# Patient Record
Sex: MALE | Race: WHITE | NOT HISPANIC OR LATINO | Employment: OTHER | URBAN - METROPOLITAN AREA
[De-identification: names, ages, dates, MRNs, and addresses within clinical notes are randomized per-mention and may not be internally consistent; named-entity substitution may affect disease eponyms.]

---

## 2017-01-04 RX ORDER — INSULIN GLARGINE 100 [IU]/ML
INJECTION, SOLUTION SUBCUTANEOUS
Qty: 45 ML | Refills: 4 | Status: SHIPPED | OUTPATIENT
Start: 2017-01-04 | End: 2018-05-11 | Stop reason: SDUPTHER

## 2017-01-16 ENCOUNTER — LAB VISIT (OUTPATIENT)
Dept: LAB | Facility: HOSPITAL | Age: 77
End: 2017-01-16
Attending: INTERNAL MEDICINE
Payer: MEDICARE

## 2017-01-16 DIAGNOSIS — E13.9 DIABETES MELLITUS DUE TO ABNORMAL INSULIN: ICD-10-CM

## 2017-01-16 LAB
ALBUMIN SERPL BCP-MCNC: 3.9 G/DL
ALP SERPL-CCNC: 80 U/L
ALT SERPL W/O P-5'-P-CCNC: 53 U/L
ANION GAP SERPL CALC-SCNC: 7 MMOL/L
AST SERPL-CCNC: 43 U/L
BASOPHILS # BLD AUTO: 0.01 K/UL
BASOPHILS NFR BLD: 0.2 %
BILIRUB SERPL-MCNC: 0.4 MG/DL
BUN SERPL-MCNC: 16 MG/DL
CALCIUM SERPL-MCNC: 9.8 MG/DL
CHLORIDE SERPL-SCNC: 103 MMOL/L
CO2 SERPL-SCNC: 32 MMOL/L
CREAT SERPL-MCNC: 1 MG/DL
DIFFERENTIAL METHOD: ABNORMAL
EOSINOPHIL # BLD AUTO: 0.1 K/UL
EOSINOPHIL NFR BLD: 1.7 %
ERYTHROCYTE [DISTWIDTH] IN BLOOD BY AUTOMATED COUNT: 14.3 %
EST. GFR  (AFRICAN AMERICAN): >60 ML/MIN/1.73 M^2
EST. GFR  (NON AFRICAN AMERICAN): >60 ML/MIN/1.73 M^2
GLUCOSE SERPL-MCNC: 189 MG/DL
HCT VFR BLD AUTO: 38.9 %
HGB BLD-MCNC: 12.8 G/DL
LYMPHOCYTES # BLD AUTO: 1.4 K/UL
LYMPHOCYTES NFR BLD: 21.9 %
MCH RBC QN AUTO: 32 PG
MCHC RBC AUTO-ENTMCNC: 32.9 %
MCV RBC AUTO: 97 FL
MONOCYTES # BLD AUTO: 0.5 K/UL
MONOCYTES NFR BLD: 7.6 %
NEUTROPHILS # BLD AUTO: 4.3 K/UL
NEUTROPHILS NFR BLD: 68.3 %
PLATELET # BLD AUTO: 187 K/UL
PMV BLD AUTO: 11.9 FL
POTASSIUM SERPL-SCNC: 4.9 MMOL/L
PROT SERPL-MCNC: 6.9 G/DL
RBC # BLD AUTO: 4 M/UL
SODIUM SERPL-SCNC: 142 MMOL/L
WBC # BLD AUTO: 6.34 K/UL

## 2017-01-16 PROCEDURE — 36415 COLL VENOUS BLD VENIPUNCTURE: CPT | Mod: PO

## 2017-01-16 PROCEDURE — 80053 COMPREHEN METABOLIC PANEL: CPT

## 2017-01-16 PROCEDURE — 83036 HEMOGLOBIN GLYCOSYLATED A1C: CPT

## 2017-01-16 PROCEDURE — 85025 COMPLETE CBC W/AUTO DIFF WBC: CPT

## 2017-01-17 ENCOUNTER — TELEPHONE (OUTPATIENT)
Dept: ORTHOPEDICS | Facility: CLINIC | Age: 77
End: 2017-01-17

## 2017-01-17 DIAGNOSIS — M17.0 PRIMARY OSTEOARTHRITIS OF BOTH KNEES: Primary | ICD-10-CM

## 2017-01-17 LAB
ESTIMATED AVG GLUCOSE: 171 MG/DL
HBA1C MFR BLD HPLC: 7.6 %

## 2017-01-17 RX ORDER — HYALURONATE SODIUM 20 MG/2 ML
40 SYRINGE (ML) INTRAARTICULAR WEEKLY
Status: SHIPPED | OUTPATIENT
Start: 2017-01-17 | End: 2017-02-07

## 2017-01-17 NOTE — TELEPHONE ENCOUNTER
----- Message from Carlos Alberto Galo PA-C sent at 1/17/2017  1:20 PM CST -----  Contact: self @home  Done     ----- Message -----     From: Myla Kevin MA     Sent: 1/16/2017   3:16 PM       To: Carlos Alberto Galo PA-C    He needs an order for bilateral viscosupplementation    ----- Message -----     From: Carlos Alberto Galo PA-C     Sent: 1/16/2017   3:07 PM       To: Myla Kevin MA        ----- Message -----     From: Kristina Nova MA     Sent: 1/16/2017   1:41 PM       To: Carlos Alberto Galo PA-C        ----- Message -----     From: Trista Landin     Sent: 1/16/2017   1:17 PM       To: Fady Saba Staff    sepideh knee euflexxa can he come in on 1/23

## 2017-01-18 RX ORDER — GABAPENTIN 300 MG/1
600 CAPSULE ORAL 3 TIMES DAILY
Qty: 640 CAPSULE | Refills: 3 | Status: SHIPPED | OUTPATIENT
Start: 2017-01-18 | End: 2017-07-05 | Stop reason: SDUPTHER

## 2017-01-18 NOTE — TELEPHONE ENCOUNTER
----- Message from Hillary Castaneda sent at 1/18/2017 12:42 PM CST -----  Contact: Self/[phone]   Type: Rx    Name of medication(s): gabapentin (NEURONTIN) 300 MG capsule    Is this a refill? New rx?refill    Who prescribed medication?    Pharmacy Name, Phone, & Location:[facility] on file #[id number]    Comments:Patient is calling to request a refill on medication above. He states he is completely out of the medication. Pt would like to speak with someone in the office. Please call and advise.    Thank you!

## 2017-01-22 RX ORDER — TIZANIDINE 2 MG/1
TABLET ORAL
Qty: 270 TABLET | Refills: 4 | Status: ON HOLD | OUTPATIENT
Start: 2017-01-22 | End: 2019-06-18

## 2017-01-23 ENCOUNTER — LAB VISIT (OUTPATIENT)
Dept: LAB | Facility: HOSPITAL | Age: 77
End: 2017-01-23
Attending: INTERNAL MEDICINE
Payer: MEDICARE

## 2017-01-23 ENCOUNTER — OFFICE VISIT (OUTPATIENT)
Dept: INTERNAL MEDICINE | Facility: CLINIC | Age: 77
End: 2017-01-23
Payer: MEDICARE

## 2017-01-23 VITALS
WEIGHT: 236.75 LBS | BODY MASS INDEX: 33.89 KG/M2 | HEART RATE: 69 BPM | DIASTOLIC BLOOD PRESSURE: 80 MMHG | HEIGHT: 70 IN | SYSTOLIC BLOOD PRESSURE: 118 MMHG | OXYGEN SATURATION: 95 %

## 2017-01-23 DIAGNOSIS — R74.01 TRANSAMINITIS: ICD-10-CM

## 2017-01-23 DIAGNOSIS — Z79.4 DIABETES MELLITUS DUE TO UNDERLYING CONDITION WITH DIABETIC POLYNEUROPATHY, WITH LONG-TERM CURRENT USE OF INSULIN: ICD-10-CM

## 2017-01-23 DIAGNOSIS — I21.4 NON-STEMI (NON-ST ELEVATED MYOCARDIAL INFARCTION): ICD-10-CM

## 2017-01-23 DIAGNOSIS — I48.0 PAROXYSMAL ATRIAL FIBRILLATION: ICD-10-CM

## 2017-01-23 DIAGNOSIS — E08.42 DIABETES MELLITUS DUE TO UNDERLYING CONDITION WITH DIABETIC POLYNEUROPATHY: ICD-10-CM

## 2017-01-23 DIAGNOSIS — I10 ESSENTIAL HYPERTENSION: ICD-10-CM

## 2017-01-23 DIAGNOSIS — I48.92 ATRIAL FLUTTER: ICD-10-CM

## 2017-01-23 DIAGNOSIS — M54.50 CHRONIC LOW BACK PAIN WITHOUT SCIATICA, UNSPECIFIED BACK PAIN LATERALITY: ICD-10-CM

## 2017-01-23 DIAGNOSIS — I50.43 ACUTE ON CHRONIC SYSTOLIC AND DIASTOLIC HEART FAILURE, NYHA CLASS 4: ICD-10-CM

## 2017-01-23 DIAGNOSIS — E08.42 DIABETES MELLITUS DUE TO UNDERLYING CONDITION WITH DIABETIC POLYNEUROPATHY, WITH LONG-TERM CURRENT USE OF INSULIN: ICD-10-CM

## 2017-01-23 DIAGNOSIS — I49.01 VENTRICULAR FIBRILLATION: ICD-10-CM

## 2017-01-23 DIAGNOSIS — I50.22 CHRONIC SYSTOLIC HEART FAILURE: ICD-10-CM

## 2017-01-23 DIAGNOSIS — I25.5 CARDIOMYOPATHY, ISCHEMIC: Primary | ICD-10-CM

## 2017-01-23 DIAGNOSIS — G89.29 CHRONIC LOW BACK PAIN WITHOUT SCIATICA, UNSPECIFIED BACK PAIN LATERALITY: ICD-10-CM

## 2017-01-23 DIAGNOSIS — Z45.02 IMPLANTABLE CARDIOVERTER-DEFIBRILLATOR DISCHARGE: ICD-10-CM

## 2017-01-23 DIAGNOSIS — I48.91 ATRIAL FIBRILLATION: ICD-10-CM

## 2017-01-23 DIAGNOSIS — I70.0 ATHEROSCLEROSIS OF AORTIC ARCH: ICD-10-CM

## 2017-01-23 DIAGNOSIS — I25.10 CORONARY ARTERY DISEASE INVOLVING NATIVE CORONARY ARTERY OF NATIVE HEART WITHOUT ANGINA PECTORIS: ICD-10-CM

## 2017-01-23 DIAGNOSIS — Z95.810 AUTOMATIC IMPLANTABLE CARDIOVERTER-DEFIBRILLATOR IN SITU: ICD-10-CM

## 2017-01-23 DIAGNOSIS — I25.5 CARDIOMYOPATHY, ISCHEMIC: ICD-10-CM

## 2017-01-23 DIAGNOSIS — Z98.61 POST PTCA: ICD-10-CM

## 2017-01-23 DIAGNOSIS — E78.00 PURE HYPERCHOLESTEROLEMIA: ICD-10-CM

## 2017-01-23 DIAGNOSIS — E78.5 HYPERLIPIDEMIA: ICD-10-CM

## 2017-01-23 LAB
ALBUMIN SERPL BCP-MCNC: 3.8 G/DL
ALP SERPL-CCNC: 77 U/L
ALT SERPL W/O P-5'-P-CCNC: 50 U/L
ANION GAP SERPL CALC-SCNC: 8 MMOL/L
AST SERPL-CCNC: 51 U/L
BASOPHILS # BLD AUTO: 0.02 K/UL
BASOPHILS NFR BLD: 0.4 %
BILIRUB SERPL-MCNC: 0.5 MG/DL
BUN SERPL-MCNC: 14 MG/DL
CALCIUM SERPL-MCNC: 9.2 MG/DL
CHLORIDE SERPL-SCNC: 102 MMOL/L
CHOLEST/HDLC SERPL: 4.6 {RATIO}
CO2 SERPL-SCNC: 30 MMOL/L
CREAT SERPL-MCNC: 1.1 MG/DL
DIFFERENTIAL METHOD: ABNORMAL
EOSINOPHIL # BLD AUTO: 0.1 K/UL
EOSINOPHIL NFR BLD: 2 %
ERYTHROCYTE [DISTWIDTH] IN BLOOD BY AUTOMATED COUNT: 14.1 %
EST. GFR  (AFRICAN AMERICAN): >60 ML/MIN/1.73 M^2
EST. GFR  (NON AFRICAN AMERICAN): >60 ML/MIN/1.73 M^2
GLUCOSE SERPL-MCNC: 277 MG/DL
HCT VFR BLD AUTO: 38.4 %
HDL/CHOLESTEROL RATIO: 21.6 %
HDLC SERPL-MCNC: 185 MG/DL
HDLC SERPL-MCNC: 40 MG/DL
HGB BLD-MCNC: 12.9 G/DL
LDLC SERPL CALC-MCNC: 103.6 MG/DL
LYMPHOCYTES # BLD AUTO: 1.5 K/UL
LYMPHOCYTES NFR BLD: 26.7 %
MCH RBC QN AUTO: 32.5 PG
MCHC RBC AUTO-ENTMCNC: 33.6 %
MCV RBC AUTO: 97 FL
MONOCYTES # BLD AUTO: 0.6 K/UL
MONOCYTES NFR BLD: 11.7 %
NEUTROPHILS # BLD AUTO: 3.2 K/UL
NEUTROPHILS NFR BLD: 58.8 %
NONHDLC SERPL-MCNC: 145 MG/DL
PLATELET # BLD AUTO: 178 K/UL
PMV BLD AUTO: 11.9 FL
POTASSIUM SERPL-SCNC: 4.7 MMOL/L
PROT SERPL-MCNC: 6.9 G/DL
RBC # BLD AUTO: 3.97 M/UL
SODIUM SERPL-SCNC: 140 MMOL/L
TRIGL SERPL-MCNC: 207 MG/DL
WBC # BLD AUTO: 5.47 K/UL

## 2017-01-23 PROCEDURE — 99999 PR PBB SHADOW E&M-EST. PATIENT-LVL II: CPT | Mod: PBBFAC,,, | Performed by: INTERNAL MEDICINE

## 2017-01-23 PROCEDURE — 1160F RVW MEDS BY RX/DR IN RCRD: CPT | Mod: S$GLB,,, | Performed by: INTERNAL MEDICINE

## 2017-01-23 PROCEDURE — 99499 UNLISTED E&M SERVICE: CPT | Mod: S$GLB,,, | Performed by: INTERNAL MEDICINE

## 2017-01-23 PROCEDURE — 3074F SYST BP LT 130 MM HG: CPT | Mod: S$GLB,,, | Performed by: INTERNAL MEDICINE

## 2017-01-23 PROCEDURE — 1157F ADVNC CARE PLAN IN RCRD: CPT | Mod: S$GLB,,, | Performed by: INTERNAL MEDICINE

## 2017-01-23 PROCEDURE — 1159F MED LIST DOCD IN RCRD: CPT | Mod: S$GLB,,, | Performed by: INTERNAL MEDICINE

## 2017-01-23 PROCEDURE — 99214 OFFICE O/P EST MOD 30 MIN: CPT | Mod: S$GLB,,, | Performed by: INTERNAL MEDICINE

## 2017-01-23 PROCEDURE — 3079F DIAST BP 80-89 MM HG: CPT | Mod: S$GLB,,, | Performed by: INTERNAL MEDICINE

## 2017-01-23 NOTE — PROGRESS NOTES
CHIEF COMPLAINT: Follow up of chf, diabetes, hypertension, a fib, hyperlipidemia, back pain      HISTORY OF PRESENT ILLNESS: This is a 76-year-old man who presents for follow up of above. His back is doing well.  HE is doing stretching at home which helps. HE has some pain if he stands too long. He takes tizanidine 2 mg three times daily as needed for muscle spasms in back      He is currently taking pradaxa 150 mg twice daily for anticoagulation for a fib/flutter. NO edema. He has mild dyspnea on exertion. No chest pain or palpitations. He continues to take Lasix 40 mg once daily, carvediolol 25 mg bid and Entresto 97/103 twice daily and KCL 10 meq 1 tablet daily for his hypertension and CHF. He is also on amiodarone 200 mg twice daily for his atrial flutter. Aspirin 81 mg daily.urgently.        Mood is stable on paxil 10 mg nightly. He was born with a bad temper. Temper is a little better. He is sleeping well. HE continues to take trazodone 50 mg 2 tablets at bedtime, temazepam 15 mg at bedtime and melatonin 5 mg at bedtime. No anxiety or depression now. HE has  from his wife which has helped his mood.       His blood sugars are better. Saw diabetic education. He currently takes Lantus 40 units at night, and NovoLog 16-20 units with meals. He denies any polydipsia, polyuria, hypoglycemia.       He has hyperlipidemia, currently off Lipitor 20 mg daily      His liver enzymes have been mildly elevated. Dr Palm stopped fenofibrate due to the elevated liver enzymes. He had a liver biopsy and the elevated liver enzymes are NOT due to amiodarone. No further work up is needed at this time.       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 Shooting pain has resolved with increasing dose of gabapentin.     No nausea, vomiting, constipation, diarrhea, dysuria, hematuria, sinus congestion, sore throat, headache.      PAST MEDICAL HISTORY:    1. Hypertension, coronary artery  "disease, status post stenting.    2. Ischemic cardiomyopathy.    3. Atrial flutter.    4. Diabetes mellitus.    5. ICD placed 02/22/2012.    6. Hyperlipidemia.    7. Osteoarthritis of the knees.  8. Atrial fibrillation  9. Transaminitis      PAST SURGICAL HISTORY: Appendectomy in 2003.       SOCIAL HISTORY: He is a former smoker, quit in 1987. Does not drink alcohol.    .       PHYSICAL EXAMINATION:    Visit Vitals    /80    Pulse 69    Ht 5' 10" (1.778 m)    Wt 107.4 kg (236 lb 12.4 oz)    SpO2 95%    BMI 33.97 kg/m2       GENERAL: He is alert, oriented, no apparent distress. Affect within normal limits.    HEENT: Conjunctivae anicteric. Pupils are equal, round and reactive to light.    Tympanic membranes are clear. Oropharynx is clear.    NECK: Supple. No cervical lymphadenopathy, no thyroid enlargement.    RESPIRATORY: Effort normal. Lungs are clear to auscultation.    HEART: Regular rate and rhythm without murmurs, gallops or rubs. No lower extremity edema.    ABDOMEN: soft, non distended, non tender, bowel sounds present, no hepatosplenomgaly  BREAST: no abn seen, no nodules palpated, no axillary lad     Labs 1/16/17 - hemoglobin A1C 7.6 , CBC acceptable, CMP with AST of 43, ALT of 53 (better)              ASSESSMENT AND PLAN:.     1. Back pain - better with stretching  2. CHF - doing well - stable  3. Coronary artery disease - stable - to follow up with Cardiology.    4. Atrial flutter - s/p cardioversion - stable    5. Diabetes mellitus - stable. Watch blood sugars  5. History of anemia on last blood work, stable    6. Hyperlipidemia. At goal off atorvastatin  7. Transaminitis -better, watch closely    8. Screening - PSA 3/16,   9. Diabetic neuropathy -stable. gabapentin 300 mg 2 capsules three times daily    10. Anxiety and depression - stable  11. Aortic arch atherosclerosis - modifying risk factors  I will see him back in 4 months, sooner if problems arise.         PSA 3/16  Colonoscopy " - declines  Influenza 9/16  Prevnar 9/16

## 2017-01-30 ENCOUNTER — HOSPITAL ENCOUNTER (OUTPATIENT)
Dept: CARDIOLOGY | Facility: CLINIC | Age: 77
Discharge: HOME | End: 2017-01-30
Payer: MEDICARE

## 2017-01-30 ENCOUNTER — OFFICE VISIT (OUTPATIENT)
Dept: CARDIOLOGY | Facility: CLINIC | Age: 77
End: 2017-01-30
Payer: MEDICARE

## 2017-01-30 VITALS
HEIGHT: 70 IN | WEIGHT: 238.31 LBS | HEART RATE: 60 BPM | DIASTOLIC BLOOD PRESSURE: 57 MMHG | BODY MASS INDEX: 34.12 KG/M2 | SYSTOLIC BLOOD PRESSURE: 102 MMHG

## 2017-01-30 DIAGNOSIS — R74.01 TRANSAMINITIS: ICD-10-CM

## 2017-01-30 DIAGNOSIS — I25.5 CARDIOMYOPATHY, ISCHEMIC: ICD-10-CM

## 2017-01-30 DIAGNOSIS — I50.22 CHRONIC SYSTOLIC HEART FAILURE: ICD-10-CM

## 2017-01-30 DIAGNOSIS — I25.10 CORONARY ARTERY DISEASE INVOLVING NATIVE CORONARY ARTERY OF NATIVE HEART WITHOUT ANGINA PECTORIS: ICD-10-CM

## 2017-01-30 DIAGNOSIS — I10 ESSENTIAL HYPERTENSION: ICD-10-CM

## 2017-01-30 DIAGNOSIS — I50.43 ACUTE ON CHRONIC SYSTOLIC AND DIASTOLIC HEART FAILURE, NYHA CLASS 4: ICD-10-CM

## 2017-01-30 DIAGNOSIS — E78.5 HYPERLIPIDEMIA: ICD-10-CM

## 2017-01-30 DIAGNOSIS — I48.91 ATRIAL FIBRILLATION: ICD-10-CM

## 2017-01-30 DIAGNOSIS — Z98.61 POST PTCA: ICD-10-CM

## 2017-01-30 DIAGNOSIS — Z95.810 AUTOMATIC IMPLANTABLE CARDIOVERTER-DEFIBRILLATOR IN SITU: ICD-10-CM

## 2017-01-30 DIAGNOSIS — I48.92 ATRIAL FLUTTER: ICD-10-CM

## 2017-01-30 DIAGNOSIS — E08.42 DIABETES MELLITUS DUE TO UNDERLYING CONDITION WITH DIABETIC POLYNEUROPATHY: ICD-10-CM

## 2017-01-30 DIAGNOSIS — I49.01 VENTRICULAR FIBRILLATION: ICD-10-CM

## 2017-01-30 DIAGNOSIS — I48.0 PAROXYSMAL ATRIAL FIBRILLATION: Primary | ICD-10-CM

## 2017-01-30 DIAGNOSIS — Z45.02 IMPLANTABLE CARDIOVERTER-DEFIBRILLATOR DISCHARGE: ICD-10-CM

## 2017-01-30 DIAGNOSIS — I21.4 NON-STEMI (NON-ST ELEVATED MYOCARDIAL INFARCTION): ICD-10-CM

## 2017-01-30 PROCEDURE — 1157F ADVNC CARE PLAN IN RCRD: CPT | Mod: S$GLB,,, | Performed by: INTERNAL MEDICINE

## 2017-01-30 PROCEDURE — 93000 ELECTROCARDIOGRAM COMPLETE: CPT | Mod: S$GLB,,, | Performed by: INTERNAL MEDICINE

## 2017-01-30 PROCEDURE — 1160F RVW MEDS BY RX/DR IN RCRD: CPT | Mod: S$GLB,,, | Performed by: INTERNAL MEDICINE

## 2017-01-30 PROCEDURE — 1159F MED LIST DOCD IN RCRD: CPT | Mod: S$GLB,,, | Performed by: INTERNAL MEDICINE

## 2017-01-30 PROCEDURE — 99499 UNLISTED E&M SERVICE: CPT | Mod: S$GLB,,, | Performed by: INTERNAL MEDICINE

## 2017-01-30 PROCEDURE — 99214 OFFICE O/P EST MOD 30 MIN: CPT | Mod: S$GLB,,, | Performed by: INTERNAL MEDICINE

## 2017-01-30 PROCEDURE — 99999 PR PBB SHADOW E&M-EST. PATIENT-LVL IV: CPT | Mod: PBBFAC,,, | Performed by: INTERNAL MEDICINE

## 2017-01-30 PROCEDURE — 1126F AMNT PAIN NOTED NONE PRSNT: CPT | Mod: S$GLB,,, | Performed by: INTERNAL MEDICINE

## 2017-01-30 PROCEDURE — 3074F SYST BP LT 130 MM HG: CPT | Mod: S$GLB,,, | Performed by: INTERNAL MEDICINE

## 2017-01-30 PROCEDURE — 3078F DIAST BP <80 MM HG: CPT | Mod: S$GLB,,, | Performed by: INTERNAL MEDICINE

## 2017-01-30 NOTE — PROGRESS NOTES
Subjective:   Patient ID:  Madhav Cortez is a 76 y.o. male who presents for follow-up of Essential hypertension (6 months fu)      HPI:  He has been doing well since his last visit. Madhav Cortez denies any chest pain, PND, orthopnea, palpitations, leg edema, or syncope. He has stable class II dyspnea. He has been tolerating Entresto. His last echo was 3/29 and showed a dilated LV with an LVEF of 10% ( LVEDD of 6.8 LVESD 6.6 cm.) His ICD was interrogated in December and showed LV pacing 99% of the time. He states he has been following the same vegan diet as Josue Merritt. On further questioning, he has has been eating at a Chinese buffet multiple days a week at the [employer]. His weight is up 10 lbs from last year and 6 lbs since the summer.     ECG: Bi-V pacing    Past Medical History   Diagnosis Date    *Atrial fibrillation     *Atrial flutter     Acute exacerbation of CHF (congestive heart failure) 8/2/2013    Anticoagulant long-term use     Anxiety     Arthritis     Atrial flutter     Back pain     Cardiomyopathy     Cataract     Coronary artery disease     Depression     Diabetes mellitus     Heart bloc     Hepatitis B     Hyperlipidemia 4/1/2014    Hypertension     Myocardial infarction     Non-STEMI (non-ST elevated myocardial infarction) 5/26/2013    Nuclear sclerosis - Both Eyes 2/18/2013    Post PTCA 8/7/2012    Presence of biventricular AICD 2/23/2016    Tobacco dependence     Transaminitis 4/1/2014    Ventricular tachycardia        Past Surgical History   Procedure Laterality Date    Coronary angioplasty with stent placement      Appendectomy      Radiofrequency ablation      Fracture surgery       L arm    Cardiac defibrillator placement      Cardiac defibrillator placement      Tonsillectomy      Vascular surgery      Insert / replace / remove pacemaker  12/15     bi-V upgrade    Colonoscopy      Steroid injection knee Bilateral      received about every 4 months  "      Social History     Social History    Marital status:      Spouse name: N/A    Number of children: N/A    Years of education: N/A     Social History Main Topics    Smoking status: Former Smoker     Quit date: 7/6/1987    Smokeless tobacco: Never Used      Comment: 25 years ago    Alcohol use No    Drug use: No    Sexual activity: No     Other Topics Concern    Not on file     Social History Narrative    Retired. Spends a lot of time gambling in the [employer]       Family History   Problem Relation Age of Onset    Cancer Father     Diabetes Father     Bladder Cancer Father     Diabetes Mother     Heart attack Mother      "weak heart"    Bipolar disorder Son     Cancer Paternal Grandmother     Heart disease Maternal Grandmother     No Known Problems Maternal Grandfather     Cancer Paternal Grandfather     No Known Problems Daughter     No Known Problems Daughter     No Known Problems Son     No Known Problems Son     Cancer Maternal Uncle     No Known Problems Maternal Aunt     No Known Problems Paternal Aunt     No Known Problems Paternal Uncle     No Known Problems Sister     No Known Problems Brother     Amblyopia Neg Hx     Blindness Neg Hx     Cataracts Neg Hx     Glaucoma Neg Hx     Hypertension Neg Hx     Macular degeneration Neg Hx     Retinal detachment Neg Hx     Strabismus Neg Hx     Stroke Neg Hx     Thyroid disease Neg Hx     Liver disease Neg Hx     Melanoma Neg Hx     Psoriasis Neg Hx     Lupus Neg Hx     Acne Neg Hx     Eczema Neg Hx        Patient's Medications   New Prescriptions    No medications on file   Previous Medications    ACCU-CHEK JIE MISC        ACCU-CHEK SMARTVIEW TEST STRIP STRP    CHECK BLOOD SUGAR THREE TIMES DAILY    AMIODARONE (PACERONE) 200 MG TAB    TAKE 1 TABLET TWICE DAILY    ASCORBIC ACID (VITAMIN C) 500 MG TABLET    Take 1,500 mg by mouth 2 (two) times daily.     ASPIRIN 81 MG CHEW    Take 1 tablet (81 mg total) by mouth " "once daily.    BETA CAROTENE-C-E-LUTEIN-MIN29 5,000-60-30-2 UNIT-MG-UNIT-MG TAB    Take 1 capsule by mouth once daily.     BLOOD-GLUCOSE METER KIT    Accu check monique meter Use as instructed    CARVEDILOL (COREG) 25 MG TABLET    TAKE 1 TABLET TWICE DAILY    DABIGATRAN ETEXILATE (PRADAXA) 150 MG CAP    Take 1 capsule (150 mg total) by mouth 2 (two) times daily. "Do NOT break, chew, or open capsules."    FUROSEMIDE (LASIX) 40 MG TABLET    Take 1 tablet (40 mg total) by mouth once daily.    GABAPENTIN (NEURONTIN) 300 MG CAPSULE    Take 2 capsules (600 mg total) by mouth 3 (three) times daily.    GLUCOSAMINE-CHONDROITIN 500-400 MG TABLET    Take 1 tablet by mouth 2 (two) times daily.    INSULIN ASPART (NOVOLOG FLEXPEN) 100 UNIT/ML INPN PEN    INJECT  16 UNITS SUBCUTANEOUSLY THREE TIMES DAILY WITH MEALS    LANTUS SOLOSTAR 100 UNIT/ML (3 ML) INPN PEN    INJECT 40 UNITS INTO THE SKIN EVERY EVENING.    MAGNESIUM OXIDE (MAG-OX) 400 MG TABLET    Take 400 mg by mouth 2 (two) times daily.     MELATONIN 5 MG TAB    Take 1 tablet by mouth every evening.     NITROGLYCERIN (NITROSTAT) 0.4 MG SL TABLET    Place 1 tablet (0.4 mg total) under the tongue every 5 (five) minutes as needed for Chest pain.    PAROXETINE (PAXIL) 10 MG TABLET    TAKE 1 TABLET EVERY EVENING.    POTASSIUM CHLORIDE (MICRO-K) 10 MEQ CPSR    TAKE 2 CAPSULES ONE TIME DAILY    RIBOSE ORAL    Take 1 tablet by mouth once daily.     SACUBITRIL-VALSARTAN (ENTRESTO)  MG PER TABLET    Take one tablet twice daily    SENNA-DOCUSATE 8.6-50 MG (PERICOLACE) 8.6-50 MG PER TABLET    Take 1 tablet by mouth 2 (two) times daily.    TEMAZEPAM (RESTORIL) 15 MG CAP    TAKE 1 CAPSULE EVERY EVENING    TIZANIDINE (ZANAFLEX) 2 MG TABLET    TAKE 1 TABLET EVERY 8 HOURS AS NEEDED FOR MUSCLE PAIN    TRAZODONE (DESYREL) 50 MG TABLET    TAKE 1 TO 2 TABLETS EVERY EVENING    UBIDECARENONE (COQ-10 ORAL)    Take 700 mg by mouth once daily. Takes 100mg tablet at lunch, and 600mg at dinner " "  Modified Medications    No medications on file   Discontinued Medications    No medications on file       Review of Systems   Constitution: Positive for weight gain. Negative for weakness and malaise/fatigue.   HENT: Negative for headaches and hearing loss.    Eyes: Negative for visual disturbance.   Cardiovascular: Positive for dyspnea on exertion. Negative for chest pain, claudication, leg swelling, near-syncope, orthopnea, palpitations, paroxysmal nocturnal dyspnea and syncope.   Respiratory: Negative for cough, shortness of breath, sleep disturbances due to breathing, snoring and wheezing.    Endocrine: Negative for cold intolerance, heat intolerance, polydipsia, polyphagia and polyuria.   Hematologic/Lymphatic: Negative for bleeding problem. Does not bruise/bleed easily.   Skin: Negative for rash and suspicious lesions.   Musculoskeletal: Negative for arthritis, falls, joint pain, muscle weakness and myalgias.   Gastrointestinal: Negative for abdominal pain, change in bowel habit, constipation, diarrhea, heartburn, hematochezia, melena and nausea.   Genitourinary: Negative for hematuria and nocturia.   Neurological: Negative for excessive daytime sleepiness, dizziness, light-headedness and loss of balance.   Psychiatric/Behavioral: Negative for depression. The patient is not nervous/anxious.    Allergic/Immunologic: Negative for environmental allergies.       Visit Vitals    BP (!) 102/57 (BP Location: Left arm, Patient Position: Sitting, BP Method: Automatic)    Pulse 60    Ht 5' 10" (1.778 m)    Wt 108.1 kg (238 lb 5.1 oz)    BMI 34.2 kg/m2       Objective:   Physical Exam   Constitutional: He is oriented to person, place, and time. He appears well-developed and well-nourished.        HENT:   Head: Normocephalic and atraumatic.   Mouth/Throat: Oropharynx is clear and moist.   Eyes: Conjunctivae and EOM are normal. Pupils are equal, round, and reactive to light. No scleral icterus.   Neck: Normal range " of motion. Neck supple. No hepatojugular reflux and no JVD present. No tracheal deviation present. No thyromegaly present.   Cardiovascular: Normal rate, regular rhythm, normal heart sounds and intact distal pulses.  PMI is not displaced.    Pulses:       Carotid pulses are 2+ on the right side, and 2+ on the left side.       Radial pulses are 2+ on the right side, and 2+ on the left side.        Dorsalis pedis pulses are 2+ on the right side, and 2+ on the left side.        Posterior tibial pulses are 2+ on the right side, and 2+ on the left side.   Pulmonary/Chest: Effort normal and breath sounds normal.   Abdominal: Soft. Bowel sounds are normal. He exhibits no distension and no mass. There is no hepatosplenomegaly. There is no tenderness.   Musculoskeletal: He exhibits no edema or tenderness.   Lymphadenopathy:     He has no cervical adenopathy.   Neurological: He is alert and oriented to person, place, and time.   Skin: Skin is warm and dry. No rash noted. No cyanosis or erythema. Nails show no clubbing.   Psychiatric: He has a normal mood and affect. His speech is normal and behavior is normal.       Lab Results   Component Value Date     01/23/2017    K 4.7 01/23/2017     01/23/2017    CO2 30 (H) 01/23/2017    BUN 14 01/23/2017    CREATININE 1.1 01/23/2017     (H) 01/23/2017    HGBA1C 7.6 (H) 01/16/2017    MG 2.6 10/19/2015    AST 51 (H) 01/23/2017    ALT 50 (H) 01/23/2017    ALBUMIN 3.8 01/23/2017    PROT 6.9 01/23/2017    BILITOT 0.5 01/23/2017    WBC 5.47 01/23/2017    HGB 12.9 (L) 01/23/2017    HCT 38.4 (L) 01/23/2017    MCV 97 01/23/2017     01/23/2017    INR 1.1 11/09/2015    INR 1.7 (H) 10/06/2011    TSH 2.125 09/23/2016    CHOL 185 01/23/2017    HDL 40 01/23/2017    LDLCALC 103.6 01/23/2017    TRIG 207 (H) 01/23/2017     (H) 10/19/2015       Assessment:     1. Paroxysmal atrial fibrillation : He is on amiodarone and anticoagulated with pradaxa. He follows with   [facility].   2. Chronic systolic heart failure :He appears well compensated and euvolemic. Continue Entresto and carvedilol. Consider adding spironolactone at his next visit if his blood pressure allows. It is too low today. FC II, Stage C. We discussed eating a healthy, low sodium diet.   3. Cardiomyopathy, ischemic    4. Coronary artery disease involving native coronary artery of native heart without angina pectoris:  He is asymptomatic. Continue asa. Statin therapy was stopped due to elevated liver enzymes.   5. Essential hypertension : Blood pressure is at goal. I have made no changes.   6. Automatic implantable cardioverter-defibrillator in situ    7. Transaminitis :Improved after stopping his statin therapy, but remains minimally elevated. Seen by hepatology and thought to be secondary to amiodarone. Currently the benefit of VT suppression outweighs the risk of stopping the amiodarone.   8.      Ventricular tachycardia:  Continue amiodarone.    Plan:     Madhav was seen today for essential hypertension.    Diagnoses and all orders for this visit:    Paroxysmal atrial fibrillation  -     Comprehensive metabolic panel; Future  -     CBC auto differential; Future    Chronic systolic heart failure  -     Comprehensive metabolic panel; Future  -     CBC auto differential; Future    Cardiomyopathy, ischemic  -     Comprehensive metabolic panel; Future  -     CBC auto differential; Future    Coronary artery disease involving native coronary artery of native heart without angina pectoris  -     Comprehensive metabolic panel; Future  -     CBC auto differential; Future    Essential hypertension  -     Comprehensive metabolic panel; Future  -     CBC auto differential; Future    Automatic implantable cardioverter-defibrillator in situ  -     Comprehensive metabolic panel; Future  -     CBC auto differential; Future    Transaminitis  -     Comprehensive metabolic panel; Future  -     CBC auto differential;  Future        Thank you for allowing me to participate in this patient's care. Please do not hesitate to contact me with any questions or concerns.

## 2017-01-30 NOTE — MR AVS SNAPSHOT
[facility] - Cardiology  [address]  [place] [address]  Phone: [phone]                  Madhav Cortez   2017 11:00 AM   Office Visit    Description:  Male : 1940   Provider:  Kira Palm MD   Department:  [facility] - Cardiology           Reason for Visit     Essential hypertension           Diagnoses this Visit        Comments    Paroxysmal atrial fibrillation    -  Primary     Chronic systolic heart failure         Cardiomyopathy, ischemic         Coronary artery disease involving native coronary artery of native heart without angina pectoris         Essential hypertension         Automatic implantable cardioverter-defibrillator in situ         Transaminitis                To Do List           Future Appointments        Provider Department Dept Phone    3/20/2017 1:00 PM PACEMAKER, ICD [facility] [address] Arrhythmia [phone]    2017 1:00 PM Mirna Reyes MD [facility] - Internal Medicine [phone]      Goals (5 Years of Data)     None      Follow-Up and Disposition     Return in about 6 months (around 2017).      [facility] On Call     [facility] On Call Nurse [facility] Emergency Department Line -  Assistance  Registered nurses in the [facility] On Call Center provide clinical advisement, health education, appointment booking, and other advisory services.  Call for this free service at [phone].             Medications           Message regarding Medications     Verify the changes and/or additions to your medication regime listed below are the same as discussed with your clinician today.  If any of these changes or additions are incorrect, please notify your healthcare provider.             Verify that the below list of medications is an accurate representation of the medications you are currently taking.  If none reported, the list may be blank. If incorrect, please contact your healthcare provider. Carry this list with you in case of emergency.           Current Medications      "ACCU-CHEK JIE [facility]     ACCU-CHEK SMARTVIEW TEST STRIP Strp CHECK BLOOD SUGAR THREE TIMES DAILY    amiodarone (PACERONE) 200 MG Tab TAKE 1 TABLET TWICE DAILY    ascorbic acid (VITAMIN C) 500 MG tablet Take 1,500 mg by mouth 2 (two) times daily.     aspirin 81 MG Chew Take 1 tablet (81 mg total) by mouth once daily.    beta carotene-C-E-lutein-min29 5,000-60-30-2 unit-mg-unit-mg Tab Take 1 capsule by mouth once daily.     blood-glucose meter kit Accu check jie meter Use as instructed    carvedilol (COREG) 25 MG tablet TAKE 1 TABLET TWICE DAILY    dabigatran etexilate (PRADAXA) 150 mg Cap Take 1 capsule (150 mg total) by mouth 2 (two) times daily. "Do NOT break, chew, or open capsules."    furosemide (LASIX) 40 MG tablet Take 1 tablet (40 mg total) by mouth once daily.    gabapentin (NEURONTIN) 300 MG capsule Take 2 capsules (600 mg total) by mouth 3 (three) times daily.    glucosamine-chondroitin 500-400 mg tablet Take 1 tablet by mouth 2 (two) times daily.    insulin aspart (NOVOLOG FLEXPEN) 100 unit/mL InPn pen INJECT  16 UNITS SUBCUTANEOUSLY THREE TIMES DAILY WITH MEALS    LANTUS SOLOSTAR 100 unit/mL (3 mL) InPn pen INJECT 40 UNITS INTO THE SKIN EVERY EVENING.    magnesium oxide (MAG-OX) 400 mg tablet Take 400 mg by mouth 2 (two) times daily.     melatonin 5 mg Tab Take 1 tablet by mouth every evening.     nitroGLYCERIN (NITROSTAT) 0.4 MG SL tablet Place 1 tablet (0.4 mg total) under the tongue every 5 (five) minutes as needed for Chest pain.    paroxetine (PAXIL) 10 MG tablet TAKE 1 TABLET EVERY EVENING.    potassium chloride (MICRO-K) 10 MEQ CpSR TAKE 2 CAPSULES ONE TIME DAILY    RIBOSE ORAL Take 1 tablet by mouth once daily.     sacubitril-valsartan (ENTRESTO)  mg per tablet Take one tablet twice daily    senna-docusate 8.6-50 mg (PERICOLACE) 8.6-50 mg per tablet Take 1 tablet by mouth 2 (two) times daily.    temazepam (RESTORIL) 15 mg Cap TAKE 1 CAPSULE EVERY EVENING    tizanidine (ZANAFLEX) 2 MG tablet " "TAKE 1 TABLET EVERY 8 HOURS AS NEEDED FOR MUSCLE PAIN    trazodone (DESYREL) 50 MG tablet TAKE 1 TO 2 TABLETS EVERY EVENING    UBIDECARENONE (COQ-10 ORAL) Take 700 mg by mouth once daily. Takes 100mg tablet at lunch, and 600mg at dinner           Clinical Reference Information           Vital Signs - Last Recorded  Most recent update: 1/30/2017 10:27 AM by Jie Bar MA    BP Pulse Ht Wt BMI    (!) 102/57 (BP Location: Left arm, Patient Position: Sitting, BP Method: Automatic) 60 5' 10" (1.778 m) 108.1 kg (238 lb 5.1 oz) 34.2 kg/m2      Blood Pressure          Most Recent Value    Right Arm BP - Sitting  100/59    Left Arm BP - Sitting  102/57    BP  (!)  102/57      Allergies as of 1/30/2017     No Known Allergies      Immunizations Administered on Date of Encounter - 1/30/2017     None      Orders Placed During Today's Visit     Future Labs/Procedures Expected by Expires    CBC auto differential  7/30/2017 3/31/2018    Comprehensive metabolic panel  8/1/2017 (Approximate) 1/30/2018      "

## 2017-01-30 NOTE — LETTER
January 30, 2017      Mirna Reyes MD  [address]  [place] [address]           [facility] - Cardiology  [address]  [place] [address]  Phone: [phone]          Patient: Madhav Cortez   MR Number: [id number]   YOB: 1940   Date of Visit: 1/30/2017       Dear Dr. Mirna Reyes:    Thank you for referring Madhav Cortez to me for evaluation. Attached you will find relevant portions of my assessment and plan of care.    If you have questions, please do not hesitate to call me. I look forward to following Madhav Cortez along with you.    Sincerely,    Kria Palm MD    Enclosure  CC:  No Recipients    If you would like to receive this communication electronically, please contact [email or web address] or [phone] to request more information on [employer] Link access.    For providers and/or their staff who would like to refer a patient to [facility], please contact us through our one-stop-shop provider referral line, [facility], at [phone].    If you feel you have received this communication in error or would no longer like to receive these types of communications, please e-mail [email or web address]

## 2017-03-05 RX ORDER — TRAZODONE HYDROCHLORIDE 50 MG/1
TABLET ORAL
Qty: 180 TABLET | Refills: 3 | Status: SHIPPED | OUTPATIENT
Start: 2017-03-05 | End: 2018-03-12 | Stop reason: SDUPTHER

## 2017-03-17 DIAGNOSIS — I25.5 ISCHEMIC CARDIOMYOPATHY: Primary | ICD-10-CM

## 2017-03-30 ENCOUNTER — OFFICE VISIT (OUTPATIENT)
Dept: ELECTROPHYSIOLOGY | Facility: CLINIC | Age: 77
End: 2017-03-30
Payer: MEDICARE

## 2017-03-30 ENCOUNTER — CLINICAL SUPPORT (OUTPATIENT)
Dept: ELECTROPHYSIOLOGY | Facility: CLINIC | Age: 77
End: 2017-03-30
Payer: MEDICARE

## 2017-03-30 ENCOUNTER — LAB VISIT (OUTPATIENT)
Dept: LAB | Facility: HOSPITAL | Age: 77
End: 2017-03-30
Payer: MEDICARE

## 2017-03-30 VITALS
HEART RATE: 97 BPM | SYSTOLIC BLOOD PRESSURE: 80 MMHG | HEIGHT: 70 IN | WEIGHT: 235 LBS | DIASTOLIC BLOOD PRESSURE: 46 MMHG | BODY MASS INDEX: 33.64 KG/M2

## 2017-03-30 DIAGNOSIS — Z79.899 ON AMIODARONE THERAPY: ICD-10-CM

## 2017-03-30 DIAGNOSIS — I50.22 CHRONIC SYSTOLIC HEART FAILURE: ICD-10-CM

## 2017-03-30 DIAGNOSIS — Z79.01 CURRENT USE OF LONG TERM ANTICOAGULATION: ICD-10-CM

## 2017-03-30 DIAGNOSIS — Z95.810 ICD (IMPLANTABLE CARDIOVERTER-DEFIBRILLATOR) IN PLACE: ICD-10-CM

## 2017-03-30 DIAGNOSIS — I25.5 ISCHEMIC CARDIOMYOPATHY: ICD-10-CM

## 2017-03-30 DIAGNOSIS — I25.5 CARDIOMYOPATHY, ISCHEMIC: ICD-10-CM

## 2017-03-30 DIAGNOSIS — E08.42 DIABETES MELLITUS DUE TO UNDERLYING CONDITION WITH DIABETIC POLYNEUROPATHY, WITH LONG-TERM CURRENT USE OF INSULIN: ICD-10-CM

## 2017-03-30 DIAGNOSIS — I48.0 PAROXYSMAL ATRIAL FIBRILLATION: Primary | ICD-10-CM

## 2017-03-30 DIAGNOSIS — I48.92 ATRIAL FLUTTER, UNSPECIFIED TYPE: ICD-10-CM

## 2017-03-30 DIAGNOSIS — Z95.810 ICD (IMPLANTABLE CARDIOVERTER-DEFIBRILLATOR) IN PLACE: Primary | ICD-10-CM

## 2017-03-30 DIAGNOSIS — Z79.4 DIABETES MELLITUS DUE TO UNDERLYING CONDITION WITH DIABETIC POLYNEUROPATHY, WITH LONG-TERM CURRENT USE OF INSULIN: ICD-10-CM

## 2017-03-30 DIAGNOSIS — I25.10 CORONARY ARTERY DISEASE INVOLVING NATIVE CORONARY ARTERY OF NATIVE HEART WITHOUT ANGINA PECTORIS: ICD-10-CM

## 2017-03-30 DIAGNOSIS — Z95.810 ICD (IMPLANTABLE CARDIOVERTER-DEFIBRILLATOR), BIVENTRICULAR, IN SITU: ICD-10-CM

## 2017-03-30 DIAGNOSIS — E78.00 PURE HYPERCHOLESTEROLEMIA: ICD-10-CM

## 2017-03-30 DIAGNOSIS — I10 ESSENTIAL HYPERTENSION: ICD-10-CM

## 2017-03-30 LAB
ALBUMIN SERPL BCP-MCNC: 3.8 G/DL
ALP SERPL-CCNC: 65 U/L
ALT SERPL W/O P-5'-P-CCNC: 85 U/L
AST SERPL-CCNC: 83 U/L
BILIRUB DIRECT SERPL-MCNC: 0.2 MG/DL
BILIRUB SERPL-MCNC: 0.4 MG/DL
PROT SERPL-MCNC: 7 G/DL
TSH SERPL DL<=0.005 MIU/L-ACNC: 2.26 UIU/ML

## 2017-03-30 PROCEDURE — 36415 COLL VENOUS BLD VENIPUNCTURE: CPT

## 2017-03-30 PROCEDURE — 1160F RVW MEDS BY RX/DR IN RCRD: CPT | Mod: S$GLB,,, | Performed by: NURSE PRACTITIONER

## 2017-03-30 PROCEDURE — 1159F MED LIST DOCD IN RCRD: CPT | Mod: S$GLB,,, | Performed by: NURSE PRACTITIONER

## 2017-03-30 PROCEDURE — 1126F AMNT PAIN NOTED NONE PRSNT: CPT | Mod: S$GLB,,, | Performed by: NURSE PRACTITIONER

## 2017-03-30 PROCEDURE — 93284 PRGRMG EVAL IMPLANTABLE DFB: CPT | Mod: S$GLB,,, | Performed by: INTERNAL MEDICINE

## 2017-03-30 PROCEDURE — 3078F DIAST BP <80 MM HG: CPT | Mod: S$GLB,,, | Performed by: NURSE PRACTITIONER

## 2017-03-30 PROCEDURE — 84443 ASSAY THYROID STIM HORMONE: CPT

## 2017-03-30 PROCEDURE — 93005 ELECTROCARDIOGRAM TRACING: CPT | Mod: S$GLB,,, | Performed by: INTERNAL MEDICINE

## 2017-03-30 PROCEDURE — 99999 PR PBB SHADOW E&M-EST. PATIENT-LVL III: CPT | Mod: PBBFAC,,, | Performed by: NURSE PRACTITIONER

## 2017-03-30 PROCEDURE — 93010 ELECTROCARDIOGRAM REPORT: CPT | Mod: S$GLB,,, | Performed by: INTERNAL MEDICINE

## 2017-03-30 PROCEDURE — 3074F SYST BP LT 130 MM HG: CPT | Mod: S$GLB,,, | Performed by: NURSE PRACTITIONER

## 2017-03-30 PROCEDURE — 99214 OFFICE O/P EST MOD 30 MIN: CPT | Mod: S$GLB,,, | Performed by: NURSE PRACTITIONER

## 2017-03-30 PROCEDURE — 80076 HEPATIC FUNCTION PANEL: CPT

## 2017-03-30 PROCEDURE — 1157F ADVNC CARE PLAN IN RCRD: CPT | Mod: S$GLB,,, | Performed by: NURSE PRACTITIONER

## 2017-03-30 NOTE — MR AVS SNAPSHOT
[facility] - Arrhythmia  [address]  [facility] [address]  Phone: [phone]  Fax: [phone]                  Madhav Cortez   3/30/2017 4:30 PM   Office Visit    Description:  Male : 1940   Provider:  CITLALI Velazquez   Department:  [facility]           Reason for Visit     Pacemaker Check           Diagnoses this Visit        Comments    On amiodarone therapy                To Do List           Future Appointments        Provider Department Dept Phone    3/30/2017 3:00 PM LAB, SAME DAY [facility] [phone]    3/30/2017 4:30 PM CITLALI Velazquez [facility] Arrhythmia [phone]    2017 1:00 PM Mirna Reyes MD [facility] - Internal Medicine [phone]    7/10/2017 1:00 PM PACEMAKER, ICD [facility] Arrhythmia [phone]      Goals (5 Years of Data)     None      Follow-Up and Disposition     Return in about 6 months (around 2017).      [facility]     [facility] -  Assistance  Unless otherwise directed by your provider, please contact [facility], our nurse care line that is available for  assistance.     Registered nurses in the [facility] provide: appointment scheduling, clinical advisement, health education, and other advisory services.  Call: [phone] (toll free)               Medications           Message regarding Medications     Verify the changes and/or additions to your medication regime listed below are the same as discussed with your clinician today.  If any of these changes or additions are incorrect, please notify your healthcare provider.             Verify that the below list of medications is an accurate representation of the medications you are currently taking.  If none reported, the list may be blank. If incorrect, please contact your healthcare provider. Carry this list with you in case of emergency.           Current Medications     ACCU-CHEK  "JIE Misc     ACCU-CHEK SMARTVIEW TEST STRIP Strp CHECK BLOOD SUGAR THREE TIMES DAILY    amiodarone (PACERONE) 200 MG Tab TAKE 1 TABLET TWICE DAILY    ascorbic acid (VITAMIN C) 500 MG tablet Take 1,500 mg by mouth 2 (two) times daily.     aspirin 81 MG Chew Take 1 tablet (81 mg total) by mouth once daily.    beta carotene-C-E-lutein-min29 5,000-60-30-2 unit-mg-unit-mg Tab Take 1 capsule by mouth once daily.     blood-glucose meter kit Accu check jie meter Use as instructed    carvedilol (COREG) 25 MG tablet TAKE 1 TABLET TWICE DAILY    dabigatran etexilate (PRADAXA) 150 mg Cap Take 1 capsule (150 mg total) by mouth 2 (two) times daily. "Do NOT break, chew, or open capsules."    furosemide (LASIX) 40 MG tablet Take 1 tablet (40 mg total) by mouth once daily.    gabapentin (NEURONTIN) 300 MG capsule Take 2 capsules (600 mg total) by mouth 3 (three) times daily.    glucosamine-chondroitin 500-400 mg tablet Take 1 tablet by mouth 2 (two) times daily.    insulin aspart (NOVOLOG FLEXPEN) 100 unit/mL InPn pen INJECT  16 UNITS SUBCUTANEOUSLY THREE TIMES DAILY WITH MEALS    LANTUS SOLOSTAR 100 unit/mL (3 mL) InPn pen INJECT 40 UNITS INTO THE SKIN EVERY EVENING.    magnesium oxide (MAG-OX) 400 mg tablet Take 400 mg by mouth 2 (two) times daily.     melatonin 5 mg Tab Take 1 tablet by mouth every evening.     nitroGLYCERIN (NITROSTAT) 0.4 MG SL tablet Place 1 tablet (0.4 mg total) under the tongue every 5 (five) minutes as needed for Chest pain.    paroxetine (PAXIL) 10 MG tablet TAKE 1 TABLET EVERY EVENING.    potassium chloride (MICRO-K) 10 MEQ CpSR TAKE 2 CAPSULES ONE TIME DAILY    RIBOSE ORAL Take 1 tablet by mouth once daily.     sacubitril-valsartan (ENTRESTO)  mg per tablet Take one tablet twice daily    senna-docusate 8.6-50 mg (PERICOLACE) 8.6-50 mg per tablet Take 1 tablet by mouth 2 (two) times daily.    temazepam (RESTORIL) 15 mg Cap TAKE 1 CAPSULE EVERY EVENING    tizanidine (ZANAFLEX) 2 MG tablet TAKE 1 " "TABLET EVERY 8 HOURS AS NEEDED FOR MUSCLE PAIN    trazodone (DESYREL) 50 MG tablet TAKE 1 TO 2 TABLETS EVERY EVENING    UBIDECARENONE (COQ-10 ORAL) Take 700 mg by mouth once daily. Takes 100mg tablet at lunch, and 600mg at dinner           Clinical Reference Information           Your Vitals Were     BP Pulse Height Weight BMI    80/46 97 5' 10" (1.778 m) 106.6 kg (235 lb) 33.72 kg/m2      Blood Pressure          Most Recent Value    Right Arm BP - Sitting  (P) 90/44    Left Arm BP - Sitting  (P) 80/46    BP  (!)  80/46      Allergies as of 3/30/2017     No Known Allergies      Immunizations Administered on Date of Encounter - 3/30/2017     None      Orders Placed During Today's Visit      Normal Orders This Visit    Complete PFT w/ bronchodilator     Future Labs/Procedures Expected by Expires    Hepatic function panel  3/30/2017 (Approximate) 8/30/2017    TSH  3/30/2017 8/30/2017      Instructions    Hold: carvedilol (Coreg), Losartan, and aldactone for 1 day.  Thereafter, restart Coreg and aldactone; contact Dr. Reyes for follow up.        Language Assistance Services     ATTENTION: Language assistance services are available, free of charge. Please call [phone].      ATENCIÓN: Si habla domonique, tiene a hunter disposición servicios gratuitos de asistencia lingüística. Llame al [phone].     YAZ Ý: N?u b?n nói Ti?ng Vi?t, có các d?ch v? h? tr? ngôn ng? mi?n phí dành cho b?n. G?i s? [phone].         [facility] complies with applicable Federal civil rights laws and does not discriminate on the basis of race, color, national origin, age, disability, or sex.        "

## 2017-03-30 NOTE — PATIENT INSTRUCTIONS
Hold: carvedilol (Coreg), Losartan, and aldactone for 1 day.  Thereafter, restart Coreg and aldactone; contact Dr. Reyes for follow up.

## 2017-03-30 NOTE — PROGRESS NOTES
Subjective:    Patient ID:  Madhav Cortez is a 76 y.o. male who presents for follow-up of ICD Check.     Mr. Cortez is a patient of Dr. Koehler.     HPI      Mr. Cortez is a 76 y.o. male with pAF, AFL, VT, ICM, LBBB, HFrEF (EF 10-15%) s/p BiV ICD, EULOGIO thrombus, CAD, DM, HTN, and hypercholesterolemia.   H/o inappropriate shock for atrial tachycardia/flutter 2/12.   H/o RFA of cavo-tricuspid isthmus 5/11.   Developed recurrent atrial flutter and atrial fibrillation.   EPS/RFA 4/8/13 micro-reentry near prior isthmus RFA, underwent successful RFA. Also had atrial fibrillation during procedure.   Had an episode of syncope, device interrogation revealed VF, with appropriate ICD shock. Was admitted, blood work c/w NSTEMI (troponin up to 7). LHC revealed stable CAD. Coreg increased to 25 mg twice daily.   Developed persistent atrial fibrillation, symptomatic. Placed on Pradaxa.   DIOGO revealed EULOGIO thrombus. Continued for extended period on Pradaxa, underwent DIOGO/DCCV 10/11/13.  Has h/o elevated liver enzymes, for which he underwent liver biopsy. Not thought to be related to amiodarone.  Given worsening CHF, upgraded to CRT-D 11/11/15.    Since his last office visit, Mr. Cortez reports feeling well overall with mild dizziness, fatigue, and occasional MCBRIDE, per baseline ; he denies chest pain, overt SOB, palpitations, or syncope. His BP is low today in clinic (R 90/44; L 80/46).     Recent cardiac studies:  Echo (03/29/16) revealed an EF of 10-15%, biatrial enlargement, of severe LVE, and a PASP of 20 mmHg.   Device Interrogation (03/30/17) reveals an intrinsic SB w/first degree AVB with stable lead and device function. AMS noted (<1% AMS burden; egrams c/w noise)no NSVT. He RA paces 99% and BiV paces 100% of the time. Estimated battery longevity 5 years.     I reviewed today's ECG which demonstrated AF w/frequent AV dual-paced complexes at 97 bpm; QRS 88, and QTc 388.    Review of Systems   Constitution: Positive for  malaise/fatigue. Negative for diaphoresis and weakness.   HENT: Negative for headaches and nosebleeds.    Eyes: Negative for double vision.   Cardiovascular: Positive for dyspnea on exertion. Negative for chest pain, irregular heartbeat, leg swelling, near-syncope, palpitations and syncope.   Respiratory: Negative for shortness of breath.    Skin: Negative.    Musculoskeletal: Negative.    Gastrointestinal: Negative for abdominal pain, hematemesis and hematochezia.   Genitourinary: Negative for hematuria.   Neurological: Positive for dizziness and light-headedness.   Psychiatric/Behavioral: Negative for altered mental status.        Objective:    Physical Exam   Constitutional: He is oriented to person, place, and time. He appears well-developed and well-nourished.   HENT:   Head: Normocephalic and atraumatic.   Eyes: Pupils are equal, round, and reactive to light.   Neck: Normal range of motion.   Cardiovascular: Normal rate, regular rhythm, normal heart sounds and intact distal pulses.    Pulmonary/Chest: Effort normal and breath sounds normal.   Abdominal: Soft.   Musculoskeletal: Normal range of motion.   Neurological: He is alert and oriented to person, place, and time.   Vitals reviewed.        Assessment:       1. Paroxysmal atrial fibrillation    2. Atrial flutter, unspecified type    3. On amiodarone therapy    4. Current use of long term anticoagulation    5. Cardiomyopathy, ischemic    6. Chronic systolic heart failure    7. ICD (implantable cardioverter-defibrillator), biventricular, in situ    8. Coronary artery disease involving native coronary artery of native heart without angina pectoris    9. Diabetes mellitus due to underlying condition with diabetic polyneuropathy, with long-term current use of insulin    10. Essential hypertension    11. Pure hypercholesterolemia         Plan:       In summary, Mr. Cortez is a 76 y.o. male with pAF, AFL s/p CTI RFA, ICM, HFrEF (EF 10-15%) s/p BiV ICD, CAD, DM,  HTN, and hypercholesterolemia.   Mr. Cortez is doing well from a rhythm perspective with stable lead and device function with <1% AF burden. Rhythm-controlled on amiodarone and anticoagulated on Pradaxa.     Hold Coreg, Losartan, and Aldactone x 1 day given hypotension today. Then restart Coreg and Aldactone and follow up with Dr. Reyes (PCP). Continue all other medications.   Continue current device settings.   Follow up in device clinic as scheduled.   Follow up in EP clinic in 6 months with repeat PFTs, TSH, and LFTs, sooner as needed.     Anali Brady, [place], APRN, FNP-C      Dr. Koehler personally evaluated Mr. Cortez and agrees with the aforementioned plan.   (A copy of today's note was sent to Dr. Reyes).

## 2017-03-30 NOTE — LETTER
April 3, 2017      Mirna Reyes MD  [address]  [facility] [address]           [facility]  [address]  [facility] [address]  Phone: [phone]  Fax: [phone]          Patient: Madhav Cortez   MR Number: [id number]   YOB: 1940   Date of Visit: 3/30/2017       Dear Dr. Mirna Reyes:    Thank you for referring Madhav Cortez to me for evaluation. Attached you will find relevant portions of my assessment and plan of care.    If you have questions, please do not hesitate to call me. I look forward to following Madhav Cortez along with you.    Sincerely,    Anali Brady, [facility]    Enclosure  CC:  No Recipients    If you would like to receive this communication electronically, please contact [email or web address] or [phone] to request more information on [employer] Link access.    For providers and/or their staff who would like to refer a patient to [facility], please contact us through our one-stop-shop provider referral line, [facility] , at [phone].    If you feel you have received this communication in error or would no longer like to receive these types of communications, please e-mail [email or web address]

## 2017-03-31 DIAGNOSIS — Z79.899 ON AMIODARONE THERAPY: Primary | ICD-10-CM

## 2017-04-03 PROBLEM — Z79.01 CURRENT USE OF LONG TERM ANTICOAGULATION: Status: ACTIVE | Noted: 2017-04-03

## 2017-04-04 ENCOUNTER — OFFICE VISIT (OUTPATIENT)
Dept: INTERNAL MEDICINE | Facility: CLINIC | Age: 77
End: 2017-04-04
Payer: MEDICARE

## 2017-04-04 VITALS
HEIGHT: 70 IN | WEIGHT: 233.94 LBS | DIASTOLIC BLOOD PRESSURE: 68 MMHG | SYSTOLIC BLOOD PRESSURE: 120 MMHG | BODY MASS INDEX: 33.49 KG/M2 | HEART RATE: 65 BPM

## 2017-04-04 DIAGNOSIS — R74.01 TRANSAMINITIS: ICD-10-CM

## 2017-04-04 DIAGNOSIS — E13.9 DIABETES MELLITUS DUE TO ABNORMAL INSULIN: Primary | ICD-10-CM

## 2017-04-04 DIAGNOSIS — I10 ESSENTIAL HYPERTENSION: ICD-10-CM

## 2017-04-04 DIAGNOSIS — E78.00 PURE HYPERCHOLESTEROLEMIA: ICD-10-CM

## 2017-04-04 DIAGNOSIS — I50.22 CHRONIC SYSTOLIC HEART FAILURE: ICD-10-CM

## 2017-04-04 DIAGNOSIS — I48.92 ATRIAL FLUTTER, UNSPECIFIED TYPE: ICD-10-CM

## 2017-04-04 PROCEDURE — 1159F MED LIST DOCD IN RCRD: CPT | Mod: S$GLB,,, | Performed by: INTERNAL MEDICINE

## 2017-04-04 PROCEDURE — 3078F DIAST BP <80 MM HG: CPT | Mod: S$GLB,,, | Performed by: INTERNAL MEDICINE

## 2017-04-04 PROCEDURE — 3074F SYST BP LT 130 MM HG: CPT | Mod: S$GLB,,, | Performed by: INTERNAL MEDICINE

## 2017-04-04 PROCEDURE — 99214 OFFICE O/P EST MOD 30 MIN: CPT | Mod: S$GLB,,, | Performed by: INTERNAL MEDICINE

## 2017-04-04 PROCEDURE — 1126F AMNT PAIN NOTED NONE PRSNT: CPT | Mod: S$GLB,,, | Performed by: INTERNAL MEDICINE

## 2017-04-04 PROCEDURE — 1157F ADVNC CARE PLAN IN RCRD: CPT | Mod: S$GLB,,, | Performed by: INTERNAL MEDICINE

## 2017-04-04 PROCEDURE — 1160F RVW MEDS BY RX/DR IN RCRD: CPT | Mod: S$GLB,,, | Performed by: INTERNAL MEDICINE

## 2017-04-04 PROCEDURE — 99999 PR PBB SHADOW E&M-EST. PATIENT-LVL II: CPT | Mod: PBBFAC,,, | Performed by: INTERNAL MEDICINE

## 2017-04-04 RX ORDER — PEN NEEDLE, DIABETIC 29 G X1/2"
NEEDLE, DISPOSABLE MISCELLANEOUS
Qty: 360 EACH | Refills: 4 | Status: SHIPPED | OUTPATIENT
Start: 2017-04-04 | End: 2019-12-23 | Stop reason: SDUPTHER

## 2017-04-04 RX ORDER — INSULIN PUMP SYRINGE, 3 ML
EACH MISCELLANEOUS
Qty: 1 EACH | Refills: 0 | Status: SHIPPED | OUTPATIENT
Start: 2017-04-04 | End: 2019-11-12 | Stop reason: SDUPTHER

## 2017-04-04 NOTE — PROGRESS NOTES
CHIEF COMPLAINT: Follow up of chf, diabetes, hypertension, a fib, hyperlipidemia, back pain      HISTORY OF PRESENT ILLNESS: This is a 76-year-old man who presents for follow up of above. HE had an episode of lightheadeness in Dr Koehler's office. Blood pressure was low. He has cut back on his fluid intake due to excessive urination at night.  He is now drinking more water and is feeling better.    His back is doing well. HE is doing stretching at home which helps. HE has some pain if he stands too long. He takes tizanidine 2 mg three times daily as needed for muscle spasms in back      He is currently taking pradaxa 150 mg twice daily for anticoagulation for a fib/flutter. NO edema. He has mild dyspnea on exertion. No chest pain or palpitations. He continues to take Lasix 40 mg once daily, carvediolol 25 mg bid and Entresto 97/103 twice daily and KCL 10 meq 1 tablet daily for his hypertension and CHF. He is also on amiodarone 200 mg twice daily for his atrial flutter. Aspirin 81 mg daily.         He is no longer taking paxil 10 mg nightly. HE does not know why he is not taking it. No side effect.  His mood is better since he  from his wife. He was born with a bad temper. Temper is a little better. He is sleeping well. HE continues to take trazodone 50 mg 2 tablets at bedtime, temazepam 15 mg at bedtime and melatonin 5 mg at bedtime. No anxiety or depression now. HE has  from his wife which has helped his mood.       His blood sugars are better. Blood sugar in the morning 160. In the afternoon, 130. He eats late at night. Saw diabetic education. He currently takes Lantus 40 units at night, and NovoLog 16-20 units with meals. He denies any polydipsia, polyuria, hypoglycemia.       He has hyperlipidemia, currently off Lipitor 20 mg daily      His liver enzymes have been mildly elevated. Dr Palm stopped fenofibrate due to the elevated liver enzymes. He had a liver biopsy and the elevated liver enzymes  "are NOT due to amiodarone. No further work up is needed at this time.       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 Shooting pain has resolved with increasing dose of gabapentin.      No nausea, vomiting, constipation, diarrhea, dysuria, hematuria, sinus congestion, sore throat, headache.       PAST MEDICAL HISTORY:    1. Hypertension, coronary artery disease, status post stenting.    2. Ischemic cardiomyopathy.    3. Atrial flutter.    4. Diabetes mellitus.    5. ICD placed 02/22/2012.    6. Hyperlipidemia.    7. Osteoarthritis of the knees.  8. Atrial fibrillation  9. Transaminitis      PAST SURGICAL HISTORY: Appendectomy in 2003.        SOCIAL HISTORY: He is a former smoker, quit in 1987. Does not drink alcohol.    .       PHYSICAL EXAMINATION:     /68  Pulse 65  Ht 5' 10" (1.778 m)  Wt 106.1 kg (233 lb 14.5 oz)  BMI 33.56 kg/m2  GENERAL: He is alert, oriented, no apparent distress. Affect within normal limits.    HEENT: Conjunctivae anicteric. Pupils are equal, round and reactive to light.    Tympanic membranes are clear. Oropharynx is clear.    NECK: Supple. No cervical lymphadenopathy, no thyroid enlargement.    RESPIRATORY: Effort normal. Lungs are clear to auscultation.    HEART: Regular rate and rhythm without murmurs, gallops or rubs. No lower extremity edema.                  ASSESSMENT AND PLAN:.      1. Back pain - stable  2. CHF - doing well - stable  3. Coronary artery disease - stable - to follow up with Cardiology.    4. Atrial flutter - s/p cardioversion - stable    5. Diabetes mellitus - stable. Watch blood sugars  5. History of anemia on last blood work, stable    6. Hyperlipidemia. At goal off atorvastatin  7. Transaminitis -better, watch closely    8. Screening - PSA 3/16,   9. Diabetic neuropathy -stable. gabapentin 300 mg 2 capsules three times daily    10. Anxiety and depression - stable  11. Aortic arch atherosclerosis - modifying " risk factors  I will see him back in 4 months, sooner if problems arise.

## 2017-04-06 RX ORDER — CARVEDILOL 25 MG/1
TABLET ORAL
Qty: 180 TABLET | Refills: 3 | Status: SHIPPED | OUTPATIENT
Start: 2017-04-06 | End: 2018-05-31 | Stop reason: SDUPTHER

## 2017-06-29 DIAGNOSIS — I25.5 CARDIOMYOPATHY, ISCHEMIC: ICD-10-CM

## 2017-06-29 DIAGNOSIS — R74.01 TRANSAMINITIS: ICD-10-CM

## 2017-06-29 DIAGNOSIS — I25.10 CORONARY ARTERY DISEASE INVOLVING NATIVE CORONARY ARTERY OF NATIVE HEART WITHOUT ANGINA PECTORIS: ICD-10-CM

## 2017-06-29 DIAGNOSIS — I10 ESSENTIAL HYPERTENSION: ICD-10-CM

## 2017-06-29 DIAGNOSIS — Z45.02 IMPLANTABLE CARDIOVERTER-DEFIBRILLATOR DISCHARGE: ICD-10-CM

## 2017-06-29 DIAGNOSIS — E78.5 HYPERLIPIDEMIA, UNSPECIFIED HYPERLIPIDEMIA TYPE: ICD-10-CM

## 2017-06-29 DIAGNOSIS — E08.42 DIABETES MELLITUS DUE TO UNDERLYING CONDITION WITH DIABETIC POLYNEUROPATHY, WITH LONG-TERM CURRENT USE OF INSULIN: ICD-10-CM

## 2017-06-29 DIAGNOSIS — Z79.4 DIABETES MELLITUS DUE TO UNDERLYING CONDITION WITH DIABETIC POLYNEUROPATHY, WITH LONG-TERM CURRENT USE OF INSULIN: ICD-10-CM

## 2017-06-29 DIAGNOSIS — Z95.810 AUTOMATIC IMPLANTABLE CARDIOVERTER-DEFIBRILLATOR IN SITU: ICD-10-CM

## 2017-06-29 DIAGNOSIS — I50.22 CHRONIC SYSTOLIC HEART FAILURE: ICD-10-CM

## 2017-06-29 DIAGNOSIS — Z98.61 POST PTCA: ICD-10-CM

## 2017-06-29 DIAGNOSIS — I49.01 VENTRICULAR FIBRILLATION: ICD-10-CM

## 2017-06-29 DIAGNOSIS — I50.43 ACUTE ON CHRONIC SYSTOLIC AND DIASTOLIC HEART FAILURE, NYHA CLASS 4: ICD-10-CM

## 2017-06-29 DIAGNOSIS — I21.4 NON-STEMI (NON-ST ELEVATED MYOCARDIAL INFARCTION): ICD-10-CM

## 2017-06-29 RX ORDER — DABIGATRAN ETEXILATE 150 MG/1
150 CAPSULE ORAL 2 TIMES DAILY
Qty: 180 CAPSULE | Refills: 3 | Status: SHIPPED | OUTPATIENT
Start: 2017-06-29 | End: 2018-06-28 | Stop reason: SDUPTHER

## 2017-06-29 RX ORDER — TEMAZEPAM 15 MG/1
CAPSULE ORAL
Qty: 90 CAPSULE | Refills: 1 | Status: SHIPPED | OUTPATIENT
Start: 2017-06-29 | End: 2017-12-11 | Stop reason: SDUPTHER

## 2017-07-03 ENCOUNTER — OFFICE VISIT (OUTPATIENT)
Dept: OPTOMETRY | Facility: CLINIC | Age: 77
End: 2017-07-03
Payer: MEDICARE

## 2017-07-03 DIAGNOSIS — H25.13 NUCLEAR SCLEROSIS, BILATERAL: Primary | ICD-10-CM

## 2017-07-03 DIAGNOSIS — H52.4 PRESBYOPIA: ICD-10-CM

## 2017-07-03 DIAGNOSIS — H40.003 GLAUCOMA SUSPECT OF BOTH EYES: ICD-10-CM

## 2017-07-03 DIAGNOSIS — H52.203 ASTIGMATISM OF BOTH EYES, UNSPECIFIED TYPE: ICD-10-CM

## 2017-07-03 DIAGNOSIS — H25.013 CORTICAL SENILE CATARACT, BILATERAL: ICD-10-CM

## 2017-07-03 DIAGNOSIS — E11.3299 BACKGROUND DIABETIC RETINOPATHY: ICD-10-CM

## 2017-07-03 PROCEDURE — 92014 COMPRE OPH EXAM EST PT 1/>: CPT | Mod: S$GLB,,, | Performed by: OPTOMETRIST

## 2017-07-03 PROCEDURE — 99999 PR PBB SHADOW E&M-EST. PATIENT-LVL II: CPT | Mod: PBBFAC,,, | Performed by: OPTOMETRIST

## 2017-07-03 PROCEDURE — 92015 DETERMINE REFRACTIVE STATE: CPT | Mod: S$GLB,,, | Performed by: OPTOMETRIST

## 2017-07-03 PROCEDURE — 99499 UNLISTED E&M SERVICE: CPT | Mod: S$GLB,,, | Performed by: OPTOMETRIST

## 2017-07-03 NOTE — PATIENT INSTRUCTIONS
Bilateral nuclear sclerotic cataract , with scattered punctate lens opacities (more apparent in the left eye.  Note decrease in correctable VA in each eye.  Per discussion with  Markdominick, defer referral for cataract surgery.  Recheck in six months..     Bilateral background/non-proliferative diabetic retinopathy. Monitor    Prior diagnsis of glaucoma suspect based on larger-than-average optic nerve cup-to-disc ratio in each eye.    C/D ratio appears stable in each eye.   Intraocular pressure (adjusted for central corneal thickess) remains within normal range.  No evidence of glaucomatous visual field defect in either eye on HVF test done previously.  Continue to monitor annually.    Astigmatic refractive error in each eye.   Presbyopia.  New spectacle lens Rx issued for use as desired.  Recheck in six months- or prior if any apparent and bothersome decrease in vision in the interim.

## 2017-07-03 NOTE — PROGRESS NOTES
HPI     Concerns About Ocular Health    Additional comments: Eye exam and refraction.  C/O bilateral epiphora, in   the morning.            Comments   Patient's age: 77 y.o. WM  Occupation: Retired  Approximate date of last eye examination:  06/22/2016  Name of last eye doctor seen: Dr Padilla  City/State: [facility]  Wears glasses? Yes     If yes, wears  Full-time or part-time?  Full-time  Present glasses are: Bifocal, SV Distance, SV Reading?  Progressive lens  Approximate age of present glasses: 1 yrs old   Got new glasses following last exam, or subsequently?:  Yes   Any problem with VA with glasses?  No  Wears CLs?:  No  Headaches?  No  Eye pain/discomfort?  No                                                                                     Flashes?  No  Floaters?  No  Diplopia/Double vision?  No  Patient's Ocular History:         Any eye surgeries? No         Any eye injury?  No         Any treatment for eye disease?  Glaucoma Suspect  Family history of eye disease?  None  Significant patient medical history:         1. Diabetes?  Yes, diagnosed X 35-40 years ago  LBS - 96 this am  ..Hemoglobin A1C       Date                     Value               Ref Range             Status                01/16/2017               7.6 (H)             4.5 - 6.2 %           Final              Comment:    According to ADA guidelines, hemoglobin A1C <7.0% represents  optimal   control in non-pregnant diabetic patients.  Different  metrics may apply   to specific populations.   Standards of Medical Care in Diabetes -   2016.  For the purpose of screening for the presence of diabetes:  <5.7%       Consistent with the absence of diabetes  5.7-6.4%  Consistent with   increasing risk for diabetes   (prediabetes)  >or=6.5%  Consistent with   diabetes  Currently no consensus exists for use of hemoglobin A1C  for   diagnosis of diabetes for children.         09/23/2016               6.9 (H)             4.5 - 6.2 %           Final          "     Comment:    According to ADA guidelines, hemoglobin A1C <7.0% represents  optimal   control in non-pregnant diabetic patients.  Different  metrics may apply   to specific populations.   Standards of Medical Care in Diabetes -   2016.  For the purpose of screening for the presence of diabetes:  <5.7%       Consistent with the absence of diabetes  5.7-6.4%  Consistent with   increasing risk for diabetes   (prediabetes)  >or=6.5%  Consistent with   diabetes  Currently no consensus exists for use of hemoglobin A1C  for   diagnosis of diabetes for children.         03/29/2016               7.3 (H)             4.5 - 6.2 %           Final              ----------         If yes, IDDM or NIDDM? IDDMx 40 -45 years   2. HBP?  No                ! OTC eyedrops currently using:  None   ! Prescription eye meds currently using:  None   ! Any history of allergy/adverse reaction to any eye meds used   previously?  No    ! Any history of allergy/adverse reaction to eyedrops used during prior   eye exam(s)? No    ! Any history of allergy/adverse reaction to Novacaine or similar meds?   No   ! Any history of allergy/adverse reaction to Epinephrine or similar meds?   No    ! Patient okay with use of anesthetic eyedrops to check eye pressure?    Yes        ! Patient okay with use of eyedrops to dilate pupils today?  Yes    !  Allergies/Medications/Medical History/Family History reviewed today?    Yes      PD =   72/68  Desired reading distance =  15"                                                                      Last edited by Keo Padilla, OD on 7/3/2017  1:43 PM. (History)            Assessment /Plan     For exam results, see Encounter Report.    1. Nuclear sclerosis, bilateral     2. Cortical senile cataract, bilateral     3. Background diabetic retinopathy     4. Astigmatism of both eyes, unspecified type     5. Presbyopia     6. Glaucoma suspect of both eyes                Bilateral nuclear sclerotic cataract , with " scattered punctate lens opacities (more apparent in the left eye.  Note decrease in correctable VA in each eye.  Per discussion with Mr. Cortez, defer referral for cataract surgery.  Recheck in six months..     Bilateral background/non-proliferative diabetic retinopathy. Monitor    Prior diagnsis of glaucoma suspect based on larger-than-average optic nerve cup-to-disc ratio in each eye.    C/D ratio appears stable in each eye.   Intraocular pressure (adjusted for central corneal thickess) remains within normal range.  No evidence of glaucomatous visual field defect in either eye on HVF test done previously.  Continue to monitor annually.    Astigmatic refractive error in each eye.   Presbyopia.  New spectacle lens Rx issued for use as desired.  Recheck in six months- or prior if any apparent and bothersome decrease in vision in the interim.

## 2017-07-06 RX ORDER — GABAPENTIN 300 MG/1
CAPSULE ORAL
Qty: 540 CAPSULE | Refills: 3 | Status: SHIPPED | OUTPATIENT
Start: 2017-07-06 | End: 2018-06-23 | Stop reason: SDUPTHER

## 2017-07-10 ENCOUNTER — CLINICAL SUPPORT (OUTPATIENT)
Dept: ELECTROPHYSIOLOGY | Facility: CLINIC | Age: 77
End: 2017-07-10
Payer: MEDICARE

## 2017-07-10 DIAGNOSIS — I25.5 ISCHEMIC CARDIOMYOPATHY: ICD-10-CM

## 2017-07-10 DIAGNOSIS — Z95.810 ICD (IMPLANTABLE CARDIOVERTER-DEFIBRILLATOR) IN PLACE: Primary | ICD-10-CM

## 2017-07-10 DIAGNOSIS — Z95.810 PRESENCE OF AUTOMATIC CARDIOVERTER/DEFIBRILLATOR (AICD): ICD-10-CM

## 2017-07-10 PROCEDURE — 93284 PRGRMG EVAL IMPLANTABLE DFB: CPT | Mod: S$GLB,,, | Performed by: INTERNAL MEDICINE

## 2017-07-25 ENCOUNTER — TELEPHONE (OUTPATIENT)
Dept: CARDIOLOGY | Facility: CLINIC | Age: 77
End: 2017-07-25

## 2017-07-25 DIAGNOSIS — R74.01 TRANSAMINITIS: ICD-10-CM

## 2017-07-25 DIAGNOSIS — I25.10 CORONARY ARTERY DISEASE INVOLVING NATIVE CORONARY ARTERY OF NATIVE HEART WITHOUT ANGINA PECTORIS: ICD-10-CM

## 2017-07-25 DIAGNOSIS — I10 ESSENTIAL HYPERTENSION: ICD-10-CM

## 2017-07-25 DIAGNOSIS — I25.5 CARDIOMYOPATHY, ISCHEMIC: ICD-10-CM

## 2017-07-25 NOTE — TELEPHONE ENCOUNTER
Called and was on hold for about 7 min,chose opt #5 and left a message.Pt said that he was ok with filling the Rx here at [facility] so Rx sent to the [facility] for a 30 day supply.Once the prior auth is approved he said he would like to get his Rx filled at [facility] mail order.e-Rx sent to .

## 2017-07-25 NOTE — TELEPHONE ENCOUNTER
----- Message from Rahel Roy sent at 7/25/2017  3:33 PM CDT -----  Contact: [facility] called because the prior auth is necessary for the pt to get on Entresto and she wanted to know if help was needed for the prior auth to be completed.  She can be reached @ [phone].  Pt of MELISSA Palm LOV 01/30/2017.  Thanks!!

## 2017-07-31 DIAGNOSIS — I25.5 CARDIOMYOPATHY, ISCHEMIC: ICD-10-CM

## 2017-07-31 DIAGNOSIS — I25.10 CORONARY ARTERY DISEASE INVOLVING NATIVE CORONARY ARTERY OF NATIVE HEART WITHOUT ANGINA PECTORIS: ICD-10-CM

## 2017-07-31 DIAGNOSIS — R74.01 TRANSAMINITIS: ICD-10-CM

## 2017-07-31 DIAGNOSIS — I10 ESSENTIAL HYPERTENSION: ICD-10-CM

## 2017-07-31 NOTE — TELEPHONE ENCOUNTER
----- Message from Ortega Lane sent at 7/31/2017  1:56 PM CDT -----  Contact: patient  Please call pt at [phone]. This is the patient that is waiting for Entresto  mg. You spoke to him earlier and he also wants to discuss [employer] pharmacy process. Last seen Dr UMM Palm 01/30/17    Thank you

## 2017-08-10 ENCOUNTER — OFFICE VISIT (OUTPATIENT)
Dept: CARDIOLOGY | Facility: CLINIC | Age: 77
End: 2017-08-10
Payer: MEDICARE

## 2017-08-10 VITALS
WEIGHT: 239.44 LBS | SYSTOLIC BLOOD PRESSURE: 131 MMHG | DIASTOLIC BLOOD PRESSURE: 65 MMHG | HEART RATE: 60 BPM | HEIGHT: 70 IN | BODY MASS INDEX: 34.28 KG/M2

## 2017-08-10 DIAGNOSIS — I48.0 PAROXYSMAL ATRIAL FIBRILLATION: ICD-10-CM

## 2017-08-10 DIAGNOSIS — E78.00 PURE HYPERCHOLESTEROLEMIA: ICD-10-CM

## 2017-08-10 DIAGNOSIS — I25.5 CARDIOMYOPATHY, ISCHEMIC: ICD-10-CM

## 2017-08-10 DIAGNOSIS — I50.22 CHRONIC SYSTOLIC HEART FAILURE: Primary | ICD-10-CM

## 2017-08-10 DIAGNOSIS — E08.42 DIABETES MELLITUS DUE TO UNDERLYING CONDITION WITH DIABETIC POLYNEUROPATHY, WITH LONG-TERM CURRENT USE OF INSULIN: ICD-10-CM

## 2017-08-10 DIAGNOSIS — Z79.01 CURRENT USE OF LONG TERM ANTICOAGULATION: ICD-10-CM

## 2017-08-10 DIAGNOSIS — Z79.899 ON AMIODARONE THERAPY: ICD-10-CM

## 2017-08-10 DIAGNOSIS — I25.10 CORONARY ARTERY DISEASE INVOLVING NATIVE CORONARY ARTERY OF NATIVE HEART WITHOUT ANGINA PECTORIS: ICD-10-CM

## 2017-08-10 DIAGNOSIS — Z79.4 DIABETES MELLITUS DUE TO UNDERLYING CONDITION WITH DIABETIC POLYNEUROPATHY, WITH LONG-TERM CURRENT USE OF INSULIN: ICD-10-CM

## 2017-08-10 DIAGNOSIS — Z95.810 ICD (IMPLANTABLE CARDIOVERTER-DEFIBRILLATOR), BIVENTRICULAR, IN SITU: ICD-10-CM

## 2017-08-10 DIAGNOSIS — I10 ESSENTIAL HYPERTENSION: ICD-10-CM

## 2017-08-10 PROCEDURE — 1159F MED LIST DOCD IN RCRD: CPT | Mod: S$GLB,,, | Performed by: INTERNAL MEDICINE

## 2017-08-10 PROCEDURE — 3008F BODY MASS INDEX DOCD: CPT | Mod: S$GLB,,, | Performed by: INTERNAL MEDICINE

## 2017-08-10 PROCEDURE — 3078F DIAST BP <80 MM HG: CPT | Mod: S$GLB,,, | Performed by: INTERNAL MEDICINE

## 2017-08-10 PROCEDURE — 99499 UNLISTED E&M SERVICE: CPT | Mod: S$GLB,,, | Performed by: INTERNAL MEDICINE

## 2017-08-10 PROCEDURE — 99214 OFFICE O/P EST MOD 30 MIN: CPT | Mod: S$GLB,,, | Performed by: INTERNAL MEDICINE

## 2017-08-10 PROCEDURE — 3075F SYST BP GE 130 - 139MM HG: CPT | Mod: S$GLB,,, | Performed by: INTERNAL MEDICINE

## 2017-08-10 PROCEDURE — 99999 PR PBB SHADOW E&M-EST. PATIENT-LVL III: CPT | Mod: PBBFAC,,, | Performed by: INTERNAL MEDICINE

## 2017-08-10 RX ORDER — SPIRONOLACTONE 25 MG/1
25 TABLET ORAL DAILY
Qty: 90 TABLET | Refills: 3 | Status: SHIPPED | OUTPATIENT
Start: 2017-08-10 | End: 2018-08-17 | Stop reason: SDUPTHER

## 2017-08-10 RX ORDER — SPIRONOLACTONE 25 MG/1
25 TABLET ORAL DAILY
Qty: 30 TABLET | Refills: 11 | Status: SHIPPED | OUTPATIENT
Start: 2017-08-10 | End: 2017-08-10 | Stop reason: SDUPTHER

## 2017-08-10 NOTE — PROGRESS NOTES
Subjective:   Patient ID:  Madhav Cortez is a 77 y.o. male who presents for follow-up of Cardiomyopathy      HPI: He has been feeling well with the exception of his knees. He and his have . He continues to go to the [employer] frequently to play poker. He has chronic class III dyspnea. Madhav Cortez denies any chest pain, PND, orthopnea, palpitations, leg edema, or syncope. He does not have a scale at home. He eats salads at the [employer]. He was hypotensive back in March when he saw Anali Brady in EP. His antihypertensive medications were held for one day and then resumed. He has not had any further episodes. He thinks he did not drink enough that day.    His last echo was 3/29 and showed a dilated LV with an LVEF of 10% ( LVEDD of 6.8 LVESD 6.6 cm.). His ICD was interrogated was 7/17 and showed LV pacing 99% of the time and no VT. He did have multiple mode switches.    Past Medical History:   Diagnosis Date    *Atrial fibrillation     *Atrial flutter     Acute exacerbation of CHF (congestive heart failure) 8/2/2013    Anticoagulant long-term use     Anxiety     Arthritis     Atrial flutter     Back pain     Cardiomyopathy     Cataract     Coronary artery disease     Depression     Diabetes mellitus     Heart bloc     Hepatitis B     Hyperlipidemia 4/1/2014    Hypertension     Myocardial infarction     Non-STEMI (non-ST elevated myocardial infarction) 5/26/2013    Nuclear sclerosis - Both Eyes 2/18/2013    Post PTCA 8/7/2012    Presence of biventricular AICD 2/23/2016    Tobacco dependence     Transaminitis 4/1/2014    Ventricular tachycardia        Past Surgical History:   Procedure Laterality Date    APPENDECTOMY      CARDIAC DEFIBRILLATOR PLACEMENT      CARDIAC DEFIBRILLATOR PLACEMENT      COLONOSCOPY      CORONARY ANGIOPLASTY WITH STENT PLACEMENT      FRACTURE SURGERY      L arm    INSERT / REPLACE / REMOVE PACEMAKER  12/15    bi-V upgrade    RADIOFREQUENCY  "ABLATION      STEROID INJECTION KNEE Bilateral     received about every 4 months    TONSILLECTOMY      VASCULAR SURGERY         Social History     Social History    Marital status:      Spouse name: N/A    Number of children: N/A    Years of education: N/A     Social History Main Topics    Smoking status: Former Smoker     Quit date: 7/6/1987    Smokeless tobacco: Never Used      Comment: 25 years ago    Alcohol use No    Drug use: No    Sexual activity: No     Other Topics Concern    None     Social History Narrative    Retired. Spends a lot of time gambling in the [employer]       Family History   Problem Relation Age of Onset    Cancer Father     Diabetes Father     Bladder Cancer Father     Diabetes Mother     Heart attack Mother      "weak heart"    Bipolar disorder Son     Cancer Paternal Grandmother     Heart disease Maternal Grandmother     No Known Problems Maternal Grandfather     Cancer Paternal Grandfather     No Known Problems Daughter     No Known Problems Daughter     No Known Problems Son     No Known Problems Son     Cancer Maternal Uncle     No Known Problems Maternal Aunt     No Known Problems Paternal Aunt     No Known Problems Paternal Uncle     No Known Problems Sister     No Known Problems Brother     Amblyopia Neg Hx     Blindness Neg Hx     Cataracts Neg Hx     Glaucoma Neg Hx     Hypertension Neg Hx     Macular degeneration Neg Hx     Retinal detachment Neg Hx     Strabismus Neg Hx     Stroke Neg Hx     Thyroid disease Neg Hx     Liver disease Neg Hx     Melanoma Neg Hx     Psoriasis Neg Hx     Lupus Neg Hx     Acne Neg Hx     Eczema Neg Hx        Patient's Medications   New Prescriptions    SPIRONOLACTONE (ALDACTONE) 25 MG TABLET    Take 1 tablet (25 mg total) by mouth once daily.   Previous Medications    ACCU-CHEK JIE MISC        AMIODARONE (PACERONE) 200 MG TAB    TAKE 1 TABLET TWICE DAILY    ASCORBIC ACID (VITAMIN C) 500 MG TABLET    " "Take 1,500 mg by mouth 2 (two) times daily.     ASPIRIN 81 MG CHEW    Take 1 tablet (81 mg total) by mouth once daily.    BETA CAROTENE-C-E-LUTEIN-MIN29 5,000-60-30-2 UNIT-MG-UNIT-MG TAB    Take 1 capsule by mouth once daily.     BLOOD SUGAR DIAGNOSTIC STRP    Accu check Monique test strips. Check blood sugar three times daily    BLOOD-GLUCOSE METER KIT    Accu check monique meter Use as instructed    CARVEDILOL (COREG) 25 MG TABLET    TAKE 1 TABLET TWICE DAILY    DABIGATRAN ETEXILATE (PRADAXA) 150 MG CAP    Take 1 capsule (150 mg total) by mouth 2 (two) times daily. "Do NOT break, chew, or open capsules."    FUROSEMIDE (LASIX) 40 MG TABLET    Take 1 tablet (40 mg total) by mouth once daily.    GABAPENTIN (NEURONTIN) 300 MG CAPSULE    TAKE 2 CAPSULES (600 MG TOTAL) BY MOUTH THREE TIMES DAILY    GLUCOSAMINE-CHONDROITIN 500-400 MG TABLET    Take 1 tablet by mouth 2 (two) times daily.    INSULIN ASPART (NOVOLOG FLEXPEN) 100 UNIT/ML INPN PEN    INJECT  16 UNITS SUBCUTANEOUSLY THREE TIMES DAILY WITH MEALS    LANTUS SOLOSTAR 100 UNIT/ML (3 ML) INPN PEN    INJECT 40 UNITS INTO THE SKIN EVERY EVENING.    MAGNESIUM OXIDE (MAG-OX) 400 MG TABLET    Take 400 mg by mouth 2 (two) times daily.     MELATONIN 5 MG TAB    Take 1 tablet by mouth every evening.     NITROGLYCERIN (NITROSTAT) 0.4 MG SL TABLET    Place 1 tablet (0.4 mg total) under the tongue every 5 (five) minutes as needed for Chest pain.    PAROXETINE (PAXIL) 10 MG TABLET    TAKE 1 TABLET EVERY EVENING.    PEN NEEDLE, DIABETIC 31 GAUGE X 1/4" NDLE    Use 4 times daily    RIBOSE ORAL    Take 1 tablet by mouth once daily.     SACUBITRIL-VALSARTAN (ENTRESTO)  MG PER TABLET    Take one tablet twice daily    SENNA-DOCUSATE 8.6-50 MG (PERICOLACE) 8.6-50 MG PER TABLET    Take 1 tablet by mouth 2 (two) times daily.    TEMAZEPAM (RESTORIL) 15 MG CAP    TAKE 1 CAPSULE EVERY EVENING    TIZANIDINE (ZANAFLEX) 2 MG TABLET    TAKE 1 TABLET EVERY 8 HOURS AS NEEDED FOR MUSCLE PAIN    " "TRAZODONE (DESYREL) 50 MG TABLET    TAKE 1 TO 2 TABLETS EVERY EVENING    UBIDECARENONE (COQ-10 ORAL)    Take 800 mg by mouth once daily. Takes 100mg tablet at lunch, and 600mg at dinner   Modified Medications    No medications on file   Discontinued Medications    POTASSIUM CHLORIDE (MICRO-K) 10 MEQ CPSR    TAKE 2 CAPSULES ONE TIME DAILY       Review of Systems   Constitution: Negative for weakness, malaise/fatigue and weight gain.   HENT: Negative for headaches and hearing loss.    Eyes: Negative for visual disturbance.   Cardiovascular: Positive for dyspnea on exertion. Negative for chest pain, claudication, leg swelling, near-syncope, orthopnea, palpitations, paroxysmal nocturnal dyspnea and syncope.   Respiratory: Negative for cough, shortness of breath, sleep disturbances due to breathing, snoring and wheezing.    Endocrine: Negative for cold intolerance, heat intolerance, polydipsia, polyphagia and polyuria.   Hematologic/Lymphatic: Negative for bleeding problem. Does not bruise/bleed easily.   Skin: Negative for rash and suspicious lesions.   Musculoskeletal: Positive for joint pain. Negative for arthritis, falls, muscle weakness and myalgias.   Gastrointestinal: Negative for abdominal pain, change in bowel habit, constipation, diarrhea, heartburn, hematochezia, melena and nausea.   Genitourinary: Negative for hematuria and nocturia.   Neurological: Positive for loss of balance. Negative for excessive daytime sleepiness, dizziness and light-headedness.   Psychiatric/Behavioral: Negative for depression. The patient is not nervous/anxious.    Allergic/Immunologic: Negative for environmental allergies.       /65 (BP Location: Left arm, Patient Position: Sitting, BP Method: Large (Automatic))   Pulse 60   Ht 5' 10" (1.778 m)   Wt 108.6 kg (239 lb 6.7 oz)   BMI 34.35 kg/m²     Objective:   Physical Exam   Constitutional: He is oriented to person, place, and time. He appears well-developed and " well-nourished.        HENT:   Head: Normocephalic and atraumatic.   Mouth/Throat: Oropharynx is clear and moist.   Eyes: Conjunctivae and EOM are normal. Pupils are equal, round, and reactive to light. No scleral icterus.   Neck: Normal range of motion. Neck supple. No hepatojugular reflux and no JVD present. No tracheal deviation present. No thyromegaly present.   Cardiovascular: Normal rate, regular rhythm, normal heart sounds and intact distal pulses.  PMI is not displaced.    Pulses:       Carotid pulses are 2+ on the right side, and 2+ on the left side.       Radial pulses are 2+ on the right side, and 2+ on the left side.        Dorsalis pedis pulses are 1+ on the right side, and 1+ on the left side.        Posterior tibial pulses are 1+ on the right side, and 1+ on the left side.   Pulmonary/Chest: Effort normal and breath sounds normal.   Abdominal: Soft. Bowel sounds are normal. He exhibits no distension and no mass. There is no hepatosplenomegaly. There is no tenderness.   Musculoskeletal: He exhibits edema. He exhibits no tenderness.   Trace edema   Lymphadenopathy:     He has no cervical adenopathy.   Neurological: He is alert and oriented to person, place, and time.   Skin: Skin is warm and dry. No rash noted. No cyanosis or erythema. Nails show no clubbing.   Hyperpigmentation of both shins   Psychiatric: He has a normal mood and affect. His speech is normal and behavior is normal.       Lab Results   Component Value Date     01/23/2017    K 4.7 01/23/2017     01/23/2017    CO2 30 (H) 01/23/2017    BUN 14 01/23/2017    CREATININE 1.1 01/23/2017     (H) 01/23/2017    HGBA1C 7.6 (H) 01/16/2017    MG 2.6 10/19/2015    AST 83 (H) 03/30/2017    ALT 85 (H) 03/30/2017    ALBUMIN 3.8 03/30/2017    PROT 7.0 03/30/2017    BILITOT 0.4 03/30/2017    WBC 5.47 01/23/2017    HGB 12.9 (L) 01/23/2017    HCT 38.4 (L) 01/23/2017    MCV 97 01/23/2017     01/23/2017    INR 1.1 11/09/2015    INR  1.7 (H) 10/06/2011    TSH 2.258 03/30/2017    CHOL 185 01/23/2017    HDL 40 01/23/2017    LDLCALC 103.6 01/23/2017    TRIG 207 (H) 01/23/2017     (H) 10/19/2015       Assessment:     1. Chronic systolic heart failure : He appears well compensated today. FC III. He is tolerating carvedilol and Entresto. Start spironolactone 25 mg daily. Stop potassium supplement and check BMP in 2 weeks.   2. Cardiomyopathy, ischemic    3. Paroxysmal atrial fibrillation :He is on amiodarone and anticoagulated with pradaxa. He follows with Dr Koehler. He has chronic mild transaminitis.   4. Coronary artery disease involving native coronary artery of native heart without angina pectoris : Continue asa. His statin was stopped due to chronically elevated liver enzymes.   5. Essential hypertension : Blood pressure is at goal. I have made no changes. Continue current regimen.   6. ICD (implantable cardioverter-defibrillator), biventricular, in situ    7. On amiodarone therapy : Recent TSH was normal. Needs yearly eye exams. He has known chronic mild transaminitis, however, after previous discussion with Dr Koehler, the risk of stopping the amiodarone outweighed the benefit.   8. Pure hypercholesterolemia    9. Current use of long term anticoagulation    10. Diabetes mellitus due to underlying condition with diabetic polyneuropathy, with long-term current use of insulin        Plan:     Madhav was seen today for cardiomyopathy.    Diagnoses and all orders for this visit:    Chronic systolic heart failure  -     Discontinue: spironolactone (ALDACTONE) 25 MG tablet; Take 1 tablet (25 mg total) by mouth once daily.  -     Basic metabolic panel; Future  -     spironolactone (ALDACTONE) 25 MG tablet; Take 1 tablet (25 mg total) by mouth once daily.    Cardiomyopathy, ischemic    Paroxysmal atrial fibrillation    Coronary artery disease involving native coronary artery of native heart without angina pectoris    Essential hypertension    ICD  (implantable cardioverter-defibrillator), biventricular, in situ    On amiodarone therapy    Pure hypercholesterolemia    Current use of long term anticoagulation    Diabetes mellitus due to underlying condition with diabetic polyneuropathy, with long-term current use of insulin        Thank you for allowing me to participate in this patient's care. Please do not hesitate to contact me with any questions or concerns.

## 2017-08-14 ENCOUNTER — OFFICE VISIT (OUTPATIENT)
Dept: INTERNAL MEDICINE | Facility: CLINIC | Age: 77
End: 2017-08-14
Payer: MEDICARE

## 2017-08-14 VITALS
WEIGHT: 235.5 LBS | DIASTOLIC BLOOD PRESSURE: 58 MMHG | BODY MASS INDEX: 33.71 KG/M2 | OXYGEN SATURATION: 96 % | HEART RATE: 70 BPM | HEIGHT: 70 IN | SYSTOLIC BLOOD PRESSURE: 108 MMHG

## 2017-08-14 DIAGNOSIS — E08.65: ICD-10-CM

## 2017-08-14 DIAGNOSIS — I48.0 PAROXYSMAL ATRIAL FIBRILLATION: ICD-10-CM

## 2017-08-14 DIAGNOSIS — E08.42: ICD-10-CM

## 2017-08-14 DIAGNOSIS — G47.00 INSOMNIA, UNSPECIFIED TYPE: ICD-10-CM

## 2017-08-14 DIAGNOSIS — Z23 NEED FOR VACCINATION AGAINST STREPTOCOCCUS PNEUMONIAE: Primary | ICD-10-CM

## 2017-08-14 DIAGNOSIS — E11.43 DIABETIC AUTONOMIC NEUROPATHY ASSOCIATED WITH TYPE 2 DIABETES MELLITUS: ICD-10-CM

## 2017-08-14 DIAGNOSIS — I50.9 CONGESTIVE HEART FAILURE, UNSPECIFIED CONGESTIVE HEART FAILURE CHRONICITY, UNSPECIFIED CONGESTIVE HEART FAILURE TYPE: ICD-10-CM

## 2017-08-14 DIAGNOSIS — E78.00 PURE HYPERCHOLESTEROLEMIA: ICD-10-CM

## 2017-08-14 DIAGNOSIS — I10 ESSENTIAL HYPERTENSION: ICD-10-CM

## 2017-08-14 PROCEDURE — 1125F AMNT PAIN NOTED PAIN PRSNT: CPT | Mod: S$GLB,,, | Performed by: INTERNAL MEDICINE

## 2017-08-14 PROCEDURE — 3074F SYST BP LT 130 MM HG: CPT | Mod: S$GLB,,, | Performed by: INTERNAL MEDICINE

## 2017-08-14 PROCEDURE — 3078F DIAST BP <80 MM HG: CPT | Mod: S$GLB,,, | Performed by: INTERNAL MEDICINE

## 2017-08-14 PROCEDURE — 3008F BODY MASS INDEX DOCD: CPT | Mod: S$GLB,,, | Performed by: INTERNAL MEDICINE

## 2017-08-14 PROCEDURE — 99214 OFFICE O/P EST MOD 30 MIN: CPT | Mod: S$GLB,,, | Performed by: INTERNAL MEDICINE

## 2017-08-14 PROCEDURE — 1159F MED LIST DOCD IN RCRD: CPT | Mod: S$GLB,,, | Performed by: INTERNAL MEDICINE

## 2017-08-14 PROCEDURE — 99499 UNLISTED E&M SERVICE: CPT | Mod: S$GLB,,, | Performed by: INTERNAL MEDICINE

## 2017-08-14 PROCEDURE — 99999 PR PBB SHADOW E&M-EST. PATIENT-LVL III: CPT | Mod: PBBFAC,,, | Performed by: INTERNAL MEDICINE

## 2017-08-14 NOTE — PROGRESS NOTES
CHIEF COMPLAINT: Follow up of chf, diabetes, hypertension, a fib, hyperlipidemia, back pain      HISTORY OF PRESENT ILLNESS: This is a 77-year-old man who presents for follow up of above.      His back is doing well. He is doing stretching at home which helps. HE has some pain if he stands too long. He takes tizanidine 2 mg three times daily as needed for muscle spasms in back      He is currently taking pradaxa 150 mg twice daily for anticoagulation for a fib/flutter. NO edema. He has mild dyspnea on exertion. No chest pain or palpitations. He continues to take spironolactone 25 mg daily, Lasix 40 mg once daily, carvediolol 25 mg bid and Entresto 97/103 twice daily and KCL 10 meq 1 tablet daily for his hypertension and CHF. He is also on amiodarone 200 mg twice daily for his atrial flutter. Aspirin 81 mg daily.         He is no longer taking paxil 10 mg nightly. HE does not know why he is not taking it. No side effect.  His mood is better since he  from his wife. He was born with a bad temper. Temper is a little better. He is sleeping well. HE continues to take trazodone 50 mg 2 tablets at bedtime, temazepam 15 mg at bedtime and melatonin 5 mg at bedtime. No anxiety or depression now. HE has  from his wife which has helped his mood.       His blood sugars are slightly higher. Blood sugar in the morning 160's. In the afternoon, 200. He eats late at night. Saw diabetic education. He currently takes Lantus 40 units at night, and NovoLog 16-20 units with meals. He denies any polydipsia, polyuria, hypoglycemia.       He has hyperlipidemia, currently off Lipitor 20 mg daily      His liver enzymes have been mildly elevated. Dr Palm stopped fenofibrate due to the elevated liver enzymes. He had a liver biopsy and the elevated liver enzymes are NOT due to amiodarone. No further work up is needed at this time.       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 Shooting  "pain has resolved with increasing dose of gabapentin.      No nausea, vomiting, constipation, diarrhea, dysuria, hematuria, sinus congestion, sore throat, headache.       PAST MEDICAL HISTORY:    1. Hypertension, coronary artery disease, status post stenting.    2. Ischemic cardiomyopathy.    3. Atrial flutter.    4. Diabetes mellitus.    5. ICD placed 02/22/2012.    6. Hyperlipidemia.    7. Osteoarthritis of the knees.  8. Atrial fibrillation  9. Transaminitis      PAST SURGICAL HISTORY: Appendectomy in 2003.        SOCIAL HISTORY: He is a former smoker, quit in 1987. Does not drink alcohol.    .       PHYSICAL EXAMINATION:    BP (!) 108/58 (BP Location: Left arm, Patient Position: Sitting, BP Method: Medium (Manual))   Pulse 70   Ht 5' 10" (1.778 m)   Wt 106.8 kg (235 lb 8 oz)   SpO2 96%   BMI 33.79 kg/m²      GENERAL: He is alert, oriented, no apparent distress. Affect within normal limits.    HEENT: Conjunctivae anicteric. Pupils are equal, round and reactive to light.    Tympanic membranes are clear. Oropharynx is clear.    NECK: Supple. No cervical lymphadenopathy, no thyroid enlargement.    RESPIRATORY: Effort normal. Lungs are clear to auscultation.    HEART: Regular rate and rhythm without murmurs, gallops or rubs. No lower extremity edema.      Protective Sensation (w/ 10 gram monofilament):  Right: Decreased  Left: Decreased    Visual Inspection:  Dry Skin -  Bilateral    Pedal Pulses:   Right: Diminshed  Left: Diminshed    Posterior tibialis:   Right:Diminshed  Left: Diminshed                     ASSESSMENT AND PLAN:.      1. Back pain - stable  2. CHF - doing well - stable  3. Coronary artery disease - stable - to follow up with Cardiology.    4. Atrial flutter - s/p cardioversion - stable    5. Diabetes mellitus - stable. Watch blood sugars. A1c  5. History of anemia on last blood work, stable    6. Hyperlipidemia. At goal off atorvastatin  7. Transaminitis -better, watch closely    8. " Screening - PSA 3/16,   9. Diabetic neuropathy -stable. gabapentin 300 mg 2 capsules three times daily    10. Anxiety and depression - stable  11. Aortic arch atherosclerosis - modifying risk factors  12. Insomnia - try increaseing melatonin to 10 mg at bedtime  I will see him back in 4 months, sooner if problems arise.

## 2017-08-25 ENCOUNTER — LAB VISIT (OUTPATIENT)
Dept: LAB | Facility: HOSPITAL | Age: 77
End: 2017-08-25
Attending: INTERNAL MEDICINE
Payer: MEDICARE

## 2017-08-25 DIAGNOSIS — I50.22 CHRONIC SYSTOLIC HEART FAILURE: ICD-10-CM

## 2017-08-25 DIAGNOSIS — I10 ESSENTIAL HYPERTENSION: ICD-10-CM

## 2017-08-25 DIAGNOSIS — R74.01 TRANSAMINITIS: ICD-10-CM

## 2017-08-25 DIAGNOSIS — Z95.810 AUTOMATIC IMPLANTABLE CARDIOVERTER-DEFIBRILLATOR IN SITU: ICD-10-CM

## 2017-08-25 DIAGNOSIS — I48.0 PAROXYSMAL ATRIAL FIBRILLATION: ICD-10-CM

## 2017-08-25 DIAGNOSIS — I25.10 CORONARY ARTERY DISEASE INVOLVING NATIVE CORONARY ARTERY OF NATIVE HEART WITHOUT ANGINA PECTORIS: ICD-10-CM

## 2017-08-25 DIAGNOSIS — E08.65: ICD-10-CM

## 2017-08-25 DIAGNOSIS — E08.42: ICD-10-CM

## 2017-08-25 DIAGNOSIS — I25.5 CARDIOMYOPATHY, ISCHEMIC: ICD-10-CM

## 2017-08-25 LAB
ALBUMIN SERPL BCP-MCNC: 3.9 G/DL
ALP SERPL-CCNC: 58 U/L
ALT SERPL W/O P-5'-P-CCNC: 99 U/L
ANION GAP SERPL CALC-SCNC: 5 MMOL/L
AST SERPL-CCNC: 85 U/L
BASOPHILS # BLD AUTO: 0.03 K/UL
BASOPHILS NFR BLD: 0.4 %
BILIRUB SERPL-MCNC: 0.6 MG/DL
BUN SERPL-MCNC: 17 MG/DL
CALCIUM SERPL-MCNC: 9.8 MG/DL
CHLORIDE SERPL-SCNC: 102 MMOL/L
CO2 SERPL-SCNC: 32 MMOL/L
CREAT SERPL-MCNC: 1.1 MG/DL
DIFFERENTIAL METHOD: ABNORMAL
EOSINOPHIL # BLD AUTO: 0.1 K/UL
EOSINOPHIL NFR BLD: 0.9 %
ERYTHROCYTE [DISTWIDTH] IN BLOOD BY AUTOMATED COUNT: 13.5 %
EST. GFR  (AFRICAN AMERICAN): >60 ML/MIN/1.73 M^2
EST. GFR  (NON AFRICAN AMERICAN): >60 ML/MIN/1.73 M^2
ESTIMATED AVG GLUCOSE: 146 MG/DL
GLUCOSE SERPL-MCNC: 188 MG/DL
HBA1C MFR BLD HPLC: 6.7 %
HCT VFR BLD AUTO: 39.5 %
HGB BLD-MCNC: 13.4 G/DL
LYMPHOCYTES # BLD AUTO: 1.5 K/UL
LYMPHOCYTES NFR BLD: 19.7 %
MCH RBC QN AUTO: 32.4 PG
MCHC RBC AUTO-ENTMCNC: 33.9 G/DL
MCV RBC AUTO: 95 FL
MONOCYTES # BLD AUTO: 0.6 K/UL
MONOCYTES NFR BLD: 7.7 %
NEUTROPHILS # BLD AUTO: 5.5 K/UL
NEUTROPHILS NFR BLD: 71 %
PLATELET # BLD AUTO: 217 K/UL
PMV BLD AUTO: 11.2 FL
POTASSIUM SERPL-SCNC: 4.4 MMOL/L
PROT SERPL-MCNC: 7.1 G/DL
RBC # BLD AUTO: 4.14 M/UL
SODIUM SERPL-SCNC: 139 MMOL/L
WBC # BLD AUTO: 7.68 K/UL

## 2017-08-25 PROCEDURE — 80053 COMPREHEN METABOLIC PANEL: CPT

## 2017-08-25 PROCEDURE — 85025 COMPLETE CBC W/AUTO DIFF WBC: CPT

## 2017-08-25 PROCEDURE — 83036 HEMOGLOBIN GLYCOSYLATED A1C: CPT

## 2017-08-25 PROCEDURE — 36415 COLL VENOUS BLD VENIPUNCTURE: CPT | Mod: PO

## 2017-08-28 ENCOUNTER — TELEPHONE (OUTPATIENT)
Dept: CARDIOLOGY | Facility: CLINIC | Age: 77
End: 2017-08-28

## 2017-08-28 NOTE — TELEPHONE ENCOUNTER
----- Message from Kira Palm MD sent at 8/28/2017  8:44 AM CDT -----  Potassium and kidney function look ok on spironolactone.

## 2017-09-22 RX ORDER — INSULIN ASPART 100 [IU]/ML
INJECTION, SOLUTION INTRAVENOUS; SUBCUTANEOUS
Qty: 45 ML | Refills: 3 | Status: SHIPPED | OUTPATIENT
Start: 2017-09-22 | End: 2019-05-22

## 2017-10-11 ENCOUNTER — CLINICAL SUPPORT (OUTPATIENT)
Dept: ELECTROPHYSIOLOGY | Facility: CLINIC | Age: 77
End: 2017-10-11
Payer: MEDICARE

## 2017-10-11 DIAGNOSIS — I42.9 CARDIOMYOPATHY: ICD-10-CM

## 2017-10-11 DIAGNOSIS — Z95.810 ICD (IMPLANTABLE CARDIOVERTER-DEFIBRILLATOR) IN PLACE: ICD-10-CM

## 2017-10-11 PROCEDURE — 93295 DEV INTERROG REMOTE 1/2/MLT: CPT | Mod: S$GLB,,, | Performed by: INTERNAL MEDICINE

## 2017-10-11 PROCEDURE — 93296 REM INTERROG EVL PM/IDS: CPT | Mod: S$GLB,,, | Performed by: INTERNAL MEDICINE

## 2017-10-17 ENCOUNTER — TELEPHONE (OUTPATIENT)
Dept: ELECTROPHYSIOLOGY | Facility: CLINIC | Age: 77
End: 2017-10-17

## 2017-10-17 NOTE — TELEPHONE ENCOUNTER
Calling to see if pt can get his PFT done in [place] before he comes in to see Anali elias. LM on .

## 2017-10-18 ENCOUNTER — HOSPITAL ENCOUNTER (OUTPATIENT)
Dept: CARDIOLOGY | Facility: CLINIC | Age: 77
Discharge: HOME | End: 2017-10-18
Payer: MEDICARE

## 2017-10-18 ENCOUNTER — OFFICE VISIT (OUTPATIENT)
Dept: ELECTROPHYSIOLOGY | Facility: CLINIC | Age: 77
End: 2017-10-18
Payer: MEDICARE

## 2017-10-18 ENCOUNTER — LAB VISIT (OUTPATIENT)
Dept: LAB | Facility: HOSPITAL | Age: 77
End: 2017-10-18
Payer: MEDICARE

## 2017-10-18 VITALS
BODY MASS INDEX: 33.39 KG/M2 | HEART RATE: 60 BPM | DIASTOLIC BLOOD PRESSURE: 62 MMHG | SYSTOLIC BLOOD PRESSURE: 102 MMHG | WEIGHT: 233.25 LBS | HEIGHT: 70 IN

## 2017-10-18 DIAGNOSIS — Z79.899 ON AMIODARONE THERAPY: ICD-10-CM

## 2017-10-18 DIAGNOSIS — Z79.4 DIABETES MELLITUS DUE TO UNDERLYING CONDITION WITH DIABETIC POLYNEUROPATHY, WITH LONG-TERM CURRENT USE OF INSULIN: ICD-10-CM

## 2017-10-18 DIAGNOSIS — I25.10 CORONARY ARTERY DISEASE INVOLVING NATIVE CORONARY ARTERY OF NATIVE HEART WITHOUT ANGINA PECTORIS: ICD-10-CM

## 2017-10-18 DIAGNOSIS — I25.5 CARDIOMYOPATHY, ISCHEMIC: Primary | ICD-10-CM

## 2017-10-18 DIAGNOSIS — Z95.810 ICD (IMPLANTABLE CARDIOVERTER-DEFIBRILLATOR), BIVENTRICULAR, IN SITU: ICD-10-CM

## 2017-10-18 DIAGNOSIS — E08.42 DIABETES MELLITUS DUE TO UNDERLYING CONDITION WITH DIABETIC POLYNEUROPATHY, WITH LONG-TERM CURRENT USE OF INSULIN: ICD-10-CM

## 2017-10-18 DIAGNOSIS — Z79.01 CURRENT USE OF LONG TERM ANTICOAGULATION: ICD-10-CM

## 2017-10-18 DIAGNOSIS — I48.0 PAROXYSMAL ATRIAL FIBRILLATION: ICD-10-CM

## 2017-10-18 DIAGNOSIS — Z79.899 ON AMIODARONE THERAPY: Primary | ICD-10-CM

## 2017-10-18 DIAGNOSIS — I50.22 CHRONIC SYSTOLIC HEART FAILURE: ICD-10-CM

## 2017-10-18 DIAGNOSIS — I10 ESSENTIAL HYPERTENSION: ICD-10-CM

## 2017-10-18 DIAGNOSIS — I25.5 ISCHEMIC CARDIOMYOPATHY: ICD-10-CM

## 2017-10-18 DIAGNOSIS — I48.92 ATRIAL FLUTTER, UNSPECIFIED TYPE: ICD-10-CM

## 2017-10-18 LAB
ALBUMIN SERPL BCP-MCNC: 3.5 G/DL
ALP SERPL-CCNC: 68 U/L
ALT SERPL W/O P-5'-P-CCNC: 105 U/L
AST SERPL-CCNC: 79 U/L
BILIRUB DIRECT SERPL-MCNC: 0.2 MG/DL
BILIRUB SERPL-MCNC: 0.4 MG/DL
PROT SERPL-MCNC: 6.9 G/DL

## 2017-10-18 PROCEDURE — 99214 OFFICE O/P EST MOD 30 MIN: CPT | Mod: S$GLB,,, | Performed by: NURSE PRACTITIONER

## 2017-10-18 PROCEDURE — 99999 PR PBB SHADOW E&M-EST. PATIENT-LVL III: CPT | Mod: PBBFAC,,, | Performed by: NURSE PRACTITIONER

## 2017-10-18 PROCEDURE — 36415 COLL VENOUS BLD VENIPUNCTURE: CPT

## 2017-10-18 PROCEDURE — 80076 HEPATIC FUNCTION PANEL: CPT

## 2017-10-18 PROCEDURE — 93000 ELECTROCARDIOGRAM COMPLETE: CPT | Mod: S$GLB,,, | Performed by: INTERNAL MEDICINE

## 2017-10-18 PROCEDURE — 99499 UNLISTED E&M SERVICE: CPT | Mod: S$GLB,,, | Performed by: NURSE PRACTITIONER

## 2017-10-18 NOTE — PROGRESS NOTES
Subjective:    Patient ID:  Madhav Cortez is a 77 y.o. male who presents for an ICD Check.     Madhav Cortez is a patient of Dr. Koehler.    HPI     Mr. Cortez is a 77 y.o. male with pAF, AFL, VT, ICM, LBBB, HFrEF (EF 10-15%) s/p BiV ICD, EULOGIO thrombus, CAD, DM, HTN, and hypercholesterolemia.   H/o inappropriate shock for atrial tachycardia/flutter 2/12.   H/o RFA of cavo-tricuspid isthmus 5/11.   Developed recurrent atrial flutter and atrial fibrillation.   EPS/RFA 4/8/13 micro-reentry near prior isthmus RFA, underwent successful RFA. Also had atrial fibrillation during procedure.   Had an episode of syncope, device interrogation revealed VF, with appropriate ICD shock. Was admitted, blood work c/w NSTEMI (troponin up to 7). LHC revealed stable CAD. Coreg increased to 25 mg twice daily.   Developed persistent atrial fibrillation, symptomatic. Placed on Pradaxa.   DIOGO revealed EULOGIO thrombus. Continued for extended period on Pradaxa, underwent DIOGO/DCCV 10/11/13.  Has h/o elevated liver enzymes, for which he underwent liver biopsy. Not thought to be related to amiodarone.  Given worsening CHF, upgraded to CRT-D 11/11/15.  At his last office visit, Mr. Cortez reported experiencing mild dizziness, fatigue, and occasional MCBRIDE, per baseline. He was found to be hypotensive (90/44; L 80/46); BP medications were held for a day and he was instructed to follow up with Dr. Reyes (PCP).    Since his last office visit, Mr. Cortez reports feeling well overall, with continued SOB/MCBRIDE, and rare positional dizziness/LH. He denies chest pain, palpitations, or syncope. He remains active, but is somewhat limited by chronic knee pain.     Recent cardiac studies:  Echo (03/29/16) revealed an EF of 10-15%, biatrial enlargement, of severe LVE, and a PASP of 20 mmHg.   Device Interrogation (10/11/17) reveals stable lead and device function. AMS noted (<1% AMS burden). He RA paces >99% and BiV paces >99% of the time. Estimated  battery longevity >4 years.       I reviewed today's ECG which demonstrated a Dual A/BiV-paced rhythm at 60 bpm; , , and QTc 468.    Review of Systems   Constitution: Negative for diaphoresis and malaise/fatigue.   HENT: Negative for nosebleeds.    Eyes: Negative for double vision.   Cardiovascular: Positive for dyspnea on exertion. Negative for chest pain, irregular heartbeat, near-syncope, palpitations and syncope.   Respiratory: Positive for shortness of breath.    Skin: Negative.    Musculoskeletal: Positive for arthritis, joint pain and stiffness.   Gastrointestinal: Negative for hematemesis and hematochezia.   Genitourinary: Negative for hematuria.   Neurological: Positive for light-headedness (positional ).   Psychiatric/Behavioral: Negative for altered mental status.        Objective:    Physical Exam   Constitutional: He is oriented to person, place, and time. He appears well-developed and well-nourished.   HENT:   Head: Normocephalic and atraumatic.   Eyes: Pupils are equal, round, and reactive to light.   Cardiovascular: Normal rate and regular rhythm.    Pulmonary/Chest: Effort normal.   Musculoskeletal:   Antalgic gait noted    Neurological: He is alert and oriented to person, place, and time.   Vitals reviewed.        Assessment:       1. Cardiomyopathy, ischemic    2. Chronic systolic heart failure    3. ICD (implantable cardioverter-defibrillator), biventricular, in situ    4. Paroxysmal atrial fibrillation    5. Atrial flutter, unspecified type    6. Current use of long term anticoagulation    7. On amiodarone therapy    8. Coronary artery disease involving native coronary artery of native heart without angina pectoris    9. Essential hypertension    10. Diabetes mellitus due to underlying condition with diabetic polyneuropathy, with long-term current use of insulin         Plan:       In summary, Mr. Cortez is a 77 y.o. male with pAF, AFL s/p CTI RFA, ICM, HFrEF (EF 10-15%) s/p BiV  ICD, CAD, DM, HTN, and hypercholesterolemia.   Mr. Cortez is doing well from a rhythm perspective with stable lead and device function with <1% AF burden; >99% BiV pacing. Rhythm-controlled on amiodarone and anticoagulated on Pradaxa.     LFTs and PFTs now given long term amiodarone use; recent TSH WNL.   Continue current medication regimen and device settings.   Follow up in device clinic as scheduled.   Follow up in EP clinic in 6 months with repeat TSH, sooner as needed.     JANI Velazquez, APRN, FNP-C        (A copy of today's note was sent to Dr. Koehler.)

## 2017-11-06 RX ORDER — FUROSEMIDE 40 MG/1
TABLET ORAL
Qty: 90 TABLET | Refills: 3 | Status: ON HOLD | OUTPATIENT
Start: 2017-11-06 | End: 2019-05-28 | Stop reason: HOSPADM

## 2017-11-22 RX ORDER — AMIODARONE HYDROCHLORIDE 200 MG/1
TABLET ORAL
Qty: 180 TABLET | Refills: 3 | Status: SHIPPED | OUTPATIENT
Start: 2017-11-22 | End: 2018-12-26 | Stop reason: SDUPTHER

## 2017-11-28 ENCOUNTER — HOSPITAL ENCOUNTER (OUTPATIENT)
Dept: CARDIOLOGY | Facility: CLINIC | Age: 77
Discharge: HOME | End: 2017-11-28
Attending: INTERNAL MEDICINE
Payer: MEDICARE

## 2017-11-28 ENCOUNTER — OFFICE VISIT (OUTPATIENT)
Dept: CARDIOLOGY | Facility: CLINIC | Age: 77
End: 2017-11-28
Payer: MEDICARE

## 2017-11-28 VITALS
HEIGHT: 70 IN | BODY MASS INDEX: 32.48 KG/M2 | WEIGHT: 226.88 LBS | SYSTOLIC BLOOD PRESSURE: 103 MMHG | HEART RATE: 59 BPM | DIASTOLIC BLOOD PRESSURE: 54 MMHG

## 2017-11-28 DIAGNOSIS — I50.22 CHRONIC SYSTOLIC HEART FAILURE: ICD-10-CM

## 2017-11-28 DIAGNOSIS — I10 ESSENTIAL HYPERTENSION: ICD-10-CM

## 2017-11-28 DIAGNOSIS — I25.10 CORONARY ARTERY DISEASE INVOLVING NATIVE CORONARY ARTERY OF NATIVE HEART WITHOUT ANGINA PECTORIS: ICD-10-CM

## 2017-11-28 DIAGNOSIS — E78.00 PURE HYPERCHOLESTEROLEMIA: ICD-10-CM

## 2017-11-28 DIAGNOSIS — I48.0 PAROXYSMAL ATRIAL FIBRILLATION: Primary | ICD-10-CM

## 2017-11-28 DIAGNOSIS — Z95.810 ICD (IMPLANTABLE CARDIOVERTER-DEFIBRILLATOR), BIVENTRICULAR, IN SITU: ICD-10-CM

## 2017-11-28 DIAGNOSIS — E08.42 DIABETES MELLITUS DUE TO UNDERLYING CONDITION WITH DIABETIC POLYNEUROPATHY, WITH LONG-TERM CURRENT USE OF INSULIN: ICD-10-CM

## 2017-11-28 DIAGNOSIS — Z79.4 DIABETES MELLITUS DUE TO UNDERLYING CONDITION WITH DIABETIC POLYNEUROPATHY, WITH LONG-TERM CURRENT USE OF INSULIN: ICD-10-CM

## 2017-11-28 DIAGNOSIS — Z79.899 ON AMIODARONE THERAPY: ICD-10-CM

## 2017-11-28 DIAGNOSIS — I48.0 PAROXYSMAL ATRIAL FIBRILLATION: ICD-10-CM

## 2017-11-28 DIAGNOSIS — Z79.01 CURRENT USE OF LONG TERM ANTICOAGULATION: ICD-10-CM

## 2017-11-28 LAB
DIASTOLIC DYSFUNCTION: YES
ESTIMATED PA SYSTOLIC PRESSURE: 30.25
RETIRED EF AND QEF - SEE NOTES: 10 (ref 55–65)
TRICUSPID VALVE REGURGITATION: ABNORMAL

## 2017-11-28 PROCEDURE — 99499 UNLISTED E&M SERVICE: CPT | Mod: S$GLB,,, | Performed by: INTERNAL MEDICINE

## 2017-11-28 PROCEDURE — 93306 TTE W/DOPPLER COMPLETE: CPT | Mod: S$GLB,,, | Performed by: INTERNAL MEDICINE

## 2017-11-28 PROCEDURE — 99999 PR PBB SHADOW E&M-EST. PATIENT-LVL III: CPT | Mod: PBBFAC,,, | Performed by: INTERNAL MEDICINE

## 2017-11-28 PROCEDURE — 99214 OFFICE O/P EST MOD 30 MIN: CPT | Mod: S$GLB,,, | Performed by: INTERNAL MEDICINE

## 2017-11-28 NOTE — PROGRESS NOTES
Consent obtained. 22 g sl started in right forearm for optison use. optison given ivp via sl for imaging. Denies transfusion rxn. Tolerated well. Sl d/ramila after. Pressure applied.

## 2017-11-28 NOTE — PROGRESS NOTES
Subjective:   Patient ID:  Madhav Cortez is a 77 y.o. male who presents for follow-up of Congestive Heart Failure (3 month f/u ) and Dizziness      HPI: He has been in his usual state of health. We added spironolactone at his last visit which he has tolerated well. He reports some postural lightheadedness. He has not had any falls or syncopal events. He has stable FC III dyspnea and no chest discomfort. His weight is down 7 lbs. His last echo was 3/16 and showed an LVEDD of 6.8 cm and an LVEF of 10%. His ICD interrogation 10/17 showed no NSVT, afib < 1% and he was LV paced 100% of the time.    Past Medical History:   Diagnosis Date    *Atrial fibrillation     *Atrial flutter     Acute exacerbation of CHF (congestive heart failure) 8/2/2013    Anticoagulant long-term use     Anxiety     Arthritis     Atrial flutter     Back pain     Cardiomyopathy     Cataract     Coronary artery disease     Depression     Diabetes mellitus     Heart bloc     Hepatitis B     Hyperlipidemia 4/1/2014    Hypertension     Myocardial infarction     Non-STEMI (non-ST elevated myocardial infarction) 5/26/2013    Nuclear sclerosis - Both Eyes 2/18/2013    Post PTCA 8/7/2012    Presence of biventricular AICD 2/23/2016    Tobacco dependence     Transaminitis 4/1/2014    Ventricular tachycardia        Past Surgical History:   Procedure Laterality Date    APPENDECTOMY      CARDIAC DEFIBRILLATOR PLACEMENT      CARDIAC DEFIBRILLATOR PLACEMENT      COLONOSCOPY      CORONARY ANGIOPLASTY WITH STENT PLACEMENT      FRACTURE SURGERY      L arm    INSERT / REPLACE / REMOVE PACEMAKER  12/15    bi-V upgrade    RADIOFREQUENCY ABLATION      STEROID INJECTION KNEE Bilateral     received about every 4 months    TONSILLECTOMY      VASCULAR SURGERY         Social History     Social History    Marital status:      Spouse name: N/A    Number of children: N/A    Years of education: N/A     Social History Main Topics  "   Smoking status: Former Smoker     Quit date: 7/6/1987    Smokeless tobacco: Never Used      Comment: 25 years ago    Alcohol use No    Drug use: No    Sexual activity: No     Other Topics Concern    None     Social History Narrative    Retired. Spends a lot of time gambling in the [place]       Family History   Problem Relation Age of Onset    Cancer Father     Diabetes Father     Bladder Cancer Father     Diabetes Mother     Heart attack Mother      "weak heart"    Bipolar disorder Son     Cancer Paternal Grandmother     Heart disease Maternal Grandmother     No Known Problems Maternal Grandfather     Cancer Paternal Grandfather     No Known Problems Daughter     No Known Problems Daughter     No Known Problems Son     No Known Problems Son     Cancer Maternal Uncle     No Known Problems Maternal Aunt     No Known Problems Paternal Aunt     No Known Problems Paternal Uncle     No Known Problems Sister     No Known Problems Brother     Amblyopia Neg Hx     Blindness Neg Hx     Cataracts Neg Hx     Glaucoma Neg Hx     Hypertension Neg Hx     Macular degeneration Neg Hx     Retinal detachment Neg Hx     Strabismus Neg Hx     Stroke Neg Hx     Thyroid disease Neg Hx     Liver disease Neg Hx     Melanoma Neg Hx     Psoriasis Neg Hx     Lupus Neg Hx     Acne Neg Hx     Eczema Neg Hx        Patient's Medications   New Prescriptions    No medications on file   Previous Medications    ACCU-CHEK JIE MISC        AMIODARONE (PACERONE) 200 MG TAB    TAKE 1 TABLET TWICE DAILY    ASCORBIC ACID (VITAMIN C) 500 MG TABLET    Take 1,500 mg by mouth 2 (two) times daily.     ASPIRIN 81 MG CHEW    Take 1 tablet (81 mg total) by mouth once daily.    BETA CAROTENE-C-E-LUTEIN-MIN29 5,000-60-30-2 UNIT-MG-UNIT-MG TAB    Take 1 capsule by mouth once daily.     BLOOD SUGAR DIAGNOSTIC STRP    Accu check Jie test strips. Check blood sugar three times daily    BLOOD-GLUCOSE METER KIT    Accu check " "monique meter Use as instructed    CARVEDILOL (COREG) 25 MG TABLET    TAKE 1 TABLET TWICE DAILY    DABIGATRAN ETEXILATE (PRADAXA) 150 MG CAP    Take 1 capsule (150 mg total) by mouth 2 (two) times daily. "Do NOT break, chew, or open capsules."    FUROSEMIDE (LASIX) 40 MG TABLET    TAKE 1 TABLET ONE TIME DAILY    GABAPENTIN (NEURONTIN) 300 MG CAPSULE    TAKE 2 CAPSULES (600 MG TOTAL) BY MOUTH THREE TIMES DAILY    GLUCOSAMINE-CHONDROITIN 500-400 MG TABLET    Take 1 tablet by mouth 2 (two) times daily.    LANTUS SOLOSTAR 100 UNIT/ML (3 ML) INPN PEN    INJECT 40 UNITS INTO THE SKIN EVERY EVENING.    MAGNESIUM OXIDE (MAG-OX) 400 MG TABLET    Take 400 mg by mouth 2 (two) times daily.     MELATONIN 5 MG TAB    Take 1 tablet by mouth every evening.     NITROGLYCERIN (NITROSTAT) 0.4 MG SL TABLET    Place 1 tablet (0.4 mg total) under the tongue every 5 (five) minutes as needed for Chest pain.    NOVOLOG FLEXPEN 100 UNIT/ML INPN PEN    INJECT  16 UNITS SUBCUTANEOUSLY THREE TIMES DAILY WITH MEALS    PAROXETINE (PAXIL) 10 MG TABLET    TAKE 1 TABLET EVERY EVENING.    PEN NEEDLE, DIABETIC 31 GAUGE X 1/4" NDLE    Use 4 times daily    RIBOSE ORAL    Take 1 tablet by mouth once daily.     SACUBITRIL-VALSARTAN (ENTRESTO)  MG PER TABLET    Take one tablet twice daily    SENNA-DOCUSATE 8.6-50 MG (PERICOLACE) 8.6-50 MG PER TABLET    Take 1 tablet by mouth 2 (two) times daily.    SPIRONOLACTONE (ALDACTONE) 25 MG TABLET    Take 1 tablet (25 mg total) by mouth once daily.    TEMAZEPAM (RESTORIL) 15 MG CAP    TAKE 1 CAPSULE EVERY EVENING    TIZANIDINE (ZANAFLEX) 2 MG TABLET    TAKE 1 TABLET EVERY 8 HOURS AS NEEDED FOR MUSCLE PAIN    TRAZODONE (DESYREL) 50 MG TABLET    TAKE 1 TO 2 TABLETS EVERY EVENING    UBIDECARENONE (COQ-10 ORAL)    Take 800 mg by mouth once daily. Takes 100mg tablet at lunch, and 600mg at dinner   Modified Medications    No medications on file   Discontinued Medications    No medications on file       Review of " "Systems   Constitution: Negative for weakness, malaise/fatigue and weight gain.   HENT: Negative for hearing loss.    Eyes: Negative for visual disturbance.   Cardiovascular: Positive for dyspnea on exertion. Negative for chest pain, claudication, leg swelling, near-syncope, orthopnea, palpitations, paroxysmal nocturnal dyspnea and syncope.   Respiratory: Negative for cough, shortness of breath, sleep disturbances due to breathing, snoring and wheezing.    Endocrine: Negative for cold intolerance, heat intolerance, polydipsia, polyphagia and polyuria.   Hematologic/Lymphatic: Negative for bleeding problem. Does not bruise/bleed easily.   Skin: Negative for rash and suspicious lesions.   Musculoskeletal: Negative for arthritis, falls, joint pain, muscle weakness and myalgias.   Gastrointestinal: Negative for abdominal pain, change in bowel habit, constipation, diarrhea, heartburn, hematochezia, melena and nausea.   Genitourinary: Negative for hematuria and nocturia.   Neurological: Positive for light-headedness. Negative for excessive daytime sleepiness, dizziness, headaches and loss of balance.   Psychiatric/Behavioral: Negative for depression. The patient is not nervous/anxious.    Allergic/Immunologic: Negative for environmental allergies.       BP (!) 103/54 (BP Location: Left arm, Patient Position: Sitting, BP Method: Medium (Automatic))   Pulse (!) 59   Ht 5' 10" (1.778 m)   Wt 102.9 kg (226 lb 13.7 oz)   BMI 32.55 kg/m²     Objective:   Physical Exam   Constitutional: He is oriented to person, place, and time. He appears well-developed and well-nourished.        HENT:   Head: Normocephalic and atraumatic.   Mouth/Throat: Oropharynx is clear and moist.   Eyes: Conjunctivae and EOM are normal. Pupils are equal, round, and reactive to light. No scleral icterus.   Neck: Normal range of motion. Neck supple. No hepatojugular reflux and no JVD present. No tracheal deviation present. No thyromegaly present. "   Cardiovascular: Normal rate, regular rhythm, normal heart sounds and intact distal pulses.  PMI is not displaced.    Pulses:       Carotid pulses are 2+ on the right side, and 2+ on the left side.       Radial pulses are 0 on the right side, and 1+ on the left side.        Dorsalis pedis pulses are 1+ on the right side, and 1+ on the left side.        Posterior tibial pulses are 1+ on the right side, and 1+ on the left side.   Pulmonary/Chest: Effort normal and breath sounds normal.   Abdominal: Soft. Bowel sounds are normal. He exhibits no distension and no mass. There is no hepatosplenomegaly. There is no tenderness.   Musculoskeletal: He exhibits edema. He exhibits no tenderness.   Trace edema   Lymphadenopathy:     He has no cervical adenopathy.   Neurological: He is alert and oriented to person, place, and time.   Skin: Skin is warm and dry. No rash noted. No cyanosis or erythema. Nails show no clubbing.   Hyperpigmentation of both shins   Psychiatric: He has a normal mood and affect. His speech is normal and behavior is normal.       Lab Results   Component Value Date     08/25/2017    K 4.4 08/25/2017     08/25/2017    CO2 32 (H) 08/25/2017    BUN 17 08/25/2017    CREATININE 1.1 08/25/2017     (H) 08/25/2017    HGBA1C 6.7 (H) 08/25/2017    MG 2.6 10/19/2015    AST 79 (H) 10/18/2017     (H) 10/18/2017    ALBUMIN 3.5 10/18/2017    PROT 6.9 10/18/2017    BILITOT 0.4 10/18/2017    WBC 7.68 08/25/2017    HGB 13.4 (L) 08/25/2017    HCT 39.5 (L) 08/25/2017    MCV 95 08/25/2017     08/25/2017    INR 1.1 11/09/2015    INR 1.7 (H) 10/06/2011    TSH 2.258 03/30/2017    CHOL 185 01/23/2017    HDL 40 01/23/2017    LDLCALC 103.6 01/23/2017    TRIG 207 (H) 01/23/2017     (H) 10/19/2015       Assessment:     1. Paroxysmal atrial fibrillation : He is on amiodarone and anticoagulated with pradaxa. He follows with Dr Koehler. He has chronic mild transaminitis   2. Chronic systolic heart  failure: He is well compensated and euvolemic. FC III, Stage C. Continue current regimen. BMP today. Echo prior to next visit.    3. Coronary artery disease involving native coronary artery of native heart without angina pectoris : He is asymptomatic. Continue asa. Statin stopped due to transaminitis. FLP prior to next visit. Will consider PCSK9 inhibitor pending his lipid results.   4. Essential hypertension : Blood pressure is at goal. I have made no changes. Continue current regimen.   5. Pure hypercholesterolemia    6. ICD (implantable cardioverter-defibrillator), biventricular, in situ    7. On amiodarone therapy    8. Current use of long term anticoagulation    9. Diabetes mellitus due to underlying condition with diabetic polyneuropathy, with long-term current use of insulin        Plan:     Madhav was seen today for congestive heart failure and dizziness.    Diagnoses and all orders for this visit:    Paroxysmal atrial fibrillation  -     Basic metabolic panel; Future  -     2D echo with color flow doppler; Future  -     Comprehensive metabolic panel; Future  -     Lipid panel; Future    Chronic systolic heart failure  -     Basic metabolic panel; Future  -     2D echo with color flow doppler; Future  -     Comprehensive metabolic panel; Future  -     Lipid panel; Future    Coronary artery disease involving native coronary artery of native heart without angina pectoris    Essential hypertension  -     Basic metabolic panel; Future  -     2D echo with color flow doppler; Future  -     Comprehensive metabolic panel; Future  -     Lipid panel; Future    Pure hypercholesterolemia  -     Basic metabolic panel; Future  -     2D echo with color flow doppler; Future  -     Comprehensive metabolic panel; Future  -     Lipid panel; Future    ICD (implantable cardioverter-defibrillator), biventricular, in situ  -     Basic metabolic panel; Future  -     2D echo with color flow doppler; Future  -     Comprehensive  metabolic panel; Future  -     Lipid panel; Future    On amiodarone therapy    Current use of long term anticoagulation    Diabetes mellitus due to underlying condition with diabetic polyneuropathy, with long-term current use of insulin  -     Basic metabolic panel; Future  -     2D echo with color flow doppler; Future  -     Comprehensive metabolic panel; Future  -     Lipid panel; Future        Thank you for allowing me to participate in this patient's care. Please do not hesitate to contact me with any questions or concerns.

## 2017-11-29 ENCOUNTER — TELEPHONE (OUTPATIENT)
Dept: CARDIOLOGY | Facility: CLINIC | Age: 77
End: 2017-11-29

## 2017-11-29 DIAGNOSIS — I48.0 PAROXYSMAL ATRIAL FIBRILLATION: Primary | ICD-10-CM

## 2017-11-29 NOTE — TELEPHONE ENCOUNTER
----- Message from Kira Palm MD sent at 11/29/2017  2:50 PM CST -----  The heart function remains severely depressed with an LVEF of 10-15%

## 2017-11-29 NOTE — TELEPHONE ENCOUNTER
----- Message from Kira Palm MD sent at 11/29/2017  2:51 PM CST -----  Potassium level is high. Please have him decrease the spironolactone to 12.5 mg daily and repeat a potassium level on Monday.

## 2017-11-29 NOTE — TELEPHONE ENCOUNTER
Potassium level elevated 5.5. Decrease spironolactone to 12.5 mg daily and repeat potassium level on Monday.

## 2017-12-04 ENCOUNTER — LAB VISIT (OUTPATIENT)
Dept: LAB | Facility: HOSPITAL | Age: 77
End: 2017-12-04
Attending: INTERNAL MEDICINE
Payer: MEDICARE

## 2017-12-04 DIAGNOSIS — I48.0 PAROXYSMAL ATRIAL FIBRILLATION: ICD-10-CM

## 2017-12-04 LAB — POTASSIUM SERPL-SCNC: 4.5 MMOL/L

## 2017-12-04 PROCEDURE — 36415 COLL VENOUS BLD VENIPUNCTURE: CPT | Mod: PO

## 2017-12-04 PROCEDURE — 84132 ASSAY OF SERUM POTASSIUM: CPT

## 2017-12-05 ENCOUNTER — PATIENT MESSAGE (OUTPATIENT)
Dept: CARDIOLOGY | Facility: CLINIC | Age: 77
End: 2017-12-05

## 2017-12-11 ENCOUNTER — OFFICE VISIT (OUTPATIENT)
Dept: INTERNAL MEDICINE | Facility: CLINIC | Age: 77
End: 2017-12-11
Payer: MEDICARE

## 2017-12-11 ENCOUNTER — IMMUNIZATION (OUTPATIENT)
Dept: INTERNAL MEDICINE | Facility: CLINIC | Age: 77
End: 2017-12-11
Payer: MEDICARE

## 2017-12-11 VITALS
BODY MASS INDEX: 32.5 KG/M2 | DIASTOLIC BLOOD PRESSURE: 42 MMHG | WEIGHT: 227 LBS | HEART RATE: 60 BPM | HEIGHT: 70 IN | SYSTOLIC BLOOD PRESSURE: 82 MMHG

## 2017-12-11 VITALS — DIASTOLIC BLOOD PRESSURE: 58 MMHG | OXYGEN SATURATION: 96 % | SYSTOLIC BLOOD PRESSURE: 92 MMHG | HEART RATE: 61 BPM

## 2017-12-11 DIAGNOSIS — I25.5 CARDIOMYOPATHY, ISCHEMIC: Primary | ICD-10-CM

## 2017-12-11 DIAGNOSIS — I70.0 ATHEROSCLEROSIS OF AORTIC ARCH: ICD-10-CM

## 2017-12-11 DIAGNOSIS — I50.22 CHRONIC SYSTOLIC HEART FAILURE: ICD-10-CM

## 2017-12-11 DIAGNOSIS — I10 ESSENTIAL HYPERTENSION: ICD-10-CM

## 2017-12-11 DIAGNOSIS — E08.42 DIABETES MELLITUS DUE TO UNDERLYING CONDITION WITH DIABETIC POLYNEUROPATHY, WITH LONG-TERM CURRENT USE OF INSULIN: ICD-10-CM

## 2017-12-11 DIAGNOSIS — I25.10 CORONARY ARTERY DISEASE INVOLVING NATIVE CORONARY ARTERY OF NATIVE HEART WITHOUT ANGINA PECTORIS: ICD-10-CM

## 2017-12-11 DIAGNOSIS — E78.00 PURE HYPERCHOLESTEROLEMIA: ICD-10-CM

## 2017-12-11 DIAGNOSIS — Z95.810 ICD (IMPLANTABLE CARDIOVERTER-DEFIBRILLATOR), BIVENTRICULAR, IN SITU: ICD-10-CM

## 2017-12-11 DIAGNOSIS — E66.9 OBESITY, UNSPECIFIED CLASSIFICATION, UNSPECIFIED OBESITY TYPE, UNSPECIFIED WHETHER SERIOUS COMORBIDITY PRESENT: ICD-10-CM

## 2017-12-11 DIAGNOSIS — N18.30 STAGE 3 CHRONIC KIDNEY DISEASE: ICD-10-CM

## 2017-12-11 DIAGNOSIS — E11.43 DIABETIC AUTONOMIC NEUROPATHY ASSOCIATED WITH TYPE 2 DIABETES MELLITUS: ICD-10-CM

## 2017-12-11 DIAGNOSIS — I25.5 CARDIOMYOPATHY, ISCHEMIC: ICD-10-CM

## 2017-12-11 DIAGNOSIS — Z79.4 DIABETES MELLITUS DUE TO UNDERLYING CONDITION WITH DIABETIC POLYNEUROPATHY, WITH LONG-TERM CURRENT USE OF INSULIN: ICD-10-CM

## 2017-12-11 DIAGNOSIS — Z00.00 ENCOUNTER FOR PREVENTIVE HEALTH EXAMINATION: Primary | ICD-10-CM

## 2017-12-11 DIAGNOSIS — Z79.01 CURRENT USE OF LONG TERM ANTICOAGULATION: ICD-10-CM

## 2017-12-11 DIAGNOSIS — Z23 NEED FOR PNEUMOCOCCAL VACCINATION: ICD-10-CM

## 2017-12-11 DIAGNOSIS — I48.0 PAROXYSMAL ATRIAL FIBRILLATION: ICD-10-CM

## 2017-12-11 DIAGNOSIS — F41.9 ANXIETY: ICD-10-CM

## 2017-12-11 DIAGNOSIS — I48.92 ATRIAL FLUTTER, UNSPECIFIED TYPE: ICD-10-CM

## 2017-12-11 DIAGNOSIS — Z79.899 ON AMIODARONE THERAPY: ICD-10-CM

## 2017-12-11 LAB
CREAT UR-MCNC: 195 MG/DL
MICROALBUMIN UR DL<=1MG/L-MCNC: 122 UG/ML
MICROALBUMIN/CREATININE RATIO: 62.6 UG/MG

## 2017-12-11 PROCEDURE — 90662 IIV NO PRSV INCREASED AG IM: CPT | Mod: S$GLB,,, | Performed by: INTERNAL MEDICINE

## 2017-12-11 PROCEDURE — 99999 PR PBB SHADOW E&M-EST. PATIENT-LVL V: CPT | Mod: PBBFAC,,, | Performed by: NURSE PRACTITIONER

## 2017-12-11 PROCEDURE — G0008 ADMIN INFLUENZA VIRUS VAC: HCPCS | Mod: S$GLB,,, | Performed by: INTERNAL MEDICINE

## 2017-12-11 PROCEDURE — 99214 OFFICE O/P EST MOD 30 MIN: CPT | Mod: S$GLB,,, | Performed by: INTERNAL MEDICINE

## 2017-12-11 PROCEDURE — 99499 UNLISTED E&M SERVICE: CPT | Mod: S$GLB,,, | Performed by: INTERNAL MEDICINE

## 2017-12-11 PROCEDURE — 99999 PR PBB SHADOW E&M-EST. PATIENT-LVL II: CPT | Mod: PBBFAC,,, | Performed by: INTERNAL MEDICINE

## 2017-12-11 PROCEDURE — 90670 PCV13 VACCINE IM: CPT | Mod: S$GLB,,, | Performed by: INTERNAL MEDICINE

## 2017-12-11 PROCEDURE — 82570 ASSAY OF URINE CREATININE: CPT

## 2017-12-11 PROCEDURE — 99499 UNLISTED E&M SERVICE: CPT | Mod: S$GLB,,, | Performed by: NURSE PRACTITIONER

## 2017-12-11 PROCEDURE — G0439 PPPS, SUBSEQ VISIT: HCPCS | Mod: S$GLB,,, | Performed by: NURSE PRACTITIONER

## 2017-12-11 PROCEDURE — G0009 ADMIN PNEUMOCOCCAL VACCINE: HCPCS | Mod: S$GLB,,, | Performed by: INTERNAL MEDICINE

## 2017-12-11 RX ORDER — TEMAZEPAM 15 MG/1
CAPSULE ORAL
Qty: 90 CAPSULE | Refills: 1 | Status: SHIPPED | OUTPATIENT
Start: 2017-12-11 | End: 2018-07-02 | Stop reason: SDUPTHER

## 2017-12-11 NOTE — PROGRESS NOTES
CHIEF COMPLAINT: Follow up of chf, diabetes, hypertension, a fib, hyperlipidemia, back pain      HISTORY OF PRESENT ILLNESS: This is a 77-year-old man who presents for follow up of above.      His back is doing well. He is doing stretching at home which helps.  He takes tizanidine 2 mg three times daily as needed for muscle spasms in back.  HE takes one tizanidine in the evening.       He is currently taking pradaxa 150 mg twice daily for anticoagulation for a fib/flutter. NO edema. He has mild dyspnea on exertion. No chest pain or palpitations. He continues to take spironolactone 25 mg 1/2 daily, Lasix 40 mg once daily, carvediolol 25 mg bid and Entresto 97/103 twice daily and KCL 10 meq 1 tablet daily for his hypertension and CHF. He is also on amiodarone 200 mg twice daily for his atrial flutter. Aspirin 81 mg daily.         He is no longer taking paxil 10 mg nightly. HE does not know why he is not taking it. No side effect.  His mood is better since he  from his wife. He was born with a bad temper. Temper is a little better. He is sleeping well. HE continues to take trazodone 50 mg 2 tablets at bedtime, temazepam 15 mg at bedtime and melatonin 5 mg at bedtime. No anxiety or depression now. HE has  from his wife which has helped his mood.       His blood sugars are slightly higher. Blood sugar in the morning 160's. In the afternoon, 200. He eats late at night. Saw diabetic education. He currently takes Lantus 40 units at night, and NovoLog 16-20 units with meals. He denies any polydipsia, polyuria, hypoglycemia.       He has hyperlipidemia, currently off Lipitor 20 mg daily      His liver enzymes have been mildly elevated. Dr Palm stopped fenofibrate due to the elevated liver enzymes. He had a liver biopsy and the elevated liver enzymes are NOT due to amiodarone. No further work up is needed at this time.       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  Shooting pain has resolved with increasing dose of gabapentin.      No nausea, vomiting, constipation, diarrhea, dysuria, hematuria, sinus congestion, sore throat, headache.       PAST MEDICAL HISTORY:    1. Hypertension, coronary artery disease, status post stenting.    2. Ischemic cardiomyopathy.    3. Atrial flutter.    4. Diabetes mellitus.    5. ICD placed 02/22/2012.    6. Hyperlipidemia.    7. Osteoarthritis of the knees.  8. Atrial fibrillation  9. Transaminitis      PAST SURGICAL HISTORY: Appendectomy in 2003.        SOCIAL HISTORY: He is a former smoker, quit in 1987. Does not drink alcohol.    .       PHYSICAL EXAMINATION:     BP (!) 92/58   Pulse 61   SpO2 96%    GENERAL: He is alert, oriented, no apparent distress. Affect within normal limits.    HEENT: Conjunctivae anicteric. Pupils are equal, round and reactive to light.    Tympanic membranes are clear. Oropharynx is clear.    NECK: Supple. No cervical lymphadenopathy, no thyroid enlargement.    RESPIRATORY: Effort normal. Lungs are clear to auscultation.    HEART: Regular rate and rhythm without murmurs, gallops or rubs. No lower extremity edema.        ASSESSMENT AND PLAN:.      1. Back pain - stable  2. CHF - BP slightly low. Decrease lasix to 40 mg 1/2 tablet daily and watch for edema  3. Coronary artery disease - stable - to follow up with Cardiology.    4. Atrial flutter - s/p cardioversion - stable    5. Diabetes mellitus - stable. Watch blood sugars. A1c at next lab draw  5. History of anemia on last blood work, stable    6. Hyperlipidemia. At goal off atorvasly    8. Screening - PSA 3/16, tatin  7. Transaminitis -better, watch close  9. Diabetic neuropathy -stable. gabapentin 300 mg 2 capsules three times daily    10. Anxiety and depression - stable  11. Aortic arch atherosclerosis - modifying risk factors  12. Insomnia - stable  I will see him back in 4 months, sooner if problems arise.

## 2017-12-11 NOTE — PATIENT INSTRUCTIONS
Counseling and Referral of Other Preventative  (Italic type indicates deductible and co-insurance are waived)    Patient Name: Madhav Cortez  Today's Date: 12/11/2017      SERVICE LIMITATIONS RECOMMENDATION    Vaccines    · Pneumococcal (once after 65)    · Influenza (annually)    · Hepatitis B (if medium/high risk)    · Prevnar 13      Hepatitis B medium/high risk factors:       - End-stage renal disease       - Hemophiliacs who received Factor VII or         IX concentrates       - Clients of institutions for the mentally             retarded       - Persons who live in the same house as          a HepB carrier       - Homosexual men       - Illicit injectable drug abusers     Pneumococcal: Due 12/2018     Influenza: Scheduled - see appointments     Hepatitis B: N/A     Prevnar 13: Scheduled - see appointments    Prostate cancer screening (annually to age 75)     Prostate specific antigen (PSA) Shared decision making with Provider. Sometimes a co-pay may be required if the patient decides to have this test. The USPSTF no longer recommends prostate cancer screening routinely in medicine: not to follow    Colorectal cancer screening (to age 75)    · Fecal occult blood test (annual)  · Flexible sigmoidoscopy (5y)  · Screening colonoscopy (10y)  · Barium enema   N/A    Diabetes self-management training (no USPSTF recommendations)  Requires referral by treating physician for patient with diabetes or renal disease. 10 hours of initial DSMT sessions of no less than 30 minutes each in a continuous 12-month period. 2 hours of follow-up DSMT in subsequent years.  Done, repeat as necessary    Glaucoma screening (no USPSTF recommendation)  Diabetes mellitus, family history   , age 50 or over    American, age 65 or over  Recommend follow up with eye care professional regularly    Medical nutrition therapy for diabetes or renal disease (no recommended schedule)  Requires referral by treating  physician for patient with diabetes or renal disease or kidney transplant within the past 3 years.  Can be provided in same year as diabetes self-management training (DSMT), and CMS recommends medical nutrition therapy take place after DSMT. Up to 3 hours for initial year and 2 hours in subsequent years.  N/A    Cardiovascular screening blood tests (every 5 years)  · Fasting lipid panel  Order as a panel if possible  Last done 1/2017, recommend to repeat every  year    Diabetes screening tests (at least every 3 years, Medicare covers annually or at 6-month intervals for prediabetic patients)  · Fasting blood sugar (FBS) or glucose tolerance test (GTT)  Patient must be diagnosed with one of the following:       - Hypertension       - Dyslipidemia       - Obesity (BMI 30kg/m2)       - Previous elevated impaired FBS or GTT       ... or any two of the following:       - Overweight (BMI 25 but <30)       - Family history of diabetes       - Age 65 or older       - History of gestational diabetes or birth of baby weighing more than 9 pounds  Last done 8/2017, recommend to repeat every 6  months    Abdominal aortic aneurysm screening (once)  · Sonogram   Limited to patients who meet one of the following criteria:       - Men who are 65-75 years old and have smoked more than 100 cigarette in their lifetime       - Anyone with a family history of abdominal aortic aneurysm       - Anyone recommended for screening by the USPSTF  N/A    HIV screening (annually for increased risk patients)  · HIV-1 and HIV-2 by EIA, or JUAN J, rapid antibody test or oral mucosa transudate  Patients must be at increased risk for HIV infection per USPSTF guidelines or pregnant. Tests covered annually for patient at increased risk or as requested by the patient. Pregnant patients may receive up to 3 tests during pregnancy.  Risks discussed, screening is not recommended    Smoking cessation counseling (up to 8 sessions per year)  Patients must be  asymptomatic of tobacco-related conditions to receive as a preventative service.  Non-smoker    Subsequent annual wellness visit  At least 12 months since last AWV  Return in one year     The following information is provided to all patients.  This information is to help you find resources for any of the problems found today that may be affecting your health:                Living healthy guide: www.[facility].[email or web address].[facility]      Understanding Diabetes: www.diabetes.org      Eating healthy: www.cdc.gov/healthyweight      CDC home safety checklist: www.cdc.gov/steadi/patient.html      Agency on Aging: www.goea.[place].[facility]      Alcoholics anonymous (AA): www.aa.org      Physical Activity: www.mojgan.nih.gov/hn3najh      Tobacco use: www.quitwithusla.org

## 2017-12-11 NOTE — PROGRESS NOTES
"Madhav Cortez presented for a  Medicare AWV and comprehensive Health Risk Assessment today. The following components were reviewed and updated:    · Medical history  · Family History  · Social history  · Allergies and Current Medications  · Health Risk Assessment  · Health Maintenance  · Care Team     ** See Completed Assessments for Annual Wellness Visit within the encounter summary.**       The following assessments were completed:  · Living Situation  · CAGE  · Depression Screening  · Timed Get Up and Go  · Whisper Test  · Cognitive Function Screening  · Nutrition Screening  · ADL Screening  · PAQ Screening            Vitals:    12/11/17 1412   BP: (!) 82/42   BP Location: Left arm   Patient Position: Sitting   Pulse: 60   Weight: 103 kg (227 lb)   Height: 5' 10" (1.778 m)     Body mass index is 32.57 kg/m².     Physical Exam   Constitutional: He is oriented to person, place, and time. He appears well-developed. No distress.   HENT:   Head: Normocephalic and atraumatic.   Cardiovascular: Normal rate and regular rhythm.    Pulses:       Radial pulses are 1+ on the right side, and 1+ on the left side.   Pulmonary/Chest: Effort normal and breath sounds normal. No respiratory distress. He has no wheezes. He has no rales.   Musculoskeletal: He exhibits no edema, tenderness or deformity.   Antalgic gait   Neurological: He is alert and oriented to person, place, and time.   Skin: Skin is warm and dry. He is not diaphoretic.   Psychiatric: He has a normal mood and affect. His behavior is normal. He expresses no homicidal and no suicidal ideation. He expresses no suicidal plans and no homicidal plans.   Vitals reviewed.        Diagnoses and health risks identified today and associated recommendations/orders:    1. Encounter for preventive health examination  Flu vaccine today.  Abnormal Whisper Test-declined further evaluation    2. Diabetes mellitus due to underlying condition with diabetic polyneuropathy, with " long-term current use of insulin  Stable.   Continue current medications.  Followed by PCP.   - MICROALBUMIN / CREATININE RATIO URINE    3. Stage 3 chronic kidney disease  Stable.   Followed by PCP.     4. 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   Continue current medications.  Followed by PCP.     5. Atrial flutter, unspecified type  Stable.   Continue current medication.  Followed by Cardiology.     6. Paroxysmal atrial fibrillation  Stable.   Continue current medications.  Followed by Cardiology.     7. Chronic systolic heart failure  Stable.   Continue current medication.  Followed by Cardiology.     8. Diabetic autonomic neuropathy associated with type 2 diabetes mellitus  Stable.   Continue current medication.  Followed by PCP.     9. Atherosclerosis of aortic arch  Stable.   Seen on imaging from 8/2/2013.  Followed by Cardiology.     10. Need for pneumococcal vaccination  - (In Office Administered) Pneumococcal Conjugate Vaccine (13 Valent) (IM)    11. On amiodarone therapy  Stable.   Continue current medication.  Followed by Cardiology.     12. Current use of long term anticoagulation  Stable.   Continue current medication.  Followed by Cardiology.     13. Pure hypercholesterolemia  Stable.   Followed by Cardiology.     14. Anxiety  Stable.   Followed by PCP.     15. Coronary artery disease involving native coronary artery of native heart without angina pectoris  Stable.   Continue current medications.  Followed by Cardiology.     16. Essential hypertension  Low BP. Will follow up with PCP next.   Followed by PCP.     17. Cardiomyopathy, ischemic  Stable.   Followed by Cardiology.     18. ICD (implantable cardioverter-defibrillator), biventricular, in situ  Stable.   Followed by Cardiology.     19. Obesity, unspecified classification, unspecified obesity type, unspecified whether serious comorbidity present  Stable.   Work on  weight loss.  Followed by PCP.       Provided Madhav with a 5-10 year written screening schedule and personal prevention plan. Recommendations were developed using the USPSTF age appropriate recommendations. Education, counseling, and referrals were provided as needed. After Visit Summary printed and given to patient which includes a list of additional screenings\tests needed.    Follow up with PCP today. Return in 1 year for HRA.    Akosua Manuel NP

## 2017-12-14 ENCOUNTER — TELEPHONE (OUTPATIENT)
Dept: INTERNAL MEDICINE | Facility: CLINIC | Age: 77
End: 2017-12-14

## 2017-12-14 ENCOUNTER — OFFICE VISIT (OUTPATIENT)
Dept: INTERNAL MEDICINE | Facility: CLINIC | Age: 77
End: 2017-12-14
Attending: FAMILY MEDICINE
Payer: MEDICARE

## 2017-12-14 VITALS
SYSTOLIC BLOOD PRESSURE: 100 MMHG | HEIGHT: 70 IN | BODY MASS INDEX: 32.57 KG/M2 | DIASTOLIC BLOOD PRESSURE: 60 MMHG | WEIGHT: 227.5 LBS

## 2017-12-14 DIAGNOSIS — M17.0 OSTEOARTHRITIS OF BOTH KNEES, UNSPECIFIED OSTEOARTHRITIS TYPE: Primary | ICD-10-CM

## 2017-12-14 PROCEDURE — 99499 UNLISTED E&M SERVICE: CPT | Mod: S$GLB,,, | Performed by: FAMILY MEDICINE

## 2017-12-14 PROCEDURE — 20610 DRAIN/INJ JOINT/BURSA W/O US: CPT | Mod: 50,S$GLB,, | Performed by: FAMILY MEDICINE

## 2017-12-14 PROCEDURE — 99999 PR PBB SHADOW E&M-EST. PATIENT-LVL III: CPT | Mod: PBBFAC,,, | Performed by: FAMILY MEDICINE

## 2017-12-14 RX ORDER — HYALURONATE SODIUM 20 MG/2 ML
20 SYRINGE (ML) INTRAARTICULAR WEEKLY
Status: SHIPPED | OUTPATIENT
Start: 2017-12-14 | End: 2018-01-04

## 2017-12-14 RX ADMIN — Medication 20 MG: at 06:12

## 2017-12-14 NOTE — PROGRESS NOTES
Subjective:       Patient ID: Madhav Cortez is a 77 y.o. male.    Chief Complaint: Knee Pain (knee shot)    HPI  Review of Systems   Constitutional: Negative for chills, fatigue and fever.   HENT: Negative for congestion and trouble swallowing.    Eyes: Negative for redness.   Respiratory: Negative for cough, chest tightness and shortness of breath.    Cardiovascular: Negative for chest pain, palpitations and leg swelling.   Gastrointestinal: Negative for abdominal pain and blood in stool.   Genitourinary: Negative for hematuria.   Musculoskeletal: Positive for arthralgias and gait problem. Negative for back pain, joint swelling, myalgias and neck pain.   Skin: Negative for color change and rash.   Neurological: Negative for tremors, speech difficulty, weakness, numbness and headaches.   Hematological: Negative for adenopathy. Does not bruise/bleed easily.   Psychiatric/Behavioral: Negative for behavioral problems, confusion and sleep disturbance. The patient is not nervous/anxious.        Objective:      Physical Exam   Constitutional: He is oriented to person, place, and time. He appears well-developed and well-nourished. No distress.   Neck: Neck supple.   Pulmonary/Chest: Effort normal.   Musculoskeletal: He exhibits no edema.        Right lower leg: He exhibits no edema.        Left lower leg: He exhibits no edema.   Neurological: He is alert and oriented to person, place, and time.   Skin: Skin is warm and dry. No rash noted.   Psychiatric: He has a normal mood and affect. His behavior is normal. Judgment and thought content normal.   Nursing note and vitals reviewed.      Assessment:       1. Osteoarthritis of both knees, unspecified osteoarthritis type        Plan:   Madhav was seen today for knee pain.    Diagnoses and all orders for this visit:    Osteoarthritis of both knees, unspecified osteoarthritis type  -     Prior Authorization Order      See meds, orders, follow up, routing and instructions  sections of encounter.  A 77-year-old male patient of Dr. Reyes.  He has DJD of the knees and   requests viscosupplementation.  He was counseled about risks and benefits   including, but not limited to joint infection, failure to improve.  The patient   consented, timeout was performed per protocol and patient received injections   bilaterally, as  indicated in his medical record.    Follow up in one week.      BIA/SUZIE  dd: 12/14/2017 18:01:42 (CST)  td: 12/14/2017 22:49:37 (CST)  Doc ID   #[id number]  Job ID #[id number]    CC:         BRIEF HISTORY:    Patient presents for therapeutic injections in the bilateral Knee(s) for OA. No complaints expressed at this time. The patient was counseled about risks and benefits of knee Visco-supplementation and other injections in general, including but not limited to pain, infection even that requiring surgery to clean out, failure to provide relief, transient flare, tissue damage. Patient expressed understanding, was given the opportunity to ask questions and any questions answered and consented verbally. Time out performed for localization, medication and patient identification using multiple identifiers.    Procedure Note:    Following a standard, sterile 2-antiseptic prep and negative arthrocentesis, Euflexxa was instilled in the bilateral knee(s) with no immediate discomfort. The procedure was tolerated well with no immediate adverse effects.    Follow up for next in series as scheduled.

## 2017-12-14 NOTE — TELEPHONE ENCOUNTER
Called pt's insurance and spoke with the PA department. The representative stated that since this was an in  no PA was needed for the euflexxa injection of both knees. Call reference # [id number].

## 2017-12-18 ENCOUNTER — PROCEDURE VISIT (OUTPATIENT)
Dept: INTERNAL MEDICINE | Facility: CLINIC | Age: 77
End: 2017-12-18
Attending: FAMILY MEDICINE
Payer: MEDICARE

## 2017-12-18 DIAGNOSIS — M17.0 OSTEOARTHRITIS OF BOTH KNEES, UNSPECIFIED OSTEOARTHRITIS TYPE: Primary | ICD-10-CM

## 2017-12-18 PROCEDURE — 20610 DRAIN/INJ JOINT/BURSA W/O US: CPT | Mod: 50,S$GLB,, | Performed by: FAMILY MEDICINE

## 2017-12-18 RX ORDER — HYALURONATE SODIUM 20 MG/2 ML
20 SYRINGE (ML) INTRAARTICULAR
Status: COMPLETED | OUTPATIENT
Start: 2017-12-18 | End: 2017-12-18

## 2017-12-18 RX ORDER — HYALURONATE SODIUM 20 MG/2 ML
20 SYRINGE (ML) INTRAARTICULAR WEEKLY
Status: SHIPPED | OUTPATIENT
Start: 2017-12-18 | End: 2018-01-08

## 2017-12-18 RX ADMIN — Medication 20 MG: at 05:12

## 2017-12-18 NOTE — PROCEDURES
Large Joint Aspiration/Injection  Date/Time: 12/18/2017 5:50 PM  Performed by: ADRIAN NAYLOR.  Authorized by: ADRIAN NAYLOR     Indications:  Pain  Timeout: Prior to procedure the correct patient, procedure, and site was verified           BRIEF HISTORY:    Patient presents for therapeutic injections in the bilateral Knee(s) for OA. No complaints expressed at this time. The patient was counseled about risks and benefits of knee Visco-supplementation and other injections in general, including but not limited to pain, infection even that requiring surgery to clean out, failure to provide relief, transient flare, tissue damage. Patient expressed understanding, was given the opportunity to ask questions and any questions answered and consented verbally. Time out performed for localization, medication and patient identification using multiple identifiers.    Procedure Note:    Following a standard, sterile 2-antiseptic prep and negative arthrocentesis, Euflexxa was instilled in the bilateral knee(s) with no immediate discomfort. The procedure was tolerated well with no immediate adverse effects.    Follow up for final in series as scheduled.

## 2018-01-08 ENCOUNTER — TELEPHONE (OUTPATIENT)
Dept: INTERNAL MEDICINE | Facility: CLINIC | Age: 78
End: 2018-01-08

## 2018-01-08 NOTE — TELEPHONE ENCOUNTER
----- Message from Ana Mendoza sent at 1/8/2018 11:46 AM CST -----  Contact: Patient [phone]  Patient is requesting an appt for tomorrow. Really needs to be seen.    Please call and advise.    Thank You

## 2018-01-09 ENCOUNTER — PROCEDURE VISIT (OUTPATIENT)
Dept: INTERNAL MEDICINE | Facility: CLINIC | Age: 78
End: 2018-01-09
Attending: FAMILY MEDICINE
Payer: MEDICARE

## 2018-01-09 ENCOUNTER — OFFICE VISIT (OUTPATIENT)
Dept: INTERNAL MEDICINE | Facility: CLINIC | Age: 78
End: 2018-01-09
Payer: MEDICARE

## 2018-01-09 VITALS
WEIGHT: 228.69 LBS | DIASTOLIC BLOOD PRESSURE: 60 MMHG | HEIGHT: 70 IN | BODY MASS INDEX: 32.74 KG/M2 | SYSTOLIC BLOOD PRESSURE: 110 MMHG

## 2018-01-09 VITALS
BODY MASS INDEX: 32.64 KG/M2 | OXYGEN SATURATION: 95 % | WEIGHT: 228 LBS | HEART RATE: 60 BPM | SYSTOLIC BLOOD PRESSURE: 90 MMHG | DIASTOLIC BLOOD PRESSURE: 56 MMHG | HEIGHT: 70 IN

## 2018-01-09 DIAGNOSIS — I10 ESSENTIAL HYPERTENSION: ICD-10-CM

## 2018-01-09 DIAGNOSIS — I70.0 ATHEROSCLEROSIS OF AORTIC ARCH: ICD-10-CM

## 2018-01-09 DIAGNOSIS — I25.5 CARDIOMYOPATHY, ISCHEMIC: ICD-10-CM

## 2018-01-09 DIAGNOSIS — G47.00 INSOMNIA, UNSPECIFIED TYPE: ICD-10-CM

## 2018-01-09 DIAGNOSIS — Z79.4 DIABETES MELLITUS DUE TO UNDERLYING CONDITION WITH DIABETIC POLYNEUROPATHY, WITH LONG-TERM CURRENT USE OF INSULIN: ICD-10-CM

## 2018-01-09 DIAGNOSIS — I25.10 CORONARY ARTERY DISEASE INVOLVING NATIVE CORONARY ARTERY OF NATIVE HEART WITHOUT ANGINA PECTORIS: ICD-10-CM

## 2018-01-09 DIAGNOSIS — M17.0 PRIMARY OSTEOARTHRITIS OF BOTH KNEES: ICD-10-CM

## 2018-01-09 DIAGNOSIS — E78.00 PURE HYPERCHOLESTEROLEMIA: ICD-10-CM

## 2018-01-09 DIAGNOSIS — M76.60 ACHILLES TENDINITIS, UNSPECIFIED LATERALITY: Primary | ICD-10-CM
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
## 2018-01-09 DIAGNOSIS — E11.43 DIABETIC AUTONOMIC NEUROPATHY ASSOCIATED WITH TYPE 2 DIABETES MELLITUS: ICD-10-CM

## 2018-01-09 DIAGNOSIS — E66.9 OBESITY, UNSPECIFIED CLASSIFICATION, UNSPECIFIED OBESITY TYPE, UNSPECIFIED WHETHER SERIOUS COMORBIDITY PRESENT: ICD-10-CM

## 2018-01-09 DIAGNOSIS — I50.22 CHRONIC SYSTOLIC HEART FAILURE: ICD-10-CM

## 2018-01-09 DIAGNOSIS — I48.92 ATRIAL FLUTTER, UNSPECIFIED TYPE: ICD-10-CM

## 2018-01-09 DIAGNOSIS — E08.42 DIABETES MELLITUS DUE TO UNDERLYING CONDITION WITH DIABETIC POLYNEUROPATHY, WITH LONG-TERM CURRENT USE OF INSULIN: ICD-10-CM

## 2018-01-09 DIAGNOSIS — M17.0 OSTEOARTHRITIS OF BOTH KNEES, UNSPECIFIED OSTEOARTHRITIS TYPE: Primary | ICD-10-CM

## 2018-01-09 PROCEDURE — 99499 UNLISTED E&M SERVICE: CPT | Mod: S$GLB,,, | Performed by: INTERNAL MEDICINE

## 2018-01-09 PROCEDURE — 99999 PR PBB SHADOW E&M-EST. PATIENT-LVL III: CPT | Mod: PBBFAC,,, | Performed by: INTERNAL MEDICINE

## 2018-01-09 PROCEDURE — 99214 OFFICE O/P EST MOD 30 MIN: CPT | Mod: S$GLB,,, | Performed by: INTERNAL MEDICINE

## 2018-01-09 PROCEDURE — 20610 DRAIN/INJ JOINT/BURSA W/O US: CPT | Mod: 50,S$GLB,, | Performed by: FAMILY MEDICINE

## 2018-01-09 RX ORDER — HYALURONATE SODIUM 20 MG/2 ML
20 SYRINGE (ML) INTRAARTICULAR WEEKLY
Status: DISCONTINUED | OUTPATIENT
Start: 2018-01-09 | End: 2018-01-09

## 2018-01-09 RX ORDER — HYALURONATE SODIUM 20 MG/2 ML
20 SYRINGE (ML) INTRAARTICULAR WEEKLY
Status: COMPLETED | OUTPATIENT
Start: 2018-01-09 | End: 2018-01-09

## 2018-01-09 RX ADMIN — Medication 20 MG: at 03:01

## 2018-01-09 NOTE — PROCEDURES
Procedures     BRIEF HISTORY:    Patient presents for therapeutic injections in the bilateral Knee(s) for OA. No complaints expressed at this time. The patient was counseled about risks and benefits of knee Visco-supplementation and other injections in general, including but not limited to pain, infection even that requiring surgery to clean out, failure to provide relief, transient flare, tissue damage. Patient expressed understanding, was given the opportunity to ask questions and any questions answered and consented verbally. Time out performed for localization, medication and patient identification using multiple identifiers.    Procedure Note:    Following a standard, sterile 2-antiseptic prep and negative arthrocentesis, Euflexxa was instilled in the bilateral knee(s) with no immediate discomfort. The procedure was tolerated well with no immediate adverse effects.    Follow up prn.       A  7-year-old patient of Dr. Reyes presenting for his third bilateral   Euflexxa injections.  He had fallen last week on to his hands, elbows and knees.    He states he could not to get up.  He states this is not a single occurrence.    He attributes it to his blood pressure dropping from his blood pressure   medications.  He denied palpitations, chest pain or dyspnea and EMS did check   him out, but did not take him in for further evaluation.    Today, he did have some abrasions, which were uninfected appearing, overlying   the patellar tendon and distal kneecap area bilaterally.    The area in the suprapatellar region was not involved.  There was no joint   effusion.  His range of motion was full.    His injections were provided today as documented, however, we did use a superior   lateral approach to avoid any abraded skin, in keeping with full sterile   technique. He tolerated this well.  I asked him to wait 6 to 8 weeks to assess   response and we will have him follow up with us p.r.n.  He does see Dr. Reyes  today and we did communicate his fall issue to her.      BIA/HN  dd: 01/09/2018 15:14:56 (CST)  td: 01/10/2018 00:38:18 (CST)  Doc ID   #[id number]  Job ID #[id number]    CC:
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
## 2018-01-15 ENCOUNTER — CLINICAL SUPPORT (OUTPATIENT)
Dept: ELECTROPHYSIOLOGY | Facility: CLINIC | Age: 78
End: 2018-01-15
Payer: MEDICARE

## 2018-01-15 DIAGNOSIS — Z95.810 ICD (IMPLANTABLE CARDIOVERTER-DEFIBRILLATOR) IN PLACE: ICD-10-CM

## 2018-01-15 DIAGNOSIS — I42.9 CARDIOMYOPATHY: ICD-10-CM

## 2018-01-15 PROCEDURE — 93295 DEV INTERROG REMOTE 1/2/MLT: CPT | Mod: S$GLB,,, | Performed by: INTERNAL MEDICINE

## 2018-01-15 PROCEDURE — 93296 REM INTERROG EVL PM/IDS: CPT | Mod: S$GLB,,, | Performed by: INTERNAL MEDICINE

## 2018-02-21 ENCOUNTER — TELEPHONE (OUTPATIENT)
Dept: INTERNAL MEDICINE | Facility: CLINIC | Age: 78
End: 2018-02-21

## 2018-02-21 NOTE — TELEPHONE ENCOUNTER
----- Message from Anabela Kirk sent at 2/21/2018 10:47 AM CST -----  Contact: Self/[phone]  Patient is requesting a call from nurse.Could not get more information from patient because patient is hard of hearing. Please advise.

## 2018-02-21 NOTE — LETTER
February 21, 2018    Madhav Cortez  [address] [place] [address]             [facility]  [address]  [facility] [address]  Phone: [phone]  Fax: [phone] To Whom It May Concern:    Due to osteoarthritis of the lumbar spine, osteoarthritis of the knees and congestive heart failure, Mr Cortez can no longer climb stairs. He cannot live in an apartment with stairs.       Mirna Quezada MD

## 2018-02-21 NOTE — TELEPHONE ENCOUNTER
----- Message from Don Ortiz sent at 2/21/2018  9:34 AM CST -----  Contact: self   Patient would like doctor to write a letter stating his medical conditions and unable to continue living in apartment with stairs.    Please advise

## 2018-03-12 RX ORDER — TRAZODONE HYDROCHLORIDE 50 MG/1
TABLET ORAL
Qty: 180 TABLET | Refills: 3 | Status: SHIPPED | OUTPATIENT
Start: 2018-03-12 | End: 2018-12-13 | Stop reason: SDUPTHER

## 2018-04-12 ENCOUNTER — TELEPHONE (OUTPATIENT)
Dept: CARDIOLOGY | Facility: CLINIC | Age: 78
End: 2018-04-12

## 2018-04-12 ENCOUNTER — OFFICE VISIT (OUTPATIENT)
Dept: INTERNAL MEDICINE | Facility: CLINIC | Age: 78
End: 2018-04-12
Attending: FAMILY MEDICINE
Payer: MEDICARE

## 2018-04-12 ENCOUNTER — PATIENT MESSAGE (OUTPATIENT)
Dept: CARDIOLOGY | Facility: CLINIC | Age: 78
End: 2018-04-12

## 2018-04-12 ENCOUNTER — LAB VISIT (OUTPATIENT)
Dept: LAB | Facility: HOSPITAL | Age: 78
End: 2018-04-12
Attending: INTERNAL MEDICINE
Payer: MEDICARE

## 2018-04-12 VITALS
SYSTOLIC BLOOD PRESSURE: 105 MMHG | HEART RATE: 60 BPM | BODY MASS INDEX: 31.62 KG/M2 | DIASTOLIC BLOOD PRESSURE: 60 MMHG | WEIGHT: 220.88 LBS | HEIGHT: 70 IN

## 2018-04-12 DIAGNOSIS — E78.00 PURE HYPERCHOLESTEROLEMIA: ICD-10-CM

## 2018-04-12 DIAGNOSIS — M17.0 OSTEOARTHRITIS OF BOTH KNEES, UNSPECIFIED OSTEOARTHRITIS TYPE: Primary | ICD-10-CM

## 2018-04-12 DIAGNOSIS — I48.0 PAROXYSMAL ATRIAL FIBRILLATION: ICD-10-CM

## 2018-04-12 DIAGNOSIS — I25.5 CARDIOMYOPATHY, ISCHEMIC: ICD-10-CM

## 2018-04-12 DIAGNOSIS — I50.22 CHRONIC SYSTOLIC HEART FAILURE: ICD-10-CM

## 2018-04-12 DIAGNOSIS — Z95.810 ICD (IMPLANTABLE CARDIOVERTER-DEFIBRILLATOR), BIVENTRICULAR, IN SITU: ICD-10-CM

## 2018-04-12 DIAGNOSIS — Z79.899 ON AMIODARONE THERAPY: ICD-10-CM

## 2018-04-12 DIAGNOSIS — I10 ESSENTIAL HYPERTENSION: ICD-10-CM

## 2018-04-12 DIAGNOSIS — E08.42 DIABETES MELLITUS DUE TO UNDERLYING CONDITION WITH DIABETIC POLYNEUROPATHY, WITH LONG-TERM CURRENT USE OF INSULIN: ICD-10-CM

## 2018-04-12 DIAGNOSIS — Z79.4 DIABETES MELLITUS DUE TO UNDERLYING CONDITION WITH DIABETIC POLYNEUROPATHY, WITH LONG-TERM CURRENT USE OF INSULIN: ICD-10-CM

## 2018-04-12 LAB
ALBUMIN SERPL BCP-MCNC: 3.7 G/DL
ALBUMIN SERPL BCP-MCNC: 3.7 G/DL
ALP SERPL-CCNC: 65 U/L
ALP SERPL-CCNC: 65 U/L
ALT SERPL W/O P-5'-P-CCNC: 88 U/L
ALT SERPL W/O P-5'-P-CCNC: 88 U/L
ANION GAP SERPL CALC-SCNC: 6 MMOL/L
ANION GAP SERPL CALC-SCNC: 6 MMOL/L
AST SERPL-CCNC: 58 U/L
AST SERPL-CCNC: 58 U/L
BILIRUB DIRECT SERPL-MCNC: 0.1 MG/DL
BILIRUB SERPL-MCNC: 0.4 MG/DL
BILIRUB SERPL-MCNC: 0.4 MG/DL
BUN SERPL-MCNC: 17 MG/DL
BUN SERPL-MCNC: 17 MG/DL
CALCIUM SERPL-MCNC: 9.3 MG/DL
CALCIUM SERPL-MCNC: 9.3 MG/DL
CHLORIDE SERPL-SCNC: 106 MMOL/L
CHLORIDE SERPL-SCNC: 106 MMOL/L
CHOLEST SERPL-MCNC: 149 MG/DL
CHOLEST/HDLC SERPL: 4.1 {RATIO}
CO2 SERPL-SCNC: 31 MMOL/L
CO2 SERPL-SCNC: 31 MMOL/L
CREAT SERPL-MCNC: 0.8 MG/DL
CREAT SERPL-MCNC: 0.8 MG/DL
EST. GFR  (AFRICAN AMERICAN): >60 ML/MIN/1.73 M^2
EST. GFR  (AFRICAN AMERICAN): >60 ML/MIN/1.73 M^2
EST. GFR  (NON AFRICAN AMERICAN): >60 ML/MIN/1.73 M^2
EST. GFR  (NON AFRICAN AMERICAN): >60 ML/MIN/1.73 M^2
GLUCOSE SERPL-MCNC: 158 MG/DL
GLUCOSE SERPL-MCNC: 158 MG/DL
HDLC SERPL-MCNC: 36 MG/DL
HDLC SERPL: 24.2 %
LDLC SERPL CALC-MCNC: 86.2 MG/DL
NONHDLC SERPL-MCNC: 113 MG/DL
POTASSIUM SERPL-SCNC: 4.6 MMOL/L
POTASSIUM SERPL-SCNC: 4.6 MMOL/L
PROT SERPL-MCNC: 6.5 G/DL
PROT SERPL-MCNC: 6.5 G/DL
SODIUM SERPL-SCNC: 143 MMOL/L
SODIUM SERPL-SCNC: 143 MMOL/L
T4 FREE SERPL-MCNC: 0.75 NG/DL
TRIGL SERPL-MCNC: 134 MG/DL
TSH SERPL DL<=0.005 MIU/L-ACNC: 7.47 UIU/ML

## 2018-04-12 PROCEDURE — 80061 LIPID PANEL: CPT

## 2018-04-12 PROCEDURE — 99999 PR PBB SHADOW E&M-EST. PATIENT-LVL III: CPT | Mod: PBBFAC,,, | Performed by: FAMILY MEDICINE

## 2018-04-12 PROCEDURE — 36415 COLL VENOUS BLD VENIPUNCTURE: CPT

## 2018-04-12 PROCEDURE — 84443 ASSAY THYROID STIM HORMONE: CPT

## 2018-04-12 PROCEDURE — 3074F SYST BP LT 130 MM HG: CPT | Mod: CPTII,S$GLB,, | Performed by: FAMILY MEDICINE

## 2018-04-12 PROCEDURE — 99213 OFFICE O/P EST LOW 20 MIN: CPT | Mod: 25,S$GLB,, | Performed by: FAMILY MEDICINE

## 2018-04-12 PROCEDURE — 80076 HEPATIC FUNCTION PANEL: CPT

## 2018-04-12 PROCEDURE — 3078F DIAST BP <80 MM HG: CPT | Mod: CPTII,S$GLB,, | Performed by: FAMILY MEDICINE

## 2018-04-12 PROCEDURE — 99499 UNLISTED E&M SERVICE: CPT | Mod: HCNC,S$GLB,, | Performed by: FAMILY MEDICINE

## 2018-04-12 PROCEDURE — 84439 ASSAY OF FREE THYROXINE: CPT

## 2018-04-12 PROCEDURE — 20610 DRAIN/INJ JOINT/BURSA W/O US: CPT | Mod: RT,S$GLB,, | Performed by: FAMILY MEDICINE

## 2018-04-12 PROCEDURE — 80048 BASIC METABOLIC PNL TOTAL CA: CPT

## 2018-04-12 RX ORDER — BETAMETHASONE SODIUM PHOSPHATE AND BETAMETHASONE ACETATE 3; 3 MG/ML; MG/ML
6 INJECTION, SUSPENSION INTRA-ARTICULAR; INTRALESIONAL; INTRAMUSCULAR; SOFT TISSUE ONCE
Status: COMPLETED | OUTPATIENT
Start: 2018-04-12 | End: 2018-04-12

## 2018-04-12 RX ADMIN — BETAMETHASONE SODIUM PHOSPHATE AND BETAMETHASONE ACETATE 6 MG: 3; 3 INJECTION, SUSPENSION INTRA-ARTICULAR; INTRALESIONAL; INTRAMUSCULAR; SOFT TISSUE at 09:04

## 2018-04-12 NOTE — TELEPHONE ENCOUNTER
----- Message from Kira Palm MD sent at 4/12/2018  4:47 PM CDT -----  Liver enzymes are chronically elevated, otherwise labs look good.

## 2018-04-12 NOTE — PROGRESS NOTES
Subjective:       Patient ID: Madhav Cortez is a 77 y.o. male.    Chief Complaint: Knee Pain    HPI  Review of Systems   Constitutional: Positive for fatigue. Negative for chills and fever.   HENT: Negative for congestion and trouble swallowing.    Eyes: Negative for redness.   Respiratory: Positive for shortness of breath. Negative for cough and chest tightness.    Cardiovascular: Negative for chest pain, palpitations and leg swelling.   Gastrointestinal: Negative for abdominal pain and blood in stool.   Genitourinary: Negative for hematuria.   Musculoskeletal: Positive for arthralgias and gait problem. Negative for back pain, joint swelling, myalgias and neck pain.   Skin: Negative for color change and rash.   Neurological: Negative for tremors, speech difficulty, weakness, numbness and headaches.   Hematological: Negative for adenopathy. Does not bruise/bleed easily.   Psychiatric/Behavioral: Negative for behavioral problems, confusion and sleep disturbance. The patient is not nervous/anxious.        Objective:      Physical Exam   Constitutional: He is oriented to person, place, and time. He appears well-developed and well-nourished. No distress.   Neck: Neck supple.   Pulmonary/Chest: Effort normal.   Musculoskeletal: He exhibits no edema.        Right lower leg: He exhibits no edema.        Left lower leg: He exhibits no edema.   Neurological: He is alert and oriented to person, place, and time.   Skin: Skin is warm and dry. No rash noted.   Psychiatric: He has a normal mood and affect. His behavior is normal. Judgment and thought content normal.   Nursing note and vitals reviewed.      Assessment:       1. Osteoarthritis of both knees, unspecified osteoarthritis type    2. Cardiomyopathy, ischemic    3. Chronic systolic heart failure    4. On amiodarone therapy        Plan:   Madhav was seen today for knee pain.    Diagnoses and all orders for this visit:    Osteoarthritis of both knees, unspecified  osteoarthritis type  -     Prior Authorization Order    Cardiomyopathy, ischemic    Chronic systolic heart failure    On amiodarone therapy    Other orders  -     betamethasone acetate-betamethasone sodium phosphate injection 6 mg; Inject 1 mL (6 mg total) into the articular space once.      See meds, orders, follow up, routing and instructions sections of encounter.    A 77-year-old established male patient.  He is in for knee pain.  He states he   fell recently when his blood sugar got a little bit low.  He has no chest pain,   dyspnea, diaphoresis, syncope, near syncope or palpitations.  We did do Euflexxa   shots approximately three and a half months ago.  He did receive some   improvement with that.  He has seen Dr. Wright and has been deemed to be a   poor surgical candidate for knee replacement, although his knees have a very   little cartilage left.  I did review his x-rays today.  He is distally   neurovascular intact.  He has distal venous stasis changes.  There are no acute   findings to either knee.  I had a lengthy discussion concerning potential for   side effects and we will proceed with a corticosteroid injection at this time   since he is a bit early for Euflexxa.  A Euflexxa prior authorization was   placed.  We will see him back in two months.    The patient and his wife did understand our conversation concerning risk and   benefit and they consented verbally for doing knee arthrocentesis.      BIA/SUZIE  dd: 04/12/2018 09:50:42 (CDT)  td: 04/13/2018 07:14:06 (CDT)  Doc ID   #[id number]  Job ID #[id number]    CC:       BRIEF HISTORY:    Patient presents for therapeutic injections in the right Knee(s) for OA. No complaints expressed at this time. The patient was counseled about risks and benefits of knee Visco-supplementation and other injections in general, including but not limited to pain, infection even that requiring surgery to clean out, failure to provide relief, transient flare, tissue damage.  Patient expressed understanding, was given the opportunity to ask questions and any questions answered and consented verbally. Time out performed for localization, medication and patient identification using multiple identifiers.    Procedure Note:    Following a standard, sterile 2-antiseptic prep and negative arthrocentesis, Betamethasone/Marcaine was instilled in the right knee(s) with no immediate discomfort. The procedure was tolerated well with no immediate adverse effects.    Follow up in 2 months for euflexxa at a 6-month interval from last shot series.

## 2018-04-17 ENCOUNTER — CLINICAL SUPPORT (OUTPATIENT)
Dept: ELECTROPHYSIOLOGY | Facility: CLINIC | Age: 78
End: 2018-04-17
Payer: MEDICARE

## 2018-04-17 DIAGNOSIS — I25.5 ISCHEMIC CARDIOMYOPATHY: ICD-10-CM

## 2018-04-17 DIAGNOSIS — Z95.810 ICD (IMPLANTABLE CARDIOVERTER-DEFIBRILLATOR) IN PLACE: ICD-10-CM

## 2018-04-17 PROCEDURE — 93296 REM INTERROG EVL PM/IDS: CPT | Mod: S$GLB,,, | Performed by: INTERNAL MEDICINE

## 2018-04-17 PROCEDURE — 93295 DEV INTERROG REMOTE 1/2/MLT: CPT | Mod: S$GLB,,, | Performed by: INTERNAL MEDICINE

## 2018-04-24 DIAGNOSIS — E13.9 DIABETES MELLITUS DUE TO ABNORMAL INSULIN: ICD-10-CM

## 2018-04-25 RX ORDER — BLOOD SUGAR DIAGNOSTIC
STRIP MISCELLANEOUS
Qty: 300 STRIP | Refills: 4 | Status: SHIPPED | OUTPATIENT
Start: 2018-04-25 | End: 2019-07-16 | Stop reason: SDUPTHER

## 2018-05-04 ENCOUNTER — TELEPHONE (OUTPATIENT)
Dept: INTERNAL MEDICINE | Facility: CLINIC | Age: 78
End: 2018-05-04

## 2018-05-04 ENCOUNTER — OFFICE VISIT (OUTPATIENT)
Dept: INTERNAL MEDICINE | Facility: CLINIC | Age: 78
End: 2018-05-04
Payer: MEDICARE

## 2018-05-04 VITALS
SYSTOLIC BLOOD PRESSURE: 118 MMHG | HEIGHT: 70 IN | HEART RATE: 60 BPM | OXYGEN SATURATION: 98 % | WEIGHT: 224.88 LBS | BODY MASS INDEX: 32.19 KG/M2 | DIASTOLIC BLOOD PRESSURE: 64 MMHG

## 2018-05-04 DIAGNOSIS — I25.5 CARDIOMYOPATHY, ISCHEMIC: ICD-10-CM

## 2018-05-04 DIAGNOSIS — E13.9 DIABETES MELLITUS DUE TO ABNORMAL INSULIN: Primary | ICD-10-CM

## 2018-05-04 DIAGNOSIS — I10 ESSENTIAL HYPERTENSION: ICD-10-CM

## 2018-05-04 DIAGNOSIS — Z79.4 TYPE 2 DIABETES MELLITUS WITH OTHER CIRCULATORY COMPLICATION, WITH LONG-TERM CURRENT USE OF INSULIN: ICD-10-CM

## 2018-05-04 DIAGNOSIS — E11.43 DIABETIC AUTONOMIC NEUROPATHY ASSOCIATED WITH TYPE 2 DIABETES MELLITUS: ICD-10-CM

## 2018-05-04 DIAGNOSIS — E11.59 TYPE 2 DIABETES MELLITUS WITH OTHER CIRCULATORY COMPLICATION, WITH LONG-TERM CURRENT USE OF INSULIN: ICD-10-CM

## 2018-05-04 DIAGNOSIS — I48.0 PAROXYSMAL ATRIAL FIBRILLATION: ICD-10-CM

## 2018-05-04 DIAGNOSIS — I25.10 CORONARY ARTERY DISEASE INVOLVING NATIVE CORONARY ARTERY OF NATIVE HEART WITHOUT ANGINA PECTORIS: ICD-10-CM

## 2018-05-04 DIAGNOSIS — E78.00 PURE HYPERCHOLESTEROLEMIA: ICD-10-CM

## 2018-05-04 PROCEDURE — 99999 PR PBB SHADOW E&M-EST. PATIENT-LVL III: CPT | Mod: PBBFAC,,, | Performed by: INTERNAL MEDICINE

## 2018-05-04 PROCEDURE — 3074F SYST BP LT 130 MM HG: CPT | Mod: CPTII,S$GLB,, | Performed by: INTERNAL MEDICINE

## 2018-05-04 PROCEDURE — 3078F DIAST BP <80 MM HG: CPT | Mod: CPTII,S$GLB,, | Performed by: INTERNAL MEDICINE

## 2018-05-04 PROCEDURE — 99214 OFFICE O/P EST MOD 30 MIN: CPT | Mod: S$GLB,,, | Performed by: INTERNAL MEDICINE

## 2018-05-04 RX ORDER — UBIDECARENONE 30 MG
CAPSULE ORAL
Status: ON HOLD | COMMUNITY
Start: 2018-05-01 | End: 2019-06-18 | Stop reason: HOSPADM

## 2018-05-04 NOTE — TELEPHONE ENCOUNTER
Hemoglobin a1c reordered. Please let pt know that his lab had to be cancelled and needs to be redrawn

## 2018-05-04 NOTE — PROGRESS NOTES
CHIEF COMPLAINT: Follow up of chf, diabetes, hypertension, a fib, hyperlipidemia, back pain      HISTORY OF PRESENT ILLNESS: This is a 77-year-old man who presents for follow up of above.     Lightheadedness resolved with changing lasix to as needed. He has not has had swelling in his legs and has not needed lasix. He has been cutting back on portions and has lost 4 pounds.     He is currently taking pradaxa 150 mg twice daily for anticoagulation for a fib/flutter. NO edema. He has mild dyspnea on exertion. No chest pain or palpitations. He continues to take spironolactone 25 mg 1/2 daily, carvediolol 25 mg bid and Entresto 97/103 twice daily and KCL 10 meq 1 tablet daily for his hypertension and CHF. He is also on amiodarone 200 mg twice daily for his atrial flutter. Aspirin 81 mg daily.       His back is doing well. He is doing stretching at home which helps.  HE takes one tizanidine in the evening.       He is no longer taking paxil 10 mg nightly.  His mood is better since he  from his wife. He was born with a bad temper. Temper is a little better. He is sleeping well. HE continues to take trazodone 50 mg 2 tablets at bedtime, temazepam 15 mg at bedtime and melatonin 5 mg at bedtime. No anxiety or depression now. HE has  from his wife which has helped his mood.       His blood sugars have been very low so he stopped the Novolog. . He currently takes Lantus 40 units at night.  He denies any polydipsia, polyuria, hypoglycemia.  Blood sugars ranging 120-125.       He has hyperlipidemia, currently off Lipitor 20 mg daily      His liver enzymes have been mildly elevated. Dr Palm stopped fenofibrate due to the elevated liver enzymes. He had a liver biopsy and the elevated liver enzymes are NOT due to amiodarone. No further work up is needed at this time.       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 Shooting pain has resolved with increasing dose of  "gabapentin.      No nausea, vomiting, constipation, diarrhea, dysuria, hematuria, sinus congestion, sore throat, headache.       PAST MEDICAL HISTORY:    1. Hypertension, coronary artery disease, status post stenting.    2. Ischemic cardiomyopathy.    3. Atrial flutter.    4. Diabetes mellitus.    5. ICD placed 02/22/2012.    6. Hyperlipidemia.    7. Osteoarthritis of the knees.  8. Atrial fibrillation  9. Transaminitis      PAST SURGICAL HISTORY: Appendectomy in 2003.        SOCIAL HISTORY: He is a former smoker, quit in 1987. Does not drink alcohol.    .       PHYSICAL EXAMINATION:     /64 (BP Location: Left arm, Patient Position: Sitting, BP Method: Large (Manual))   Pulse 60   Ht 5' 10" (1.778 m)   Wt 102 kg (224 lb 13.9 oz)   SpO2 98%   BMI 32.27 kg/m²      GENERAL: He is alert, oriented, no apparent distress. Affect within normal limits.    HEENT: Conjunctivae anicteric. Pupils are equal, round and reactive to light.    Tympanic membranes are clear. Oropharynx is clear.    NECK: Supple. No cervical lymphadenopathy, no thyroid enlargement.    RESPIRATORY: Effort normal. Lungs are clear to auscultation.    HEART: Regular rate and rhythm without murmurs, gallops or rubs. No lower extremity edema.          ASSESSMENT AND PLAN:.  1.  CHF - stable  4. Coronary artery disease - stable - to follow up with Cardiology.    5. Atrial flutter - s/p cardioversion - stable    6. 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 Watch blood sugars. A1C today  7. History of anemia on last blood work, stable    8. Hyperlipidemia. At goal    9Transaminitis -better, watch close  10. Diabetic neuropathy -stable. gabapentin 300 mg 2 capsules three times daily    11. Anxiety and depression - stable  12. Aortic arch atherosclerosis - modifying risk factors  13. Insomnia - stable  14. OA knees - stable  I will see him back in 4 months, sooner if problems arise.    "

## 2018-05-07 ENCOUNTER — TELEPHONE (OUTPATIENT)
Dept: INTERNAL MEDICINE | Facility: CLINIC | Age: 78
End: 2018-05-07

## 2018-05-07 NOTE — TELEPHONE ENCOUNTER
----- Message from Ana eMndoza sent at 5/7/2018 12:29 PM CDT -----  Contact: Patient [phone]  Patient is requesting a call about his appt.    Please call and advise.    Thank You

## 2018-05-08 ENCOUNTER — LAB VISIT (OUTPATIENT)
Dept: LAB | Facility: HOSPITAL | Age: 78
End: 2018-05-08
Attending: INTERNAL MEDICINE
Payer: MEDICARE

## 2018-05-08 DIAGNOSIS — E13.9 DIABETES MELLITUS DUE TO ABNORMAL INSULIN: ICD-10-CM

## 2018-05-08 LAB
ESTIMATED AVG GLUCOSE: 154 MG/DL
HBA1C MFR BLD HPLC: 7 %

## 2018-05-08 PROCEDURE — 36415 COLL VENOUS BLD VENIPUNCTURE: CPT

## 2018-05-08 PROCEDURE — 83036 HEMOGLOBIN GLYCOSYLATED A1C: CPT

## 2018-05-11 RX ORDER — INSULIN GLARGINE 100 [IU]/ML
INJECTION, SOLUTION SUBCUTANEOUS
Qty: 45 ML | Refills: 4 | Status: ON HOLD | OUTPATIENT
Start: 2018-05-11 | End: 2019-05-28 | Stop reason: SDUPTHER

## 2018-05-21 DIAGNOSIS — R74.01 TRANSAMINITIS: ICD-10-CM

## 2018-05-21 DIAGNOSIS — I10 ESSENTIAL HYPERTENSION: ICD-10-CM

## 2018-05-21 DIAGNOSIS — I25.5 CARDIOMYOPATHY, ISCHEMIC: ICD-10-CM

## 2018-05-21 DIAGNOSIS — I25.10 CORONARY ARTERY DISEASE INVOLVING NATIVE CORONARY ARTERY OF NATIVE HEART WITHOUT ANGINA PECTORIS: ICD-10-CM

## 2018-05-22 ENCOUNTER — TELEPHONE (OUTPATIENT)
Dept: INTERNAL MEDICINE | Facility: CLINIC | Age: 78
End: 2018-05-22

## 2018-05-22 RX ORDER — SACUBITRIL AND VALSARTAN 97; 103 MG/1; MG/1
TABLET, FILM COATED ORAL
Qty: 180 TABLET | Refills: 1 | Status: SHIPPED | OUTPATIENT
Start: 2018-05-22 | End: 2018-12-03 | Stop reason: SDUPTHER

## 2018-05-22 NOTE — TELEPHONE ENCOUNTER
----- Message from Yonas Steve MD sent at 5/22/2018  2:46 PM CDT -----  Contact: Pt [phone]      ----- Message -----  From: Emeli Abbott  Sent: 5/22/2018  12:34 PM  To: Power Branham A Staff    Patient states he was told to call a week before he has to come in for his appointment. Pt states he called yesterday and did not get a call back.

## 2018-05-22 NOTE — TELEPHONE ENCOUNTER
----- Message from Yonas Steve MD sent at 5/22/2018  7:41 AM CDT -----  Contact: Self [phone]      ----- Message -----  From: Tori Tovar  Sent: 5/21/2018   1:11 PM  To: Power Branham A Staff    Patient states he was told to call a week before he has to come in for his appointment.

## 2018-05-31 RX ORDER — CARVEDILOL 25 MG/1
25 TABLET ORAL 2 TIMES DAILY
Qty: 180 TABLET | Refills: 0 | Status: SHIPPED | OUTPATIENT
Start: 2018-05-31 | End: 2018-06-08 | Stop reason: SDUPTHER

## 2018-06-08 RX ORDER — CARVEDILOL 25 MG/1
25 TABLET ORAL 2 TIMES DAILY
Qty: 180 TABLET | Refills: 1 | Status: SHIPPED | OUTPATIENT
Start: 2018-06-08 | End: 2019-03-26 | Stop reason: SDUPTHER

## 2018-06-12 ENCOUNTER — OFFICE VISIT (OUTPATIENT)
Dept: INTERNAL MEDICINE | Facility: CLINIC | Age: 78
End: 2018-06-12
Attending: FAMILY MEDICINE
Payer: MEDICARE

## 2018-06-12 VITALS
HEART RATE: 60 BPM | HEIGHT: 70 IN | SYSTOLIC BLOOD PRESSURE: 102 MMHG | BODY MASS INDEX: 29.15 KG/M2 | DIASTOLIC BLOOD PRESSURE: 52 MMHG | WEIGHT: 203.63 LBS

## 2018-06-12 DIAGNOSIS — M17.0 PRIMARY OSTEOARTHRITIS OF BOTH KNEES: Primary | ICD-10-CM

## 2018-06-12 PROCEDURE — 99999 PR PBB SHADOW E&M-EST. PATIENT-LVL III: CPT | Mod: PBBFAC,,, | Performed by: FAMILY MEDICINE

## 2018-06-12 PROCEDURE — 20610 DRAIN/INJ JOINT/BURSA W/O US: CPT | Mod: 50,S$GLB,, | Performed by: FAMILY MEDICINE

## 2018-06-12 PROCEDURE — 99499 UNLISTED E&M SERVICE: CPT | Mod: S$GLB,,, | Performed by: FAMILY MEDICINE

## 2018-06-13 NOTE — PROGRESS NOTES
This 78-year-old gentleman presents for injection of his knees.  I reviewed   Euflexxa, indications, risks and benefits and potential complication.  The   patient consented verbally to injections bilaterally.  Please see procedure   note.  Follow up in 1 week.      BIA/IN  dd: 06/13/2018 08:49:49 (CDT)  td: 06/14/2018 01:09:33 (CDT)  Doc ID   #[id number]  Job ID #[id number]    CC:           BRIEF HISTORY:    Patient presents for therapeutic injections in the bilateral Knee(s) for OA. No complaints expressed at this time. The patient was counseled about risks and benefits of knee Visco-supplementation and other injections in general, including but not limited to pain, infection even that requiring surgery to clean out, failure to provide relief, transient flare, tissue damage. Patient expressed understanding, was given the opportunity to ask questions and any questions answered and consented verbally. Time out performed for localization, medication and patient identification using multiple identifiers.    Procedure Note:    Following a standard, sterile 2-antiseptic prep and negative arthrocentesis, Euflexxa was instilled in the bilateral knee(s) with no immediate discomfort. The procedure was tolerated well with no immediate adverse effects.    Follow up for next in series as scheduled.

## 2018-06-19 ENCOUNTER — OFFICE VISIT (OUTPATIENT)
Dept: INTERNAL MEDICINE | Facility: CLINIC | Age: 78
End: 2018-06-19
Attending: FAMILY MEDICINE
Payer: MEDICARE

## 2018-06-19 VITALS
HEART RATE: 60 BPM | WEIGHT: 223.13 LBS | BODY MASS INDEX: 31.94 KG/M2 | SYSTOLIC BLOOD PRESSURE: 110 MMHG | HEIGHT: 70 IN | DIASTOLIC BLOOD PRESSURE: 52 MMHG

## 2018-06-19 DIAGNOSIS — M17.0 PRIMARY OSTEOARTHRITIS OF BOTH KNEES: Primary | ICD-10-CM

## 2018-06-19 PROCEDURE — 99999 PR PBB SHADOW E&M-EST. PATIENT-LVL III: CPT | Mod: PBBFAC,,, | Performed by: FAMILY MEDICINE

## 2018-06-19 PROCEDURE — 99499 UNLISTED E&M SERVICE: CPT | Mod: S$GLB,,, | Performed by: FAMILY MEDICINE

## 2018-06-19 PROCEDURE — 20610 DRAIN/INJ JOINT/BURSA W/O US: CPT | Mod: 50,S$GLB,, | Performed by: FAMILY MEDICINE

## 2018-06-19 NOTE — PROGRESS NOTES
BRIEF HISTORY:    Patient presents for therapeutic injections in the bilateral Knee(s) for OA. No complaints expressed at this time. The patient was counseled about risks and benefits of knee Visco-supplementation and other injections in general, including but not limited to pain, infection even that requiring surgery to clean out, failure to provide relief, transient flare, tissue damage. Patient expressed understanding, was given the opportunity to ask questions and any questions answered and consented verbally. Time out performed for localization, medication and patient identification using multiple identifiers.    Procedure Note:    Following a standard, sterile 2-antiseptic prep and negative arthrocentesis, Euflexxa was instilled in the bilateral knee(s) with no immediate discomfort. The procedure was tolerated well with no immediate adverse effects.    Follow up for final in series as scheduled.       An 78-year-old patient.  He is here for his knee injection.  He has no   complaints.  No benefit from his first.  No complication from his first.      USHA  dd: 06/19/2018 16:56:07 (CDT)  td: 06/20/2018 01:48:10 (CDT)  Doc ID   #[id number]  Job ID #[id number]    CC:

## 2018-06-24 RX ORDER — GABAPENTIN 300 MG/1
CAPSULE ORAL
Qty: 540 CAPSULE | Refills: 3 | Status: SHIPPED | OUTPATIENT
Start: 2018-06-24 | End: 2019-09-24 | Stop reason: SDUPTHER

## 2018-06-28 ENCOUNTER — OFFICE VISIT (OUTPATIENT)
Dept: INTERNAL MEDICINE | Facility: CLINIC | Age: 78
End: 2018-06-28
Attending: FAMILY MEDICINE
Payer: MEDICARE

## 2018-06-28 VITALS
WEIGHT: 211.13 LBS | HEART RATE: 62 BPM | SYSTOLIC BLOOD PRESSURE: 94 MMHG | HEIGHT: 70 IN | BODY MASS INDEX: 30.23 KG/M2 | DIASTOLIC BLOOD PRESSURE: 54 MMHG

## 2018-06-28 DIAGNOSIS — I25.5 CARDIOMYOPATHY, ISCHEMIC: ICD-10-CM

## 2018-06-28 DIAGNOSIS — Z45.02 IMPLANTABLE CARDIOVERTER-DEFIBRILLATOR DISCHARGE: ICD-10-CM

## 2018-06-28 DIAGNOSIS — I50.22 CHRONIC SYSTOLIC HEART FAILURE: ICD-10-CM

## 2018-06-28 DIAGNOSIS — E78.5 HYPERLIPIDEMIA, UNSPECIFIED HYPERLIPIDEMIA TYPE: ICD-10-CM

## 2018-06-28 DIAGNOSIS — I21.4 NON-STEMI (NON-ST ELEVATED MYOCARDIAL INFARCTION): ICD-10-CM

## 2018-06-28 DIAGNOSIS — E08.42 DIABETES MELLITUS DUE TO UNDERLYING CONDITION WITH DIABETIC POLYNEUROPATHY, WITH LONG-TERM CURRENT USE OF INSULIN: ICD-10-CM

## 2018-06-28 DIAGNOSIS — Z79.4 DIABETES MELLITUS DUE TO UNDERLYING CONDITION WITH DIABETIC POLYNEUROPATHY, WITH LONG-TERM CURRENT USE OF INSULIN: ICD-10-CM

## 2018-06-28 DIAGNOSIS — I49.01 VENTRICULAR FIBRILLATION: ICD-10-CM

## 2018-06-28 DIAGNOSIS — M17.0 PRIMARY OSTEOARTHRITIS OF BOTH KNEES: Primary | ICD-10-CM

## 2018-06-28 DIAGNOSIS — Z98.61 POST PTCA: ICD-10-CM

## 2018-06-28 DIAGNOSIS — R74.01 TRANSAMINITIS: ICD-10-CM

## 2018-06-28 DIAGNOSIS — I10 ESSENTIAL HYPERTENSION: ICD-10-CM

## 2018-06-28 DIAGNOSIS — I50.43 ACUTE ON CHRONIC SYSTOLIC AND DIASTOLIC HEART FAILURE, NYHA CLASS 4: ICD-10-CM

## 2018-06-28 DIAGNOSIS — I25.10 CORONARY ARTERY DISEASE INVOLVING NATIVE CORONARY ARTERY OF NATIVE HEART WITHOUT ANGINA PECTORIS: ICD-10-CM

## 2018-06-28 DIAGNOSIS — Z95.810 AUTOMATIC IMPLANTABLE CARDIOVERTER-DEFIBRILLATOR IN SITU: ICD-10-CM

## 2018-06-28 PROCEDURE — 20610 DRAIN/INJ JOINT/BURSA W/O US: CPT | Mod: 50,S$GLB,, | Performed by: FAMILY MEDICINE

## 2018-06-28 PROCEDURE — 99499 UNLISTED E&M SERVICE: CPT | Mod: S$GLB,,, | Performed by: FAMILY MEDICINE

## 2018-06-28 PROCEDURE — 99999 PR PBB SHADOW E&M-EST. PATIENT-LVL III: CPT | Mod: PBBFAC,,, | Performed by: FAMILY MEDICINE

## 2018-06-28 RX ORDER — DABIGATRAN ETEXILATE 150 MG/1
150 CAPSULE ORAL 2 TIMES DAILY
Qty: 180 CAPSULE | Refills: 3 | Status: SHIPPED | OUTPATIENT
Start: 2018-06-28 | End: 2019-08-05 | Stop reason: SDUPTHER

## 2018-06-28 NOTE — PROGRESS NOTES
BRIEF HISTORY:    Patient presents for therapeutic injections in the bilateral Knee(s) for OA. No complaints expressed at this time. The patient was counseled about risks and benefits of knee Visco-supplementation and other injections in general, including but not limited to pain, infection even that requiring surgery to clean out, failure to provide relief, transient flare, tissue damage. Patient expressed understanding, was given the opportunity to ask questions and any questions answered and consented verbally. Time out performed for localization, medication and patient identification using multiple identifiers.    Procedure Note:    Following a standard, sterile 2-antiseptic prep and negative arthrocentesis, Euflexxa was instilled in the bilateral knee(s) with no immediate discomfort. The procedure was tolerated well with no immediate adverse effects.    Follow up in about 6 months prn.

## 2018-07-02 RX ORDER — TEMAZEPAM 15 MG/1
CAPSULE ORAL
Qty: 90 CAPSULE | Refills: 1 | Status: SHIPPED | OUTPATIENT
Start: 2018-07-02 | End: 2018-12-26 | Stop reason: SDUPTHER

## 2018-07-05 ENCOUNTER — PES CALL (OUTPATIENT)
Dept: ADMINISTRATIVE | Facility: CLINIC | Age: 78
End: 2018-07-05

## 2018-07-17 ENCOUNTER — TELEPHONE (OUTPATIENT)
Dept: ELECTROPHYSIOLOGY | Facility: CLINIC | Age: 78
End: 2018-07-17

## 2018-07-17 NOTE — TELEPHONE ENCOUNTER
Left voicemail requesting patient come into the clinic earlier for his appt 7/18/18 OR provided the option of transmitting remotely and returning to clinic in 3 mos.  If patient accepts an earlier time, he would need to arrive prior to 2pm.

## 2018-08-17 DIAGNOSIS — I50.22 CHRONIC SYSTOLIC HEART FAILURE: ICD-10-CM

## 2018-08-20 RX ORDER — SPIRONOLACTONE 25 MG/1
TABLET ORAL
Qty: 90 TABLET | Refills: 3 | Status: SHIPPED | OUTPATIENT
Start: 2018-08-20 | End: 2019-09-10 | Stop reason: SDUPTHER

## 2018-09-05 ENCOUNTER — TELEPHONE (OUTPATIENT)
Dept: INTERNAL MEDICINE | Facility: CLINIC | Age: 78
End: 2018-09-05

## 2018-09-05 NOTE — TELEPHONE ENCOUNTER
----- Message from Esther Olmos sent at 9/5/2018  9:40 AM CDT -----  Contact: [facility] [phone]      ----- Message -----  From: Esther Olmos  Sent: 9/5/2018   9:11 AM  To: Eric PETERSON Staff    Prior Authorization Needed    Medication: paroxetine (PAXIL) 10 MG tablet    Pharmacy Info: [facility] - [address]    Plan does not cover this medication. Please call plan at [phone] to initiate prior authorization or call/fax pharmacy to change medication. Patient ID#[id number]    Note chart when prior authorization has been submitted.    Please notify pharmacy when prior authorization has been approved.    Thank You

## 2018-09-07 NOTE — TELEPHONE ENCOUNTER
If he has not been taking paxil since 2015, I will not start him on the medication at this point in time.     Please have him bring all of his medication bottles to his next visit so I can make sure we have a correct list of his medications.

## 2018-09-07 NOTE — TELEPHONE ENCOUNTER
Spoke with [facility] mail delivery, pharmacy is needing a new rx for pt's paroxetine (PAXIL) 10 MG tablet last rx was in 2015. Pt requested [facility] reach out to the office for a new rx to be sent in to mail delivery.      Please advise.

## 2018-09-18 ENCOUNTER — IMMUNIZATION (OUTPATIENT)
Dept: INTERNAL MEDICINE | Facility: CLINIC | Age: 78
End: 2018-09-18
Payer: MEDICARE

## 2018-09-18 ENCOUNTER — OFFICE VISIT (OUTPATIENT)
Dept: INTERNAL MEDICINE | Facility: CLINIC | Age: 78
End: 2018-09-18
Payer: MEDICARE

## 2018-09-18 ENCOUNTER — LAB VISIT (OUTPATIENT)
Dept: LAB | Facility: HOSPITAL | Age: 78
End: 2018-09-18
Attending: INTERNAL MEDICINE
Payer: MEDICARE

## 2018-09-18 VITALS
HEIGHT: 70 IN | OXYGEN SATURATION: 97 % | SYSTOLIC BLOOD PRESSURE: 120 MMHG | HEART RATE: 61 BPM | BODY MASS INDEX: 30.29 KG/M2 | DIASTOLIC BLOOD PRESSURE: 80 MMHG

## 2018-09-18 DIAGNOSIS — M17.9 OSTEOARTHRITIS OF KNEE, UNSPECIFIED LATERALITY, UNSPECIFIED OSTEOARTHRITIS TYPE: ICD-10-CM

## 2018-09-18 DIAGNOSIS — I25.5 CARDIOMYOPATHY, ISCHEMIC: ICD-10-CM

## 2018-09-18 DIAGNOSIS — M17.0 PRIMARY OSTEOARTHRITIS OF BOTH KNEES: ICD-10-CM

## 2018-09-18 DIAGNOSIS — I70.0 ATHEROSCLEROSIS OF AORTIC ARCH: ICD-10-CM

## 2018-09-18 DIAGNOSIS — Z79.4 TYPE 2 DIABETES MELLITUS WITH OTHER CIRCULATORY COMPLICATION, WITH LONG-TERM CURRENT USE OF INSULIN: ICD-10-CM

## 2018-09-18 DIAGNOSIS — Z79.4 DIABETES MELLITUS DUE TO UNDERLYING CONDITION WITH DIABETIC POLYNEUROPATHY, WITH LONG-TERM CURRENT USE OF INSULIN: ICD-10-CM

## 2018-09-18 DIAGNOSIS — E08.42 DIABETES MELLITUS DUE TO UNDERLYING CONDITION WITH DIABETIC POLYNEUROPATHY, WITH LONG-TERM CURRENT USE OF INSULIN: ICD-10-CM

## 2018-09-18 DIAGNOSIS — N18.30 STAGE 3 CHRONIC KIDNEY DISEASE: ICD-10-CM

## 2018-09-18 DIAGNOSIS — E11.59 TYPE 2 DIABETES MELLITUS WITH OTHER CIRCULATORY COMPLICATION, WITH LONG-TERM CURRENT USE OF INSULIN: ICD-10-CM

## 2018-09-18 DIAGNOSIS — I50.22 CHRONIC SYSTOLIC HEART FAILURE: ICD-10-CM

## 2018-09-18 DIAGNOSIS — I48.0 PAROXYSMAL ATRIAL FIBRILLATION: ICD-10-CM

## 2018-09-18 DIAGNOSIS — E78.00 PURE HYPERCHOLESTEROLEMIA: ICD-10-CM

## 2018-09-18 DIAGNOSIS — Z12.5 SCREENING PSA (PROSTATE SPECIFIC ANTIGEN): ICD-10-CM

## 2018-09-18 DIAGNOSIS — Z00.00 ROUTINE GENERAL MEDICAL EXAMINATION AT A HEALTH CARE FACILITY: Primary | ICD-10-CM

## 2018-09-18 DIAGNOSIS — I10 ESSENTIAL HYPERTENSION: ICD-10-CM

## 2018-09-18 LAB
ALBUMIN SERPL BCP-MCNC: 3.9 G/DL
ALP SERPL-CCNC: 69 U/L
ALT SERPL W/O P-5'-P-CCNC: 47 U/L
ANION GAP SERPL CALC-SCNC: 6 MMOL/L
AST SERPL-CCNC: 40 U/L
BASOPHILS # BLD AUTO: 0.01 K/UL
BASOPHILS NFR BLD: 0.2 %
BILIRUB SERPL-MCNC: 0.4 MG/DL
BUN SERPL-MCNC: 15 MG/DL
CALCIUM SERPL-MCNC: 9.3 MG/DL
CHLORIDE SERPL-SCNC: 102 MMOL/L
CO2 SERPL-SCNC: 29 MMOL/L
COMPLEXED PSA SERPL-MCNC: 0.89 NG/ML
CREAT SERPL-MCNC: 0.9 MG/DL
DIFFERENTIAL METHOD: ABNORMAL
EOSINOPHIL # BLD AUTO: 0.1 K/UL
EOSINOPHIL NFR BLD: 1.1 %
ERYTHROCYTE [DISTWIDTH] IN BLOOD BY AUTOMATED COUNT: 13.5 %
EST. GFR  (AFRICAN AMERICAN): >60 ML/MIN/1.73 M^2
EST. GFR  (NON AFRICAN AMERICAN): >60 ML/MIN/1.73 M^2
GLUCOSE SERPL-MCNC: 289 MG/DL
HCT VFR BLD AUTO: 38.4 %
HGB BLD-MCNC: 12.6 G/DL
LYMPHOCYTES # BLD AUTO: 1.3 K/UL
LYMPHOCYTES NFR BLD: 20.6 %
MCH RBC QN AUTO: 30.8 PG
MCHC RBC AUTO-ENTMCNC: 32.8 G/DL
MCV RBC AUTO: 94 FL
MONOCYTES # BLD AUTO: 0.5 K/UL
MONOCYTES NFR BLD: 8.3 %
NEUTROPHILS # BLD AUTO: 4.4 K/UL
NEUTROPHILS NFR BLD: 69.6 %
PLATELET # BLD AUTO: 164 K/UL
PMV BLD AUTO: 11.5 FL
POTASSIUM SERPL-SCNC: 4.7 MMOL/L
PROT SERPL-MCNC: 6.8 G/DL
RBC # BLD AUTO: 4.09 M/UL
SODIUM SERPL-SCNC: 137 MMOL/L
WBC # BLD AUTO: 6.37 K/UL

## 2018-09-18 PROCEDURE — 90662 IIV NO PRSV INCREASED AG IM: CPT | Mod: PBBFAC

## 2018-09-18 PROCEDURE — 99213 OFFICE O/P EST LOW 20 MIN: CPT | Mod: PBBFAC,25 | Performed by: INTERNAL MEDICINE

## 2018-09-18 PROCEDURE — 99213 OFFICE O/P EST LOW 20 MIN: CPT | Mod: 25,S$PBB,, | Performed by: INTERNAL MEDICINE

## 2018-09-18 PROCEDURE — 3079F DIAST BP 80-89 MM HG: CPT | Mod: CPTII,,, | Performed by: INTERNAL MEDICINE

## 2018-09-18 PROCEDURE — 84443 ASSAY THYROID STIM HORMONE: CPT

## 2018-09-18 PROCEDURE — 3074F SYST BP LT 130 MM HG: CPT | Mod: CPTII,,, | Performed by: INTERNAL MEDICINE

## 2018-09-18 PROCEDURE — 99397 PER PM REEVAL EST PAT 65+ YR: CPT | Mod: 25,S$PBB,, | Performed by: INTERNAL MEDICINE

## 2018-09-18 PROCEDURE — 80053 COMPREHEN METABOLIC PANEL: CPT

## 2018-09-18 PROCEDURE — 99499 UNLISTED E&M SERVICE: CPT | Mod: HCNC,S$GLB,, | Performed by: INTERNAL MEDICINE

## 2018-09-18 PROCEDURE — 85025 COMPLETE CBC W/AUTO DIFF WBC: CPT

## 2018-09-18 PROCEDURE — 36415 COLL VENOUS BLD VENIPUNCTURE: CPT

## 2018-09-18 PROCEDURE — 83036 HEMOGLOBIN GLYCOSYLATED A1C: CPT

## 2018-09-18 PROCEDURE — 99999 PR PBB SHADOW E&M-EST. PATIENT-LVL III: CPT | Mod: PBBFAC,,, | Performed by: INTERNAL MEDICINE

## 2018-09-18 PROCEDURE — 84439 ASSAY OF FREE THYROXINE: CPT

## 2018-09-18 PROCEDURE — 84153 ASSAY OF PSA TOTAL: CPT

## 2018-09-18 RX ORDER — NYSTATIN 100000 U/G
CREAM TOPICAL 2 TIMES DAILY
Qty: 60 G | Refills: 3 | Status: ON HOLD | OUTPATIENT
Start: 2018-09-18 | End: 2019-06-18

## 2018-09-18 NOTE — PROGRESS NOTES
CHIEF COMPLAINT: Annual exam and  Follow up of chf, diabetes, hypertension, a fib, hyperlipidemia, back pain      HISTORY OF PRESENT ILLNESS: This is a 78-year-old man who presents for follow up of above.       He is currently taking pradaxa 150 mg twice daily for anticoagulation for a fib/flutter. NO edema. He has mild dyspnea on exertion. No chest pain or palpitations. He continues to take spironolactone 25 mg 1/2 daily, carvediolol 25 mg bid and Entresto 97/103 twice daily and KCL 10 meq 1 tablet daily for his hypertension and CHF. He is also on amiodarone 200 mg twice daily for his atrial flutter. Aspirin 81 mg daily.       His back is doing well. He is doing stretching at home which helps.  HE takes one tizanidine in the evening.       He is no longer taking paxil 10 mg nightly.  His mood is better since he  from his wife. He was born with a bad temper. Temper is a little better. He is sleeping well. HE continues to take trazodone 50 mg 2 tablets at bedtime, temazepam 15 mg at bedtime and melatonin 5 mg at bedtime. No anxiety or depression now.       His blood sugars have been very low so he stopped the Novolog. . He currently takes Lantus 40-45 units at night.  He denies any polydipsia, polyuria, hypoglycemia.  Blood sugars ranging 120-125.       He has hyperlipidemia, currently off Lipitor 20 mg daily      His liver enzymes have been mildly elevated. Dr Palm stopped fenofibrate due to the elevated liver enzymes. He had a liver biopsy and the elevated liver enzymes are NOT due to amiodarone. No further work up is needed at this time.       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 Shooting pain has resolved with increasing dose of gabapentin.      No nausea, vomiting, constipation, diarrhea, dysuria, hematuria, sinus congestion, sore throat, headache.     He continues to have knee pain. His knees will occasionally do p hysical therapy.       PAST MEDICAL  "HISTORY:    1. Hypertension, coronary artery disease, status post stenting.    2. Ischemic cardiomyopathy.    3. Atrial flutter.    4. Diabetes mellitus.    5. ICD placed 02/22/2012.    6. Hyperlipidemia.    7. Osteoarthritis of the knees.  8. Atrial fibrillation  9. Transaminitis      PAST SURGICAL HISTORY: Appendectomy in 2003.        SOCIAL HISTORY: He is a former smoker, quit in 1987. Does not drink alcohol.    .       PHYSICAL EXAMINATION:      /80   Pulse 61   Ht 5' 10" (1.778 m)   SpO2 97%   BMI 30.29 kg/m²      GENERAL: He is alert, oriented, no apparent distress. Affect within normal limits.    HEENT: Conjunctivae anicteric. Pupils are equal, round and reactive to light.    Tympanic membranes are clear. Oropharynx is clear.    NECK: Supple. No cervical lymphadenopathy, no thyroid enlargement.    RESPIRATORY: Effort normal. Lungs are clear to auscultation.    HEART: Regular rate and rhythm without murmurs, gallops or rubs. No lower extremity edema.     Protective Sensation (w/ 10 gram monofilament):  Right: Decreased  Left: Decreased    Visual Inspection:  Dry Skin -  Bilateral    Pedal Pulses:   Right: Present  Left: Present    Posterior tibialis:   Right:Present  Left: Present                 ASSESSMENT AND PLAN:.    Annual exam - discussed diet, exercise and safety issues.    PSA today  Declines colonosocpy  Influenza vaccine today  Prevnar 12/2017  Pneumovax 2/2003      Other medical problems discussed at this visit. Billing will be separate and based on time. Spent 15 minutes in counseling regarding these medical problems.    1.  CHF - stable  4. Coronary artery disease - stable - to follow up with Cardiology.    5. Atrial flutter - s/p cardioversion - stable    6. 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 Watch blood sugars. A1C today  7. History of anemia on last blood work, stable    8. Hyperlipidemia. At goal    9Transaminitis -better, watch close  10. " Diabetic neuropathy -stable. gabapentin 300 mg 2 capsules three times daily    11. Anxiety and depression - stable  12. Aortic arch atherosclerosis - modifying risk factors  13. Insomnia - stable  14. OA knees - to pain clinic for eval for geniculate nerve ablation  I will see him back in 4 months, sooner if problems arise.

## 2018-09-19 LAB
ESTIMATED AVG GLUCOSE: 189 MG/DL
HBA1C MFR BLD HPLC: 8.2 %
T4 FREE SERPL-MCNC: 0.72 NG/DL
TSH SERPL DL<=0.005 MIU/L-ACNC: 8.93 UIU/ML

## 2018-09-23 ENCOUNTER — TELEPHONE (OUTPATIENT)
Dept: INTERNAL MEDICINE | Facility: CLINIC | Age: 78
End: 2018-09-23

## 2018-09-23 RX ORDER — LEVOTHYROXINE SODIUM 50 UG/1
50 TABLET ORAL DAILY
Qty: 90 TABLET | Refills: 1 | Status: SHIPPED | OUTPATIENT
Start: 2018-09-23 | End: 2019-02-11

## 2018-10-03 ENCOUNTER — TELEPHONE (OUTPATIENT)
Dept: INTERNAL MEDICINE | Facility: CLINIC | Age: 78
End: 2018-10-03

## 2018-10-03 NOTE — TELEPHONE ENCOUNTER
----- Message from Esther Olmos sent at 10/3/2018  3:34 PM CDT -----  Contact: Patient [phone]      ----- Message -----  From: Isabella Issa  Sent: 10/3/2018   2:45 PM  To: Eric PETERSON Staff    Pt is calling to speak with someone in regards to himself having some fleam coming for the past three days. Pt would like to know if he can get something dent over to his pharmacy. Please call back and advise.        Thanks

## 2018-10-08 ENCOUNTER — TELEPHONE (OUTPATIENT)
Dept: INTERNAL MEDICINE | Facility: CLINIC | Age: 78
End: 2018-10-08

## 2018-10-08 RX ORDER — KETOCONAZOLE 20 MG/G
CREAM TOPICAL DAILY
Qty: 60 G | Refills: 3 | Status: ON HOLD | OUTPATIENT
Start: 2018-10-08 | End: 2019-06-18 | Stop reason: CLARIF

## 2018-10-08 NOTE — TELEPHONE ENCOUNTER
Spoke with pt, states last time he saw PCP rash under left arm medication given is not helping the rash is growing. Pt state he has been putting on everyday. Pt would like something new called in.      Please advise

## 2018-10-08 NOTE — TELEPHONE ENCOUNTER
----- Message from Esther Olmos sent at 10/8/2018  1:48 PM CDT -----  Contact: Self/[phone]      ----- Message -----  From: Anabela Kirk  Sent: 10/8/2018   1:21 PM  To: Eric PETERSON Staff    Patient is requesting a call back from nurse ASAP, the patient called on 10/3 and states no one has returned call back. Please call patient.

## 2018-10-18 ENCOUNTER — TELEPHONE (OUTPATIENT)
Dept: INTERNAL MEDICINE | Facility: CLINIC | Age: 78
End: 2018-10-18

## 2018-10-18 DIAGNOSIS — R21 RASH: Primary | ICD-10-CM

## 2018-10-18 NOTE — TELEPHONE ENCOUNTER
Pt states that PCP mis diagnosed him. He states that he was given 2 different medications on 2 different occassions however he has no relief. Pt would like to see someone else for this issue. Please advise

## 2018-10-18 NOTE — TELEPHONE ENCOUNTER
----- Message from Tori Tovar sent at 10/18/2018  2:16 PM CDT -----  Contact: Self. Call  [phone]  Patient ask that Dr. Reyes or nurse call him and he would not tell me a ref to the call.

## 2018-10-18 NOTE — TELEPHONE ENCOUNTER
He still has a rash despite 2 different creams  Have him see dermatology - have him call derm to see if he can be seen sooner.

## 2018-10-25 ENCOUNTER — OFFICE VISIT (OUTPATIENT)
Dept: DERMATOLOGY | Facility: CLINIC | Age: 78
End: 2018-10-25
Payer: MEDICARE

## 2018-10-25 DIAGNOSIS — L98.9 DISEASE OF SKIN AND SUBCUTANEOUS TISSUE: Primary | ICD-10-CM

## 2018-10-25 PROCEDURE — 99202 OFFICE O/P NEW SF 15 MIN: CPT | Mod: S$PBB,,, | Performed by: DERMATOLOGY

## 2018-10-25 PROCEDURE — 99999 PR PBB SHADOW E&M-EST. PATIENT-LVL II: CPT | Mod: PBBFAC,,, | Performed by: DERMATOLOGY

## 2018-10-25 PROCEDURE — 99212 OFFICE O/P EST SF 10 MIN: CPT | Mod: PBBFAC | Performed by: DERMATOLOGY

## 2018-10-25 PROCEDURE — 1101F PT FALLS ASSESS-DOCD LE1/YR: CPT | Mod: CPTII,,, | Performed by: DERMATOLOGY

## 2018-10-25 RX ORDER — TRIAMCINOLONE ACETONIDE 0.25 MG/G
CREAM TOPICAL
Qty: 30 G | Refills: 3 | Status: ON HOLD | OUTPATIENT
Start: 2018-10-25 | End: 2019-06-18

## 2018-10-25 RX ORDER — CLINDAMYCIN PHOSPHATE 10 UG/ML
LOTION TOPICAL
Qty: 60 ML | Refills: 3 | Status: ON HOLD | OUTPATIENT
Start: 2018-10-25 | End: 2019-05-28 | Stop reason: HOSPADM

## 2018-10-25 NOTE — PROGRESS NOTES
Subjective:       Patient ID:  Madhav Cortez is a 78 y.o. male who presents for   Chief Complaint   Patient presents with    Rash     under L arm     Skin Check     UBSE     Rash  - Initial  Affected locations: left axilla and right axilla  Duration: 2 months  Signs / symptoms: itching (occ)  Timing: last used deodarant under left arm 3 weeks ago.  Aggravated by: nothing  Treatments tried: keto cream bid x 10 days.  Improvement on treatment: no relief        Review of Systems   Genitourinary:        Has diabetes  Lab Results       Component                Value               Date                       HGBA1C                   8.2 (H)             09/18/2018               Skin: Positive for itching (occ) and rash.        Objective:    Physical Exam   Constitutional: He appears well-developed and well-nourished. No distress.   Neurological: He is alert and oriented to person, place, and time. He is not disoriented.   Psychiatric: He has a normal mood and affect.   Skin:   Areas Examined (abnormalities noted in diagram):   Chest / Axilla Inspection Performed             Diagram Legend     Erythematous scaling macule/papule c/w actinic keratosis       Vascular papule c/w angioma      Pigmented verrucoid papule/plaque c/w seborrheic keratosis      Yellow umbilicated papule c/w sebaceous hyperplasia      Irregularly shaped tan macule c/w lentigo     1-2 mm smooth white papules consistent with Milia      Movable subcutaneous cyst with punctum c/w epidermal inclusion cyst      Subcutaneous movable cyst c/w pilar cyst      Firm pink to brown papule c/w dermatofibroma      Pedunculated fleshy papule(s) c/w skin tag(s)      Evenly pigmented macule c/w junctional nevus     Mildly variegated pigmented, slightly irregular-bordered macule c/w mildly atypical nevus      Flesh colored to evenly pigmented papule c/w intradermal nevus       Pink pearly papule/plaque c/w basal cell carcinoma      Erythematous hyperkeratotic  cursted plaque c/w SCC      Surgical scar with no sign of skin cancer recurrence      Open and closed comedones      Inflammatory papules and pustules      Verrucoid papule consistent consistent with wart     Erythematous eczematous patches and plaques     Dystrophic onycholytic nail with subungual debris c/w onychomycosis     Umbilicated papule    Erythematous-base heme-crusted tan verrucoid plaque consistent with inflamed seborrheic keratosis     Erythematous Silvery Scaling Plaque c/w Psoriasis     See annotation      Assessment / Plan:        Disease of skin and subcutaneous tissue - r/o erythrasma vs ICD/ACD  Failed antiyeast treatments    -     clindamycin (CLEOCIN T) 1 % lotion; AAA axilla bid - apply first  Dispense: 60 mL; Refill: 3  -     triamcinolone acetonide 0.025% (KENALOG) 0.025 % cream; AAA bid  Dispense: 30 g; Refill: 3    D/c deodorant until clear then trial of almay             No Follow-up on file.

## 2018-10-30 DIAGNOSIS — Z95.810 CARDIAC DEFIBRILLATOR IN SITU: ICD-10-CM

## 2018-10-30 DIAGNOSIS — Z95.810 CARDIAC DEFIBRILLATOR IN SITU: Primary | ICD-10-CM

## 2018-10-30 DIAGNOSIS — Z95.810 CARDIAC DEFIBRILLATOR IN PLACE: Primary | ICD-10-CM

## 2018-11-06 ENCOUNTER — TELEPHONE (OUTPATIENT)
Dept: CARDIOLOGY | Facility: CLINIC | Age: 78
End: 2018-11-06

## 2018-11-06 ENCOUNTER — TELEPHONE (OUTPATIENT)
Dept: DERMATOLOGY | Facility: CLINIC | Age: 78
End: 2018-11-06

## 2018-11-06 DIAGNOSIS — I50.22 CHRONIC SYSTOLIC HEART FAILURE: Primary | ICD-10-CM

## 2018-11-06 DIAGNOSIS — R06.02 SOB (SHORTNESS OF BREATH) ON EXERTION: ICD-10-CM

## 2018-11-06 NOTE — TELEPHONE ENCOUNTER
Spoke to pt.pt will continue x2 Rx to give it time to improved pt only used both creams for 1 1/2 week, pt will call back if not improved in the next month or so.

## 2018-11-06 NOTE — TELEPHONE ENCOUNTER
,         LOV:11/28/18.Pt is c/o feeling SOB with exertion and he said he dreads bedtime b/c he said he is unable to sleep b/c he starts to bring up a lot of thick mucus.He denies swelling to his LE's and does not feel he has gained wt.He said that his PCP told him to call his cardiologist.He is very addiment about only seeing you.I felt he should not wait until you are clinic consultant on 11/20/18 and he said absolutely no nurse parctioner.He reports he takes all his meds as Rx'd,taking the Lasix 40 mg 1 tab QD as well as the spironolactone 25 mg 1 tab daily .Did admit that he likes to eat popcorn at night.I can put him in to see a fellow tomorrow but he refuses.Please advise.Thanks,Cherelle

## 2018-11-06 NOTE — TELEPHONE ENCOUNTER
----- Message from Ariana Hill sent at 11/6/2018 12:59 PM CST -----  Contact: pt at [phone]  Patient Returning Call from [facility]    Who Left Message for Patient:Rick pt  Communication Preference:call  Additional Information:Just missed a call.  Thinks it may have been from you or maybe his cardio doc.

## 2018-11-06 NOTE — TELEPHONE ENCOUNTER
----- Message from Maritza Black MA sent at 11/6/2018  1:09 PM CST -----  Contact: patient called  The patient said he can't sleep at night and he have  mucus in his throat his PCP told him to call his cardiology Dr. Last visit was on 11- Dr. Kira Palm.  Please call the patient at [phone]. Thank you.

## 2018-11-06 NOTE — TELEPHONE ENCOUNTER
----- Message from Dorothea Lloyd sent at 11/6/2018 11:50 AM CST -----  Contact: patient  Please call above patient at [phone] said he was told call if cream was not working for his under arm rash waiting on a call from the nurse

## 2018-11-06 NOTE — TELEPHONE ENCOUNTER
He's not been seen here since last November.  He needs to come in to be evalutated. If he doesn't want to wait to see me or see Una, he can see a fellow or another available attending for an urgent visit and then follow up with me.

## 2018-11-07 ENCOUNTER — OFFICE VISIT (OUTPATIENT)
Dept: CARDIOLOGY | Facility: CLINIC | Age: 78
End: 2018-11-07
Payer: MEDICARE

## 2018-11-07 ENCOUNTER — HOSPITAL ENCOUNTER (OUTPATIENT)
Dept: CARDIOLOGY | Facility: CLINIC | Age: 78
Discharge: HOME | End: 2018-11-07
Payer: MEDICARE

## 2018-11-07 VITALS
HEIGHT: 72 IN | WEIGHT: 217.63 LBS | DIASTOLIC BLOOD PRESSURE: 62 MMHG | OXYGEN SATURATION: 98 % | SYSTOLIC BLOOD PRESSURE: 124 MMHG | HEART RATE: 60 BPM | BODY MASS INDEX: 29.48 KG/M2

## 2018-11-07 DIAGNOSIS — I10 ESSENTIAL HYPERTENSION: ICD-10-CM

## 2018-11-07 DIAGNOSIS — I25.5 CARDIOMYOPATHY, ISCHEMIC: ICD-10-CM

## 2018-11-07 DIAGNOSIS — E78.00 PURE HYPERCHOLESTEROLEMIA: ICD-10-CM

## 2018-11-07 DIAGNOSIS — Z95.810 ICD (IMPLANTABLE CARDIOVERTER-DEFIBRILLATOR), BIVENTRICULAR, IN SITU: ICD-10-CM

## 2018-11-07 DIAGNOSIS — Z79.899 ON AMIODARONE THERAPY: ICD-10-CM

## 2018-11-07 DIAGNOSIS — I48.0 PAROXYSMAL ATRIAL FIBRILLATION: ICD-10-CM

## 2018-11-07 DIAGNOSIS — I50.22 CHRONIC SYSTOLIC HEART FAILURE: ICD-10-CM

## 2018-11-07 DIAGNOSIS — R06.02 SOB (SHORTNESS OF BREATH) ON EXERTION: ICD-10-CM

## 2018-11-07 DIAGNOSIS — R09.3 ABNORMAL SPUTUM AMOUNT: ICD-10-CM

## 2018-11-07 DIAGNOSIS — G47.00 INSOMNIA, UNSPECIFIED TYPE: Primary | ICD-10-CM

## 2018-11-07 PROCEDURE — 99999 PR PBB SHADOW E&M-EST. PATIENT-LVL V: CPT | Mod: PBBFAC,GC,, | Performed by: INTERNAL MEDICINE

## 2018-11-07 PROCEDURE — 99214 OFFICE O/P EST MOD 30 MIN: CPT | Mod: 25,GC,S$GLB, | Performed by: INTERNAL MEDICINE

## 2018-11-07 PROCEDURE — 3078F DIAST BP <80 MM HG: CPT | Mod: CPTII,GC,S$GLB, | Performed by: INTERNAL MEDICINE

## 2018-11-07 PROCEDURE — 93000 ELECTROCARDIOGRAM COMPLETE: CPT | Mod: S$GLB,,, | Performed by: INTERNAL MEDICINE

## 2018-11-07 PROCEDURE — 3074F SYST BP LT 130 MM HG: CPT | Mod: CPTII,GC,S$GLB, | Performed by: INTERNAL MEDICINE

## 2018-11-07 PROCEDURE — 1101F PT FALLS ASSESS-DOCD LE1/YR: CPT | Mod: CPTII,GC,S$GLB, | Performed by: INTERNAL MEDICINE

## 2018-11-07 RX ORDER — FLUTICASONE PROPIONATE 50 MCG
1 SPRAY, SUSPENSION (ML) NASAL DAILY
Qty: 2 BOTTLE | Refills: 0 | Status: SHIPPED | OUTPATIENT
Start: 2018-11-07 | End: 2019-07-22

## 2018-11-07 NOTE — PROGRESS NOTES
I have personally taken the history and examined this patient and agree with the fellow's note as stated above.Concur that the patient has no clinical evidence for volume overload and may have sxs from GERD. He was instructed to weigh daily and take furosemide for short term weight gain then stop. Repeat as necessary.

## 2018-11-07 NOTE — PROGRESS NOTES
Cardiology Clinic Note  Reason for Visit: White mucus production     HPI:   78 y.o M with PMhx of ICM (EF10-15%, CRT-D in place, NYHA FC III sx), CAD (s/p PCI), VT (On Amiodarone), AFL (s/p CTI RFA), HTN, T2DM, CKD that presents today for evaluation of white mucus production.    Patient reports when he lays down sometimes or when he is recumbent while watching TV, he has productive white mucus production which he has to spit up. He also reports difficulty sleeping. He has been compliant with his medications and reports that this problem of spitting up productive white mucus has been occurring over the past few months. He reported this to his PCP he recommended he be evaluated by cardiology due to his history of ICM / HFrEF. He denies any recent changes in his diet or medications (was taken off his Lasix other than on PRN basis by his PCP earlier this year). He reports feeling well other than insomnia and this productive white mucus. He also reports that he often wakes up in the AM with a runny nose that improves once he blows his nose. He uses affrin in the PM before bed to clear his sinuses. Denies a history of GERD. Reports that he snores.     Reports MCBRIDE (chronic, at baseline)  He denies any CP, SOB at rest, orthopnea, PND, lightheadedness, syncope, LE edema, palpitations    ROS:    Constitution: Negative for fever or chills. Negative for weight loss or gain.   HENT: Negative for sore throat or headaches. Negative for rhinorrhea.  Eyes: Negative for blurred or double vision.   Cardiovascular: See above  Pulmonary: Negative for SOB. Negative for cough.   Gastrointestinal: Negative for abdominal pain. Negative for nausea/ vomiting. Negative for diarrhea.   : Negative for dysuria.   Neurological: Negative for focal weakness or sensory changes.  PMH:     Past Medical History:   Diagnosis Date    *Atrial fibrillation     *Atrial flutter     Acute exacerbation of CHF (congestive heart failure) 8/2/2013     Anticoagulant long-term use     Anxiety     Arthritis     Atrial flutter     Back pain     Cardiomyopathy     Cataract     Coronary artery disease     Depression     Diabetes mellitus     Heart bloc     Hepatitis B     Hyperlipidemia 4/1/2014    Hypertension     Myocardial infarction     Non-STEMI (non-ST elevated myocardial infarction) 5/26/2013    Nuclear sclerosis - Both Eyes 2/18/2013    Post PTCA 8/7/2012    Presence of biventricular AICD 2/23/2016    Stage 3 chronic kidney disease 12/11/2017    Tobacco dependence     Transaminitis 4/1/2014    Ventricular tachycardia      Past Surgical History:   Procedure Laterality Date    ABLATION N/A 4/8/2013    [facility] CATH LAB    APPENDECTOMY      BIOPSY LIVER AND ULTRASOUND N/A 5/26/2014    [facility]    CARDIAC DEFIBRILLATOR PLACEMENT      CARDIAC DEFIBRILLATOR PLACEMENT      CARDIOVERSION N/A 10/10/2013    Performed by [facility] CATH LAB    CARDIOVERSION N/A 8/19/2013    [facility] CATH LAB    COLONOSCOPY      CORONARY ANGIOPLASTY WITH STENT PLACEMENT      FRACTURE SURGERY      L arm    INSERT / REPLACE / REMOVE PACEMAKER  12/15    bi-V upgrade    INSERTION-ICD-BIVENTRICULAR N/A 11/11/2015    Per[facility] CATH LAB    RADIOFREQUENCY ABLATION      STEROID INJECTION KNEE Bilateral     received about every 4 months    TONSILLECTOMY      TRANSESOPHAGEAL ECHOCARDIOGRAM (DIOGO) N/A 10/10/2013    [facility] CATH LAB    TRANSESOPHAGEAL ECHOCARDIOGRAM (DIOGO) N/A 8/19/2013    Per[facility] by [facility] CATH LAB    VASCULAR SURGERY       Allergies:   Review of patient's allergies indicates:  No Known Allergies  Medications:     Current Outpatient Medications on File Prior to Visit   Medication Sig Dispense Refill    ACCU-CHEK JIE Misc USE AS INSTRUCTED 1 each 0     "ACCU-CHEK SMARTVIEW TEST STRIP Strp CHECK BLOOD SUGAR THREE TIMES DAILY 300 strip 4    amiodarone (PACERONE) 200 MG Tab TAKE 1 TABLET TWICE DAILY 180 tablet 3    ascorbic acid (VITAMIN C) 500 MG tablet Take 1,500 mg by mouth 2 (two) times daily.       aspirin 81 MG Chew Take 1 tablet (81 mg total) by mouth once daily. 30 tablet 11    beta carotene-C-E-lutein-min29 5,000-60-30-2 unit-mg-unit-mg Tab Take 1 capsule by mouth once daily.       blood-glucose meter kit Accu check monique meter Use as instructed 1 each 0    carvedilol (COREG) 25 MG tablet Take 1 tablet (25 mg total) by mouth 2 (two) times daily. 180 tablet 1    clindamycin (CLEOCIN T) 1 % lotion  apply to affected area twice daily as directed ( apply first) 60 mL 3    co-enzyme Q-10 30 mg capsule 800 units daily      dabigatran etexilate (PRADAXA) 150 mg Cap Take 1 capsule (150 mg total) by mouth 2 (two) times daily. "Do NOT break, chew, or open capsules." 180 capsule 3    ENTRESTO  mg per tablet TAKE 1 TABLET TWICE DAILY 180 tablet 1    furosemide (LASIX) 40 MG tablet TAKE 1 TABLET ONE TIME DAILY (Patient taking differently: TAKE 1/2 TABLET ONE TIME DAILY) 90 tablet 3    gabapentin (NEURONTIN) 300 MG capsule TAKE 2 CAPSULES BY MOUTH THREE TIMES DAILY 540 capsule 3    glucosamine-chondroitin 500-400 mg tablet Take 1 tablet by mouth 2 (two) times daily.      ketoconazole (NIZORAL) 2 % cream Apply topically once daily. 60 g 3    LANTUS SOLOSTAR U-100 INSULIN 100 unit/mL (3 mL) InPn pen INJECT 40 UNITS INTO THE SKIN EVERY EVENING. 45 mL 4    levothyroxine (SYNTHROID) 50 MCG tablet Take 1 tablet (50 mcg total) by mouth once daily. 90 tablet 1    magnesium oxide (MAG-OX) 400 mg tablet Take 400 mg by mouth 2 (two) times daily.       melatonin 5 mg Tab Take 1 tablet by mouth every evening.       NOVOLOG FLEXPEN 100 unit/mL InPn pen INJECT  16 UNITS SUBCUTANEOUSLY THREE TIMES DAILY WITH MEALS 45 mL 3    nystatin (MYCOSTATIN) cream Apply " "topically 2 (two) times daily. 60 g 3    pen needle, diabetic 31 gauge x 1/4" Ndle Use 4 times daily 360 each 4    RIBOSE ORAL Take 1 tablet by mouth once daily.       senna-docusate 8.6-50 mg (PERICOLACE) 8.6-50 mg per tablet Take 1 tablet by mouth 2 (two) times daily. 60 tablet 0    spironolactone (ALDACTONE) 25 MG tablet TAKE 1 TABLET ONE TIME DAILY 90 tablet 3    temazepam (RESTORIL) 15 mg Cap TAKE 1 CAPSULE EVERY EVENING 90 capsule 1    tizanidine (ZANAFLEX) 2 MG tablet TAKE 1 TABLET EVERY 8 HOURS AS NEEDED FOR MUSCLE PAIN 270 tablet 4    traZODone (DESYREL) 50 MG tablet TAKE 1 TO 2 TABLETS EVERY EVENING 180 tablet 3    triamcinolone acetonide 0.025% (KENALOG) 0.025 % cream apply to affected area twice daily as directed 30 g 3    UBIDECARENONE (COQ-10 ORAL) Take 800 mg by mouth once daily. Takes 100mg tablet at lunch, and 600mg at dinner      nitroGLYCERIN (NITROSTAT) 0.4 MG SL tablet Place 1 tablet (0.4 mg total) under the tongue every 5 (five) minutes as needed for Chest pain. 30 tablet 3     No current facility-administered medications on file prior to visit.      Social History:     Social History     Tobacco Use    Smoking status: Former Smoker     Last attempt to quit: 1987     Years since quittin.3    Smokeless tobacco: Never Used    Tobacco comment: 25 years ago   Substance Use Topics    Alcohol use: No     Family History:     Family History   Problem Relation Age of Onset    Cancer Father     Diabetes Father     Bladder Cancer Father     Diabetes Mother     Heart attack Mother         "weak heart"    Bipolar disorder Son     Cancer Paternal Grandmother     Heart disease Maternal Grandmother     No Known Problems Maternal Grandfather     Cancer Paternal Grandfather     No Known Problems Daughter     No Known Problems Daughter     No Known Problems Son     No Known Problems Son     Cancer Maternal Uncle     No Known Problems Maternal Aunt     No Known Problems " Paternal Aunt     No Known Problems Paternal Uncle     No Known Problems Sister     No Known Problems Brother     Amblyopia Neg Hx     Blindness Neg Hx     Cataracts Neg Hx     Glaucoma Neg Hx     Hypertension Neg Hx     Macular degeneration Neg Hx     Retinal detachment Neg Hx     Strabismus Neg Hx     Stroke Neg Hx     Thyroid disease Neg Hx     Liver disease Neg Hx     Melanoma Neg Hx     Psoriasis Neg Hx     Lupus Neg Hx     Acne Neg Hx     Eczema Neg Hx      Physical Exam:   /62 (BP Location: Left arm, Patient Position: Sitting, BP Method: Medium (Automatic))   Pulse 60   Ht 6' (1.829 m)   Wt 98.7 kg (217 lb 9.5 oz)   SpO2 98%   BMI 29.51 kg/m²      Constitutional: NAD, conversant  HEENT: Sclera anicteric, EOMI, OP clear  Neck: No JVD, no carotid bruits  CV: RRR, no m/r/g, normal S1/S2  Pulm: CTAB, no wheezes, rales, or ronchi  GI: Abdomen soft, NTND, +BS  Extremities: No LE edema, warm with palpable pulses  Skin: No ecchymosis, erythema, or ulcers. Hyperpigmented BL LE.  Psych: AOx3, appropriate affect  Neuro: No focal deficits    Labs:     Lab Results   Component Value Date     09/18/2018    K 4.7 09/18/2018     09/18/2018    CO2 29 09/18/2018    BUN 15 09/18/2018    CREATININE 0.9 09/18/2018    ANIONGAP 6 (L) 09/18/2018     Lab Results   Component Value Date    AST 40 09/18/2018    ALT 47 (H) 09/18/2018    ALKPHOS 69 09/18/2018    BILITOT 0.4 09/18/2018    BILIDIR 0.1 04/12/2018    ALBUMIN 3.9 09/18/2018     Lab Results   Component Value Date    CALCIUM 9.3 09/18/2018    MG 2.6 10/19/2015    PHOS 4.2 08/02/2013     Lab Results   Component Value Date     (H) 10/19/2015     (H) 09/21/2015     (H) 09/14/2015    Lab Results   Component Value Date    WBC 6.37 09/18/2018    HGB 12.6 (L) 09/18/2018    HCT 38.4 (L) 09/18/2018     09/18/2018    GRAN 4.4 09/18/2018    GRAN 69.6 09/18/2018     Lab Results   Component Value Date    INR 1.1 11/09/2015     INR 1.7 (H) 10/06/2011     Lab Results   Component Value Date    CHOL 149 04/12/2018    HDL 36 (L) 04/12/2018    LDLCALC 86.2 04/12/2018    TRIG 134 04/12/2018     Lab Results   Component Value Date    HGBA1C 8.2 (H) 09/18/2018     Lab Results   Component Value Date    TSH 8.928 (H) 09/18/2018          Imaging:     No results found for: EF    EKG: BI-V paced rhythm.   Assessment and Plan:    Madhav was seen today for shortness of breath, cough and insomnia. His SOB is at his baseline and he shows no clinical signs of ADHF. He does not take daily weights (instructed to do so) but review of his weight from today shows weight less than that when he was seen in clinic by Dr. Palm 1 year ago. His sx sound more consistent with either GERD or post nasal drip. No recent viral illness.     Diagnoses and all orders for this visit:    Paroxysmal atrial fibrillation - BI-V paced rhythm today. Continue Amiodarone and Pradaxa.    Cardiomyopathy, ischemic - Appears compensated. Continue Entresto, Coreg and Aldacrone. Lasix PRN. Instructed to take for weight gain, worsening SOB or LE edema.    Essential hypertension - Well controlled. Continue current regimen.    Pure hypercholesterolemia    On amiodarone therapy    ICD (implantable cardioverter-defibrillator), biventricular, in situ  Insomnia, unspecified type - Refer to sleep disorders, may need evaluation for HO  -     Ambulatory consult to Sleep Disorders    Abnormal sputum amount - Patient denies any symptoms consistent with ADHF. Clinical exam not suggestive of volume overload. Instructed to take daily weights and medication compliance.   -     fluticasone (FLONASE) 50 mcg/actuation nasal spray; 1 spray (50 mcg total) by Each Nare route once daily.  -     ranitidine (ZANTAC) 150 MG tablet; Take 1 tablet (150 mg total) by mouth 2 (two) times daily.        RTC in 1 year or PRN    Patient seen with Dr. Dewitt    Signed:  Ronal Gutiérrez MD  Cardiovascular Disease Fellow,  PGY-V  Pager: [phone]  11/7/2018 4:40 PM    Follow-up:     Future Appointments   Date Time Provider Department Center   12/21/2018  3:00 PM Terrie Camarillo NP [facility] SLEEP [place]   1/16/2019  1:45 PM EKG, APPT [facility] EKG [facility]   1/16/2019  2:20 PM COORDINATED DEVICE CHECK [facility] DEVON [facility]   1/16/2019  3:00 PM Yana Adan NP [facility]   1/22/2019  2:30 PM Mirna Reyes MD [facility]

## 2018-11-07 NOTE — PATIENT INSTRUCTIONS
Weigh daily and double your diuretic dose for 3-4 lb weight gain over a 2-3 day period or 5 pounds in a week until the weight has resolved then return to original dose. Repeat as necessary..

## 2018-11-12 ENCOUNTER — TELEPHONE (OUTPATIENT)
Dept: DERMATOLOGY | Facility: CLINIC | Age: 78
End: 2018-11-12

## 2018-11-12 DIAGNOSIS — L98.9 DISEASE OF SKIN AND SUBCUTANEOUS TISSUE: ICD-10-CM

## 2018-11-12 NOTE — TELEPHONE ENCOUNTER
----- Message from Abhinav Hanna sent at 11/12/2018 12:20 PM CST -----  Contact: Patient   Rx Refill/Request     Is this a Refill or New Rx:  Refill   Rx Name and Strength:   clindamycin (CLEOCIN T) 1 % lotion  Preferred Pharmacy with phone number:  [facility] [phone]  Communication Preference: [phone]  Additional Information:

## 2018-11-12 NOTE — TELEPHONE ENCOUNTER
Spoke with patient and explained that he has refills on requested medication.  He will let us know if he has problems refilling it.

## 2018-12-03 DIAGNOSIS — I25.10 CORONARY ARTERY DISEASE INVOLVING NATIVE CORONARY ARTERY OF NATIVE HEART WITHOUT ANGINA PECTORIS: ICD-10-CM

## 2018-12-03 DIAGNOSIS — I25.5 CARDIOMYOPATHY, ISCHEMIC: ICD-10-CM

## 2018-12-03 DIAGNOSIS — I10 ESSENTIAL HYPERTENSION: ICD-10-CM

## 2018-12-03 DIAGNOSIS — R74.01 TRANSAMINITIS: ICD-10-CM

## 2018-12-04 RX ORDER — SACUBITRIL AND VALSARTAN 97; 103 MG/1; MG/1
TABLET, FILM COATED ORAL
Qty: 180 TABLET | Refills: 1 | Status: SHIPPED | OUTPATIENT
Start: 2018-12-04 | End: 2019-07-16 | Stop reason: SDUPTHER

## 2018-12-06 ENCOUNTER — TELEPHONE (OUTPATIENT)
Dept: INTERNAL MEDICINE | Facility: CLINIC | Age: 78
End: 2018-12-06

## 2018-12-06 NOTE — TELEPHONE ENCOUNTER
Please advise     ----- Message from Jalyn Kitchen sent at 12/6/2018  8:58 AM CST -----  Contact: self [phone]  Patient requesting a call from the office to discuss sleeping medication , stated its not working he don 't remember name of medication . Please call and advise, Thanks

## 2018-12-06 NOTE — TELEPHONE ENCOUNTER
Spoke with pt. Pt stated he is still having trouble sleeping and a friend told him about Ambien. Pt was advised of his upcoming appt with the sleep clinic and his follow up appt with provider. pt was offered and earlier appt and opted to wait til his regular schedule appt to discuss options with provider.

## 2018-12-07 ENCOUNTER — TELEPHONE (OUTPATIENT)
Dept: INTERNAL MEDICINE | Facility: CLINIC | Age: 78
End: 2018-12-07

## 2018-12-07 DIAGNOSIS — M17.0 PRIMARY OSTEOARTHRITIS OF BOTH KNEES: Primary | ICD-10-CM

## 2018-12-07 NOTE — TELEPHONE ENCOUNTER
Please see below and advise. Thank you NL  ----- Message from Amy Dang sent at 12/7/2018  2:15 PM CST -----  Contact: self/[phone]  Patient called in regards needing to get shots on his knee, patient did not specify what type of shot, but stated that he get 3 shots, in 3 weeks period. Please call and advise. Thank you

## 2018-12-13 RX ORDER — TRAZODONE HYDROCHLORIDE 50 MG/1
TABLET ORAL
Qty: 180 TABLET | Refills: 3 | Status: SHIPPED | OUTPATIENT
Start: 2018-12-13 | End: 2019-05-22

## 2018-12-19 ENCOUNTER — PATIENT MESSAGE (OUTPATIENT)
Dept: SLEEP MEDICINE | Facility: CLINIC | Age: 78
End: 2018-12-19

## 2018-12-19 ENCOUNTER — TELEPHONE (OUTPATIENT)
Dept: INTERNAL MEDICINE | Facility: CLINIC | Age: 78
End: 2018-12-19

## 2018-12-19 NOTE — TELEPHONE ENCOUNTER
----- Message from Amy Dang sent at 12/19/2018  3:43 PM CST -----  Contact: self/[phone]  2nd message.  Patient called in regards needing to get shots on his knee, patient did not specify what type of shot, but stated that he get 3 shots, in 3 weeks period. Patient stated that he called a week ago and has not receive an answer. Please call and advise. Thank you

## 2018-12-20 ENCOUNTER — TELEPHONE (OUTPATIENT)
Dept: DERMATOLOGY | Facility: CLINIC | Age: 78
End: 2018-12-20

## 2018-12-20 NOTE — TELEPHONE ENCOUNTER
----- Message from Yasmine Barber MA sent at 12/19/2018  3:56 PM CST -----  Contact: pt at [phone]      ----- Message -----  From: Ariana Hill  Sent: 12/19/2018   3:51 PM  To: Regine Rios Staff    Needs Advice    Reason for call: Cream he is using is  Not working.  Left a messag a week ago and hasn't heard from anyone.  Pt not too nice.        Communication Preference:call    Additional Information:  Pt uses pharmacy across the [facility] fr om [facility].  [facility]

## 2018-12-20 NOTE — TELEPHONE ENCOUNTER
Spoke to patient.pt states his rash has improved on R axilla, there is only redness present on L armpit but not itchy, pt was using topicals differently then how he was instructed todo so, advise pt:     1. clindamycin (CLEOCIN T) 1 % lotion; AAA axilla bid - apply first  Dispense: 60 mL; Refill: 3  2. triamcinolone acetonide 0.025% (KENALOG) 0.025 % cream; AAA bid  Dispense: 30 g; Refill: 3     Also Dr. Weiner recommended: D/c deodorant until clear then trial of almay.    Informed pt to call back if Rash persist.pt verbalized understanding.

## 2018-12-20 NOTE — TELEPHONE ENCOUNTER
Nam, Please call patient and schedule patient for a proceedure appointment with me. Euflexxa auth was placed. Please ensure this is processed. Thank you.

## 2018-12-26 RX ORDER — TEMAZEPAM 15 MG/1
CAPSULE ORAL
Qty: 90 CAPSULE | Refills: 1 | Status: SHIPPED | OUTPATIENT
Start: 2018-12-26 | End: 2019-04-10 | Stop reason: SDUPTHER

## 2018-12-26 RX ORDER — AMIODARONE HYDROCHLORIDE 200 MG/1
TABLET ORAL
Qty: 180 TABLET | Refills: 3 | Status: SHIPPED | OUTPATIENT
Start: 2018-12-26 | End: 2020-01-29

## 2018-12-27 ENCOUNTER — TELEPHONE (OUTPATIENT)
Dept: DERMATOLOGY | Facility: CLINIC | Age: 78
End: 2018-12-27

## 2018-12-27 NOTE — TELEPHONE ENCOUNTER
----- Message from Dorothea Lloyd sent at 12/27/2018 10:25 AM CST -----  Contact: patient  Please call above patient at [phone] needs help with directions on medication waiting on a call from the nurse thanks

## 2019-01-01 ENCOUNTER — HOSPITAL ENCOUNTER (EMERGENCY)
Facility: HOSPITAL | Age: 79
Discharge: HOME | End: 2019-01-01
Attending: EMERGENCY MEDICINE
Payer: MEDICARE

## 2019-01-01 VITALS
DIASTOLIC BLOOD PRESSURE: 67 MMHG | OXYGEN SATURATION: 95 % | TEMPERATURE: 98 F | HEIGHT: 70 IN | RESPIRATION RATE: 18 BRPM | SYSTOLIC BLOOD PRESSURE: 142 MMHG | WEIGHT: 230 LBS | HEART RATE: 60 BPM | BODY MASS INDEX: 32.93 KG/M2

## 2019-01-01 DIAGNOSIS — L03.116 LEFT LEG CELLULITIS: Primary | ICD-10-CM

## 2019-01-01 PROCEDURE — 99283 EMERGENCY DEPT VISIT LOW MDM: CPT | Mod: HCNC

## 2019-01-01 PROCEDURE — 25000003 PHARM REV CODE 250: Mod: HCNC | Performed by: EMERGENCY MEDICINE

## 2019-01-01 PROCEDURE — 99284 EMERGENCY DEPT VISIT MOD MDM: CPT | Mod: ,,, | Performed by: EMERGENCY MEDICINE

## 2019-01-01 PROCEDURE — 99284 PR EMERGENCY DEPT VISIT,LEVEL IV: ICD-10-PCS | Mod: ,,, | Performed by: EMERGENCY MEDICINE

## 2019-01-01 RX ORDER — BACITRACIN ZINC 500 UNIT/G
OINTMENT IN PACKET (EA) TOPICAL
Status: COMPLETED | OUTPATIENT
Start: 2019-01-01 | End: 2019-01-01

## 2019-01-01 RX ORDER — DOXYCYCLINE HYCLATE 100 MG
100 TABLET ORAL EVERY 12 HOURS
Qty: 20 TABLET | Refills: 0 | Status: SHIPPED | OUTPATIENT
Start: 2019-01-01 | End: 2019-01-07 | Stop reason: ALTCHOICE

## 2019-01-01 RX ORDER — DOXYCYCLINE HYCLATE 100 MG
100 TABLET ORAL
Status: COMPLETED | OUTPATIENT
Start: 2019-01-01 | End: 2019-01-01

## 2019-01-01 RX ADMIN — DOXYCYCLINE HYCLATE 100 MG: 100 TABLET, COATED ORAL at 05:01

## 2019-01-01 RX ADMIN — BACITRACIN 1 EACH: 500 OINTMENT TOPICAL at 05:01

## 2019-01-01 NOTE — ED NOTES
Pt reports that about 2 weeks ago he was getting on a bus and when he stepped up he scraped his shin on the step. Pt says he put neosporin on his leg and thought that the abrasion would eventually heal, but it hasn't. Pt is nervous that wound may be infected. Wound is actively leaking fluid. Pt does have slight neuropathy in RLE, but he has equal sensation in both lower extremities.

## 2019-01-01 NOTE — ED PROVIDER NOTES
Encounter Date: 1/1/2019    SCRIBE #1 NOTE: I, Madhuri Chavez, am scribing for, and in the presence of,  Dr. Acevedo. I have scribed the entire note.       History     Chief Complaint   Patient presents with    Leg Injury     Patient states that he hit his left shin 2 weeks ago. Reports pain to site. Is concerned about infection to site.      Time patient was seen by the provider: 5:36 AM      The patient is a 78 y.o. male with co-morbidities including: Afib, acute exacerbation of CHF, anti-coagulant long-term use, CAD, DM, HLD, HTN, who presents to the ED with a complaint of left leg pain and abrasion after scraping it across a step on the bus 2 weeks ago. The patient reports he began treating the wound with neosporin, but this has given no relief. Patient is concerned wound may be infected. He notes of discharge from the site. Denies head injury or LOC. Denies fever, nausea, vomiting, diarrhea, SOB, chest pain, dysuria. The patient also complains of a productive cough. Patient was a former smoker.  A ten point review of systems was completed and is negative except as documented above.  Patient denies any other acute medical complaint.  The patients available PMH, PSH, medications, allergies, and triage vital signs were reviewed just prior to their medical evaluation.      The history is provided by the patient, medical records and the spouse.     Review of patient's allergies indicates:  No Known Allergies  Past Medical History:   Diagnosis Date    *Atrial fibrillation     *Atrial flutter     Acute exacerbation of CHF (congestive heart failure) 8/2/2013    Anticoagulant long-term use     Anxiety     Arthritis     Atrial flutter     Back pain     Cardiomyopathy     Cataract     Coronary artery disease     Depression     Diabetes mellitus     Heart bloc     Hepatitis B     Hyperlipidemia 4/1/2014    Hypertension     Myocardial infarction     Non-STEMI (non-ST elevated myocardial infarction)  "5/26/2013    Nuclear sclerosis - Both Eyes 2/18/2013    Post PTCA 8/7/2012    Presence of biventricular AICD 2/23/2016    Stage 3 chronic kidney disease 12/11/2017    Tobacco dependence     Transaminitis 4/1/2014    Ventricular tachycardia      Past Surgical History:   Procedure Laterality Date    ABLATION N/A 4/8/2013    [facility]    APPENDECTOMY      BIOPSY LIVER AND ULTRASOUND N/A 5/26/2014    Performed by [facility] OR [facility]    CARDIAC DEFIBRILLATOR PLACEMENT      CARDIAC DEFIBRILLATOR PLACEMENT      CARDIOVERSION N/A 10/10/2013    [facility] CATH LAB    CARDIOVERSION N/A 8/19/2013    [facility] CATH LAB    COLONOSCOPY      CORONARY ANGIOPLASTY WITH STENT PLACEMENT      FRACTURE SURGERY      L arm    INSERT / REPLACE / REMOVE PACEMAKER  12/15    bi-V upgrade    INSERTION-ICD-BIVENTRICULAR N/A 11/11/2015    [facility] CATH LAB    RADIOFREQUENCY ABLATION      STEROID INJECTION KNEE Bilateral     received about every 4 months    TONSILLECTOMY      TRANSESOPHAGEAL ECHOCARDIOGRAM (DIOGO) N/A 10/10/2013    [facility] CATH LAB    TRANSESOPHAGEAL ECHOCARDIOGRAM (DIOGO) N/A 8/19/2013    [facility] CATH LAB    VASCULAR SURGERY       Family History   Problem Relation Age of Onset    Cancer Father     Diabetes Father     Bladder Cancer Father     Diabetes Mother     Heart attack Mother         "weak heart"    Bipolar disorder Son     Cancer Paternal Grandmother     Heart disease Maternal Grandmother     No Known Problems Maternal Grandfather     Cancer Paternal Grandfather     No Known Problems Daughter     No Known Problems Daughter     No Known Problems Son     No Known Problems Son     Cancer Maternal Uncle     No Known Problems Maternal Aunt     No Known Problems Paternal Aunt     No Known " Problems Paternal Uncle     No Known Problems Sister     No Known Problems Brother     Amblyopia Neg Hx     Blindness Neg Hx     Cataracts Neg Hx     Glaucoma Neg Hx     Hypertension Neg Hx     Macular degeneration Neg Hx     Retinal detachment Neg Hx     Strabismus Neg Hx     Stroke Neg Hx     Thyroid disease Neg Hx     Liver disease Neg Hx     Melanoma Neg Hx     Psoriasis Neg Hx     Lupus Neg Hx     Acne Neg Hx     Eczema Neg Hx      Social History     Tobacco Use    Smoking status: Former Smoker     Last attempt to quit: 1987     Years since quittin.5    Smokeless tobacco: Never Used    Tobacco comment: 25 years ago   Substance Use Topics    Alcohol use: No    Drug use: No     Review of Systems   Constitutional: Negative for fever.   HENT: Negative for sore throat.    Eyes: Negative for visual disturbance.   Respiratory: Positive for cough. Negative for shortness of breath.    Cardiovascular: Negative for chest pain.   Gastrointestinal: Negative for nausea.   Genitourinary: Negative for dysuria.   Musculoskeletal: Negative for back pain.        +Left leg pain   Skin: Positive for wound (left leg). Negative for rash.   Neurological: Negative for weakness.   Hematological: Does not bruise/bleed easily.       Physical Exam     Initial Vitals [19 0510]   BP Pulse Resp Temp SpO2   (!) 142/67 60 18 97.9 °F (36.6 °C) 95 %      MAP       --         Physical Exam    Nursing note and vitals reviewed.  Constitutional: He appears well-developed and well-nourished. He is not diaphoretic. No distress.   HENT:   Head: Normocephalic and atraumatic.   Nose: Nose normal.   Eyes: Conjunctivae are normal. Right eye exhibits no discharge. Left eye exhibits no discharge.   Neck: Normal range of motion. Neck supple.   Cardiovascular: Normal rate, regular rhythm and normal heart sounds. Exam reveals no gallop and no friction rub.    No murmur heard.  Pulmonary/Chest: Breath sounds normal. No  respiratory distress. He has no wheezes. He has no rhonchi. He has no rales.   Musculoskeletal: Normal range of motion. He exhibits edema and tenderness.   Trace edema to bilateral LE   Neurological: He is alert and oriented to person, place, and time. He has normal strength. GCS score is 15. GCS eye subscore is 4. GCS verbal subscore is 5. GCS motor subscore is 6.   Skin: Skin is warm and dry. No rash and no abscess noted.   Developing cellulitis surrounding left lower leg abrasion, no purulent discharge.   Psychiatric: He has a normal mood and affect. His behavior is normal. Judgment and thought content normal.         ED Course   Procedures  Labs Reviewed - No data to display       Imaging Results    None          Medical Decision Making:   ED Management:  78-year-old male presents with developing cellulitis around abrasion to the left lower leg.  No evidence of abscess or lymphangitis.  Patient is afebrile and nontoxic.  Vitals unremarkable. The rest of his physical exam is benign.  No evidence of significant CHF exacerbation.  Will treat as outpatient with doxycycline.  He will call his regular physician in 2 days to arrange close follow-up for wound check.  Patient will return to ED for worsening symptoms, inability to eat/drink, fever greater than 100.4, or any other concerns.  Did bedside teaching with return precautions.  All questions answered.  The patient acknowledges understanding.  Gave verbal discharge instructions.            Scribe Attestation:   Scribe #1: I performed the above scribed service and the documentation accurately describes the services I performed. I attest to the accuracy of the note.               Clinical Impression:   The encounter diagnosis was Left leg cellulitis.      Disposition:   Disposition: Discharged  Condition: Stable                        Christoph Acevedo MD  01/01/19 2167

## 2019-01-02 DIAGNOSIS — E13.9 DIABETES MELLITUS DUE TO ABNORMAL INSULIN: ICD-10-CM

## 2019-01-05 ENCOUNTER — OFFICE VISIT (OUTPATIENT)
Dept: INTERNAL MEDICINE | Facility: CLINIC | Age: 79
End: 2019-01-05
Payer: MEDICARE

## 2019-01-05 VITALS
WEIGHT: 224.19 LBS | DIASTOLIC BLOOD PRESSURE: 67 MMHG | HEIGHT: 70 IN | BODY MASS INDEX: 32.1 KG/M2 | SYSTOLIC BLOOD PRESSURE: 137 MMHG | HEART RATE: 57 BPM | TEMPERATURE: 98 F

## 2019-01-05 DIAGNOSIS — L03.116 CELLULITIS OF LEFT LEG: Primary | ICD-10-CM

## 2019-01-05 PROCEDURE — 3075F SYST BP GE 130 - 139MM HG: CPT | Mod: CPTII,HCNC,S$GLB, | Performed by: INTERNAL MEDICINE

## 2019-01-05 PROCEDURE — 3288F FALL RISK ASSESSMENT DOCD: CPT | Mod: CPTII,HCNC,S$GLB, | Performed by: INTERNAL MEDICINE

## 2019-01-05 PROCEDURE — 1100F PTFALLS ASSESS-DOCD GE2>/YR: CPT | Mod: CPTII,HCNC,S$GLB, | Performed by: INTERNAL MEDICINE

## 2019-01-05 PROCEDURE — 3288F PR FALLS RISK ASSESSMENT DOCUMENTED: ICD-10-PCS | Mod: CPTII,HCNC,S$GLB, | Performed by: INTERNAL MEDICINE

## 2019-01-05 PROCEDURE — 1100F PR PT FALLS ASSESS DOC 2+ FALLS/FALL W/INJURY/YR: ICD-10-PCS | Mod: CPTII,HCNC,S$GLB, | Performed by: INTERNAL MEDICINE

## 2019-01-05 PROCEDURE — 3078F DIAST BP <80 MM HG: CPT | Mod: CPTII,HCNC,S$GLB, | Performed by: INTERNAL MEDICINE

## 2019-01-05 PROCEDURE — 99999 PR PBB SHADOW E&M-EST. PATIENT-LVL III: ICD-10-PCS | Mod: PBBFAC,HCNC,, | Performed by: INTERNAL MEDICINE

## 2019-01-05 PROCEDURE — 99213 OFFICE O/P EST LOW 20 MIN: CPT | Mod: HCNC,S$GLB,, | Performed by: INTERNAL MEDICINE

## 2019-01-05 PROCEDURE — 99999 PR PBB SHADOW E&M-EST. PATIENT-LVL III: CPT | Mod: PBBFAC,HCNC,, | Performed by: INTERNAL MEDICINE

## 2019-01-05 PROCEDURE — 3078F PR MOST RECENT DIASTOLIC BLOOD PRESSURE < 80 MM HG: ICD-10-PCS | Mod: CPTII,HCNC,S$GLB, | Performed by: INTERNAL MEDICINE

## 2019-01-05 PROCEDURE — 3075F PR MOST RECENT SYSTOLIC BLOOD PRESS GE 130-139MM HG: ICD-10-PCS | Mod: CPTII,HCNC,S$GLB, | Performed by: INTERNAL MEDICINE

## 2019-01-05 PROCEDURE — 99213 PR OFFICE/OUTPT VISIT, EST, LEVL III, 20-29 MIN: ICD-10-PCS | Mod: HCNC,S$GLB,, | Performed by: INTERNAL MEDICINE

## 2019-01-05 NOTE — PROGRESS NOTES
Subjective:       Patient ID: Madhav Cortez is a 78 y.o. male.    Chief Complaint: Hospital Follow Up    Here for ED follow up after developing cellulitis in left lower leg which he scraped on bus step about mid December.  Taking Bactrim and using neosporin  Thinks it is getting better but wants check.  Reports that surrounding red area is duller and smaller.  Central abrasion is smaller and drains straw colored fluid at lower edge of abrasion.  No fever or chills.  Admits that he sits in chair all day without elevating legs      Review of Systems   Constitutional: Negative for activity change, appetite change and fever.   HENT: Negative for congestion, postnasal drip and sore throat.    Respiratory: Negative for cough, shortness of breath and wheezing.    Cardiovascular: Negative for chest pain and palpitations.   Gastrointestinal: Negative for abdominal pain, blood in stool, constipation, diarrhea, nausea and vomiting.   Genitourinary: Negative for decreased urine volume, difficulty urinating, flank pain and frequency.   Musculoskeletal: Negative for arthralgias.   Neurological: Negative for dizziness, weakness and headaches.       Objective:      Physical Exam   Musculoskeletal:        Legs:      Assessment:       1. Cellulitis of left leg        Plan:   Madhav was seen today for hospital follow up.    Diagnoses and all orders for this visit:    Cellulitis of left leg

## 2019-01-07 ENCOUNTER — OFFICE VISIT (OUTPATIENT)
Dept: INTERNAL MEDICINE | Facility: CLINIC | Age: 79
End: 2019-01-07
Attending: FAMILY MEDICINE
Payer: MEDICARE

## 2019-01-07 DIAGNOSIS — S80.812D INFECTED ABRASION OF LEFT LOWER EXTREMITY, SUBSEQUENT ENCOUNTER: ICD-10-CM

## 2019-01-07 DIAGNOSIS — L03.116 CELLULITIS OF LEFT LEG: Primary | ICD-10-CM

## 2019-01-07 DIAGNOSIS — L08.9 INFECTED ABRASION OF LEFT LOWER EXTREMITY, SUBSEQUENT ENCOUNTER: ICD-10-CM

## 2019-01-07 DIAGNOSIS — M17.0 PRIMARY OSTEOARTHRITIS OF BOTH KNEES: ICD-10-CM

## 2019-01-07 PROCEDURE — 99213 PR OFFICE/OUTPT VISIT, EST, LEVL III, 20-29 MIN: ICD-10-PCS | Mod: HCNC,S$GLB,, | Performed by: FAMILY MEDICINE

## 2019-01-07 PROCEDURE — 99999 PR PBB SHADOW E&M-EST. PATIENT-LVL II: CPT | Mod: PBBFAC,HCNC,, | Performed by: FAMILY MEDICINE

## 2019-01-07 PROCEDURE — 3074F SYST BP LT 130 MM HG: CPT | Mod: CPTII,HCNC,S$GLB, | Performed by: FAMILY MEDICINE

## 2019-01-07 PROCEDURE — 3078F DIAST BP <80 MM HG: CPT | Mod: CPTII,HCNC,S$GLB, | Performed by: FAMILY MEDICINE

## 2019-01-07 PROCEDURE — 99999 PR PBB SHADOW E&M-EST. PATIENT-LVL II: ICD-10-PCS | Mod: PBBFAC,HCNC,, | Performed by: FAMILY MEDICINE

## 2019-01-07 PROCEDURE — 1101F PT FALLS ASSESS-DOCD LE1/YR: CPT | Mod: CPTII,HCNC,S$GLB, | Performed by: FAMILY MEDICINE

## 2019-01-07 PROCEDURE — 3078F PR MOST RECENT DIASTOLIC BLOOD PRESSURE < 80 MM HG: ICD-10-PCS | Mod: CPTII,HCNC,S$GLB, | Performed by: FAMILY MEDICINE

## 2019-01-07 PROCEDURE — 3074F PR MOST RECENT SYSTOLIC BLOOD PRESSURE < 130 MM HG: ICD-10-PCS | Mod: CPTII,HCNC,S$GLB, | Performed by: FAMILY MEDICINE

## 2019-01-07 PROCEDURE — 1101F PR PT FALLS ASSESS DOC 0-1 FALLS W/OUT INJ PAST YR: ICD-10-PCS | Mod: CPTII,HCNC,S$GLB, | Performed by: FAMILY MEDICINE

## 2019-01-07 PROCEDURE — 99213 OFFICE O/P EST LOW 20 MIN: CPT | Mod: HCNC,S$GLB,, | Performed by: FAMILY MEDICINE

## 2019-01-07 RX ORDER — DICLOXACILLIN SODIUM 500 MG/1
500 CAPSULE ORAL 4 TIMES DAILY
Qty: 20 CAPSULE | Refills: 0 | Status: SHIPPED | OUTPATIENT
Start: 2019-01-07 | End: 2019-01-12

## 2019-01-07 NOTE — PROGRESS NOTES
Subjective:       Patient ID: Madhav Cortez is a 78 y.o. male.    Chief Complaint: No chief complaint on file.    HPI  Review of Systems   Constitutional: Positive for fatigue. Negative for chills, fever and unexpected weight change.   HENT: Negative for congestion and trouble swallowing.    Eyes: Negative for redness and visual disturbance.   Respiratory: Negative for cough, chest tightness and shortness of breath.    Cardiovascular: Negative for chest pain, palpitations and leg swelling.   Gastrointestinal: Negative for abdominal pain and blood in stool.   Genitourinary: Negative for difficulty urinating and hematuria.   Musculoskeletal: Positive for arthralgias and gait problem. Negative for back pain, joint swelling, myalgias and neck pain.   Skin: Positive for color change, rash and wound.   Neurological: Negative for tremors, speech difficulty, weakness, numbness and headaches.   Hematological: Negative for adenopathy. Does not bruise/bleed easily.   Psychiatric/Behavioral: Negative for behavioral problems, confusion and sleep disturbance. The patient is not nervous/anxious.        Objective:      Physical Exam   Constitutional: He is oriented to person, place, and time. He appears well-developed and well-nourished. No distress.   Neck: Neck supple.   Pulmonary/Chest: Effort normal.   Musculoskeletal: He exhibits no edema.        Right lower leg: He exhibits no edema.        Left lower leg: He exhibits no edema.   Neurological: He is alert and oriented to person, place, and time.   Skin: Skin is warm and dry. No rash noted. There is erythema.   Psychiatric: He has a normal mood and affect. His behavior is normal. Judgment and thought content normal.   Nursing note and vitals reviewed.      Assessment:       1. Cellulitis of left leg    2. Primary osteoarthritis of both knees    3. Infected abrasion of left lower extremity, subsequent encounter        Plan:   Diagnoses and all orders for this  visit:    Cellulitis of left leg    Primary osteoarthritis of both knees    Infected abrasion of left lower extremity, subsequent encounter    Other orders  -     sodium hyaluronate (EUFLEXXA) 10 mg/mL(mw 2.4 -3.6 million) Syrg 20 mg  -     dicloxacillin (DYNAPEN) 500 MG capsule; Take 1 capsule (500 mg total) by mouth 4 (four) times daily. for 5 days  -     sodium hyaluronate (EUFLEXXA) 10 mg/mL(mw 2.4 -3.6 million) Syrg 20 mg      See meds, orders, follow up, routing and instructions sections of encounter.  The patient is in for shots in his knees; however, he reports having fallen a   couple of days ago.  He was seen in the Emergency Room on the first, placed on   antibiotic, told to use Neosporin.  He was concerned about infection, saw Dr. Maxwell on the fifth, antibiotic was not changed.  She told him to leave wound   open.    On examination today, he has an 8-10 cm abrasion to the left pretibial area with   what appears to be appropriate wound healing and healing ridge, does not appear   to be acutely infected to me.  There is no calf tenderness or swelling.  He   does have a couple of small abraded areas overlying the left knee with no   effusion.  His right knee is unremarkable at this time.    RECOMMENDATIONS:  Since he is here for an injection, I am going to hold off   until he is off of antibiotics altogether, which would be next week.  He wants   to see his primary care physician.  I did reassure him.  I think he may see her   later in the week.  I am going to switch his antibiotics to dicloxacillin.  He   has no other known allergies and I recommend keeping it open and dry for now.      BIA/SUZIE  dd: 01/07/2019 12:27:17 (CST)  td: 01/08/2019 05:20:38 (CST)  Doc ID   #[id number]  Job ID #[id number]    CC:

## 2019-01-09 ENCOUNTER — OFFICE VISIT (OUTPATIENT)
Dept: INTERNAL MEDICINE | Facility: CLINIC | Age: 79
End: 2019-01-09
Payer: MEDICARE

## 2019-01-09 ENCOUNTER — PES CALL (OUTPATIENT)
Dept: ADMINISTRATIVE | Facility: CLINIC | Age: 79
End: 2019-01-09

## 2019-01-09 VITALS
WEIGHT: 224 LBS | HEIGHT: 69 IN | BODY MASS INDEX: 33.18 KG/M2 | HEART RATE: 60 BPM | DIASTOLIC BLOOD PRESSURE: 56 MMHG | SYSTOLIC BLOOD PRESSURE: 120 MMHG

## 2019-01-09 DIAGNOSIS — I10 ESSENTIAL HYPERTENSION: Primary | ICD-10-CM

## 2019-01-09 DIAGNOSIS — E11.59 TYPE 2 DIABETES MELLITUS WITH OTHER CIRCULATORY COMPLICATION, WITH LONG-TERM CURRENT USE OF INSULIN: ICD-10-CM

## 2019-01-09 DIAGNOSIS — M17.0 PRIMARY OSTEOARTHRITIS OF BOTH KNEES: ICD-10-CM

## 2019-01-09 DIAGNOSIS — S81.802D OPEN WOUND OF LEFT LOWER LEG, SUBSEQUENT ENCOUNTER: ICD-10-CM

## 2019-01-09 DIAGNOSIS — I50.22 CHRONIC SYSTOLIC HEART FAILURE: ICD-10-CM

## 2019-01-09 DIAGNOSIS — Z79.4 TYPE 2 DIABETES MELLITUS WITH OTHER CIRCULATORY COMPLICATION, WITH LONG-TERM CURRENT USE OF INSULIN: ICD-10-CM

## 2019-01-09 PROCEDURE — 1101F PR PT FALLS ASSESS DOC 0-1 FALLS W/OUT INJ PAST YR: ICD-10-PCS | Mod: CPTII,HCNC,S$GLB, | Performed by: INTERNAL MEDICINE

## 2019-01-09 PROCEDURE — 3078F DIAST BP <80 MM HG: CPT | Mod: CPTII,HCNC,S$GLB, | Performed by: INTERNAL MEDICINE

## 2019-01-09 PROCEDURE — 99499 RISK ADDL DX/OHS AUDIT: ICD-10-PCS | Mod: HCNC,S$GLB,, | Performed by: INTERNAL MEDICINE

## 2019-01-09 PROCEDURE — 99214 PR OFFICE/OUTPT VISIT, EST, LEVL IV, 30-39 MIN: ICD-10-PCS | Mod: HCNC,S$GLB,, | Performed by: INTERNAL MEDICINE

## 2019-01-09 PROCEDURE — 3074F PR MOST RECENT SYSTOLIC BLOOD PRESSURE < 130 MM HG: ICD-10-PCS | Mod: CPTII,HCNC,S$GLB, | Performed by: INTERNAL MEDICINE

## 2019-01-09 PROCEDURE — 1101F PT FALLS ASSESS-DOCD LE1/YR: CPT | Mod: CPTII,HCNC,S$GLB, | Performed by: INTERNAL MEDICINE

## 2019-01-09 PROCEDURE — 3074F SYST BP LT 130 MM HG: CPT | Mod: CPTII,HCNC,S$GLB, | Performed by: INTERNAL MEDICINE

## 2019-01-09 PROCEDURE — 99999 PR PBB SHADOW E&M-EST. PATIENT-LVL III: ICD-10-PCS | Mod: PBBFAC,HCNC,, | Performed by: INTERNAL MEDICINE

## 2019-01-09 PROCEDURE — 3078F PR MOST RECENT DIASTOLIC BLOOD PRESSURE < 80 MM HG: ICD-10-PCS | Mod: CPTII,HCNC,S$GLB, | Performed by: INTERNAL MEDICINE

## 2019-01-09 PROCEDURE — 99999 PR PBB SHADOW E&M-EST. PATIENT-LVL III: CPT | Mod: PBBFAC,HCNC,, | Performed by: INTERNAL MEDICINE

## 2019-01-09 PROCEDURE — 99214 OFFICE O/P EST MOD 30 MIN: CPT | Mod: HCNC,S$GLB,, | Performed by: INTERNAL MEDICINE

## 2019-01-09 PROCEDURE — 99499 UNLISTED E&M SERVICE: CPT | Mod: HCNC,S$GLB,, | Performed by: INTERNAL MEDICINE

## 2019-01-11 DIAGNOSIS — Z95.810 ICD (IMPLANTABLE CARDIOVERTER-DEFIBRILLATOR) IN PLACE: Primary | ICD-10-CM

## 2019-01-11 DIAGNOSIS — I25.5 ISCHEMIC CARDIOMYOPATHY: ICD-10-CM

## 2019-01-13 NOTE — PROGRESS NOTES
CHIEF COMPLAINT: Wound left lower leg      HISTORY OF PRESENT ILLNESS: This is a 78-year-old man who presents due to wound on the left lower leg.     He scraped his left anterior tibia mid December trying to get on a bus. He went to the ED on 1/1/19 and was started on doxycycline 100 mg twice daily.  The area was slowly improving and he saw DR Maxwell on 1/5/19.  He then saw Dr Steve on 11/7/19 for possible knee injection. The knee was not injected but antibiotics were changed to dicloxacillin 500 mg four times daily.  Wound is much improved. It is now dry.  NO drainage, fever, chills, nausea, vomiting, constipation, diarrhea.       He is currently taking pradaxa 150 mg twice daily for anticoagulation for a fib/flutter. NO edema. He has mild dyspnea on exertion. No chest pain or palpitations. He continues to take spironolactone 25 mg 1/2 daily, carvediolol 25 mg bid and Entresto 97/103 twice daily and KCL 10 meq 1 tablet daily for his hypertension and CHF. He is also on amiodarone 200 mg twice daily for his atrial flutter. Aspirin 81 mg daily.       His back is doing well. He is doing stretching at home which helps.  HE takes one tizanidine in the evening.       He is no longer taking paxil 10 mg nightly.  His mood is better since he  from his wife. He was born with a bad temper. Temper is a little better. He is sleeping well. HE continues to take trazodone 50 mg 2 tablets at bedtime, temazepam 15 mg at bedtime and melatonin 5 mg at bedtime. No anxiety or depression now.       His blood sugars have been very low so he stopped the Novolog. . He currently takes Lantus 40-45 units at night.  He denies any polydipsia, polyuria, hypoglycemia.  Blood sugars ranging 120-125.       He has hyperlipidemia, currently off Lipitor 20 mg daily      His liver enzymes have been mildly elevated. Dr Palm stopped fenofibrate due to the elevated liver enzymes. He had a liver biopsy and the elevated liver enzymes are NOT due to  "amiodarone. No further work up is needed at this time.       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 Shooting pain has resolved with increasing dose of gabapentin.      No nausea, vomiting, constipation, diarrhea, dysuria, hematuria, sinus congestion, sore throat, headache.       PAST MEDICAL HISTORY:    1. Hypertension, coronary artery disease, status post stenting.    2. Ischemic cardiomyopathy.    3. Atrial flutter.    4. Diabetes mellitus.    5. ICD placed 02/22/2012.    6. Hyperlipidemia.    7. Osteoarthritis of the knees.  8. Atrial fibrillation  9. Transaminitis      PAST SURGICAL HISTORY: Appendectomy in 2003.        SOCIAL HISTORY: He is a former smoker, quit in 1987. Does not drink alcohol.    .       PHYSICAL EXAMINATION:    BP (!) 120/56 (BP Location: Right arm, Patient Position: Sitting, BP Method: Medium (Manual))   Pulse 60   Ht 5' 9" (1.753 m)   Wt 101.6 kg (224 lb)   BMI 33.08 kg/m²      GENERAL: He is alert, oriented, no apparent distress. Affect within normal limits.    HEENT: Conjunctivae anicteric. Pupils are equal, round and reactive to light.    Tympanic membranes are clear. Oropharynx is clear.    NECK: Supple. No cervical lymphadenopathy, no thyroid enlargement.    RESPIRATORY: Effort normal. Lungs are clear to auscultation.    HEART: Regular rate and rhythm without murmurs, gallops or rubs. No lower extremity edema.   Dry wound on the left antierior tibia.  NO erythema or warmth.             ASSESSMENT AND PLAN:.   1. Wound anterior tibia of the left lower leg - continue antibiotics. Wash with soap and water  2.  CHF - stable  4. Coronary artery disease - stable - to follow up with Cardiology.    5. Atrial flutter - s/p cardioversion - stable    6. 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 Watch blood sugars.   7. History of anemia on last blood work, stable    8. Hyperlipidemia. At goal    9Transaminitis " -better, watch close  10. Diabetic neuropathy -stable. gabapentin 300 mg 2 capsules three times daily    11. Anxiety and depression - stable  12. Aortic arch atherosclerosis - modifying risk factors  13. Insomnia - stable  14.  will see him back in 2-3 weeks as scheduled, sooner if problems arise.

## 2019-01-14 ENCOUNTER — PROCEDURE VISIT (OUTPATIENT)
Dept: INTERNAL MEDICINE | Facility: CLINIC | Age: 79
End: 2019-01-14
Attending: FAMILY MEDICINE
Payer: MEDICARE

## 2019-01-14 VITALS
WEIGHT: 226.88 LBS | DIASTOLIC BLOOD PRESSURE: 78 MMHG | SYSTOLIC BLOOD PRESSURE: 120 MMHG | OXYGEN SATURATION: 99 % | HEART RATE: 60 BPM | TEMPERATURE: 98 F | BODY MASS INDEX: 33.6 KG/M2 | HEIGHT: 69 IN

## 2019-01-14 DIAGNOSIS — M17.0 PRIMARY OSTEOARTHRITIS OF BOTH KNEES: Primary | ICD-10-CM

## 2019-01-14 PROCEDURE — 20610 DRAIN/INJ JOINT/BURSA W/O US: CPT | Mod: 50,HCNC,S$GLB, | Performed by: FAMILY MEDICINE

## 2019-01-14 PROCEDURE — 20610 PR DRAIN/INJECT LARGE JOINT/BURSA: ICD-10-PCS | Mod: 50,HCNC,S$GLB, | Performed by: FAMILY MEDICINE

## 2019-01-14 NOTE — PROCEDURES
BRIEF HISTORY:    Patient presents for therapeutic injections in the bilateral Knee(s) for OA. No complaints expressed at this time. The patient was counseled about risks and benefits of knee Visco-supplementation and other injections in general, including but not limited to pain, infection even that requiring surgery to clean out, failure to provide relief, transient flare, tissue damage. Patient expressed understanding, was given the opportunity to ask questions and any questions answered and consented verbally. Time out performed for localization, medication and patient identification using multiple identifiers.    Procedure Note:    Following a standard, sterile 2-antiseptic prep and negative arthrocentesis, Euflexxa was instilled in the bilateral knee(s) with no immediate discomfort. The procedure was tolerated well with no immediate adverse effects.    Follow up for next in series as scheduled.     An 78-year-old established male patient.  He is in for an injection in the   knees.  Last week he showed up for his injections and it was noted that he had   abrasion to the left shin and he had concerns for infection.  There was some   erythema surrounding the abraded area consistent with healing and at that time,   I felt that perhaps we should hold off on injecting his lower extremities.  As   of today all symptoms related to his abrasion are resolved.  He has no   appreciable edema, erythema.  He has an appropriate scabbed area.  This is well   apart from the knee.  His knee shows no redness, warmth, swelling or other signs   of inflammation or infection at this time.  I feel it is acceptable to move   forward with his knee injections at this time and we have explained the risks   and benefits, he and his wife consented verbally.      USHA  dd: 01/14/2019 12:15:31 (CST)  td: 01/15/2019 09:52:24 (CST)  Doc ID   #[id number]  Job ID #[id number]    CC:

## 2019-01-16 ENCOUNTER — OFFICE VISIT (OUTPATIENT)
Dept: ELECTROPHYSIOLOGY | Facility: CLINIC | Age: 79
End: 2019-01-16
Payer: MEDICARE

## 2019-01-16 ENCOUNTER — CLINICAL SUPPORT (OUTPATIENT)
Dept: CARDIOLOGY | Facility: HOSPITAL | Age: 79
End: 2019-01-16
Attending: INTERNAL MEDICINE
Payer: MEDICARE

## 2019-01-16 ENCOUNTER — HOSPITAL ENCOUNTER (OUTPATIENT)
Dept: CARDIOLOGY | Facility: CLINIC | Age: 79
Discharge: HOME | End: 2019-01-16
Attending: INTERNAL MEDICINE
Payer: MEDICARE

## 2019-01-16 VITALS
HEIGHT: 70 IN | DIASTOLIC BLOOD PRESSURE: 72 MMHG | HEART RATE: 60 BPM | BODY MASS INDEX: 32.07 KG/M2 | SYSTOLIC BLOOD PRESSURE: 110 MMHG | WEIGHT: 224 LBS

## 2019-01-16 DIAGNOSIS — Z79.899 LONG TERM CURRENT USE OF AMIODARONE: ICD-10-CM

## 2019-01-16 DIAGNOSIS — I10 ESSENTIAL HYPERTENSION: ICD-10-CM

## 2019-01-16 DIAGNOSIS — Z95.810 ICD (IMPLANTABLE CARDIOVERTER-DEFIBRILLATOR) IN PLACE: ICD-10-CM

## 2019-01-16 DIAGNOSIS — I48.0 PAROXYSMAL ATRIAL FIBRILLATION: ICD-10-CM

## 2019-01-16 DIAGNOSIS — Z95.810 CARDIAC DEFIBRILLATOR IN PLACE: ICD-10-CM

## 2019-01-16 DIAGNOSIS — Z95.810 ICD (IMPLANTABLE CARDIOVERTER-DEFIBRILLATOR), BIVENTRICULAR, IN SITU: ICD-10-CM

## 2019-01-16 DIAGNOSIS — I48.92 ATRIAL FLUTTER, UNSPECIFIED TYPE: ICD-10-CM

## 2019-01-16 DIAGNOSIS — I25.5 CARDIOMYOPATHY, ISCHEMIC: Primary | ICD-10-CM

## 2019-01-16 DIAGNOSIS — Z95.810 CARDIAC DEFIBRILLATOR IN SITU: ICD-10-CM

## 2019-01-16 DIAGNOSIS — I25.5 ISCHEMIC CARDIOMYOPATHY: ICD-10-CM

## 2019-01-16 PROCEDURE — 93005 ELECTROCARDIOGRAM TRACING: CPT | Mod: HCNC,S$GLB,, | Performed by: INTERNAL MEDICINE

## 2019-01-16 PROCEDURE — 99214 OFFICE O/P EST MOD 30 MIN: CPT | Mod: HCNC,S$GLB,, | Performed by: NURSE PRACTITIONER

## 2019-01-16 PROCEDURE — 93284 PRGRMG EVAL IMPLANTABLE DFB: CPT | Mod: HCNC

## 2019-01-16 PROCEDURE — 3078F PR MOST RECENT DIASTOLIC BLOOD PRESSURE < 80 MM HG: ICD-10-PCS | Mod: CPTII,HCNC,S$GLB, | Performed by: NURSE PRACTITIONER

## 2019-01-16 PROCEDURE — 93000 ELECTROCARDIOGRAM COMPLETE: CPT | Mod: HCNC,S$GLB,, | Performed by: INTERNAL MEDICINE

## 2019-01-16 PROCEDURE — 3078F DIAST BP <80 MM HG: CPT | Mod: CPTII,HCNC,S$GLB, | Performed by: NURSE PRACTITIONER

## 2019-01-16 PROCEDURE — 99499 RISK ADDL DX/OHS AUDIT: ICD-10-PCS | Mod: HCNC,S$GLB,, | Performed by: NURSE PRACTITIONER

## 2019-01-16 PROCEDURE — 1101F PT FALLS ASSESS-DOCD LE1/YR: CPT | Mod: CPTII,HCNC,S$GLB, | Performed by: NURSE PRACTITIONER

## 2019-01-16 PROCEDURE — 99499 UNLISTED E&M SERVICE: CPT | Mod: HCNC,S$GLB,, | Performed by: NURSE PRACTITIONER

## 2019-01-16 PROCEDURE — 1101F PR PT FALLS ASSESS DOC 0-1 FALLS W/OUT INJ PAST YR: ICD-10-PCS | Mod: CPTII,HCNC,S$GLB, | Performed by: NURSE PRACTITIONER

## 2019-01-16 PROCEDURE — 99999 PR PBB SHADOW E&M-EST. PATIENT-LVL III: CPT | Mod: PBBFAC,HCNC,, | Performed by: NURSE PRACTITIONER

## 2019-01-16 PROCEDURE — 99214 PR OFFICE/OUTPT VISIT, EST, LEVL IV, 30-39 MIN: ICD-10-PCS | Mod: HCNC,S$GLB,, | Performed by: NURSE PRACTITIONER

## 2019-01-16 PROCEDURE — 3074F SYST BP LT 130 MM HG: CPT | Mod: CPTII,HCNC,S$GLB, | Performed by: NURSE PRACTITIONER

## 2019-01-16 PROCEDURE — 93005 RHYTHM STRIP: ICD-10-PCS | Mod: HCNC,S$GLB,, | Performed by: INTERNAL MEDICINE

## 2019-01-16 PROCEDURE — 93284 CARDIAC DEVICE CHECK - IN CLINIC: ICD-10-PCS | Mod: 26,HCNC,, | Performed by: INTERNAL MEDICINE

## 2019-01-16 PROCEDURE — 93000 RHYTHM STRIP: ICD-10-PCS | Mod: HCNC,S$GLB,, | Performed by: INTERNAL MEDICINE

## 2019-01-16 PROCEDURE — 3074F PR MOST RECENT SYSTOLIC BLOOD PRESSURE < 130 MM HG: ICD-10-PCS | Mod: CPTII,HCNC,S$GLB, | Performed by: NURSE PRACTITIONER

## 2019-01-16 PROCEDURE — 99999 PR PBB SHADOW E&M-EST. PATIENT-LVL III: ICD-10-PCS | Mod: PBBFAC,HCNC,, | Performed by: NURSE PRACTITIONER

## 2019-01-16 PROCEDURE — 93284 PRGRMG EVAL IMPLANTABLE DFB: CPT | Mod: 26,HCNC,, | Performed by: INTERNAL MEDICINE

## 2019-01-16 NOTE — PROGRESS NOTES
Mr. Cortez is a patient of Dr. Koehler and was last seen in clinic 10/18/2017.      Subjective:   Patient ID:  Madhav Cortez is a 78 y.o. male who presents for follow-up of Atrial Fibrillation  .     HPI:    Mr. Cortez is a 78 y.o. male with pAF, AFL, VT, ICM, LBBB, HFrEF (EF 10-15%) s/p BiV ICD, EULOGIO thrombus, CAD, DM, HTN, and HLD here for follow up.     Background:    H/o inappropriate shock for atrial tachycardia/flutter 2/12.   H/o RFA of cavo-tricuspid isthmus 5/11.   Developed recurrent atrial flutter and atrial fibrillation.   EPS/RFA 4/8/13 micro-reentry near prior isthmus RFA, underwent successful RFA. Also had atrial fibrillation during procedure.   Had an episode of syncope, device interrogation revealed VF, with appropriate ICD shock. Was admitted, blood work c/w NSTEMI (troponin up to 7). LHC revealed stable CAD. Coreg increased to 25 mg twice daily.   Developed persistent atrial fibrillation, symptomatic. Placed on Pradaxa.   DIOGO revealed EULOGIO thrombus. Continued for extended period on Pradaxa, underwent DIOGO/DCCV 10/11/13.  Has h/o elevated liver enzymes, for which he underwent liver biopsy. Not thought to be related to amiodarone.  Given worsening CHF, upgraded to CRT-D 11/11/2015.    Update (01/16/2019):    Today he says he feels well. No new cardiac symptoms. No significant MCBRIDE, CP, palps, light-headedness, or syncope.    He is currently taking pradaxa 150mg BID for stroke prophylaxis and denies significant bleeding episodes. He is currently being treated with amiodarone 200mg BID for rhythm control and carvedilol 25mg BID for HR control.  Kidney function is stable, with a creatinine of 0.9 on 9/18/2018. LFTs are OK 9/2018. TSH is elevated 9/2018 (normal free T4). Last PFT on 3/29/2016 showed a DLCO of 64 which was stable. He has had PFTs ordered but not completed since then.    Device Interrogation (1/16/2019) reveals stable lead and device function. [id number], <1% burden, max 42 seconds. No  "ventricular arrhythmia. He paces 99% in the RA and 99% in the BiV. Estimated battery longevity 2-3 years. LV cap confirm added in device clinic.    I have personally reviewed the patient's EKG today, which shows APVP at 60bpm. QRS is 170ms. QT is 496. No significant changes vs prior year.    Recent Cardiac Tests:    2D Echo (11/28/2017):  CONCLUSIONS     1 - Severely depressed left ventricular systolic function (EF 10-15%).     2 - Mild left atrial enlargement.     3 - Eccentric hypertrophy.     4 - No wall motion abnormalities.     5 - Impaired LV relaxation, normal LAP (grade 1 diastolic dysfunction).     6 - The estimated PA systolic pressure is 30 mmHg.     7 - Mild tricuspid regurgitation.     Current Outpatient Medications   Medication Sig    ACCU-CHEK JIE Misc USE AS INSTRUCTED    ACCU-CHEK SMARTVIEW TEST STRIP Strp CHECK BLOOD SUGAR THREE TIMES DAILY    amiodarone (PACERONE) 200 MG Tab TAKE 1 TABLET TWICE DAILY    ascorbic acid (VITAMIN C) 500 MG tablet Take 1,500 mg by mouth 2 (two) times daily.     aspirin 81 MG Chew Take 1 tablet (81 mg total) by mouth once daily.    beta carotene-C-E-lutein-min29 5,000-60-30-2 unit-mg-unit-mg Tab Take 1 capsule by mouth once daily.     carvedilol (COREG) 25 MG tablet Take 1 tablet (25 mg total) by mouth 2 (two) times daily.    clindamycin (CLEOCIN T) 1 % lotion  apply to affected area twice daily as directed ( apply first)    co-enzyme Q-10 30 mg capsule 800 units daily    dabigatran etexilate (PRADAXA) 150 mg Cap Take 1 capsule (150 mg total) by mouth 2 (two) times daily. "Do NOT break, chew, or open capsules."    ENTRESTO  mg per tablet TAKE 1 TABLET TWICE DAILY    fluticasone (FLONASE) 50 mcg/actuation nasal spray 1 spray (50 mcg total) by Each Nare route once daily.    furosemide (LASIX) 40 MG tablet TAKE 1 TABLET ONE TIME DAILY (Patient taking differently: TAKE 1/2 TABLET ONE TIME DAILY)    gabapentin (NEURONTIN) 300 MG capsule TAKE 2 CAPSULES " "BY MOUTH THREE TIMES DAILY    glucosamine-chondroitin 500-400 mg tablet Take 1 tablet by mouth 2 (two) times daily.    ketoconazole (NIZORAL) 2 % cream Apply topically once daily.    LANTUS SOLOSTAR U-100 INSULIN 100 unit/mL (3 mL) InPn pen INJECT 40 UNITS INTO THE SKIN EVERY EVENING.    levothyroxine (SYNTHROID) 50 MCG tablet Take 1 tablet (50 mcg total) by mouth once daily.    magnesium oxide (MAG-OX) 400 mg tablet Take 400 mg by mouth 2 (two) times daily.     melatonin 5 mg Tab Take 1 tablet by mouth every evening.     nitroGLYCERIN (NITROSTAT) 0.4 MG SL tablet Place 1 tablet (0.4 mg total) under the tongue every 5 (five) minutes as needed for Chest pain.    NOVOLOG FLEXPEN 100 unit/mL InPn pen INJECT  16 UNITS SUBCUTANEOUSLY THREE TIMES DAILY WITH MEALS    nystatin (MYCOSTATIN) cream Apply topically 2 (two) times daily.    ranitidine (ZANTAC) 150 MG tablet Take 1 tablet (150 mg total) by mouth 2 (two) times daily.    RIBOSE ORAL Take 1 tablet by mouth once daily.     senna-docusate 8.6-50 mg (PERICOLACE) 8.6-50 mg per tablet Take 1 tablet by mouth 2 (two) times daily.    spironolactone (ALDACTONE) 25 MG tablet TAKE 1 TABLET ONE TIME DAILY    temazepam (RESTORIL) 15 mg Cap TAKE 1 CAPSULE EVERY EVENING    tizanidine (ZANAFLEX) 2 MG tablet TAKE 1 TABLET EVERY 8 HOURS AS NEEDED FOR MUSCLE PAIN    traZODone (DESYREL) 50 MG tablet TAKE 1 TO 2 TABLETS EVERY EVENING    triamcinolone acetonide 0.025% (KENALOG) 0.025 % cream apply to affected area twice daily as directed    UBIDECARENONE (COQ-10 ORAL) Take 800 mg by mouth once daily. Takes 100mg tablet at lunch, and 600mg at dinner    blood-glucose meter kit Accu check monique meter Use as instructed    pen needle, diabetic 31 gauge x 1/4" Ndle Use 4 times daily     Current Facility-Administered Medications   Medication    sodium hyaluronate (EUFLEXXA) 10 mg/mL(mw 2.4 -3.6 million) Syrg 20 mg    sodium hyaluronate (EUFLEXXA) 10 mg/mL(mw 2.4 -3.6 " "million) Syrg 20 mg       Review of Systems   Constitution: Negative for malaise/fatigue.   Cardiovascular: Negative for chest pain, dyspnea on exertion, irregular heartbeat, leg swelling and palpitations.   Respiratory: Negative for shortness of breath.    Hematologic/Lymphatic: Negative for bleeding problem.   Skin: Negative for rash.   Musculoskeletal: Negative for myalgias.   Gastrointestinal: Negative for hematemesis, hematochezia and nausea.   Genitourinary: Negative for hematuria.   Neurological: Negative for light-headedness.   Psychiatric/Behavioral: Negative for altered mental status.   Allergic/Immunologic: Negative for persistent infections.     Objective:          /72   Pulse 60   Ht 5' 9.5" (1.765 m)   Wt 101.6 kg (223 lb 15.8 oz)   BMI 32.60 kg/m²     Physical Exam   Constitutional: He is oriented to person, place, and time. He appears well-developed and well-nourished.   HENT:   Head: Normocephalic.   Nose: Nose normal.   Eyes: Pupils are equal, round, and reactive to light.   Cardiovascular: Normal rate, regular rhythm, S1 normal and S2 normal.   No murmur heard.  Pulses:       Radial pulses are 2+ on the right side, and 2+ on the left side.   Pulmonary/Chest: Breath sounds normal. No respiratory distress.   Device to LUCW.   Abdominal: Normal appearance.   Musculoskeletal: Normal range of motion. He exhibits no edema.   Neurological: He is alert and oriented to person, place, and time.   Skin: Skin is warm and dry. No erythema.   Psychiatric: He has a normal mood and affect. His speech is normal and behavior is normal.   Nursing note and vitals reviewed.    Lab Results   Component Value Date     09/18/2018    K 4.7 09/18/2018    MG 2.6 10/19/2015    BUN 15 09/18/2018    CREATININE 0.9 09/18/2018    ALT 47 (H) 09/18/2018    AST 40 09/18/2018    HGB 12.6 (L) 09/18/2018    HCT 38.4 (L) 09/18/2018    TSH 8.928 (H) 09/18/2018    LDLCALC 86.2 04/12/2018           Assessment:     1. " Cardiomyopathy, ischemic    2. Essential hypertension    3. Atrial flutter, unspecified type    4. Paroxysmal atrial fibrillation    5. ICD (implantable cardioverter-defibrillator), biventricular, in situ    6. Long term current use of amiodarone      Plan:     In summary, Mr. Cortez is a 78 y.o. male with pAF, AFL, VT, ICM, LBBB, HFrEF (EF 10-15%) s/p BiV ICD, EULOGIO thrombus, CAD, DM, HTN, and HLD here for follow up.   Mr. Cortez is doing well from a device perspective with stable lead and device function. 100% biventricular pacing.  Very low AF burden. No ventricular arrhythmia. He is on amiodarone for rhythm control (AF/AFL and VT). LFTs, thyroid ok. Needs updated PFTs. On pradaxa for CVA prophylaxis.    Schedule PFTs.  Continue current medication regimen and device settings.   Follow up in device clinic as scheduled.   Follow up in EP clinic in 6 months with TSH, LFTs, sooner as needed.     *A copy of this note has been sent to Dr. Koehler*    Follow-up in about 6 months (around 7/16/2019).    ------------------------------------------------------------------    ANGELIQUE Mckee, NP-C  [facility]

## 2019-01-21 ENCOUNTER — OFFICE VISIT (OUTPATIENT)
Dept: INTERNAL MEDICINE | Facility: CLINIC | Age: 79
End: 2019-01-21
Attending: FAMILY MEDICINE
Payer: MEDICARE

## 2019-01-21 VITALS
TEMPERATURE: 98 F | OXYGEN SATURATION: 98 % | SYSTOLIC BLOOD PRESSURE: 108 MMHG | WEIGHT: 226.88 LBS | BODY MASS INDEX: 33.6 KG/M2 | DIASTOLIC BLOOD PRESSURE: 54 MMHG | HEIGHT: 69 IN | HEART RATE: 60 BPM

## 2019-01-21 DIAGNOSIS — M17.0 PRIMARY OSTEOARTHRITIS OF BOTH KNEES: Primary | ICD-10-CM

## 2019-01-21 PROCEDURE — 99999 PR PBB SHADOW E&M-EST. PATIENT-LVL III: CPT | Mod: PBBFAC,HCNC,, | Performed by: FAMILY MEDICINE

## 2019-01-21 PROCEDURE — 20610 PR DRAIN/INJECT LARGE JOINT/BURSA: ICD-10-PCS | Mod: 50,HCNC,S$GLB, | Performed by: FAMILY MEDICINE

## 2019-01-21 PROCEDURE — 99999 PR PBB SHADOW E&M-EST. PATIENT-LVL III: ICD-10-PCS | Mod: PBBFAC,HCNC,, | Performed by: FAMILY MEDICINE

## 2019-01-21 PROCEDURE — 99499 NO LOS: ICD-10-PCS | Mod: HCNC,S$GLB,, | Performed by: FAMILY MEDICINE

## 2019-01-21 PROCEDURE — 20610 DRAIN/INJ JOINT/BURSA W/O US: CPT | Mod: 50,HCNC,S$GLB, | Performed by: FAMILY MEDICINE

## 2019-01-21 PROCEDURE — 99499 UNLISTED E&M SERVICE: CPT | Mod: HCNC,S$GLB,, | Performed by: FAMILY MEDICINE

## 2019-01-21 NOTE — PROGRESS NOTES
BRIEF HISTORY:    Patient presents for therapeutic injections in the bilateral Knee(s) for OA. No complaints expressed at this time. The patient was counseled about risks and benefits of knee Visco-supplementation and other injections in general, including but not limited to pain, infection even that requiring surgery to clean out, failure to provide relief, transient flare, tissue damage. Patient expressed understanding, was given the opportunity to ask questions and any questions answered and consented verbally. Time out performed for localization, medication and patient identification using multiple identifiers.    Procedure Note:    Following a standard, sterile 2-antiseptic prep and negative arthrocentesis, Euflexxa was instilled in the bilateral knee(s) with no immediate discomfort. The procedure was tolerated well with no immediate adverse effects.    Follow up for next in series as scheduled.

## 2019-01-22 ENCOUNTER — OFFICE VISIT (OUTPATIENT)
Dept: INTERNAL MEDICINE | Facility: CLINIC | Age: 79
End: 2019-01-22
Payer: MEDICARE

## 2019-01-22 ENCOUNTER — HOSPITAL ENCOUNTER (OUTPATIENT)
Dept: PULMONOLOGY | Facility: CLINIC | Age: 79
Discharge: HOME | End: 2019-01-22
Payer: MEDICARE

## 2019-01-22 VITALS
DIASTOLIC BLOOD PRESSURE: 60 MMHG | SYSTOLIC BLOOD PRESSURE: 120 MMHG | BODY MASS INDEX: 32.35 KG/M2 | OXYGEN SATURATION: 99 % | WEIGHT: 226 LBS | HEART RATE: 61 BPM | HEIGHT: 70 IN

## 2019-01-22 DIAGNOSIS — I50.22 CHRONIC SYSTOLIC HEART FAILURE: ICD-10-CM

## 2019-01-22 DIAGNOSIS — I70.0 ATHEROSCLEROSIS OF AORTIC ARCH: ICD-10-CM

## 2019-01-22 DIAGNOSIS — I10 ESSENTIAL HYPERTENSION: ICD-10-CM

## 2019-01-22 DIAGNOSIS — E78.00 PURE HYPERCHOLESTEROLEMIA: ICD-10-CM

## 2019-01-22 DIAGNOSIS — E55.9 VITAMIN D DEFICIENCY DISEASE: ICD-10-CM

## 2019-01-22 DIAGNOSIS — I48.0 PAROXYSMAL ATRIAL FIBRILLATION: ICD-10-CM

## 2019-01-22 DIAGNOSIS — Z79.4 TYPE 2 DIABETES MELLITUS WITH OTHER CIRCULATORY COMPLICATION, WITH LONG-TERM CURRENT USE OF INSULIN: ICD-10-CM

## 2019-01-22 DIAGNOSIS — Z79.899 LONG TERM CURRENT USE OF AMIODARONE: ICD-10-CM

## 2019-01-22 DIAGNOSIS — E11.59 TYPE 2 DIABETES MELLITUS WITH OTHER CIRCULATORY COMPLICATION, WITH LONG-TERM CURRENT USE OF INSULIN: ICD-10-CM

## 2019-01-22 DIAGNOSIS — E13.9 DIABETES MELLITUS DUE TO ABNORMAL INSULIN: Primary | ICD-10-CM

## 2019-01-22 LAB
PRE FEV1 FVC: 67
PRE FEV1: 1.82
PRE FVC: 2.72
PREDICTED FEV1 FVC: 77
PREDICTED FEV1: 2.99
PREDICTED FVC: 3.82

## 2019-01-22 PROCEDURE — 94729 DIFFUSING CAPACITY: CPT | Mod: HCNC,S$GLB,, | Performed by: INTERNAL MEDICINE

## 2019-01-22 PROCEDURE — 99214 OFFICE O/P EST MOD 30 MIN: CPT | Mod: HCNC,S$GLB,, | Performed by: INTERNAL MEDICINE

## 2019-01-22 PROCEDURE — 1101F PT FALLS ASSESS-DOCD LE1/YR: CPT | Mod: CPTII,HCNC,S$GLB, | Performed by: INTERNAL MEDICINE

## 2019-01-22 PROCEDURE — 99999 PR PBB SHADOW E&M-EST. PATIENT-LVL II: ICD-10-PCS | Mod: PBBFAC,HCNC,, | Performed by: INTERNAL MEDICINE

## 2019-01-22 PROCEDURE — 99999 PR PBB SHADOW E&M-EST. PATIENT-LVL II: CPT | Mod: PBBFAC,HCNC,, | Performed by: INTERNAL MEDICINE

## 2019-01-22 PROCEDURE — 3078F DIAST BP <80 MM HG: CPT | Mod: CPTII,HCNC,S$GLB, | Performed by: INTERNAL MEDICINE

## 2019-01-22 PROCEDURE — 1101F PR PT FALLS ASSESS DOC 0-1 FALLS W/OUT INJ PAST YR: ICD-10-PCS | Mod: CPTII,HCNC,S$GLB, | Performed by: INTERNAL MEDICINE

## 2019-01-22 PROCEDURE — 3078F PR MOST RECENT DIASTOLIC BLOOD PRESSURE < 80 MM HG: ICD-10-PCS | Mod: CPTII,HCNC,S$GLB, | Performed by: INTERNAL MEDICINE

## 2019-01-22 PROCEDURE — 94729 PR C02/MEMBANE DIFFUSE CAPACITY: ICD-10-PCS | Mod: HCNC,S$GLB,, | Performed by: INTERNAL MEDICINE

## 2019-01-22 PROCEDURE — 3074F SYST BP LT 130 MM HG: CPT | Mod: CPTII,HCNC,S$GLB, | Performed by: INTERNAL MEDICINE

## 2019-01-22 PROCEDURE — 94010 BREATHING CAPACITY TEST: CPT | Mod: HCNC,S$GLB,, | Performed by: INTERNAL MEDICINE

## 2019-01-22 PROCEDURE — 99214 PR OFFICE/OUTPT VISIT, EST, LEVL IV, 30-39 MIN: ICD-10-PCS | Mod: HCNC,S$GLB,, | Performed by: INTERNAL MEDICINE

## 2019-01-22 PROCEDURE — 94010 BREATHING CAPACITY TEST: ICD-10-PCS | Mod: HCNC,S$GLB,, | Performed by: INTERNAL MEDICINE

## 2019-01-22 PROCEDURE — 3074F PR MOST RECENT SYSTOLIC BLOOD PRESSURE < 130 MM HG: ICD-10-PCS | Mod: CPTII,HCNC,S$GLB, | Performed by: INTERNAL MEDICINE

## 2019-01-22 NOTE — PROGRESS NOTES
CHIEF COMPLAINT: Follow up of Wound left lower leg      HISTORY OF PRESENT ILLNESS: This is a 78-year-old man who presents for follow up of above.  Wound on the left lower leg is healed. HE has a scab on the area. No drainage. HE has finished antibiotics.  No fever, chills, nausea, vomiting, constipation, diarrhea.       He is currently taking pradaxa 150 mg twice daily for anticoagulation for a fib/flutter. NO edema. He has mild dyspnea on exertion. No chest pain or palpitations. He continues to take spironolactone 25 mg 1/2 daily, carvediolol 25 mg bid and Entresto 97/103 twice daily and KCL 10 meq 1 tablet daily for his hypertension and CHF. He is also on amiodarone 200 mg twice daily for his atrial flutter. Aspirin 81 mg daily.       His back is doing well. He is doing stretching at home which helps.  HE takes one tizanidine in the evening.       He is no longer taking paxil 10 mg nightly. Mood has been good. Temper has not been a problem.  He is sleeping well. HE continues to take trazodone 50 mg 2 tablets at bedtime, temazepam 15 mg at bedtime and melatonin 5 mg at bedtime. No anxiety or depression now.       His blood sugars have been very low so he stopped the Novolog. . He currently takes Lantus 40-45 units at night.  He denies any polydipsia, polyuria, hypoglycemia.  Blood sugars ranging 120-125.       He has hyperlipidemia, currently off Lipitor 20 mg daily      His liver enzymes have been mildly elevated. Dr Palm stopped fenofibrate due to the elevated liver enzymes. He had a liver biopsy and the elevated liver enzymes are NOT due to amiodarone. No further work up is needed at this time.       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 Shooting pain has resolved with increasing dose of gabapentin.          PAST MEDICAL HISTORY:    1. Hypertension, coronary artery disease, status post stenting.    2. Ischemic cardiomyopathy.    3. Atrial flutter.    4. Diabetes  "mellitus.    5. ICD placed 02/22/2012.    6. Hyperlipidemia.    7. Osteoarthritis of the knees.  8. Atrial fibrillation  9. Transaminitis      PAST SURGICAL HISTORY: Appendectomy in 2003.        SOCIAL HISTORY: He is a former smoker, quit in 1987. Does not drink alcohol.    .       PHYSICAL EXAMINATION:    /60   Pulse 61   Ht 5' 10" (1.778 m)   Wt 102.5 kg (226 lb)   SpO2 99%   BMI 32.43 kg/m²      GENERAL: He is alert, oriented, no apparent distress. Affect within normal limits.    HEENT: Conjunctivae anicteric. Pupils are equal, round and reactive to light.    Tympanic membranes are clear. Oropharynx is clear.    NECK: Supple. No cervical lymphadenopathy, no thyroid enlargement.    RESPIRATORY: Effort normal. Lungs are clear to auscultation.    HEART: Regular rate and rhythm without murmurs, gallops or rubs. No lower extremity edema.   Scab on the left antierior tibia.  NO erythema or warmth.             ASSESSMENT AND PLAN:.   1. Wound anterior tibia of the left lower leg - healing  2.  CHF - stable  4. Coronary artery disease - stable - to follow up with Cardiology.    5. Atrial flutter - s/p cardioversion - stable    6. 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 Watch blood sugars.   7. History of anemia on last blood work, stable    8. Hyperlipidemia. At goal    9Transaminitis -better, watch close  10. Diabetic neuropathy -stable. gabapentin 300 mg 2 capsules three times daily    11. Anxiety and depression - stable  12. Aortic arch atherosclerosis - modifying risk factors  13. Insomnia - stable  14. Hypothyroidism - on supplement  15. Renal insufficiency - check labs    will see him back in 3 months, sooner if problems arise  "

## 2019-01-28 ENCOUNTER — OFFICE VISIT (OUTPATIENT)
Dept: INTERNAL MEDICINE | Facility: CLINIC | Age: 79
End: 2019-01-28
Attending: FAMILY MEDICINE
Payer: MEDICARE

## 2019-01-28 ENCOUNTER — LAB VISIT (OUTPATIENT)
Dept: LAB | Facility: HOSPITAL | Age: 79
End: 2019-01-28
Attending: INTERNAL MEDICINE
Payer: MEDICARE

## 2019-01-28 VITALS
HEIGHT: 70 IN | HEART RATE: 60 BPM | TEMPERATURE: 98 F | BODY MASS INDEX: 32.35 KG/M2 | WEIGHT: 226 LBS | DIASTOLIC BLOOD PRESSURE: 60 MMHG | OXYGEN SATURATION: 96 % | SYSTOLIC BLOOD PRESSURE: 117 MMHG

## 2019-01-28 DIAGNOSIS — M17.0 PRIMARY OSTEOARTHRITIS OF BOTH KNEES: Primary | ICD-10-CM

## 2019-01-28 DIAGNOSIS — E55.9 VITAMIN D DEFICIENCY DISEASE: ICD-10-CM

## 2019-01-28 DIAGNOSIS — E13.9 DIABETES MELLITUS DUE TO ABNORMAL INSULIN: ICD-10-CM

## 2019-01-28 LAB
25(OH)D3+25(OH)D2 SERPL-MCNC: 35 NG/ML
ALBUMIN SERPL BCP-MCNC: 3.8 G/DL
ALP SERPL-CCNC: 85 U/L
ALT SERPL W/O P-5'-P-CCNC: 72 U/L
ANION GAP SERPL CALC-SCNC: 7 MMOL/L
AST SERPL-CCNC: 46 U/L
BASOPHILS # BLD AUTO: 0.01 K/UL
BASOPHILS NFR BLD: 0.2 %
BILIRUB SERPL-MCNC: 0.4 MG/DL
BUN SERPL-MCNC: 16 MG/DL
CALCIUM SERPL-MCNC: 9.5 MG/DL
CHLORIDE SERPL-SCNC: 102 MMOL/L
CO2 SERPL-SCNC: 29 MMOL/L
CREAT SERPL-MCNC: 1 MG/DL
DIFFERENTIAL METHOD: ABNORMAL
EOSINOPHIL # BLD AUTO: 0.1 K/UL
EOSINOPHIL NFR BLD: 1.9 %
ERYTHROCYTE [DISTWIDTH] IN BLOOD BY AUTOMATED COUNT: 14.1 %
EST. GFR  (AFRICAN AMERICAN): >60 ML/MIN/1.73 M^2
EST. GFR  (NON AFRICAN AMERICAN): >60 ML/MIN/1.73 M^2
ESTIMATED AVG GLUCOSE: 192 MG/DL
GLUCOSE SERPL-MCNC: 247 MG/DL
HBA1C MFR BLD HPLC: 8.3 %
HCT VFR BLD AUTO: 39 %
HGB BLD-MCNC: 12.5 G/DL
LYMPHOCYTES # BLD AUTO: 1.3 K/UL
LYMPHOCYTES NFR BLD: 22.4 %
MCH RBC QN AUTO: 31.1 PG
MCHC RBC AUTO-ENTMCNC: 32.1 G/DL
MCV RBC AUTO: 97 FL
MONOCYTES # BLD AUTO: 0.7 K/UL
MONOCYTES NFR BLD: 10.9 %
NEUTROPHILS # BLD AUTO: 3.8 K/UL
NEUTROPHILS NFR BLD: 64.4 %
PLATELET # BLD AUTO: 158 K/UL
PMV BLD AUTO: 11.5 FL
POTASSIUM SERPL-SCNC: 4.4 MMOL/L
PROT SERPL-MCNC: 6.6 G/DL
RBC # BLD AUTO: 4.02 M/UL
SODIUM SERPL-SCNC: 138 MMOL/L
T4 FREE SERPL-MCNC: 0.83 NG/DL
TSH SERPL DL<=0.005 MIU/L-ACNC: 10.91 UIU/ML
WBC # BLD AUTO: 5.94 K/UL

## 2019-01-28 PROCEDURE — 84443 ASSAY THYROID STIM HORMONE: CPT | Mod: HCNC

## 2019-01-28 PROCEDURE — 83036 HEMOGLOBIN GLYCOSYLATED A1C: CPT | Mod: HCNC

## 2019-01-28 PROCEDURE — 36415 COLL VENOUS BLD VENIPUNCTURE: CPT | Mod: HCNC

## 2019-01-28 PROCEDURE — 85025 COMPLETE CBC W/AUTO DIFF WBC: CPT | Mod: HCNC

## 2019-01-28 PROCEDURE — 20610 DRAIN/INJ JOINT/BURSA W/O US: CPT | Mod: 50,HCNC,S$GLB, | Performed by: FAMILY MEDICINE

## 2019-01-28 PROCEDURE — 99999 PR PBB SHADOW E&M-EST. PATIENT-LVL III: CPT | Mod: PBBFAC,HCNC,, | Performed by: FAMILY MEDICINE

## 2019-01-28 PROCEDURE — 80053 COMPREHEN METABOLIC PANEL: CPT | Mod: HCNC

## 2019-01-28 PROCEDURE — 99999 PR PBB SHADOW E&M-EST. PATIENT-LVL III: ICD-10-PCS | Mod: PBBFAC,HCNC,, | Performed by: FAMILY MEDICINE

## 2019-01-28 PROCEDURE — 84439 ASSAY OF FREE THYROXINE: CPT | Mod: HCNC

## 2019-01-28 PROCEDURE — 99499 NO LOS: ICD-10-PCS | Mod: HCNC,S$GLB,, | Performed by: FAMILY MEDICINE

## 2019-01-28 PROCEDURE — 99499 UNLISTED E&M SERVICE: CPT | Mod: HCNC,S$GLB,, | Performed by: FAMILY MEDICINE

## 2019-01-28 PROCEDURE — 20610 PR DRAIN/INJECT LARGE JOINT/BURSA: ICD-10-PCS | Mod: 50,HCNC,S$GLB, | Performed by: FAMILY MEDICINE

## 2019-01-28 PROCEDURE — 82306 VITAMIN D 25 HYDROXY: CPT | Mod: HCNC

## 2019-01-28 NOTE — PROGRESS NOTES
BRIEF HISTORY:    Patient presents for therapeutic injections in the bilateral Knee(s) for OA. No complaints expressed at this time. The patient was counseled about risks and benefits of knee Visco-supplementation and other injections in general, including but not limited to pain, infection even that requiring surgery to clean out, failure to provide relief, transient flare, tissue damage. Patient expressed understanding, was given the opportunity to ask questions and any questions answered and consented verbally. Time out performed for localization, medication and patient identification using multiple identifiers.    Procedure Note:    Following a standard, sterile 2-antiseptic prep and negative arthrocentesis, Euflexxa was instilled in the bilateral knee(s) with no immediate discomfort. The procedure was tolerated well with no immediate adverse effects.    Follow up in about 6 months.

## 2019-02-11 ENCOUNTER — TELEPHONE (OUTPATIENT)
Dept: INTERNAL MEDICINE | Facility: CLINIC | Age: 79
End: 2019-02-11

## 2019-02-11 ENCOUNTER — TELEPHONE (OUTPATIENT)
Dept: DERMATOLOGY | Facility: CLINIC | Age: 79
End: 2019-02-11

## 2019-02-11 RX ORDER — LEVOTHYROXINE SODIUM 75 UG/1
75 TABLET ORAL DAILY
Qty: 90 TABLET | Refills: 4 | Status: SHIPPED | OUTPATIENT
Start: 2019-02-11 | End: 2020-04-03

## 2019-02-11 NOTE — TELEPHONE ENCOUNTER
----- Message from Ariana Liz sent at 2/11/2019  3:08 PM CST -----  Contact: pt ast [phone]  Needs Advice    Reason for call:Pt states that he needs an emergency appt for tomorrow afternoon because he has a rash under his arm       Communication Preference:call today. l Pt disconnected himself!    Additional Information:

## 2019-02-11 NOTE — TELEPHONE ENCOUNTER
Appointment scheduled. Pt would like to know about his thyroid medication. He is taking current dosage regularly. Wants to know if he needs to double the dosage or if new rx will be called in.

## 2019-02-11 NOTE — TELEPHONE ENCOUNTER
----- Message from Mirna Reyes MD sent at 2/5/2019  5:08 PM CST -----  Please notify pt  YOur blood work on 1/28/19 revealed  Your TSH is elevated, meaning you  Need more thyroid medicaiton. Have you been taking the levothryoxine 50 mcg regularly. If you have not been taking regularly, then you need to take it regularly. If you have been taking regularly, then Dr ESTES needs to increase the medciation(let me know)  Your blood sugars are averaging 192 (hemoglobin A1C is 8.3) work on diet.  Your blood count, kidney function, liver function, are stable  SF

## 2019-02-11 NOTE — TELEPHONE ENCOUNTER
He is to increase his thyroid medicaiton to 75 mcg daily (which would be 1.5 tablets of his 50 mcg dose.   I have sent a new rx for the levothryoxine 75 mcg daily to [facility].

## 2019-02-11 NOTE — TELEPHONE ENCOUNTER
Spoke to pt.pt insisted to be seen next day appt.Yohana appt and confirmed date and time over the phone w/pt.

## 2019-02-11 NOTE — TELEPHONE ENCOUNTER
Spoke with pt, advised of results pt has been taking his rx levothyroxine (SYNTHROID) 50 MCG tablet as prescribed. Pt complaining of 1/2 dollar size fluid filled blister on the top of his right foot next to his big toe. Denies pain. The blister showed up 2 days ago. Pt would like recommendations on if he needs to pop it or how to proceeded since he is a diabetic.       Please advise

## 2019-02-12 ENCOUNTER — OFFICE VISIT (OUTPATIENT)
Dept: INTERNAL MEDICINE | Facility: CLINIC | Age: 79
End: 2019-02-12
Payer: MEDICARE

## 2019-02-12 ENCOUNTER — OFFICE VISIT (OUTPATIENT)
Dept: DERMATOLOGY | Facility: CLINIC | Age: 79
End: 2019-02-12
Payer: MEDICARE

## 2019-02-12 VITALS
SYSTOLIC BLOOD PRESSURE: 122 MMHG | DIASTOLIC BLOOD PRESSURE: 66 MMHG | HEIGHT: 70 IN | BODY MASS INDEX: 32.5 KG/M2 | OXYGEN SATURATION: 95 % | HEART RATE: 60 BPM | TEMPERATURE: 98 F | WEIGHT: 227 LBS

## 2019-02-12 DIAGNOSIS — Z13.83 SCREENING FOR RESPIRATORY CONDITION: Primary | ICD-10-CM

## 2019-02-12 DIAGNOSIS — E11.42 DM TYPE 2 WITH DIABETIC PERIPHERAL NEUROPATHY: ICD-10-CM

## 2019-02-12 DIAGNOSIS — L98.9 DISEASE OF SKIN AND SUBCUTANEOUS TISSUE: Primary | ICD-10-CM

## 2019-02-12 DIAGNOSIS — S90.821A BLISTER OF RIGHT FOOT, INITIAL ENCOUNTER: ICD-10-CM

## 2019-02-12 LAB
POST FEV1 FVC: 70.27
POST FEV1: 1.84
POST FVC: 2.62
PRE FEV1 FVC: 69.23
PRE FEV1: 1.7
PRE FVC: 2.45
PREDICTED FEV1: 59
PREDICTED FVC: 61

## 2019-02-12 PROCEDURE — 3078F PR MOST RECENT DIASTOLIC BLOOD PRESSURE < 80 MM HG: ICD-10-PCS | Mod: HCNC,CPTII,S$GLB, | Performed by: INTERNAL MEDICINE

## 2019-02-12 PROCEDURE — 3078F DIAST BP <80 MM HG: CPT | Mod: HCNC,CPTII,S$GLB, | Performed by: DERMATOLOGY

## 2019-02-12 PROCEDURE — 87106 FUNGI IDENTIFICATION YEAST: CPT | Mod: HCNC

## 2019-02-12 PROCEDURE — 3288F PR FALLS RISK ASSESSMENT DOCUMENTED: ICD-10-PCS | Mod: HCNC,CPTII,S$GLB, | Performed by: INTERNAL MEDICINE

## 2019-02-12 PROCEDURE — 99213 PR OFFICE/OUTPT VISIT, EST, LEVL III, 20-29 MIN: ICD-10-PCS | Mod: 25,HCNC,S$GLB, | Performed by: INTERNAL MEDICINE

## 2019-02-12 PROCEDURE — 87101 SKIN FUNGI CULTURE: CPT | Mod: HCNC

## 2019-02-12 PROCEDURE — 1100F PTFALLS ASSESS-DOCD GE2>/YR: CPT | Mod: HCNC,CPTII,S$GLB, | Performed by: INTERNAL MEDICINE

## 2019-02-12 PROCEDURE — 99999 PR PBB SHADOW E&M-EST. PATIENT-LVL III: ICD-10-PCS | Mod: PBBFAC,HCNC,, | Performed by: DERMATOLOGY

## 2019-02-12 PROCEDURE — 99999 PR PBB SHADOW E&M-EST. PATIENT-LVL III: CPT | Mod: PBBFAC,HCNC,, | Performed by: INTERNAL MEDICINE

## 2019-02-12 PROCEDURE — 99213 OFFICE O/P EST LOW 20 MIN: CPT | Mod: 25,HCNC,S$GLB, | Performed by: INTERNAL MEDICINE

## 2019-02-12 PROCEDURE — 3074F PR MOST RECENT SYSTOLIC BLOOD PRESSURE < 130 MM HG: ICD-10-PCS | Mod: HCNC,CPTII,S$GLB, | Performed by: INTERNAL MEDICINE

## 2019-02-12 PROCEDURE — 3074F PR MOST RECENT SYSTOLIC BLOOD PRESSURE < 130 MM HG: ICD-10-PCS | Mod: HCNC,CPTII,S$GLB, | Performed by: DERMATOLOGY

## 2019-02-12 PROCEDURE — 3074F SYST BP LT 130 MM HG: CPT | Mod: HCNC,CPTII,S$GLB, | Performed by: INTERNAL MEDICINE

## 2019-02-12 PROCEDURE — 99999 PR PBB SHADOW E&M-EST. PATIENT-LVL III: CPT | Mod: PBBFAC,HCNC,, | Performed by: DERMATOLOGY

## 2019-02-12 PROCEDURE — 1101F PT FALLS ASSESS-DOCD LE1/YR: CPT | Mod: HCNC,CPTII,S$GLB, | Performed by: DERMATOLOGY

## 2019-02-12 PROCEDURE — 99499 RISK ADDL DX/OHS AUDIT: ICD-10-PCS | Mod: HCNC,S$GLB,, | Performed by: DERMATOLOGY

## 2019-02-12 PROCEDURE — 94640 PR INHAL RX, AIRWAY OBST/DX SPUTUM INDUCT: ICD-10-PCS | Mod: HCNC,S$GLB,, | Performed by: INTERNAL MEDICINE

## 2019-02-12 PROCEDURE — 3078F PR MOST RECENT DIASTOLIC BLOOD PRESSURE < 80 MM HG: ICD-10-PCS | Mod: HCNC,CPTII,S$GLB, | Performed by: DERMATOLOGY

## 2019-02-12 PROCEDURE — 99499 UNLISTED E&M SERVICE: CPT | Mod: HCNC,S$GLB,, | Performed by: DERMATOLOGY

## 2019-02-12 PROCEDURE — 1101F PR PT FALLS ASSESS DOC 0-1 FALLS W/OUT INJ PAST YR: ICD-10-PCS | Mod: HCNC,CPTII,S$GLB, | Performed by: DERMATOLOGY

## 2019-02-12 PROCEDURE — 94640 AIRWAY INHALATION TREATMENT: CPT | Mod: HCNC,S$GLB,, | Performed by: INTERNAL MEDICINE

## 2019-02-12 PROCEDURE — 99999 PR PBB SHADOW E&M-EST. PATIENT-LVL III: ICD-10-PCS | Mod: PBBFAC,HCNC,, | Performed by: INTERNAL MEDICINE

## 2019-02-12 PROCEDURE — 3288F FALL RISK ASSESSMENT DOCD: CPT | Mod: HCNC,CPTII,S$GLB, | Performed by: INTERNAL MEDICINE

## 2019-02-12 PROCEDURE — 3074F SYST BP LT 130 MM HG: CPT | Mod: HCNC,CPTII,S$GLB, | Performed by: DERMATOLOGY

## 2019-02-12 PROCEDURE — 3078F DIAST BP <80 MM HG: CPT | Mod: HCNC,CPTII,S$GLB, | Performed by: INTERNAL MEDICINE

## 2019-02-12 PROCEDURE — 99213 OFFICE O/P EST LOW 20 MIN: CPT | Mod: HCNC,S$GLB,, | Performed by: DERMATOLOGY

## 2019-02-12 PROCEDURE — 99213 PR OFFICE/OUTPT VISIT, EST, LEVL III, 20-29 MIN: ICD-10-PCS | Mod: HCNC,S$GLB,, | Performed by: DERMATOLOGY

## 2019-02-12 PROCEDURE — 1100F PR PT FALLS ASSESS DOC 2+ FALLS/FALL W/INJURY/YR: ICD-10-PCS | Mod: HCNC,CPTII,S$GLB, | Performed by: INTERNAL MEDICINE

## 2019-02-12 RX ORDER — ALBUTEROL SULFATE 2.5 MG/.5ML
2.5 SOLUTION RESPIRATORY (INHALATION)
Status: COMPLETED | OUTPATIENT
Start: 2019-02-12 | End: 2019-02-12

## 2019-02-12 RX ADMIN — ALBUTEROL SULFATE 2.5 MG: 2.5 SOLUTION RESPIRATORY (INHALATION) at 02:02

## 2019-02-12 NOTE — PROGRESS NOTES
Subjective:       Patient ID: Madhav Cortez is a 78 y.o. male.    Chief Complaint: Blister    Diabetic man has developed large blister on right foot.  No known injury but does have peripheral neuropathy      Review of Systems   Constitutional: Negative for activity change, appetite change and fever.   HENT: Negative for congestion, postnasal drip and sore throat.    Respiratory: Negative for cough, shortness of breath and wheezing.    Cardiovascular: Negative for chest pain and palpitations.   Gastrointestinal: Negative for abdominal pain, blood in stool, constipation, diarrhea, nausea and vomiting.   Genitourinary: Negative for decreased urine volume, difficulty urinating, flank pain and frequency.   Musculoskeletal: Negative for arthralgias.   Neurological: Negative for dizziness, weakness and headaches.       Objective:      Physical Exam   Feet:   Left Foot:   Skin Integrity: Positive for blister.   Skin:            Assessment:       1. Screening for respiratory condition    2. Blister of right foot, initial encounter    3. DM type 2 with diabetic peripheral neuropathy        Plan:   Madhav was seen today for blister.    Diagnoses and all orders for this visit:    Screening for respiratory condition  -     Mobile Spirometry With/Without Bronchodilator - Justus Cardoso    Blister of right foot, initial encounter  -     Ambulatory consult to Podiatry    DM type 2 with diabetic peripheral neuropathy    Other orders  -     albuterol sulfate nebulizer solution 2.5 mg

## 2019-02-12 NOTE — PROGRESS NOTES
Subjective:       Patient ID:  Madhav Cortez is a 78 y.o. male who presents for   Chief Complaint   Patient presents with    Rash     f/u L axilla still itchy needs more refills      Pt with diabetes    Pt with asymptomatic axillary rash since 9/18      Rash  - Follow-up  Symptom course: unchanged  Currently using: last seen 10/25/18 - cleocin lotion bid and tac 0.025% cream bid - no help. failed keto cream bid x 10 days (by PCP) prior   Affected locations: right axilla and left axilla  Signs / symptoms: asymptomatic (not itchy)        Review of Systems   Genitourinary:        Has diabetes  Lab Results       Component                Value               Date                       HGBA1C                   8.2 (H)             09/18/2018               Skin: Positive for rash. Negative for itching.        Objective:    Physical Exam   Constitutional: He appears well-developed and well-nourished. No distress.   Neurological: He is alert and oriented to person, place, and time. He is not disoriented.   Psychiatric: He has a normal mood and affect.   Skin:   Areas Examined (abnormalities noted in diagram):   Chest / Axilla Inspection Performed             Diagram Legend     Erythematous scaling macule/papule c/w actinic keratosis       Vascular papule c/w angioma      Pigmented verrucoid papule/plaque c/w seborrheic keratosis      Yellow umbilicated papule c/w sebaceous hyperplasia      Irregularly shaped tan macule c/w lentigo     1-2 mm smooth white papules consistent with Milia      Movable subcutaneous cyst with punctum c/w epidermal inclusion cyst      Subcutaneous movable cyst c/w pilar cyst      Firm pink to brown papule c/w dermatofibroma      Pedunculated fleshy papule(s) c/w skin tag(s)      Evenly pigmented macule c/w junctional nevus     Mildly variegated pigmented, slightly irregular-bordered macule c/w mildly atypical nevus      Flesh colored to evenly pigmented papule c/w intradermal nevus       Pink  pearly papule/plaque c/w basal cell carcinoma      Erythematous hyperkeratotic cursted plaque c/w SCC      Surgical scar with no sign of skin cancer recurrence      Open and closed comedones      Inflammatory papules and pustules      Verrucoid papule consistent consistent with wart     Erythematous eczematous patches and plaques     Dystrophic onycholytic nail with subungual debris c/w onychomycosis     Umbilicated papule    Erythematous-base heme-crusted tan verrucoid plaque consistent with inflamed seborrheic keratosis     Erythematous Silvery Scaling Plaque c/w Psoriasis     See annotation      Assessment / Plan:        Disease of skin and subcutaneous tissue - r/o candida  D/c tac 0.025% cream and d/c cleocin lotion  -     Fungal culture , skin, hair, or nails -- will call pt with results    Cool blow dry after showering. Once clear, use Zeasorb AF powder for maintenance.               Follow-up if symptoms worsen or fail to improve.

## 2019-02-13 ENCOUNTER — TELEPHONE (OUTPATIENT)
Dept: INTERNAL MEDICINE | Facility: CLINIC | Age: 79
End: 2019-02-13

## 2019-02-13 NOTE — TELEPHONE ENCOUNTER
Pt stated that his wound has now flattened and that it is not bothering him and he would like to know what to do next.   She he keep putting the prescribed cream on it or stop.

## 2019-02-13 NOTE — TELEPHONE ENCOUNTER
----- Message from Brenda Taylor sent at 2/13/2019  1:25 PM CST -----  Contact: Madhav Cortez [phone]  The patient was seen yesterday and he states the blister is bigger and it has gone flat he's very concerned and is requesting a call back immediately. This is concerning to him because he is diabetic. Please call to advise.

## 2019-02-15 ENCOUNTER — OFFICE VISIT (OUTPATIENT)
Dept: PODIATRY | Facility: CLINIC | Age: 79
End: 2019-02-15
Payer: MEDICARE

## 2019-02-15 VITALS
WEIGHT: 227 LBS | HEART RATE: 59 BPM | DIASTOLIC BLOOD PRESSURE: 64 MMHG | SYSTOLIC BLOOD PRESSURE: 101 MMHG | HEIGHT: 70 IN | BODY MASS INDEX: 32.5 KG/M2

## 2019-02-15 DIAGNOSIS — I87.2 STASIS DERMATITIS OF BOTH LEGS: ICD-10-CM

## 2019-02-15 DIAGNOSIS — L60.9 DISEASE OF NAIL: ICD-10-CM

## 2019-02-15 DIAGNOSIS — I73.9 PERIPHERAL VASCULAR DISEASE: Primary | ICD-10-CM

## 2019-02-15 PROCEDURE — 3074F PR MOST RECENT SYSTOLIC BLOOD PRESSURE < 130 MM HG: ICD-10-PCS | Mod: HCNC,CPTII,S$GLB, | Performed by: PODIATRIST

## 2019-02-15 PROCEDURE — 3078F DIAST BP <80 MM HG: CPT | Mod: HCNC,CPTII,S$GLB, | Performed by: PODIATRIST

## 2019-02-15 PROCEDURE — 1101F PR PT FALLS ASSESS DOC 0-1 FALLS W/OUT INJ PAST YR: ICD-10-PCS | Mod: HCNC,CPTII,S$GLB, | Performed by: PODIATRIST

## 2019-02-15 PROCEDURE — 99999 PR PBB SHADOW E&M-EST. PATIENT-LVL III: CPT | Mod: PBBFAC,HCNC,, | Performed by: PODIATRIST

## 2019-02-15 PROCEDURE — 99203 PR OFFICE/OUTPT VISIT, NEW, LEVL III, 30-44 MIN: ICD-10-PCS | Mod: HCNC,S$GLB,, | Performed by: PODIATRIST

## 2019-02-15 PROCEDURE — 1101F PT FALLS ASSESS-DOCD LE1/YR: CPT | Mod: HCNC,CPTII,S$GLB, | Performed by: PODIATRIST

## 2019-02-15 PROCEDURE — 99203 OFFICE O/P NEW LOW 30 MIN: CPT | Mod: HCNC,S$GLB,, | Performed by: PODIATRIST

## 2019-02-15 PROCEDURE — 99999 PR PBB SHADOW E&M-EST. PATIENT-LVL III: ICD-10-PCS | Mod: PBBFAC,HCNC,, | Performed by: PODIATRIST

## 2019-02-15 PROCEDURE — 3078F PR MOST RECENT DIASTOLIC BLOOD PRESSURE < 80 MM HG: ICD-10-PCS | Mod: HCNC,CPTII,S$GLB, | Performed by: PODIATRIST

## 2019-02-15 PROCEDURE — 3074F SYST BP LT 130 MM HG: CPT | Mod: HCNC,CPTII,S$GLB, | Performed by: PODIATRIST

## 2019-02-15 NOTE — LETTER
February 20, 2019      Daniella Maxwell MD  [address]  [place] [address]           [facility] - Podiatry  [address]  [facility] [address]  Phone: [phone]          Patient: Madhav Cortez   MR Number: [id number]   YOB: 1940   Date of Visit: 2/15/2019       Dear Dr. Daniella Maxwell:    Thank you for referring Madhav Cortez to me for evaluation. Attached you will find relevant portions of my assessment and plan of care.    If you have questions, please do not hesitate to call me. I look forward to following Madhav Cortez along with you.    Sincerely,    Alber De La Cruz, DPKIANA    Enclosure  CC:  No Recipients    If you would like to receive this communication electronically, please contact [email or web address] or [phone] to request more information on [employer] access.    For providers and/or their staff who would like to refer a patient to [facility], please contact us through our one-stop-shop provider referral line, [facility], at [phone].    If you feel you have received this communication in error or would no longer like to receive these types of communications, please e-mail [email or web address]

## 2019-02-20 NOTE — PROGRESS NOTES
Subjective:      Patient ID: Madhav Cortez is a 78 y.o. male.    Chief Complaint: Foot Ulcer (rt foot blister) and Diabetes Mellitus    Madhav is a 78 y.o. male who presents to the clinic for evaluation and treatment of high risk feet. Madhav has a past medical history of *Atrial fibrillation, *Atrial flutter, Acute exacerbation of CHF (congestive heart failure) (8/2/2013), Anticoagulant long-term use, Anxiety, Arthritis, Atrial flutter, Back pain, Cardiomyopathy, Cataract, Coronary artery disease, Depression, Diabetes mellitus, Heart bloc, Hepatitis B, Hyperlipidemia (4/1/2014), Hypertension, Myocardial infarction, Non-STEMI (non-ST elevated myocardial infarction) (5/26/2013), Nuclear sclerosis - Both Eyes (2/18/2013), Post PTCA (8/7/2012), Presence of biventricular AICD (2/23/2016), Stage 3 chronic kidney disease (12/11/2017), Tobacco dependence, Transaminitis (4/1/2014), and Ventricular tachycardia. The patient's chief complaint is long, thick toenails. This patient has documented high risk feet requiring routine maintenance secondary to peripheral vascular disease.    PCP: Mirna Reyes MD    Date Last Seen by PCP:   Chief Complaint   Patient presents with    Foot Ulcer     rt foot blister    Diabetes Mellitus         Current shoe gear:  Affected Foot: Casual shoes     Unaffected Foot: Casual shoes    Last encounter in this department: Visit date not found    Hemoglobin A1C   Date Value Ref Range Status   01/28/2019 8.3 (H) 4.0 - 5.6 % Final     Comment:     ADA Screening Guidelines:  5.7-6.4%  Consistent with prediabetes  >or=6.5%  Consistent with diabetes  High levels of fetal hemoglobin interfere with the HbA1C  assay. Heterozygous hemoglobin variants (HbS, HgC, etc)do  not significantly interfere with this assay.   However, presence of multiple variants may affect accuracy.     09/18/2018 8.2 (H) 4.0 - 5.6 % Final     Comment:     ADA Screening Guidelines:  5.7-6.4%  Consistent with  prediabetes  >or=6.5%  Consistent with diabetes  High levels of fetal hemoglobin interfere with the HbA1C  assay. Heterozygous hemoglobin variants (HbS, HgC, etc)do  not significantly interfere with this assay.   However, presence of multiple variants may affect accuracy.     05/08/2018 7.0 (H) 4.0 - 5.6 % Final     Comment:     According to ADA guidelines, hemoglobin A1c <7.0% represents  optimal control in non-pregnant diabetic patients. Different  metrics may apply to specific patient populations.   Standards of Medical Care in Diabetes-2016.  For the purpose of screening for the presence of diabetes:  <5.7%     Consistent with the absence of diabetes  5.7-6.4%  Consistent with increasing risk for diabetes   (prediabetes)  >or=6.5%  Consistent with diabetes  Currently, no consensus exists for use of hemoglobin A1c  for diagnosis of diabetes for children.  This Hemoglobin A1c assay has significant interference with fetal   hemoglobin   (HbF). The results are invalid for patients with abnormal amounts of   HbF,   including those with known Hereditary Persistence   of Fetal Hemoglobin. Heterozygous hemoglobin variants (HbAS, HbAC,   HbAD, HbAE, HbA2) do not significantly interfere with this assay;   however, presence of multiple variants in a sample may impact the %   interference.         Review of Systems   Constitution: Negative for chills and fever.   HENT: Negative for congestion and tinnitus.    Eyes: Negative for double vision and visual disturbance.   Cardiovascular: Positive for claudication. Negative for chest pain.   Respiratory: Negative for hemoptysis and shortness of breath.    Endocrine: Negative for cold intolerance and heat intolerance.   Hematologic/Lymphatic: Negative for adenopathy and bleeding problem.   Skin: Positive for color change, dry skin, nail changes, poor wound healing, skin cancer and suspicious lesions.   Musculoskeletal: Positive for stiffness. Negative for myalgias.    Gastrointestinal: Negative for nausea and vomiting.   Genitourinary: Negative for dysuria and hematuria.   Neurological: Positive for sensory change.   Psychiatric/Behavioral: Negative for altered mental status and suicidal ideas.   Allergic/Immunologic: Negative for environmental allergies and persistent infections.           Objective:      Physical Exam   Constitutional: He is oriented to person, place, and time. He appears well-developed and well-nourished.   Cardiovascular:   Pulses:       Dorsalis pedis pulses are 0 on the right side, and 0 on the left side.        Posterior tibial pulses are 1+ on the right side, and 1+ on the left side.   Pulmonary/Chest: Effort normal.   Musculoskeletal: Normal range of motion.   Anterior, lateral, and posterior muscle groups bilateral lower extremities show strength 4 over 5 symmetrically. Inspection and palpation of the joints and bones reveal no crepitus or joint effusion. No tenderness upon palpation. Mild plantar flexor contractures noted to digits 2 through 5 bilaterally.  Angle and base of gait are normal.   Feet:   Right Foot:   Skin Integrity: Positive for callus and dry skin.   Left Foot:   Skin Integrity: Positive for callus and dry skin.   Neurological: He is alert and oriented to person, place, and time. He displays atrophy and abnormal reflex. A sensory deficit is present.   Reflex Scores:       Patellar reflexes are 1+ on the right side and 1+ on the left side.       Achilles reflexes are 1+ on the right side and 1+ on the left side.  Sharp, dull, light touch sensation are intact bilaterally.  Proprioceptive sensation is intact to both lower extremities.  Vibratory sensation and Kinsman Candice monofilament exam shows intact protective sensation to plantar toes 1 through 5 bilaterally. Deep tendon reflexes to the patellar tendons is 1 over 4 bilaterally symmetrical.  Deep tendon reflexes to the Achilles tendon is 1 over 4 bilaterally symmetrical. No ankle  clonus or Babinski reflex noted bilaterally. Coordination is fair to both lower extremities.     Skin: Skin is warm and dry. Capillary refill takes 2 to 3 seconds. There is pallor.   Skin turgor is decreased bilaterally. Skin texture is thin dry and atrophic. Nail plates 1 through 5 bilaterally show thickening discoloration subungual debris and tenderness upon palpation. No lesions or rashes or open wounds appreciated both lower extremities on palpation and examination.   Psychiatric: He has a normal mood and affect.   Vitals reviewed.            Assessment:       Encounter Diagnoses   Name Primary?    Peripheral vascular disease Yes    Disease of nail     Stasis dermatitis of both legs          Plan:       Madhav was seen today for foot ulcer and diabetes mellitus.    Diagnoses and all orders for this visit:    Peripheral vascular disease    Disease of nail    Stasis dermatitis of both legs      I counseled the patient on his conditions, their implications and medical management.      Discussed peripheral vascular disease, hyperpigmentation of both lower extremities.  May consider compression therapy and/or vascular consult and dermatological consult in the future if condition worsens.  Follow-up as needed.  .

## 2019-02-22 ENCOUNTER — TELEPHONE (OUTPATIENT)
Dept: INTERNAL MEDICINE | Facility: CLINIC | Age: 79
End: 2019-02-22

## 2019-02-22 DIAGNOSIS — B37.9 CANDIDA ALBICANS INFECTION: Primary | ICD-10-CM

## 2019-02-22 RX ORDER — CICLOPIROX OLAMINE 7.7 MG/G
CREAM TOPICAL
Qty: 90 G | Refills: 0 | Status: SHIPPED | OUTPATIENT
Start: 2019-02-22 | End: 2019-05-20 | Stop reason: SDUPTHER

## 2019-02-22 NOTE — PROGRESS NOTES
Fungus Cult, skin, hair or nails [facility]          Sent rx for loprox cream bid to axilla until clear

## 2019-02-22 NOTE — TELEPHONE ENCOUNTER
Pt has a large blister on her   foot. He states that he saw Dr. Maxwell and now another one has come out on his left leg and R ankle. Pt wants to know what he should do. Pt states that he is also getting a sharp pain in his leg. X 3 nights. Pt states that the pain wakes him up in the night when the pain come. Please advise. Pt states that he has seen 2 other doctors for this however he would like to see PCP for this issue now.

## 2019-02-22 NOTE — TELEPHONE ENCOUNTER
----- Message from Isabella Issa sent at 2/22/2019  8:56 AM CST -----  Contact: Patient [phone]  Pt states that he is calling to speak with someone in the office in regards to a blister that he had on his right foot. Pt states that he has now had a blister appear on his left leg and he would like to speak with someone in regards to this. Please call back and advise.      Thanks

## 2019-02-25 ENCOUNTER — OFFICE VISIT (OUTPATIENT)
Dept: INTERNAL MEDICINE | Facility: CLINIC | Age: 79
End: 2019-02-25
Payer: MEDICARE

## 2019-02-25 VITALS
SYSTOLIC BLOOD PRESSURE: 120 MMHG | OXYGEN SATURATION: 98 % | HEIGHT: 70 IN | HEART RATE: 60 BPM | DIASTOLIC BLOOD PRESSURE: 60 MMHG | BODY MASS INDEX: 32.57 KG/M2

## 2019-02-25 DIAGNOSIS — I10 ESSENTIAL HYPERTENSION: ICD-10-CM

## 2019-02-25 DIAGNOSIS — I50.9 CONGESTIVE HEART FAILURE, UNSPECIFIED HF CHRONICITY, UNSPECIFIED HEART FAILURE TYPE: ICD-10-CM

## 2019-02-25 DIAGNOSIS — S91.301A WOUND OF RIGHT FOOT: Primary | ICD-10-CM

## 2019-02-25 DIAGNOSIS — E13.9 DIABETES MELLITUS DUE TO ABNORMAL INSULIN: ICD-10-CM

## 2019-02-25 PROCEDURE — 1101F PT FALLS ASSESS-DOCD LE1/YR: CPT | Mod: HCNC,CPTII,S$GLB, | Performed by: INTERNAL MEDICINE

## 2019-02-25 PROCEDURE — 3078F PR MOST RECENT DIASTOLIC BLOOD PRESSURE < 80 MM HG: ICD-10-PCS | Mod: HCNC,CPTII,S$GLB, | Performed by: INTERNAL MEDICINE

## 2019-02-25 PROCEDURE — 3074F SYST BP LT 130 MM HG: CPT | Mod: HCNC,CPTII,S$GLB, | Performed by: INTERNAL MEDICINE

## 2019-02-25 PROCEDURE — 1101F PR PT FALLS ASSESS DOC 0-1 FALLS W/OUT INJ PAST YR: ICD-10-PCS | Mod: HCNC,CPTII,S$GLB, | Performed by: INTERNAL MEDICINE

## 2019-02-25 PROCEDURE — 3074F PR MOST RECENT SYSTOLIC BLOOD PRESSURE < 130 MM HG: ICD-10-PCS | Mod: HCNC,CPTII,S$GLB, | Performed by: INTERNAL MEDICINE

## 2019-02-25 PROCEDURE — 99214 PR OFFICE/OUTPT VISIT, EST, LEVL IV, 30-39 MIN: ICD-10-PCS | Mod: HCNC,S$GLB,, | Performed by: INTERNAL MEDICINE

## 2019-02-25 PROCEDURE — 99999 PR PBB SHADOW E&M-EST. PATIENT-LVL III: ICD-10-PCS | Mod: PBBFAC,HCNC,, | Performed by: INTERNAL MEDICINE

## 2019-02-25 PROCEDURE — 99214 OFFICE O/P EST MOD 30 MIN: CPT | Mod: HCNC,S$GLB,, | Performed by: INTERNAL MEDICINE

## 2019-02-25 PROCEDURE — 3078F DIAST BP <80 MM HG: CPT | Mod: HCNC,CPTII,S$GLB, | Performed by: INTERNAL MEDICINE

## 2019-02-25 PROCEDURE — 99999 PR PBB SHADOW E&M-EST. PATIENT-LVL III: CPT | Mod: PBBFAC,HCNC,, | Performed by: INTERNAL MEDICINE

## 2019-02-25 NOTE — PROGRESS NOTES
CHIEF COMPLAINT: Blister on right foot.       HISTORY OF PRESENT ILLNESS: This is a 78-year-old man who presents due to blister on the right foot for the last 2 weeks.  It was draining clear yellow fluid.  THe skin came off this morning. No fever or chills. Blisters have not been painful. He had a blishter on the left lower leg several days ago that has resolved. He is putting betadine on the blisters. He does not elevate legs.  He sleeps in a recliner with his feet hanging down.      He is currently taking pradaxa 150 mg twice daily for anticoagulation for a fib/flutter. NO edema. He has mild dyspnea on exertion. No chest pain or palpitations. He continues to take spironolactone 25 mg 1/2 daily, carvediolol 25 mg bid and Entresto 97/103 twice daily for his hypertension and CHF. He is also on amiodarone 200 mg twice daily for his atrial flutter. Aspirin 81 mg daily.       His back is doing well. He is doing stretching at home which helps.  HE takes one tizanidine in the evening.       He is no longer taking paxil 10 mg nightly. Mood has been good. Temper has not been a problem.  He is sleeping well. HE continues to take trazodone 50 mg 2 tablets at bedtime, temazepam 15 mg at bedtime and melatonin 5 mg at bedtime. No anxiety or depression now.       His blood sugars have been very low so he stopped the Novolog. . He currently takes Lantus 40-45 units at night.  He denies any polydipsia, polyuria, hypoglycemia.  Blood sugars ranging 120-125.       He has hyperlipidemia, currently off Lipitor 20 mg daily      His liver enzymes have been mildly elevated. Dr Palm stopped fenofibrate due to the elevated liver enzymes. He had a liver biopsy and the elevated liver enzymes are NOT due to amiodarone. No further work up is needed at this time.       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 Shooting pain has resolved with increasing dose of gabapentin.    He is now taking  "levothyroxine 75 mcg daily for his hypothyroidism           PAST MEDICAL HISTORY:    1. Hypertension, coronary artery disease, status post stenting.    2. Ischemic cardiomyopathy.    3. Atrial flutter.    4. Diabetes mellitus.    5. ICD placed 02/22/2012.    6. Hyperlipidemia.    7. Osteoarthritis of the knees.  8. Atrial fibrillation  9. Transaminitis      PAST SURGICAL HISTORY: Appendectomy in 2003.        SOCIAL HISTORY: He is a former smoker, quit in 1987. Does not drink alcohol.    .       PHYSICAL EXAMINATION:    /60   Pulse 60   Ht 5' 10" (1.778 m)   SpO2 98%   BMI 32.57 kg/m²      GENERAL: He is alert, oriented, no apparent distress. Affect within normal limits.    HEENT: Conjunctivae anicteric. Pupils are equal, round and reactive to light.    Tympanic membranes are clear. Oropharynx is clear.    NECK: Supple. No cervical lymphadenopathy, no thyroid enlargement.    RESPIRATORY: Effort normal. Lungs are clear to auscultation.    HEART: Regular rate and rhythm without murmurs, gallops or rubs. No lower extremity edema. .  NO erythema or warmth.     Excoriated skin over the dorsal aspect of the right foot.             ASSESSMENT AND PLAN:.   1. Wound right foot - no signs of infection> Betadine.  Place topical antibiotic ointment.  Wound care. Elevate feet  2.  CHF - stable  4. Coronary artery disease - stable - to follow up with Cardiology.    5. Atrial flutter - s/p cardioversion - stable    6. 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 Watch blood sugars.   7. History of anemia on last blood work, stable    8. Hyperlipidemia. At goal    9Transaminitis -better, watch close  10. Diabetic neuropathy -stable. gabapentin 300 mg 2 capsules three times daily    11. Anxiety and depression - stable  12. Aortic arch atherosclerosis - modifying risk factors  13. Insomnia - stable  14. Hypothyroidism - on supplement  15. Renal insufficiency - check labs    will see him " back as scheduled, sooner if problems arise

## 2019-02-26 ENCOUNTER — TELEPHONE (OUTPATIENT)
Dept: INTERNAL MEDICINE | Facility: CLINIC | Age: 79
End: 2019-02-26

## 2019-03-08 ENCOUNTER — TELEPHONE (OUTPATIENT)
Dept: WOUND CARE | Facility: CLINIC | Age: 79
End: 2019-03-08

## 2019-03-08 NOTE — TELEPHONE ENCOUNTER
----- Message from Clare Daniels sent at 3/8/2019  2:03 PM CST -----  Contact: Self [phone]  Pt is requesting a call back to move his appointment on 3.22.19 to after 2pm or if that is not possible to another day.    Pt may be reached at [phone].    Thank you.  LC

## 2019-03-12 LAB — FUNGUS BLD CULT: NORMAL

## 2019-03-18 RX ORDER — CARVEDILOL 25 MG/1
TABLET ORAL
Qty: 180 TABLET | Refills: 1 | OUTPATIENT
Start: 2019-03-18

## 2019-03-26 RX ORDER — CARVEDILOL 25 MG/1
25 TABLET ORAL 2 TIMES DAILY
Qty: 180 TABLET | Refills: 1 | Status: SHIPPED | OUTPATIENT
Start: 2019-03-26 | End: 2019-09-17

## 2019-03-26 NOTE — TELEPHONE ENCOUNTER
----- Message from Laquita Levine sent at 3/26/2019  2:07 PM CDT -----  Contact: [facility] mail order/[place]/[phone]   Rep called in regards to getting a Rx refill for carvedilol (COREG) 25 MG tablet 180 tablet 1 6/8/2018 No  Take 1 tablet (25 mg total) by mouth 2 (two) times daily.    For a 14 day supply sent to the local pharmacy. Just until he gets his full Rx from [employer].    [facility] [phone]   Please advise

## 2019-03-27 ENCOUNTER — HOSPITAL ENCOUNTER (OUTPATIENT)
Dept: RADIOLOGY | Facility: HOSPITAL | Age: 79
Discharge: HOME | End: 2019-03-27
Attending: NURSE PRACTITIONER
Payer: MEDICARE

## 2019-03-27 ENCOUNTER — OFFICE VISIT (OUTPATIENT)
Dept: WOUND CARE | Facility: CLINIC | Age: 79
End: 2019-03-27
Payer: MEDICARE

## 2019-03-27 VITALS
WEIGHT: 219.38 LBS | SYSTOLIC BLOOD PRESSURE: 123 MMHG | HEART RATE: 60 BPM | TEMPERATURE: 97 F | DIASTOLIC BLOOD PRESSURE: 73 MMHG | BODY MASS INDEX: 31.41 KG/M2 | HEIGHT: 70 IN

## 2019-03-27 DIAGNOSIS — Z79.4 TYPE 2 DIABETES MELLITUS WITH OTHER CIRCULATORY COMPLICATION, WITH LONG-TERM CURRENT USE OF INSULIN: ICD-10-CM

## 2019-03-27 DIAGNOSIS — L97.921 ULCER OF LEFT LOWER EXTREMITY, LIMITED TO BREAKDOWN OF SKIN: ICD-10-CM

## 2019-03-27 DIAGNOSIS — E11.59 TYPE 2 DIABETES MELLITUS WITH OTHER CIRCULATORY COMPLICATION, WITH LONG-TERM CURRENT USE OF INSULIN: ICD-10-CM

## 2019-03-27 DIAGNOSIS — L97.512 SKIN ULCER OF RIGHT FOOT WITH FAT LAYER EXPOSED: ICD-10-CM

## 2019-03-27 DIAGNOSIS — L97.512 SKIN ULCER OF RIGHT FOOT WITH FAT LAYER EXPOSED: Primary | ICD-10-CM

## 2019-03-27 DIAGNOSIS — R60.0 BILATERAL LEG EDEMA: ICD-10-CM

## 2019-03-27 DIAGNOSIS — I73.9 PERIPHERAL VASCULAR DISEASE: ICD-10-CM

## 2019-03-27 PROCEDURE — 3078F DIAST BP <80 MM HG: CPT | Mod: HCNC,CPTII,S$GLB, | Performed by: NURSE PRACTITIONER

## 2019-03-27 PROCEDURE — 1101F PT FALLS ASSESS-DOCD LE1/YR: CPT | Mod: HCNC,CPTII,S$GLB, | Performed by: NURSE PRACTITIONER

## 2019-03-27 PROCEDURE — 99999 PR PBB SHADOW E&M-EST. PATIENT-LVL V: ICD-10-PCS | Mod: PBBFAC,HCNC,, | Performed by: NURSE PRACTITIONER

## 2019-03-27 PROCEDURE — 3074F SYST BP LT 130 MM HG: CPT | Mod: HCNC,CPTII,S$GLB, | Performed by: NURSE PRACTITIONER

## 2019-03-27 PROCEDURE — 99203 OFFICE O/P NEW LOW 30 MIN: CPT | Mod: HCNC,S$GLB,, | Performed by: NURSE PRACTITIONER

## 2019-03-27 PROCEDURE — 73630 X-RAY EXAM OF FOOT: CPT | Mod: TC,HCNC,RT

## 2019-03-27 PROCEDURE — 1101F PR PT FALLS ASSESS DOC 0-1 FALLS W/OUT INJ PAST YR: ICD-10-PCS | Mod: HCNC,CPTII,S$GLB, | Performed by: NURSE PRACTITIONER

## 2019-03-27 PROCEDURE — 3074F PR MOST RECENT SYSTOLIC BLOOD PRESSURE < 130 MM HG: ICD-10-PCS | Mod: HCNC,CPTII,S$GLB, | Performed by: NURSE PRACTITIONER

## 2019-03-27 PROCEDURE — 99203 PR OFFICE/OUTPT VISIT, NEW, LEVL III, 30-44 MIN: ICD-10-PCS | Mod: HCNC,S$GLB,, | Performed by: NURSE PRACTITIONER

## 2019-03-27 PROCEDURE — 99999 PR PBB SHADOW E&M-EST. PATIENT-LVL V: CPT | Mod: PBBFAC,HCNC,, | Performed by: NURSE PRACTITIONER

## 2019-03-27 PROCEDURE — 73630 XR FOOT COMPLETE 3 VIEW RIGHT: ICD-10-PCS | Mod: 26,HCNC,RT, | Performed by: RADIOLOGY

## 2019-03-27 PROCEDURE — 73630 X-RAY EXAM OF FOOT: CPT | Mod: 26,HCNC,RT, | Performed by: RADIOLOGY

## 2019-03-27 PROCEDURE — 3078F PR MOST RECENT DIASTOLIC BLOOD PRESSURE < 80 MM HG: ICD-10-PCS | Mod: HCNC,CPTII,S$GLB, | Performed by: NURSE PRACTITIONER

## 2019-03-27 NOTE — PROGRESS NOTES
Subjective:       Patient ID: Madhav Cortez is a 78 y.o. male.    Chief Complaint: Wound Check    HPI   This is an 78 year old male referred by Dr. Reyes for evaluation and management of a wound to the right foot.  This began as a blister in early February 2019.  The blister erupted leaving a wound in its place.  He has new wounds to the left lower leg which also began as blisters that erupted. He has used betadine and topical antibiotic ointment on the wounds and they are not healing. His medical history is significant for CHF, CAD, atrial flutter, type II diabetes, hyperlipidemia, diabetic neuropathy, transaminitis, anxiety, depression, aortic arch atherosclerosis, insomnia, hypothyroidism and renal insufficiency. He is afebrile. He denies increased redness, swelling or purulent drainage.  His pain level is 8/10.  Review of Systems   Constitutional: Negative for chills, diaphoresis and fever.   HENT: Positive for hearing loss and postnasal drip. Negative for rhinorrhea, sinus pressure, sneezing, sore throat, tinnitus and trouble swallowing.    Eyes: Negative for visual disturbance.   Respiratory: Positive for shortness of breath. Negative for apnea, cough and wheezing.    Cardiovascular: Positive for leg swelling. Negative for chest pain and palpitations.   Gastrointestinal: Negative for constipation, diarrhea, nausea and vomiting.   Genitourinary: Negative for difficulty urinating, dysuria, frequency and hematuria.   Musculoskeletal: Negative for arthralgias, back pain and joint swelling.   Skin: Positive for wound.   Neurological: Negative for dizziness, weakness, light-headedness and headaches.   Hematological: Bruises/bleeds easily.   Psychiatric/Behavioral: Positive for dysphoric mood and sleep disturbance. Negative for confusion and decreased concentration. The patient is not nervous/anxious.        Objective:      Physical Exam   Constitutional: He is oriented to person, place, and time. He appears  well-developed and well-nourished. No distress.   HENT:   Head: Normocephalic and atraumatic.   Mouth/Throat: Oropharynx is clear and moist.   Eyes: Pupils are equal, round, and reactive to light. Conjunctivae and EOM are normal. Right eye exhibits no discharge. Left eye exhibits no discharge. No scleral icterus.   Neck: Normal range of motion. Neck supple. No JVD present. No tracheal deviation present. No thyromegaly present.   Cardiovascular: Normal rate, regular rhythm and normal heart sounds. Exam reveals no gallop and no friction rub.   No murmur heard.  Pulses:       Dorsalis pedis pulses are 0 on the right side, and 0 on the left side.        Posterior tibial pulses are 0 on the right side, and 0 on the left side.   Pulmonary/Chest: Effort normal and breath sounds normal. No respiratory distress. He has no wheezes. He has no rales.   Abdominal: Soft. Bowel sounds are normal. He exhibits no distension. There is no tenderness.   Musculoskeletal: Normal range of motion. He exhibits edema (1-2+ lower leg). He exhibits no tenderness.        Legs:       Feet:    Neurological: He is alert and oriented to person, place, and time.   Skin: Skin is warm and dry. No rash noted. He is not diaphoretic. No erythema.   Psychiatric: He has a normal mood and affect. His behavior is normal. Judgment and thought content normal.   Nursing note and vitals reviewed.      ..  Hemoglobin A1C   Date Value Ref Range Status   01/28/2019 8.3 (H) 4.0 - 5.6 % Final     Comment:     ADA Screening Guidelines:  5.7-6.4%  Consistent with prediabetes  >or=6.5%  Consistent with diabetes  High levels of fetal hemoglobin interfere with the HbA1C  assay. Heterozygous hemoglobin variants (HbS, HgC, etc)do  not significantly interfere with this assay.   However, presence of multiple variants may affect accuracy.     09/18/2018 8.2 (H) 4.0 - 5.6 % Final     Comment:     ADA Screening Guidelines:  5.7-6.4%  Consistent with prediabetes  >or=6.5%   "Consistent with diabetes  High levels of fetal hemoglobin interfere with the HbA1C  assay. Heterozygous hemoglobin variants (HbS, HgC, etc)do  not significantly interfere with this assay.   However, presence of multiple variants may affect accuracy.     05/08/2018 7.0 (H) 4.0 - 5.6 % Final     Comment:     According to ADA guidelines, hemoglobin A1c <7.0% represents  optimal control in non-pregnant diabetic patients. Different  metrics may apply to specific patient populations.   Standards of Medical Care in Diabetes-2016.  For the purpose of screening for the presence of diabetes:  <5.7%     Consistent with the absence of diabetes  5.7-6.4%  Consistent with increasing risk for diabetes   (prediabetes)  >or=6.5%  Consistent with diabetes  Currently, no consensus exists for use of hemoglobin A1c  for diagnosis of diabetes for children.  This Hemoglobin A1c assay has significant interference with fetal   hemoglobin   (HbF). The results are invalid for patients with abnormal amounts of   HbF,   including those with known Hereditary Persistence   of Fetal Hemoglobin. Heterozygous hemoglobin variants (HbAS, HbAC,   HbAD, HbAE, HbA2) do not significantly interfere with this assay;   however, presence of multiple variants in a sample may impact the %   interference.       ..  Lab Results   Component Value Date    ALBUMIN 3.8 01/28/2019     Assessment:       1. Skin ulcer of right foot with fat layer exposed    2. Ulcer of left lower extremity, limited to breakdown of skin    3. Bilateral leg edema    4. Peripheral vascular disease    5. Type 2 diabetes mellitus with other circulatory complication, with long-term current use of insulin               Plan:            JOSSY/PVRs.  CBC, CMP, ESR, CRP, prealbumin, HgA1C.  Xray right foot to rule out osteo.  Apply medihoney gel daily to bilateral leg and foot ulcers, cover with gauze and secure with roll gauze.  Compression with two 4" ace wraps bilateral lower legs.  Return to " clinic in one month.    Right medial foot    Left medial leg    Left shin

## 2019-03-27 NOTE — PATIENT INSTRUCTIONS
Wound Care  Taking proper care of your wound will help it heal. Your healthcare provider may show you how to clean and dress the wound. He or she will also explain how to tell if the wound is healing normally. Here are the basic steps:      A wound that's not healing normally may be dark in color or have white streaks.    Wash Your Hands  · Use liquid soap and lather for 2 minutes. Scrub between your fingers and under your nails.  · Rinse with warm water, keeping your fingers pointing down.  · Use a paper towel to dry your hands and to turn off the faucet.  Remove the Used Dressing  · Set up your supplies.  · Put on disposable gloves if youre dressing a wound for someone else or your wound is infected.  · Gently take off the old dressing. If you have a drain or tube in the wound, be careful not to pull on it.  · Loosen the tape by pulling gently toward the wound.  · Remove the dressing one layer at a time and put it in a plastic bag.  · Remove your gloves.  Inspect and Dress the Wound  · Each time you change the dressing, inspect the wound carefully to be sure its healing normally.  · Wash your hands again. Put on a new pair of gloves if youre dressing a wound for someone else or if your wound is infected.  · Clean and dress the wound as directed by your doctor or nurse. If you have a drain or tube, be careful not to pull on it.  · Put all supplies in a plastic bag; seal the bag and put it in the trash.  · Be sure to wash your hands again.  Call your healthcare provider if you see any of the following signs of a problem:   · Bleeding that soaks the dressing  · Pink fluid weeping from the wound  · Increased drainage or drainage that is yellow, yellow-green, or foul-smelling  · Increased swelling or pain, or redness or swelling in the skin around the wound  · A change in the color of the wound  · An increase in the size of the wound  · A fever over 101.0°F, increased fatigue, or a loss of appetite.       ©  "7929-3233 [facility], [address] [place] [address], [place] [address]. All rights reserved. This information is not intended as a substitute for professional medical care. Always follow your healthcare professional's instructions.      You may shower using a mild soap such as Dove.  Irrigate the wound with lukewarm water for 5 minutes and dry thoroughly.  Apply medihoney gel to the wound, cover with cotton gauze and secure with roll gauze or a men's diabetic knee high sock (may purchase at [facility] and reuse after washing).  Use two 4" ace wraps for compression.  Change dressing daily.  Report any signs of infection.      You may purchase your supplies including the medihoney gel from [employer].  They have 3 locations:  [address] [place], [place] [address] [address] [phone]  [address] [place] [address] [phone] [address] [place], [place] [id number] [phone]      You may purchase your supplies including the medihoney gel from [employer].  They are located at [address] [place] [address] in [place].  Their telephone number is [phone].  "

## 2019-03-27 NOTE — LETTER
March 27, 2019      Mirna Reyes MD  [address]  [facility] [address]           [facility]  [address]  [facility] [address]  Phone: [phone]          Patient: Madhav Cortez   MR Number: [id number]   YOB: 1940   Date of Visit: 3/27/2019       Dear Dr. Mirna Reyes:    Thank you for referring Madhav Cortez to me for evaluation. Attached you will find relevant portions of my assessment and plan of care.    If you have questions, please do not hesitate to call me. I look forward to following Madhav Cortez along with you.    Sincerely,    Radha Szymanski, ALVAREZ    Enclosure  CC:  No Recipients    If you would like to receive this communication electronically, please contact [email or web address] or [phone] to request more information on [employer] access.    For providers and/or their staff who would like to refer a patient to [facility], please contact us through our one-stop-shop provider referral line, [facility], at [phone].    If you feel you have received this communication in error or would no longer like to receive these types of communications, please e-mail [email or web address]

## 2019-03-28 ENCOUNTER — TELEPHONE (OUTPATIENT)
Dept: WOUND CARE | Facility: CLINIC | Age: 79
End: 2019-03-28

## 2019-03-28 NOTE — TELEPHONE ENCOUNTER
----- Message from Dorothea Lloyd sent at 3/28/2019  1:53 PM CDT -----  Contact: patient wife  [phone]-please call above patient wife at number in message need to speak with the nurse about appointment thanks

## 2019-04-09 ENCOUNTER — PATIENT OUTREACH (OUTPATIENT)
Dept: ADMINISTRATIVE | Facility: HOSPITAL | Age: 79
End: 2019-04-09

## 2019-04-09 NOTE — PROGRESS NOTES
Health Maintenance Due   Topic Date Due    TETANUS VACCINE  06/04/1958    Zoster Vaccine  06/04/1998    Pneumococcal Vaccine (65+ Low/Medium Risk) (2 of 2 - PPSV23) 12/11/2018    Eye Exam  04/17/2019     Pre-visit audit complete, immunizations reconciled, old orders deleted and Hemoglobin A1c modifier updated.

## 2019-04-10 RX ORDER — TEMAZEPAM 15 MG/1
15 CAPSULE ORAL NIGHTLY
Qty: 90 CAPSULE | Refills: 1 | Status: SHIPPED | OUTPATIENT
Start: 2019-04-10 | End: 2019-05-30

## 2019-04-23 ENCOUNTER — TELEPHONE (OUTPATIENT)
Dept: INTERNAL MEDICINE | Facility: CLINIC | Age: 79
End: 2019-04-23

## 2019-04-23 ENCOUNTER — OFFICE VISIT (OUTPATIENT)
Dept: INTERNAL MEDICINE | Facility: CLINIC | Age: 79
End: 2019-04-23
Payer: MEDICARE

## 2019-04-23 VITALS
SYSTOLIC BLOOD PRESSURE: 110 MMHG | WEIGHT: 227.75 LBS | OXYGEN SATURATION: 99 % | DIASTOLIC BLOOD PRESSURE: 60 MMHG | BODY MASS INDEX: 32.61 KG/M2 | HEIGHT: 70 IN | HEART RATE: 59 BPM

## 2019-04-23 DIAGNOSIS — I10 ESSENTIAL HYPERTENSION: Primary | ICD-10-CM

## 2019-04-23 DIAGNOSIS — E08.42 DIABETES MELLITUS DUE TO UNDERLYING CONDITION WITH DIABETIC POLYNEUROPATHY, WITH LONG-TERM CURRENT USE OF INSULIN: ICD-10-CM

## 2019-04-23 DIAGNOSIS — Z79.4 DIABETES MELLITUS DUE TO UNDERLYING CONDITION WITH DIABETIC POLYNEUROPATHY, WITH LONG-TERM CURRENT USE OF INSULIN: ICD-10-CM

## 2019-04-23 DIAGNOSIS — S81.802D WOUND OF LEFT LOWER EXTREMITY, SUBSEQUENT ENCOUNTER: ICD-10-CM

## 2019-04-23 DIAGNOSIS — I48.0 PAROXYSMAL ATRIAL FIBRILLATION: ICD-10-CM

## 2019-04-23 DIAGNOSIS — M17.9 OSTEOARTHRITIS OF KNEE, UNSPECIFIED LATERALITY, UNSPECIFIED OSTEOARTHRITIS TYPE: Primary | ICD-10-CM

## 2019-04-23 PROCEDURE — 3074F SYST BP LT 130 MM HG: CPT | Mod: HCNC,CPTII,S$GLB, | Performed by: INTERNAL MEDICINE

## 2019-04-23 PROCEDURE — 99214 OFFICE O/P EST MOD 30 MIN: CPT | Mod: HCNC,S$GLB,, | Performed by: INTERNAL MEDICINE

## 2019-04-23 PROCEDURE — 99999 PR PBB SHADOW E&M-EST. PATIENT-LVL II: CPT | Mod: PBBFAC,HCNC,, | Performed by: INTERNAL MEDICINE

## 2019-04-23 PROCEDURE — 99999 PR PBB SHADOW E&M-EST. PATIENT-LVL II: ICD-10-PCS | Mod: PBBFAC,HCNC,, | Performed by: INTERNAL MEDICINE

## 2019-04-23 PROCEDURE — 3074F PR MOST RECENT SYSTOLIC BLOOD PRESSURE < 130 MM HG: ICD-10-PCS | Mod: HCNC,CPTII,S$GLB, | Performed by: INTERNAL MEDICINE

## 2019-04-23 PROCEDURE — 1101F PT FALLS ASSESS-DOCD LE1/YR: CPT | Mod: HCNC,CPTII,S$GLB, | Performed by: INTERNAL MEDICINE

## 2019-04-23 PROCEDURE — 3078F PR MOST RECENT DIASTOLIC BLOOD PRESSURE < 80 MM HG: ICD-10-PCS | Mod: HCNC,CPTII,S$GLB, | Performed by: INTERNAL MEDICINE

## 2019-04-23 PROCEDURE — 3078F DIAST BP <80 MM HG: CPT | Mod: HCNC,CPTII,S$GLB, | Performed by: INTERNAL MEDICINE

## 2019-04-23 PROCEDURE — 99214 PR OFFICE/OUTPT VISIT, EST, LEVL IV, 30-39 MIN: ICD-10-PCS | Mod: HCNC,S$GLB,, | Performed by: INTERNAL MEDICINE

## 2019-04-23 PROCEDURE — 1101F PR PT FALLS ASSESS DOC 0-1 FALLS W/OUT INJ PAST YR: ICD-10-PCS | Mod: HCNC,CPTII,S$GLB, | Performed by: INTERNAL MEDICINE

## 2019-04-23 NOTE — TELEPHONE ENCOUNTER
His last shot was in January, he should postpone until the end of June for his next injections. See PA for Euflexxa.  This will need to be a procedure visit, if possible, to trigger the prior authorization. Thank you

## 2019-04-23 NOTE — TELEPHONE ENCOUNTER
"Pt was seen with Dr. Reyes and by the chance told me that pt would be able to wait until the end of June for his knee shot    I have scheduled pt for knee shot on 6/28  The schedule type could not be procedure due to the coding issue I think. I changed the visit type as "Establish pt" with $0 copay    Please let me know if this is ok for you  Thank you  "

## 2019-04-23 NOTE — TELEPHONE ENCOUNTER
----- Message from Nikki Campuzano sent at 4/23/2019  8:41 AM CDT -----  Contact: [phone]  Patient is requesting that the office has the medication needed for his appt on 5-8-2019.  Patient will be getting a shot in his knees.    Please advise, thank you

## 2019-04-23 NOTE — PROGRESS NOTES
"CHIEF COMPLAINT: Follow up of Blister on right foot, chf, hypertension, diabetes, htn.       HISTORY OF PRESENT ILLNESS: This is a 78-year-old man who presents for follow up of above.    HE has a wound on the left lower leg and the right foot. HE is followed by wound care. HE will be seeing wound care tomorrow with JOSSY prior. No swelling in his legs. He has nerve pain in his feet that comes and goes.  No pain in his lower legs when he walks. No fever or chills.      He is currently taking pradaxa 150 mg twice daily for anticoagulation for a fib/flutter. NO edema. He has mild dyspnea on exertion. No chest pain or palpitations. He continues to take spironolactone 25 mg 1/2 tablet daily, carvediolol 25 mg bid and Entresto 97/103 twice daily for his hypertension and CHF. He is also on amiodarone 200 mg twice daily for his atrial flutter. Aspirin 81 mg daily.       His back is doing well. He is doing stretching at home which helps.  HE takes one tizanidine in the evening.       He is no longer taking paxil 10 mg nightly. Mood has been good. Temper has not been a problem.  He is sleeping well. HE continues to take trazodone 50 mg 2 tablets at bedtime, temazepam 15 mg at bedtime and melatonin 5 mg at bedtime. No anxiety or depression now.       His blood sugars have been very low so he stopped the Novolog. He currently takes Lantus at night.  HE states he has not been eating much so he has been taking less lantus. Twice a week, he does not take Lantus. "It all depends what the reading is" He denies any polydipsia, polyuria, hypoglycemia. HE checks his blood sugars 2-3 times a day.       He has hyperlipidemia, currently off Lipitor 20 mg daily      His liver enzymes have been mildly elevated. Dr Palm stopped fenofibrate due to the elevated liver enzymes. He had a liver biopsy and the elevated liver enzymes are NOT due to amiodarone. No further work up is needed at this time.       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 Shooting pain has resolved with increasing dose of gabapentin.     He is now taking levothyroxine 75 mcg daily for his hypothyroidism           PAST MEDICAL HISTORY:    1. Hypertension, coronary artery disease, status post stenting.    2. Ischemic cardiomyopathy.    3. Atrial flutter.    4. Diabetes mellitus.    5. ICD placed 02/22/2012.    6. Hyperlipidemia.    7. Osteoarthritis of the knees.  8. Atrial fibrillation  9. Transaminitis      PAST SURGICAL HISTORY: Appendectomy in 2003.        SOCIAL HISTORY: He is a former smoker, quit in 1987. Does not drink alcohol.    .       PHYSICAL EXAMINATION:    /60   Pulse (!) 59   Ht 5' 10" (1.778 m)   Wt 103.3 kg (227 lb 11.8 oz)   SpO2 99%   BMI 32.68 kg/m²      GENERAL: He is alert, oriented, no apparent distress. Affect within normal limits.    HEENT: Conjunctivae anicteric. Pupils are equal, round and reactive to light.    Tympanic membranes are clear. Oropharynx is clear.    NECK: Supple. No cervical lymphadenopathy, no thyroid enlargement.    RESPIRATORY: Effort normal. Lungs are clear to auscultation.    HEART: Regular rate and rhythm without murmurs, gallops or rubs. No lower extremity edema. .  NO erythema or warmth.      Wound right foot - improved - no signs of infection  Wound left lower leg - no signs of infection            ASSESSMENT AND PLAN:.   1. Wound right foot and left leg - see wound care tomorrow with JOSSY prior-  2.  CHF - stable  4. Coronary artery disease - stable - to follow up with Cardiology.    5. Atrial flutter - s/p cardioversion - stable    6. 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 Watch blood sugars.   7. History of anemia on last blood work, stable    8. Hyperlipidemia. At goal    9  Transaminitis -better, watch close  10. Diabetic neuropathy -stable. gabapentin 300 mg 2 capsules three times daily    11. Anxiety and depression - stable  12. " Aortic arch atherosclerosis - modifying risk factors  13. Insomnia - stable  14. Hypothyroidism - on supplement  15. Renal insufficiency - check labs    I will see him back in 3 months, sooner if problems arise.

## 2019-04-23 NOTE — TELEPHONE ENCOUNTER
Spoke to pt and pt said his knee is doing horrible now and it is very painful that he needs to be seen early.  Do you still want to see pt at the end of June or OK to keep 5/8 appt  Please advise. Thank you

## 2019-04-24 ENCOUNTER — HOSPITAL ENCOUNTER (EMERGENCY)
Facility: HOSPITAL | Age: 79
Discharge: HOME | End: 2019-04-24
Attending: EMERGENCY MEDICINE
Payer: MEDICARE

## 2019-04-24 ENCOUNTER — HOSPITAL ENCOUNTER (OUTPATIENT)
Dept: VASCULAR SURGERY | Facility: CLINIC | Age: 79
Discharge: HOME | End: 2019-04-24
Attending: NURSE PRACTITIONER
Payer: MEDICARE

## 2019-04-24 ENCOUNTER — OFFICE VISIT (OUTPATIENT)
Dept: WOUND CARE | Facility: CLINIC | Age: 79
End: 2019-04-24
Payer: MEDICARE

## 2019-04-24 VITALS
DIASTOLIC BLOOD PRESSURE: 74 MMHG | OXYGEN SATURATION: 98 % | SYSTOLIC BLOOD PRESSURE: 130 MMHG | TEMPERATURE: 99 F | RESPIRATION RATE: 16 BRPM | HEART RATE: 68 BPM

## 2019-04-24 VITALS
SYSTOLIC BLOOD PRESSURE: 118 MMHG | BODY MASS INDEX: 32.5 KG/M2 | HEART RATE: 58 BPM | WEIGHT: 227 LBS | HEIGHT: 70 IN | TEMPERATURE: 97 F | DIASTOLIC BLOOD PRESSURE: 78 MMHG

## 2019-04-24 DIAGNOSIS — I73.9 PERIPHERAL VASCULAR DISEASE: ICD-10-CM

## 2019-04-24 DIAGNOSIS — M25.562 CHRONIC PAIN OF BOTH KNEES: ICD-10-CM

## 2019-04-24 DIAGNOSIS — I87.2 VENOUS INSUFFICIENCY OF BOTH LOWER EXTREMITIES: ICD-10-CM

## 2019-04-24 DIAGNOSIS — G89.29 CHRONIC PAIN OF BOTH KNEES: ICD-10-CM

## 2019-04-24 DIAGNOSIS — L97.921 ULCER OF LEFT LOWER EXTREMITY, LIMITED TO BREAKDOWN OF SKIN: Primary | ICD-10-CM
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
## 2019-04-24 DIAGNOSIS — M25.561 CHRONIC PAIN OF BOTH KNEES: ICD-10-CM

## 2019-04-24 LAB
BUN SERPL-MCNC: 18 MG/DL (ref 6–30)
CHLORIDE SERPL-SCNC: 105 MMOL/L (ref 95–110)
CREAT SERPL-MCNC: 0.8 MG/DL (ref 0.5–1.4)
GLUCOSE SERPL-MCNC: 118 MG/DL (ref 70–110)
HCT VFR BLD CALC: 39 %PCV (ref 36–54)
POC IONIZED CALCIUM: 1.15 MMOL/L (ref 1.06–1.42)
POC TCO2 (MEASURED): 26 MMOL/L (ref 23–29)
POTASSIUM BLD-SCNC: 4.3 MMOL/L (ref 3.5–5.1)
SAMPLE: ABNORMAL
SODIUM BLD-SCNC: 140 MMOL/L (ref 136–145)

## 2019-04-24 PROCEDURE — 3074F SYST BP LT 130 MM HG: CPT | Mod: HCNC,CPTII,S$GLB, | Performed by: NURSE PRACTITIONER

## 2019-04-24 PROCEDURE — 1100F PR PT FALLS ASSESS DOC 2+ FALLS/FALL W/INJURY/YR: ICD-10-PCS | Mod: HCNC,CPTII,S$GLB, | Performed by: NURSE PRACTITIONER

## 2019-04-24 PROCEDURE — 1100F PTFALLS ASSESS-DOCD GE2>/YR: CPT | Mod: HCNC,CPTII,S$GLB, | Performed by: NURSE PRACTITIONER

## 2019-04-24 PROCEDURE — 99284 EMERGENCY DEPT VISIT MOD MDM: CPT | Mod: 25,HCNC

## 2019-04-24 PROCEDURE — 99284 EMERGENCY DEPT VISIT MOD MDM: CPT | Mod: ,,, | Performed by: PHYSICIAN ASSISTANT

## 2019-04-24 PROCEDURE — 3078F DIAST BP <80 MM HG: CPT | Mod: HCNC,CPTII,S$GLB, | Performed by: NURSE PRACTITIONER

## 2019-04-24 PROCEDURE — 99999 PR PBB SHADOW E&M-EST. PATIENT-LVL V: ICD-10-PCS | Mod: PBBFAC,HCNC,, | Performed by: NURSE PRACTITIONER

## 2019-04-24 PROCEDURE — 3078F PR MOST RECENT DIASTOLIC BLOOD PRESSURE < 80 MM HG: ICD-10-PCS | Mod: HCNC,CPTII,S$GLB, | Performed by: NURSE PRACTITIONER

## 2019-04-24 PROCEDURE — 99999 PR PBB SHADOW E&M-EST. PATIENT-LVL V: CPT | Mod: PBBFAC,HCNC,, | Performed by: NURSE PRACTITIONER

## 2019-04-24 PROCEDURE — 3288F FALL RISK ASSESSMENT DOCD: CPT | Mod: HCNC,CPTII,S$GLB, | Performed by: NURSE PRACTITIONER

## 2019-04-24 PROCEDURE — 93924 PR NON-INVASIVE LOWER EXTREM ART STRESS/REST, COMPLETE,BILATERAL: ICD-10-PCS | Mod: HCNC,S$GLB,, | Performed by: SURGERY

## 2019-04-24 PROCEDURE — 80047 BASIC METABLC PNL IONIZED CA: CPT | Mod: HCNC

## 2019-04-24 PROCEDURE — 99212 OFFICE O/P EST SF 10 MIN: CPT | Mod: HCNC,S$GLB,, | Performed by: NURSE PRACTITIONER

## 2019-04-24 PROCEDURE — 99284 PR EMERGENCY DEPT VISIT,LEVEL IV: ICD-10-PCS | Mod: ,,, | Performed by: PHYSICIAN ASSISTANT

## 2019-04-24 PROCEDURE — 93924 LWR XTR VASC STDY BILAT: CPT | Mod: HCNC,S$GLB,, | Performed by: SURGERY

## 2019-04-24 PROCEDURE — 3288F PR FALLS RISK ASSESSMENT DOCUMENTED: ICD-10-PCS | Mod: HCNC,CPTII,S$GLB, | Performed by: NURSE PRACTITIONER

## 2019-04-24 PROCEDURE — 99212 PR OFFICE/OUTPT VISIT, EST, LEVL II, 10-19 MIN: ICD-10-PCS | Mod: HCNC,S$GLB,, | Performed by: NURSE PRACTITIONER

## 2019-04-24 PROCEDURE — 3074F PR MOST RECENT SYSTOLIC BLOOD PRESSURE < 130 MM HG: ICD-10-PCS | Mod: HCNC,CPTII,S$GLB, | Performed by: NURSE PRACTITIONER

## 2019-04-24 NOTE — ED PROVIDER NOTES
"Encounter Date: 4/24/2019       History     Chief Complaint   Patient presents with    Fall     Patient brought in by  EMS from home. Patient tripped and fell, no complaints, denies pain     78 year old male with medical history of Cardiomyopathy, CHF, HTN, Parox AFib on Pradaxa, ICD in place presenting to the ED with the chief complaint of fall. History provided by patient and wife at bedside. Patient experienced a mechanical fall this evening around 11pm. Patient reports developing acute on chronic bilateral knee pain after rising up from his computer chair. Patient reports his legs "gave out" causing him to fall forward. Patient reports catching himself on his recliner and falling down to the ground and landed on his right hip. Patient denies LOC or head trauma. Patient reports having pain to bilateral knees currently. He denies taking any medications for pain at home. Patient has chronic bilateral knee pain 2/2 osteoarthritis and receives intra-articular injections for pain management. He is scheduled to receive his next injection in 6/2019. He denies using ambulatory assistive devices. He reports having chronic skin ulcers to his right ankle and left foot that he is followed by wound care for management. He reports the ulcers have been healing well without recent complications.         Review of patient's allergies indicates:  No Known Allergies  Past Medical History:   Diagnosis Date    *Atrial fibrillation     *Atrial flutter     Acute exacerbation of CHF (congestive heart failure) 8/2/2013    Anticoagulant long-term use     Anxiety     Arthritis     Atrial flutter     Back pain     Cardiomyopathy     Cataract     Coronary artery disease     Depression     Diabetes mellitus     Heart bloc     Hepatitis B     Hyperlipidemia 4/1/2014    Hypertension     Myocardial infarction     Non-STEMI (non-ST elevated myocardial infarction) 5/26/2013    Nuclear sclerosis - Both Eyes 2/18/2013    Post " "PTCA 8/7/2012    Presence of biventricular AICD 2/23/2016    Stage 3 chronic kidney disease 12/11/2017    Tobacco dependence     Transaminitis 4/1/2014    Ventricular tachycardia      Past Surgical History:   Procedure Laterality Date    ABLATION N/A 4/8/2013    [facility] C[facility]    APPENDECTOMY      BIOPSY LIVER AND ULTRASOUND N/A 5/26/2014    Performed by [facility] OR [facility]    CARDIAC DEFIBRILLATOR PLACEMENT      CARDIAC DEFIBRILLATOR PLACEMENT      CARDIOVERSION N/A 10/10/2013    Per[facility] CATH LAB    CARDIOVERSION N/A 8/19/2013    [facility] CATH [facility]    COLONOSCOPY      CORONARY ANGIOPLASTY WITH STENT PLACEMENT      FRACTURE SURGERY      L arm    INSERT / REPLACE / REMOVE PACEMAKER  12/15    bi-V upgrade    INSERTION-ICD-BIVENTRICULAR N/A 11/11/2015    [facility] CATH LAB    RADIOFREQUENCY ABLATION      STEROID INJECTION KNEE Bilateral     received about every 4 months    TONSILLECTOMY      TRANSESOPHAGEAL ECHOCARDIOGRAM (DIOGO) N/A 10/10/2013    [facility] CATH LAB    TRANSESOPHAGEAL ECHOCARDIOGRAM (DIOGO) N/A 8/19/2013    [facility] CATH LAB    VASCULAR SURGERY       Family History   Problem Relation Age of Onset    Cancer Father     Diabetes Father     Bladder Cancer Father     Diabetes Mother     Heart attack Mother         "weak heart"    Bipolar disorder Son     Cancer Paternal Grandmother     Heart disease Maternal Grandmother     No Known Problems Maternal Grandfather     Cancer Paternal Grandfather     No Known Problems Daughter     No Known Problems Daughter     No Known Problems Son     No Known Problems Son     Cancer Maternal Uncle     No Known Problems Maternal Aunt     No Known Problems Paternal Aunt     No Known Problems Paternal Uncle     No Known Problems Sister     No " Known Problems Brother     Amblyopia Neg Hx     Blindness Neg Hx     Cataracts Neg Hx     Glaucoma Neg Hx     Hypertension Neg Hx     Macular degeneration Neg Hx     Retinal detachment Neg Hx     Strabismus Neg Hx     Stroke Neg Hx     Thyroid disease Neg Hx     Liver disease Neg Hx     Melanoma Neg Hx     Psoriasis Neg Hx     Lupus Neg Hx     Acne Neg Hx     Eczema Neg Hx      Social History     Tobacco Use    Smoking status: Former Smoker     Last attempt to quit: 1987     Years since quittin.8    Smokeless tobacco: Never Used    Tobacco comment: 25 years ago   Substance Use Topics    Alcohol use: No    Drug use: No     Review of Systems   Constitutional: Negative for chills, diaphoresis, fatigue and fever.   HENT: Negative for sore throat and trouble swallowing.    Eyes: Negative for pain and redness.   Respiratory: Negative for cough and shortness of breath.    Cardiovascular: Negative for chest pain.   Gastrointestinal: Negative for abdominal pain, diarrhea, nausea and vomiting.   Genitourinary: Negative for dysuria.   Musculoskeletal: Positive for arthralgias (bilateral knee). Negative for back pain, neck pain and neck stiffness.   Skin: Positive for wound (Ulcers to left ankle and R foot). Negative for rash.   Neurological: Negative for dizziness, weakness, light-headedness and headaches.   Hematological: Does not bruise/bleed easily.     Physical Exam     Initial Vitals [19 0030]   BP Pulse Resp Temp SpO2   130/74 68 16 98.6 °F (37 °C) 98 %      MAP       --         Physical Exam    Constitutional: He appears well-developed and well-nourished. He is not diaphoretic.   HENT:   Head: Normocephalic and atraumatic.   Mouth/Throat: Oropharynx is clear and moist. No oropharyngeal exudate.   Eyes: EOM are normal. Pupils are equal, round, and reactive to light.   Neck: Normal range of motion. Neck supple.   Cardiovascular: Normal rate, regular rhythm and intact distal pulses.    Pulmonary/Chest: Breath sounds normal. No respiratory distress. He has no wheezes.   Defibrillator in place left upper chest   Abdominal: Soft. He exhibits no distension. There is no tenderness.   Musculoskeletal: Normal range of motion.   No midline spinal tenderness   Neurological: He is alert and oriented to person, place, and time. He has normal strength. No cranial nerve deficit or sensory deficit.   Skin: Skin is warm and dry.   Superficial abrasions overlying bilateral patellas. Full active and passive ROM of lower extremities. Chronic ulcerations to R ankle and L foot       ED Course   Procedures  Labs Reviewed   ISTAT PROCEDURE - Abnormal; Notable for the following components:       Result Value    POC Glucose 118 (*)     All other components within normal limits   ISTAT CHEM8          Imaging Results          X-Ray Knee 3 View Bilateral (Final result)  Result time 04/24/19 02:31:09    Final result by Reyes Estrada MD (04/24/19 02:31:09)                 Impression:      No acute fractures.      Electronically signed by: Reyes Estrada MD  Date:    04/24/2019  Time:    02:31             Narrative:    EXAMINATION:  XR KNEE 3 VIEW BILATERAL    CLINICAL HISTORY:  Unspecified fall, initial encounter    TECHNIQUE:  AP, lateral, and Merchant views of both knees were performed.    COMPARISON:  June 27, 2016.    FINDINGS:  Right: No fracture or dislocation.  No joint effusion.  Moderate to severe degenerative change, similar to prior.  Varus deformity.    Left: No fracture or dislocation.  No joint effusion.  Moderate to severe degenerative change, similar to prior.  Varus deformity.                               X-Ray Hip 2 View Right (Final result)  Result time 04/24/19 02:25:13    Final result by Reyes Estrada MD (04/24/19 02:25:13)                 Impression:      No acute fracture.      Electronically signed by: Reyes Estrada MD  Date:    04/24/2019  Time:    02:25             Narrative:     EXAMINATION:  XR HIP 2 VIEW RIGHT    CLINICAL HISTORY:  right hip pain;    TECHNIQUE:  AP pelvis and frog leg lateral view of the right hip were performed.    COMPARISON:  None    FINDINGS:  No fracture or dislocation.  Moderate degenerative changes at the right hip joint.  No suspicious appearing lytic or blastic lesions.                                 Medical Decision Making:   History:   Old Medical Records: I decided to obtain old medical records.  Old Records Summarized: records from clinic visits.  Clinical Tests:   Lab Tests: Ordered and Reviewed  Radiological Study: Ordered and Reviewed       APC / Resident Notes:   78 year old male with medical history of Cardiomyopathy, CHF, HTN, Parox AFib on Pradaxa, ICD in place presenting to the ED c/o mechanical fall 2/2 bilateral knee pain today at 11pm. DDx includes but not limited to osteoarthritis, contusion, fracture, dislocation, abrasion, dehydration, electrolyte disturbance. Will get x-rays and check chem8.    X-rays negative for fracture  Chem8 WNL    No emergent findings on ED work-up. Patient able to bear weight on both legs and ambulate without assistance in examination room. He remains neurovascularly intact on repeat exam. Patient's knee pain most likely related to osteoarthritis. Patient reports having a walker at home and states he will use it for assistance when he returns home. Patient is stable for discharge. Advised patient to keep his appointment with wound care tomorrow for his leg ulcerations. Advised him to use Tylenol for pain control. Patient expresses understanding and agreeable to the plan. Return to ED precautions given for new, worsening, or concerning symptoms. I have discussed the care of this patient with my supervising physician.                  Clinical Impression:       ICD-10-CM ICD-9-CM   1. Fall W19.XXXA E888.9   2. Chronic pain of both knees M25.561 719.46    M25.562 338.29    G89.29          Disposition:   Disposition:  Discharged  Condition: Stable                        Barney Reinoso PA-C  04/24/19 0421

## 2019-04-24 NOTE — PROGRESS NOTES
Subjective:       Patient ID: Madhav Cortez is a 78 y.o. male.    Chief Complaint: Wound Check    HPI     This patient is seen today for reevaluation of wounds to the right foot and left lower leg.  These wounds began as blisters that erupted. He has used betadine and topical antibiotic ointment on the wounds and they were not healing. He is using medihoney gel daily on the wounds.  He is supposed to be using ace wraps for compression but admits that he has not been allowing his wife to wrap his legs. His medical history is significant for CHF, CAD, atrial flutter, type II diabetes, hyperlipidemia, diabetic neuropathy, transaminitis, anxiety, depression, aortic arch atherosclerosis, insomnia, hypothyroidism and renal insufficiency. He is afebrile. He denies increased redness, swelling or purulent drainage.  His pain level is 6/10.  Review of Systems   Constitutional: Negative for chills, diaphoresis and fever.   HENT: Positive for hearing loss and postnasal drip. Negative for rhinorrhea, sinus pressure, sneezing, sore throat, tinnitus and trouble swallowing.    Eyes: Negative for visual disturbance.   Respiratory: Positive for shortness of breath. Negative for apnea, cough and wheezing.    Cardiovascular: Positive for leg swelling. Negative for chest pain and palpitations.   Gastrointestinal: Negative for constipation, diarrhea, nausea and vomiting.   Genitourinary: Negative for difficulty urinating, dysuria, frequency and hematuria.   Musculoskeletal: Negative for arthralgias, back pain and joint swelling.   Skin: Positive for wound.   Neurological: Negative for dizziness, weakness, light-headedness and headaches.   Hematological: Bruises/bleeds easily.   Psychiatric/Behavioral: Positive for dysphoric mood and sleep disturbance. Negative for confusion and decreased concentration. The patient is not nervous/anxious.        Objective:      Physical Exam   Constitutional: He is oriented to person, place, and time.  He appears well-developed and well-nourished. No distress.   HENT:   Head: Normocephalic and atraumatic.   Musculoskeletal: Normal range of motion. He exhibits edema (1-2+ lower leg). He exhibits no tenderness.        Legs:       Feet:    Neurological: He is alert and oriented to person, place, and time.   Skin: Skin is warm and dry. No rash noted. He is not diaphoretic. No erythema.   Psychiatric: He has a normal mood and affect. His behavior is normal. Judgment and thought content normal.   Nursing note and vitals reviewed.      ..  Hemoglobin A1C   Date Value Ref Range Status   03/27/2019 8.3 (H) 4.0 - 5.6 % Final     Comment:     ADA Screening Guidelines:  5.7-6.4%  Consistent with prediabetes  >or=6.5%  Consistent with diabetes  High levels of fetal hemoglobin interfere with the HbA1C  assay. Heterozygous hemoglobin variants (HbS, HgC, etc)do  not significantly interfere with this assay.   However, presence of multiple variants may affect accuracy.     01/28/2019 8.3 (H) 4.0 - 5.6 % Final     Comment:     ADA Screening Guidelines:  5.7-6.4%  Consistent with prediabetes  >or=6.5%  Consistent with diabetes  High levels of fetal hemoglobin interfere with the HbA1C  assay. Heterozygous hemoglobin variants (HbS, HgC, etc)do  not significantly interfere with this assay.   However, presence of multiple variants may affect accuracy.     09/18/2018 8.2 (H) 4.0 - 5.6 % Final     Comment:     ADA Screening Guidelines:  5.7-6.4%  Consistent with prediabetes  >or=6.5%  Consistent with diabetes  High levels of fetal hemoglobin interfere with the HbA1C  assay. Heterozygous hemoglobin variants (HbS, HgC, etc)do  not significantly interfere with this assay.   However, presence of multiple variants may affect accuracy.       ..  Lab Results   Component Value Date    ALBUMIN 4.2 03/27/2019    A vascular lab study performed today revealed:    The right JOSSY was 1.98 and the left was 1.65. The presence of artifactually elevated  "pressure in the arteries of the lower extremity renders all pressure measurement unreliable due to suspected medial calcinosis. Bilateral PVR waveforms suggest minimal PAOD.  Assessment:       1. Ulcer of left lower extremity, limited to breakdown of skin    2. Peripheral vascular disease    3. Venous insufficiency of both lower extremities               Plan:            Pre-EVLT study.  Apply medihoney gel daily to bilateral leg and foot ulcers, cover with gauze and secure with roll gauze.  Compression with two 4" ace wraps bilateral lower legs.  Return to clinic in one month.    Right medial foot      Left medial leg      Left shin                        "

## 2019-04-24 NOTE — ED TRIAGE NOTES
"Pt reports "bad knees" and "legs just gave out tonight".  Fell from standing and hit recliner "half way."  Denies pain or injury.  Denies LOC.  "

## 2019-04-24 NOTE — DISCHARGE INSTRUCTIONS
Use your walker at home for ambulation assistance    Follow-up with your primary care provider and wound care for ongoing management  You may take Tylenol for pain  Return to the emergency room for new, worsening, or concerning symptoms    Our goal in the emergency department is to always give you outstanding care and exceptional service. You may receive a survey by mail or e-mail in the next week regarding your experience in our ED. We would greatly appreciate your completing and returning the survey. Your feedback provides us with a way to recognize our staff who give very good care and it helps us learn how to improve when your experience was below our aspiration of excellence.

## 2019-04-24 NOTE — PATIENT INSTRUCTIONS
"You may shower using a mild soap such as Dove.  Irrigate the wound with lukewarm water for 5 minutes and dry thoroughly.  Apply medihoney gel to the wounds, cover with cotton gauze and secure with men's diabetic knee high socks (you may purchase these at [facility]) or roll gauze.  Use two 4" ace wraps for compression.  Change dressing daily.  Report any signs of infection.  "

## 2019-05-13 ENCOUNTER — HOSPITAL ENCOUNTER (OUTPATIENT)
Dept: RADIOLOGY | Facility: HOSPITAL | Age: 79
Discharge: HOME | End: 2019-05-13
Attending: ORTHOPAEDIC SURGERY
Payer: MEDICARE

## 2019-05-13 ENCOUNTER — OFFICE VISIT (OUTPATIENT)
Dept: SPORTS MEDICINE | Facility: CLINIC | Age: 79
End: 2019-05-13
Payer: MEDICARE

## 2019-05-13 VITALS
WEIGHT: 227 LBS | SYSTOLIC BLOOD PRESSURE: 109 MMHG | BODY MASS INDEX: 32.5 KG/M2 | HEIGHT: 70 IN | DIASTOLIC BLOOD PRESSURE: 78 MMHG

## 2019-05-13 DIAGNOSIS — M25.561 PAIN IN BOTH KNEES, UNSPECIFIED CHRONICITY: Primary | ICD-10-CM

## 2019-05-13 DIAGNOSIS — M25.561 PAIN IN BOTH KNEES, UNSPECIFIED CHRONICITY: ICD-10-CM

## 2019-05-13 DIAGNOSIS — M25.562 PAIN IN BOTH KNEES, UNSPECIFIED CHRONICITY: ICD-10-CM

## 2019-05-13 DIAGNOSIS — M25.562 PAIN IN BOTH KNEES, UNSPECIFIED CHRONICITY: Primary | ICD-10-CM

## 2019-05-13 PROCEDURE — 1101F PT FALLS ASSESS-DOCD LE1/YR: CPT | Mod: HCNC,CPTII,S$GLB, | Performed by: ORTHOPAEDIC SURGERY

## 2019-05-13 PROCEDURE — 1101F PR PT FALLS ASSESS DOC 0-1 FALLS W/OUT INJ PAST YR: ICD-10-PCS | Mod: HCNC,CPTII,S$GLB, | Performed by: ORTHOPAEDIC SURGERY

## 2019-05-13 PROCEDURE — 73565 XR KNEE AP STANDING BILATERAL: ICD-10-PCS | Mod: 26,HCNC,, | Performed by: RADIOLOGY

## 2019-05-13 PROCEDURE — 73565 X-RAY EXAM OF KNEES: CPT | Mod: 26,HCNC,, | Performed by: RADIOLOGY

## 2019-05-13 PROCEDURE — 99999 PR PBB SHADOW E&M-EST. PATIENT-LVL V: ICD-10-PCS | Mod: PBBFAC,HCNC,, | Performed by: ORTHOPAEDIC SURGERY

## 2019-05-13 PROCEDURE — 99203 OFFICE O/P NEW LOW 30 MIN: CPT | Mod: HCNC,S$GLB,, | Performed by: ORTHOPAEDIC SURGERY

## 2019-05-13 PROCEDURE — 3078F DIAST BP <80 MM HG: CPT | Mod: HCNC,CPTII,S$GLB, | Performed by: ORTHOPAEDIC SURGERY

## 2019-05-13 PROCEDURE — 99999 PR PBB SHADOW E&M-EST. PATIENT-LVL V: CPT | Mod: PBBFAC,HCNC,, | Performed by: ORTHOPAEDIC SURGERY

## 2019-05-13 PROCEDURE — 3074F SYST BP LT 130 MM HG: CPT | Mod: HCNC,CPTII,S$GLB, | Performed by: ORTHOPAEDIC SURGERY

## 2019-05-13 PROCEDURE — 99203 PR OFFICE/OUTPT VISIT, NEW, LEVL III, 30-44 MIN: ICD-10-PCS | Mod: HCNC,S$GLB,, | Performed by: ORTHOPAEDIC SURGERY

## 2019-05-13 PROCEDURE — 3074F PR MOST RECENT SYSTOLIC BLOOD PRESSURE < 130 MM HG: ICD-10-PCS | Mod: HCNC,CPTII,S$GLB, | Performed by: ORTHOPAEDIC SURGERY

## 2019-05-13 PROCEDURE — 73565 X-RAY EXAM OF KNEES: CPT | Mod: TC,HCNC,FY,PO

## 2019-05-13 PROCEDURE — 3078F PR MOST RECENT DIASTOLIC BLOOD PRESSURE < 80 MM HG: ICD-10-PCS | Mod: HCNC,CPTII,S$GLB, | Performed by: ORTHOPAEDIC SURGERY

## 2019-05-13 NOTE — PROGRESS NOTES
Patient ID: Madhav Cortez is a 78 y.o. male.     Chief Complaint: bilat knee pain    Get regular injecitons with Dr. Steve       HPI  Review of Systems   Constitutional: Positive for fatigue. Negative for chills, fever and unexpected weight change.   HENT: Negative for congestion and trouble swallowing.    Eyes: Negative for redness and visual disturbance.   Respiratory: Negative for cough, chest tightness and shortness of breath.    Cardiovascular: Negative for chest pain, palpitations and leg swelling.   Gastrointestinal: Negative for abdominal pain and blood in stool.   Genitourinary: Negative for difficulty urinating and hematuria.   Musculoskeletal: Positive for arthralgias and gait problem. Negative for back pain, joint swelling, myalgias and neck pain.   Skin: Positive for color change, rash and wound.   Neurological: Negative for tremors, speech difficulty, weakness, numbness and headaches.   Hematological: Negative for adenopathy. Does not bruise/bleed easily.   Psychiatric/Behavioral: Negative for behavioral problems, confusion and sleep disturbance. The patient is not nervous/anxious.        Objective:   Physical Exam   Constitutional: He is oriented to person, place, and time. He appears well-developed and well-nourished. No distress.   Neck: Neck supple.   Pulmonary/Chest: Effort normal.   Musculoskeletal: He exhibits no edema.        Right lower leg: He exhibits no edema.        Left lower leg: He exhibits no edema.   Neurological: He is alert and oriented to person, place, and time.   Skin: Skin is warm and dry. No rash noted. There is erythema.   Psychiatric: He has a normal mood and affect. His behavior is normal. Judgment and thought content normal.   Nursing note and vitals reviewed.      Assessment:       1. Cellulitis of left leg    2. Primary osteoarthritis of both knees    3. Infected abrasion of left lower extremity, subsequent encounter        Plan:   Diagnoses and all orders for this  visit:     Cellulitis of left leg     Primary osteoarthritis of both knees     Infected abrasion of left lower extremity, subsequent encounter     Other orders  -     sodium hyaluronate (EUFLEXXA) 10 mg/mL(mw 2.4 -3.6 million) Syrg 20 mg  -     dicloxacillin (DYNAPEN) 500 MG capsule; Take 1 capsule (500 mg total) by mouth 4 (four) times daily. for 5 days  -     sodium hyaluronate (EUFLEXXA) 10 mg/mL(mw 2.4 -3.6 million) Syrg 20 mg        See meds, orders, follow up, routing and instructions sections of encounter.  The patient is in for shots in his knees; however, he reports having fallen a   couple of days ago.  He was seen in the Emergency Room on the first, placed on   antibiotic, told to use Neosporin.  He was concerned about infection, saw Dr. Maxwell on the fifth, antibiotic was not changed.  She told him to leave wound   open.     On examination today, he has an 8-10 cm abrasion to the left pretibial area with   what appears to be appropriate wound healing and healing ridge, does not appear   to be acutely infected to me.  There is no calf tenderness or swelling.  He   does have a couple of small abraded areas overlying the left knee with no   effusion.  His right knee is unremarkable at this time.     RECOMMENDATIONS:    Will order injection for Dr. Steve  And will follow up with Dr. Steve for injections

## 2019-05-20 ENCOUNTER — TELEPHONE (OUTPATIENT)
Dept: INTERNAL MEDICINE | Facility: CLINIC | Age: 79
End: 2019-05-20

## 2019-05-20 ENCOUNTER — NURSE TRIAGE (OUTPATIENT)
Dept: ADMINISTRATIVE | Facility: CLINIC | Age: 79
End: 2019-05-20

## 2019-05-20 DIAGNOSIS — B37.9 CANDIDA ALBICANS INFECTION: ICD-10-CM

## 2019-05-20 RX ORDER — CICLOPIROX OLAMINE 7.7 MG/G
CREAM TOPICAL
Qty: 90 G | Refills: 0 | Status: ON HOLD | OUTPATIENT
Start: 2019-05-20 | End: 2019-06-18 | Stop reason: CLARIF

## 2019-05-20 NOTE — TELEPHONE ENCOUNTER
Flat cant breathe.  Unable to redirect patient to carry out any indicated triage  Refuses UC  Wants a doctor referral due is perception of inability to be scheduled with his doctor.  Lety in Dr. Palm office is who he is requesting  [phone] is patients call back number    Reason for Disposition   [1] Caller requests to speak ONLY to PCP AND [2] urgent question    Protocols used: PCP CALL - NO TRIAGE-A-    [facility] rodriguez cardiology triage has agreed to call Mr. Corey now.  I have given her his phone number and his breathing complaint but the call ended prior to communicating that the patient will most likely also voice concerns of the blisters on his legs.

## 2019-05-20 NOTE — TELEPHONE ENCOUNTER
Pt did not go to ED because he said his breathing is not that bad. He needs a cardiologist and [facility] on call staff helped him to set up an appt for 5/21. He told me he needs a full time wound care doctor to deal with the blisters on his bilat lower extremities, but when offered and appt  in  he said he would rather wait until 5/29 to see her for a Wound care referral.

## 2019-05-20 NOTE — TELEPHONE ENCOUNTER
Pt called in to [facility] (phone Staff) Re; difficulty breathing advised send to on call staff immediately. Fwd'd to RN supervisor.

## 2019-05-22 ENCOUNTER — HOSPITAL ENCOUNTER (INPATIENT)
Facility: HOSPITAL | Age: 79
LOS: 6 days | Discharge: HOME | DRG: 291 | End: 2019-05-28
Attending: EMERGENCY MEDICINE | Admitting: HOSPITALIST
Payer: MEDICARE

## 2019-05-22 ENCOUNTER — TELEPHONE (OUTPATIENT)
Dept: WOUND CARE | Facility: CLINIC | Age: 79
End: 2019-05-22

## 2019-05-22 DIAGNOSIS — J90 BILATERAL PLEURAL EFFUSION: Primary | ICD-10-CM

## 2019-05-22 DIAGNOSIS — I50.23 ACUTE ON CHRONIC SYSTOLIC HEART FAILURE: ICD-10-CM

## 2019-05-22 DIAGNOSIS — I50.9 ACUTE ON CHRONIC CONGESTIVE HEART FAILURE, UNSPECIFIED HEART FAILURE TYPE: ICD-10-CM

## 2019-05-22 DIAGNOSIS — R06.02 SHORTNESS OF BREATH: ICD-10-CM

## 2019-05-22 DIAGNOSIS — I50.9 CHF (CONGESTIVE HEART FAILURE): ICD-10-CM

## 2019-05-22 LAB
ALBUMIN SERPL BCP-MCNC: 3.5 G/DL (ref 3.5–5.2)
ALP SERPL-CCNC: 79 U/L (ref 55–135)
ALT SERPL W/O P-5'-P-CCNC: 64 U/L (ref 10–44)
ANION GAP SERPL CALC-SCNC: 10 MMOL/L (ref 8–16)
AST SERPL-CCNC: 70 U/L (ref 10–40)
BASOPHILS # BLD AUTO: 0.03 K/UL (ref 0–0.2)
BASOPHILS NFR BLD: 0.4 % (ref 0–1.9)
BILIRUB SERPL-MCNC: 0.9 MG/DL (ref 0.1–1)
BNP SERPL-MCNC: 1978 PG/ML (ref 0–99)
BUN SERPL-MCNC: 15 MG/DL (ref 8–23)
CALCIUM SERPL-MCNC: 9.5 MG/DL (ref 8.7–10.5)
CHLORIDE SERPL-SCNC: 105 MMOL/L (ref 95–110)
CO2 SERPL-SCNC: 24 MMOL/L (ref 23–29)
CREAT SERPL-MCNC: 0.8 MG/DL (ref 0.5–1.4)
DIFFERENTIAL METHOD: ABNORMAL
EOSINOPHIL # BLD AUTO: 0.1 K/UL (ref 0–0.5)
EOSINOPHIL NFR BLD: 0.8 % (ref 0–8)
ERYTHROCYTE [DISTWIDTH] IN BLOOD BY AUTOMATED COUNT: 17.3 % (ref 11.5–14.5)
EST. GFR  (AFRICAN AMERICAN): >60 ML/MIN/1.73 M^2
EST. GFR  (NON AFRICAN AMERICAN): >60 ML/MIN/1.73 M^2
GLUCOSE SERPL-MCNC: 57 MG/DL (ref 70–110)
HCT VFR BLD AUTO: 40.2 % (ref 40–54)
HGB BLD-MCNC: 13 G/DL (ref 14–18)
IMM GRANULOCYTES # BLD AUTO: 0.03 K/UL (ref 0–0.04)
IMM GRANULOCYTES NFR BLD AUTO: 0.4 % (ref 0–0.5)
INR PPP: 1.3 (ref 0.8–1.2)
LYMPHOCYTES # BLD AUTO: 1.1 K/UL (ref 1–4.8)
LYMPHOCYTES NFR BLD: 14 % (ref 18–48)
MAGNESIUM SERPL-MCNC: 1.9 MG/DL (ref 1.6–2.6)
MCH RBC QN AUTO: 30.8 PG (ref 27–31)
MCHC RBC AUTO-ENTMCNC: 32.3 G/DL (ref 32–36)
MCV RBC AUTO: 95 FL (ref 82–98)
MONOCYTES # BLD AUTO: 0.9 K/UL (ref 0.3–1)
MONOCYTES NFR BLD: 11.5 % (ref 4–15)
NEUTROPHILS # BLD AUTO: 5.7 K/UL (ref 1.8–7.7)
NEUTROPHILS NFR BLD: 72.9 % (ref 38–73)
NRBC BLD-RTO: 0 /100 WBC
PHOSPHATE SERPL-MCNC: 3.9 MG/DL (ref 2.7–4.5)
PLATELET # BLD AUTO: 214 K/UL (ref 150–350)
PMV BLD AUTO: 12.4 FL (ref 9.2–12.9)
POCT GLUCOSE: 152 MG/DL (ref 70–110)
POCT GLUCOSE: 179 MG/DL (ref 70–110)
POTASSIUM SERPL-SCNC: 4.2 MMOL/L (ref 3.5–5.1)
PROT SERPL-MCNC: 6.7 G/DL (ref 6–8.4)
PROTHROMBIN TIME: 13.3 SEC (ref 9–12.5)
RBC # BLD AUTO: 4.22 M/UL (ref 4.6–6.2)
SODIUM SERPL-SCNC: 139 MMOL/L (ref 136–145)
TROPONIN I SERPL DL<=0.01 NG/ML-MCNC: 0.05 NG/ML (ref 0–0.03)
WBC # BLD AUTO: 7.86 K/UL (ref 3.9–12.7)

## 2019-05-22 PROCEDURE — 80053 COMPREHEN METABOLIC PANEL: CPT | Mod: HCNC

## 2019-05-22 PROCEDURE — 93005 ELECTROCARDIOGRAM TRACING: CPT | Mod: HCNC

## 2019-05-22 PROCEDURE — 99285 EMERGENCY DEPT VISIT HI MDM: CPT | Mod: HCNC,,, | Performed by: EMERGENCY MEDICINE

## 2019-05-22 PROCEDURE — 85610 PROTHROMBIN TIME: CPT | Mod: HCNC

## 2019-05-22 PROCEDURE — 85025 COMPLETE CBC W/AUTO DIFF WBC: CPT | Mod: HCNC

## 2019-05-22 PROCEDURE — 93010 ELECTROCARDIOGRAM REPORT: CPT | Mod: HCNC,,, | Performed by: INTERNAL MEDICINE

## 2019-05-22 PROCEDURE — 84100 ASSAY OF PHOSPHORUS: CPT | Mod: HCNC

## 2019-05-22 PROCEDURE — 96374 THER/PROPH/DIAG INJ IV PUSH: CPT | Mod: HCNC

## 2019-05-22 PROCEDURE — 96376 TX/PRO/DX INJ SAME DRUG ADON: CPT | Mod: HCNC

## 2019-05-22 PROCEDURE — 99223 1ST HOSP IP/OBS HIGH 75: CPT | Mod: AI,HCNC,, | Performed by: HOSPITALIST

## 2019-05-22 PROCEDURE — 99285 EMERGENCY DEPT VISIT HI MDM: CPT | Mod: 25,HCNC

## 2019-05-22 PROCEDURE — 63600175 PHARM REV CODE 636 W HCPCS: Mod: HCNC | Performed by: HOSPITALIST

## 2019-05-22 PROCEDURE — 83735 ASSAY OF MAGNESIUM: CPT | Mod: HCNC

## 2019-05-22 PROCEDURE — 99285 PR EMERGENCY DEPT VISIT,LEVEL V: ICD-10-PCS | Mod: HCNC,,, | Performed by: EMERGENCY MEDICINE

## 2019-05-22 PROCEDURE — 99223 PR INITIAL HOSPITAL CARE,LEVL III: ICD-10-PCS | Mod: AI,HCNC,, | Performed by: HOSPITALIST

## 2019-05-22 PROCEDURE — 96372 THER/PROPH/DIAG INJ SC/IM: CPT | Mod: 59,HCNC

## 2019-05-22 PROCEDURE — 25000003 PHARM REV CODE 250: Mod: HCNC | Performed by: HOSPITALIST

## 2019-05-22 PROCEDURE — S5571 INSULIN DISPOS PEN 3 ML: HCPCS | Mod: HCNC | Performed by: HOSPITALIST

## 2019-05-22 PROCEDURE — 82962 GLUCOSE BLOOD TEST: CPT | Mod: 59,HCNC

## 2019-05-22 PROCEDURE — 11000001 HC ACUTE MED/SURG PRIVATE ROOM: Mod: HCNC

## 2019-05-22 PROCEDURE — 25000003 PHARM REV CODE 250: Mod: HCNC | Performed by: EMERGENCY MEDICINE

## 2019-05-22 PROCEDURE — 93010 EKG 12-LEAD: ICD-10-PCS | Mod: HCNC,,, | Performed by: INTERNAL MEDICINE

## 2019-05-22 PROCEDURE — 51702 INSERT TEMP BLADDER CATH: CPT | Mod: HCNC

## 2019-05-22 PROCEDURE — 83880 ASSAY OF NATRIURETIC PEPTIDE: CPT | Mod: HCNC

## 2019-05-22 PROCEDURE — 63600175 PHARM REV CODE 636 W HCPCS: Mod: HCNC | Performed by: EMERGENCY MEDICINE

## 2019-05-22 PROCEDURE — 84484 ASSAY OF TROPONIN QUANT: CPT | Mod: HCNC

## 2019-05-22 RX ORDER — GLUCAGON 1 MG
1 KIT INJECTION
Status: DISCONTINUED | OUTPATIENT
Start: 2019-05-22 | End: 2019-05-28 | Stop reason: HOSPADM

## 2019-05-22 RX ORDER — ONDANSETRON 4 MG/1
4 TABLET, ORALLY DISINTEGRATING ORAL EVERY 6 HOURS PRN
Status: DISCONTINUED | OUTPATIENT
Start: 2019-05-22 | End: 2019-05-28 | Stop reason: HOSPADM

## 2019-05-22 RX ORDER — FUROSEMIDE 10 MG/ML
80 INJECTION INTRAMUSCULAR; INTRAVENOUS 2 TIMES DAILY
Status: DISCONTINUED | OUTPATIENT
Start: 2019-05-22 | End: 2019-05-24

## 2019-05-22 RX ORDER — LIDOCAINE HYDROCHLORIDE 20 MG/ML
JELLY TOPICAL
Status: COMPLETED | OUTPATIENT
Start: 2019-05-22 | End: 2019-05-22

## 2019-05-22 RX ORDER — IBUPROFEN 200 MG
24 TABLET ORAL
Status: DISCONTINUED | OUTPATIENT
Start: 2019-05-22 | End: 2019-05-28 | Stop reason: HOSPADM

## 2019-05-22 RX ORDER — RAMELTEON 8 MG/1
8 TABLET ORAL NIGHTLY PRN
Status: DISCONTINUED | OUTPATIENT
Start: 2019-05-22 | End: 2019-05-26

## 2019-05-22 RX ORDER — SODIUM CHLORIDE 0.9 % (FLUSH) 0.9 %
10 SYRINGE (ML) INJECTION
Status: DISCONTINUED | OUTPATIENT
Start: 2019-05-22 | End: 2019-05-28 | Stop reason: HOSPADM

## 2019-05-22 RX ORDER — NAPROXEN SODIUM 220 MG/1
81 TABLET, FILM COATED ORAL DAILY
Status: DISCONTINUED | OUTPATIENT
Start: 2019-05-22 | End: 2019-05-28 | Stop reason: HOSPADM

## 2019-05-22 RX ORDER — IBUPROFEN 200 MG
16 TABLET ORAL
Status: DISCONTINUED | OUTPATIENT
Start: 2019-05-22 | End: 2019-05-28 | Stop reason: HOSPADM

## 2019-05-22 RX ORDER — ASCORBIC ACID 500 MG
1500 TABLET ORAL 2 TIMES DAILY
Status: DISCONTINUED | OUTPATIENT
Start: 2019-05-22 | End: 2019-05-28 | Stop reason: HOSPADM

## 2019-05-22 RX ORDER — AMIODARONE HYDROCHLORIDE 200 MG/1
200 TABLET ORAL 2 TIMES DAILY
Status: DISCONTINUED | OUTPATIENT
Start: 2019-05-22 | End: 2019-05-28 | Stop reason: HOSPADM

## 2019-05-22 RX ORDER — FUROSEMIDE 10 MG/ML
80 INJECTION INTRAMUSCULAR; INTRAVENOUS
Status: COMPLETED | OUTPATIENT
Start: 2019-05-22 | End: 2019-05-22

## 2019-05-22 RX ORDER — CARVEDILOL 12.5 MG/1
25 TABLET ORAL 2 TIMES DAILY
Status: DISCONTINUED | OUTPATIENT
Start: 2019-05-22 | End: 2019-05-22

## 2019-05-22 RX ORDER — DABIGATRAN ETEXILATE 150 MG/1
150 CAPSULE ORAL 2 TIMES DAILY
Status: DISCONTINUED | OUTPATIENT
Start: 2019-05-22 | End: 2019-05-25

## 2019-05-22 RX ORDER — FLUTICASONE PROPIONATE 50 MCG
1 SPRAY, SUSPENSION (ML) NASAL DAILY
Status: DISCONTINUED | OUTPATIENT
Start: 2019-05-22 | End: 2019-05-28 | Stop reason: HOSPADM

## 2019-05-22 RX ADMIN — RAMELTEON 8 MG: 8 TABLET, FILM COATED ORAL at 10:05

## 2019-05-22 RX ADMIN — FUROSEMIDE 80 MG: 10 INJECTION, SOLUTION INTRAMUSCULAR; INTRAVENOUS at 09:05

## 2019-05-22 RX ADMIN — GABAPENTIN 600 MG: 100 CAPSULE ORAL at 04:05

## 2019-05-22 RX ADMIN — AMIODARONE HYDROCHLORIDE 200 MG: 200 TABLET ORAL at 09:05

## 2019-05-22 RX ADMIN — OXYCODONE HYDROCHLORIDE AND ACETAMINOPHEN 1500 MG: 500 TABLET ORAL at 09:05

## 2019-05-22 RX ADMIN — DABIGATRAN ETEXILATE MESYLATE 150 MG: 150 CAPSULE ORAL at 09:05

## 2019-05-22 RX ADMIN — INSULIN DETEMIR 30 UNITS: 100 INJECTION, SOLUTION SUBCUTANEOUS at 09:05

## 2019-05-22 RX ADMIN — SACUBITRIL AND VALSARTAN 1 TABLET: 97; 103 TABLET, FILM COATED ORAL at 10:05

## 2019-05-22 RX ADMIN — FUROSEMIDE 80 MG: 10 INJECTION, SOLUTION INTRAMUSCULAR; INTRAVENOUS at 06:05

## 2019-05-22 RX ADMIN — GABAPENTIN 600 MG: 100 CAPSULE ORAL at 09:05

## 2019-05-22 RX ADMIN — LIDOCAINE HYDROCHLORIDE 10 ML: 20 JELLY TOPICAL at 11:05

## 2019-05-22 RX ADMIN — ASPIRIN 81 MG CHEWABLE TABLET 81 MG: 81 TABLET CHEWABLE at 12:05

## 2019-05-22 NOTE — ASSESSMENT & PLAN NOTE
Rate currently controlled  Continue amiodarone  Holding carvedilol as per above   Continue dabigatran

## 2019-05-22 NOTE — SUBJECTIVE & OBJECTIVE
Past Medical History:   Diagnosis Date    *Atrial fibrillation     *Atrial flutter     Acute exacerbation of CHF (congestive heart failure) 8/2/2013    Anticoagulant long-term use     Anxiety     Arthritis     Atrial flutter     Back pain     Cardiomyopathy     Cataract     Coronary artery disease     Depression     Diabetes mellitus     Heart bloc     Hepatitis B     Hyperlipidemia 4/1/2014    Hypertension     Myocardial infarction     Non-STEMI (non-ST elevated myocardial infarction) 5/26/2013    Nuclear sclerosis - Both Eyes 2/18/2013    Post PTCA 8/7/2012    Presence of biventricular AICD 2/23/2016    Stage 3 chronic kidney disease 12/11/2017    Tobacco dependence     Transaminitis 4/1/2014    Ventricular tachycardia        Past Surgical History:   Procedure Laterality Date    ABLATION N/A 4/8/2013    [facility] CATH [facility]    APPENDECTOMY      BIOPSY LIVER AND ULTRASOUND N/A 5/26/2014    [facility]    CARDIAC DEFIBRILLATOR PLACEMENT      CARDIAC DEFIBRILLATOR PLACEMENT      CARDIOVERSION N/A 10/10/2013    Performed [facility] CATH LAB    CARDIOVERSION N/A 8/19/2013    [facility] CATH LAB    COLONOSCOPY      CORONARY ANGIOPLASTY WITH STENT PLACEMENT      FRACTURE SURGERY      L arm    INSERT / REPLACE / REMOVE PACEMAKER  12/15    bi-V upgrade    INSERTION-ICD-BIVENTRICULAR N/A 11/11/2015    [facility] CATH LAB    RADIOFREQUENCY ABLATION      STEROID INJECTION KNEE Bilateral     received about every 4 months    TONSILLECTOMY      TRANSESOPHAGEAL ECHOCARDIOGRAM (DOIGO) N/A 10/10/2013    [facility] CATH LAB    TRANSESOPHAGEAL ECHOCARDIOGRAM (DIOGO) N/A 8/19/2013    Per[facility] by [facility] CATH LAB    VASCULAR SURGERY         Review of patient's allergies indicates:  No Known Allergies    No  "current facility-administered medications on file prior to encounter.      Current Outpatient Medications on File Prior to Encounter   Medication Sig    amiodarone (PACERONE) 200 MG Tab TAKE 1 TABLET TWICE DAILY    ascorbic acid (VITAMIN C) 500 MG tablet Take 1,500 mg by mouth 2 (two) times daily.     aspirin 81 MG Chew Take 1 tablet (81 mg total) by mouth once daily.    beta carotene-C-E-lutein-min29 5,000-60-30-2 unit-mg-unit-mg Tab Take 1 capsule by mouth once daily.     carvedilol (COREG) 25 MG tablet Take 1 tablet (25 mg total) by mouth 2 (two) times daily.    co-enzyme Q-10 30 mg capsule 800 units daily    dabigatran etexilate (PRADAXA) 150 mg Cap Take 1 capsule (150 mg total) by mouth 2 (two) times daily. "Do NOT break, chew, or open capsules."    ENTRESTO  mg per tablet TAKE 1 TABLET TWICE DAILY    fluticasone (FLONASE) 50 mcg/actuation nasal spray 1 spray (50 mcg total) by Each Nare route once daily.    furosemide (LASIX) 40 MG tablet TAKE 1 TABLET ONE TIME DAILY (Patient taking differently: TAKE 1/2 TABLET ONE TIME DAILY)    gabapentin (NEURONTIN) 300 MG capsule TAKE 2 CAPSULES BY MOUTH THREE TIMES DAILY    glucosamine-chondroitin 500-400 mg tablet Take 1 tablet by mouth 2 (two) times daily.    LANTUS SOLOSTAR U-100 INSULIN 100 unit/mL (3 mL) InPn pen INJECT 40 UNITS INTO THE SKIN EVERY EVENING.    levothyroxine (SYNTHROID) 75 MCG tablet Take 1 tablet (75 mcg total) by mouth once daily.    magnesium oxide (MAG-OX) 400 mg tablet Take 400 mg by mouth 2 (two) times daily.     melatonin 5 mg Tab Take 1 tablet by mouth every evening.     RIBOSE ORAL Take 1 tablet by mouth once daily.     spironolactone (ALDACTONE) 25 MG tablet TAKE 1 TABLET ONE TIME DAILY    temazepam (RESTORIL) 15 mg Cap Take 1 capsule (15 mg total) by mouth every evening.    ACCU-CHEK JIE Misc USE AS INSTRUCTED    ACCU-CHEK SMARTVIEW TEST STRIP Strp CHECK BLOOD SUGAR THREE TIMES DAILY    blood-glucose meter kit " "Accu check monique meter Use as instructed    ciclopirox (LOPROX) 0.77 % Crea APPLY TO AFFECTED AREAS BILATERAL AXILLA TWICE DAILY UNTIL CLEAR    clindamycin (CLEOCIN T) 1 % lotion  apply to affected area twice daily as directed ( apply first)    ketoconazole (NIZORAL) 2 % cream Apply topically once daily.    nitroGLYCERIN (NITROSTAT) 0.4 MG SL tablet Place 1 tablet (0.4 mg total) under the tongue every 5 (five) minutes as needed for Chest pain.    nystatin (MYCOSTATIN) cream Apply topically 2 (two) times daily.    pen needle, diabetic 31 gauge x 1/4" Ndle Use 4 times daily    senna-docusate 8.6-50 mg (PERICOLACE) 8.6-50 mg per tablet Take 1 tablet by mouth 2 (two) times daily.    tizanidine (ZANAFLEX) 2 MG tablet TAKE 1 TABLET EVERY 8 HOURS AS NEEDED FOR MUSCLE PAIN    triamcinolone acetonide 0.025% (KENALOG) 0.025 % cream apply to affected area twice daily as directed    UBIDECARENONE (COQ-10 ORAL) Take 800 mg by mouth once daily. Takes 100mg tablet at lunch, and 600mg at dinner    [DISCONTINUED] NOVOLOG FLEXPEN 100 unit/mL InPn pen INJECT  16 UNITS SUBCUTANEOUSLY THREE TIMES DAILY WITH MEALS    [DISCONTINUED] ranitidine (ZANTAC) 150 MG tablet Take 1 tablet (150 mg total) by mouth 2 (two) times daily.    [DISCONTINUED] traZODone (DESYREL) 50 MG tablet TAKE 1 TO 2 TABLETS EVERY EVENING     Family History     Problem Relation (Age of Onset)    Bipolar disorder Son    Bladder Cancer Father    Cancer Father, Paternal Grandmother, Paternal Grandfather, Maternal Uncle    Diabetes Father, Mother    Heart attack Mother    Heart disease Maternal Grandmother    No Known Problems Maternal Grandfather, Daughter, Daughter, Son, Son, Maternal Aunt, Paternal Aunt, Paternal Uncle, Sister, Brother        Tobacco Use    Smoking status: Former Smoker     Last attempt to quit: 1987     Years since quittin.8    Smokeless tobacco: Never Used    Tobacco comment: 25 years ago   Substance and Sexual Activity    " Alcohol use: No    Drug use: No    Sexual activity: Never     Partners: Female     Review of Systems   Constitutional: Positive for fatigue. Negative for chills and fever.   HENT: Negative.    Eyes: Negative for visual disturbance.   Respiratory: Positive for cough and shortness of breath. Negative for choking, chest tightness and wheezing.    Cardiovascular: Positive for leg swelling. Negative for chest pain and palpitations.   Gastrointestinal: Negative for abdominal distention, abdominal pain, blood in stool, constipation, diarrhea, nausea and vomiting.   Genitourinary: Negative for difficulty urinating, flank pain, frequency and hematuria.   Musculoskeletal:        LE pain from wounds   Skin:        LE wounds    Neurological: Negative for light-headedness, numbness and headaches.   Psychiatric/Behavioral: Negative.      Objective:     Vital Signs (Most Recent):  Temp: 98.9 °F (37.2 °C) (05/22/19 1445)  Pulse: 63 (05/22/19 1445)  Resp: 20 (05/22/19 1445)  BP: (!) 140/70 (05/22/19 1445)  SpO2: 95 % (05/22/19 1445) Vital Signs (24h Range):  Temp:  [97.4 °F (36.3 °C)-98.9 °F (37.2 °C)] 98.9 °F (37.2 °C)  Pulse:  [60-63] 63  Resp:  [18-29] 20  SpO2:  [95 %-99 %] 95 %  BP: (134-150)/() 140/70     Weight: 103 kg (227 lb)  Body mass index is 32.57 kg/m².    Physical Exam   Constitutional: He is oriented to person, place, and time. He appears well-developed and well-nourished.   Seems fatigued.    HENT:   Head: Normocephalic and atraumatic.   Eyes: No scleral icterus.   Neck: Neck supple.   Cardiovascular: Normal rate and normal heart sounds.   Pulmonary/Chest: Effort normal. No respiratory distress. He has no wheezes.   Blunted breath sounds at the bases bilaterally    Abdominal: Soft. Bowel sounds are normal. He exhibits no distension. There is no tenderness. There is no guarding.   Musculoskeletal: He exhibits edema.   Neurological: He is alert and oriented to person, place, and time.   Skin:   LE wounds  wrapped but noted clear drainage.   Psychiatric: He has a normal mood and affect.   Vitals reviewed.          Significant Labs: All pertinent labs within the past 24 hours have been reviewed.    Significant Imaging: I have reviewed all pertinent imaging results/findings within the past 24 hours.

## 2019-05-22 NOTE — H&P
[facility] Medicine  History & Physical    Patient Name: Madhav Cortez  MRN: [id number]  Admission Date: 5/22/2019  Attending Physician: Oni Cordoba MD  Primary Care Provider: Mirna Reyes MD    [facility] Medicine Team: [facility] MED  Oni Cordoba MD     Patient information was obtained from patient, spouse/SO and ER records.     Subjective:     Principal Problem:Acute on chronic systolic heart failure    Chief Complaint:   Chief Complaint   Patient presents with    Shortness of Breath     worsening x 1 month. Reports severe orthopnea with mild lower extremity swelling. Reports extensive heart history        HPI: The patient is a 78 y.o. male with PMH of CHF, HTN, atrial fib and atrial flutter on anticoagulation, HLP, and chronic LE wounds who presents with a one month history of worsening SOB. He states no changes in his medications or dietary compliance. SOB has gotten to the point where he cannot sleep. Reports orthopnea and MCBRIDE. Reports an occasional cough with some whitish-yellow phlegm. He also has LE swelling and wounds that are painful. Denies any actual CP, palpations, nausea, vomiting, abdominal distention, dizziness, or trouble urinating.       Past Medical History:   Diagnosis Date    *Atrial fibrillation     *Atrial flutter     Acute exacerbation of CHF (congestive heart failure) 8/2/2013    Anticoagulant long-term use     Anxiety     Arthritis     Atrial flutter     Back pain     Cardiomyopathy     Cataract     Coronary artery disease     Depression     Diabetes mellitus     Heart bloc     Hepatitis B     Hyperlipidemia 4/1/2014    Hypertension     Myocardial infarction     Non-STEMI (non-ST elevated myocardial infarction) 5/26/2013    Nuclear sclerosis - Both Eyes 2/18/2013    Post PTCA 8/7/2012    Presence of biventricular AICD 2/23/2016    Stage 3 chronic kidney disease 12/11/2017    Tobacco dependence     Transaminitis 4/1/2014     "Ventricular tachycardia        Past Surgical History:   Procedure Laterality Date    ABLATION N/A 4/8/2013    Performed by [facility]    APPENDECTOMY      BIOPSY LIVER AND ULTRASOUND N/A 5/26/2014    Performed by [facility]    CARDIAC DEFIBRILLATOR PLACEMENT      CARDIAC DEFIBRILLATOR PLACEMENT      CARDIOVERSION N/A 10/10/2013    Performed by [facility] CATH LAB    CARDIOVERSION N/A 8/19/2013    Per[facility] C[facility]    COLONOSCOPY      CORONARY ANGIOPLASTY WITH STENT PLACEMENT      FRACTURE SURGERY      L arm    INSERT / REPLACE / REMOVE PACEMAKER  12/15    bi-V upgrade    INSERTION-ICD-BIVENTRICULAR N/A 11/11/2015    Per[facility] by [facility] C[facility]    RADIOFREQUENCY ABLATION      STEROID INJECTION KNEE Bilateral     received about every 4 months    TONSILLECTOMY      TRANSESOPHAGEAL ECHOCARDIOGRAM (DIOGO) N/A 10/10/2013    Performed by [facility] CATH LAB    TRANSESOPHAGEAL ECHOCARDIOGRAM (DIOGO) N/A 8/19/2013    Performed by [facility] CATH [facility]    VASCULAR SURGERY         Review of patient's allergies indicates:  No Known Allergies    No current facility-administered medications on file prior to encounter.      Current Outpatient Medications on File Prior to Encounter   Medication Sig    amiodarone (PACERONE) 200 MG Tab TAKE 1 TABLET TWICE DAILY    ascorbic acid (VITAMIN C) 500 MG tablet Take 1,500 mg by mouth 2 (two) times daily.     aspirin 81 MG Chew Take 1 tablet (81 mg total) by mouth once daily.    beta carotene-C-E-lutein-min29 5,000-60-30-2 unit-mg-unit-mg Tab Take 1 capsule by mouth once daily.     carvedilol (COREG) 25 MG tablet Take 1 tablet (25 mg total) by mouth 2 (two) times daily.    co-enzyme Q-10 30 mg capsule 800 units daily    dabigatran etexilate (PRADAXA) 150 mg Cap Take 1 capsule (150 mg total) by mouth 2 (two) times daily. "Do NOT " "break, chew, or open capsules."    ENTRESTO  mg per tablet TAKE 1 TABLET TWICE DAILY    fluticasone (FLONASE) 50 mcg/actuation nasal spray 1 spray (50 mcg total) by Each Nare route once daily.    furosemide (LASIX) 40 MG tablet TAKE 1 TABLET ONE TIME DAILY (Patient taking differently: TAKE 1/2 TABLET ONE TIME DAILY)    gabapentin (NEURONTIN) 300 MG capsule TAKE 2 CAPSULES BY MOUTH THREE TIMES DAILY    glucosamine-chondroitin 500-400 mg tablet Take 1 tablet by mouth 2 (two) times daily.    LANTUS SOLOSTAR U-100 INSULIN 100 unit/mL (3 mL) InPn pen INJECT 40 UNITS INTO THE SKIN EVERY EVENING.    levothyroxine (SYNTHROID) 75 MCG tablet Take 1 tablet (75 mcg total) by mouth once daily.    magnesium oxide (MAG-OX) 400 mg tablet Take 400 mg by mouth 2 (two) times daily.     melatonin 5 mg Tab Take 1 tablet by mouth every evening.     RIBOSE ORAL Take 1 tablet by mouth once daily.     spironolactone (ALDACTONE) 25 MG tablet TAKE 1 TABLET ONE TIME DAILY    temazepam (RESTORIL) 15 mg Cap Take 1 capsule (15 mg total) by mouth every evening.    ACCU-CHEK JIE Misc USE AS INSTRUCTED    ACCU-CHEK SMARTVIEW TEST STRIP Strp CHECK BLOOD SUGAR THREE TIMES DAILY    blood-glucose meter kit Accu check jei meter Use as instructed    ciclopirox (LOPROX) 0.77 % Crea APPLY TO AFFECTED AREAS BILATERAL AXILLA TWICE DAILY UNTIL CLEAR    clindamycin (CLEOCIN T) 1 % lotion  apply to affected area twice daily as directed ( apply first)    ketoconazole (NIZORAL) 2 % cream Apply topically once daily.    nitroGLYCERIN (NITROSTAT) 0.4 MG SL tablet Place 1 tablet (0.4 mg total) under the tongue every 5 (five) minutes as needed for Chest pain.    nystatin (MYCOSTATIN) cream Apply topically 2 (two) times daily.    pen needle, diabetic 31 gauge x 1/4" Ndle Use 4 times daily    senna-docusate 8.6-50 mg (PERICOLACE) 8.6-50 mg per tablet Take 1 tablet by mouth 2 (two) times daily.    tizanidine (ZANAFLEX) 2 MG tablet TAKE 1 " TABLET EVERY 8 HOURS AS NEEDED FOR MUSCLE PAIN    triamcinolone acetonide 0.025% (KENALOG) 0.025 % cream apply to affected area twice daily as directed    UBIDECARENONE (COQ-10 ORAL) Take 800 mg by mouth once daily. Takes 100mg tablet at lunch, and 600mg at dinner    [DISCONTINUED] NOVOLOG FLEXPEN 100 unit/mL InPn pen INJECT  16 UNITS SUBCUTANEOUSLY THREE TIMES DAILY WITH MEALS    [DISCONTINUED] ranitidine (ZANTAC) 150 MG tablet Take 1 tablet (150 mg total) by mouth 2 (two) times daily.    [DISCONTINUED] traZODone (DESYREL) 50 MG tablet TAKE 1 TO 2 TABLETS EVERY EVENING     Family History     Problem Relation (Age of Onset)    Bipolar disorder Son    Bladder Cancer Father    Cancer Father, Paternal Grandmother, Paternal Grandfather, Maternal Uncle    Diabetes Father, Mother    Heart attack Mother    Heart disease Maternal Grandmother    No Known Problems Maternal Grandfather, Daughter, Daughter, Son, Son, Maternal Aunt, Paternal Aunt, Paternal Uncle, Sister, Brother        Tobacco Use    Smoking status: Former Smoker     Last attempt to quit: 1987     Years since quittin.8    Smokeless tobacco: Never Used    Tobacco comment: 25 years ago   Substance and Sexual Activity    Alcohol use: No    Drug use: No    Sexual activity: Never     Partners: Female     Review of Systems   Constitutional: Positive for fatigue. Negative for chills and fever.   HENT: Negative.    Eyes: Negative for visual disturbance.   Respiratory: Positive for cough and shortness of breath. Negative for choking, chest tightness and wheezing.    Cardiovascular: Positive for leg swelling. Negative for chest pain and palpitations.   Gastrointestinal: Negative for abdominal distention, abdominal pain, blood in stool, constipation, diarrhea, nausea and vomiting.   Genitourinary: Negative for difficulty urinating, flank pain, frequency and hematuria.   Musculoskeletal:        LE pain from wounds   Skin:        LE wounds     Neurological: Negative for light-headedness, numbness and headaches.   Psychiatric/Behavioral: Negative.      Objective:     Vital Signs (Most Recent):  Temp: 98.9 °F (37.2 °C) (05/22/19 1445)  Pulse: 63 (05/22/19 1445)  Resp: 20 (05/22/19 1445)  BP: (!) 140/70 (05/22/19 1445)  SpO2: 95 % (05/22/19 1445) Vital Signs (24h Range):  Temp:  [97.4 °F (36.3 °C)-98.9 °F (37.2 °C)] 98.9 °F (37.2 °C)  Pulse:  [60-63] 63  Resp:  [18-29] 20  SpO2:  [95 %-99 %] 95 %  BP: (134-150)/() 140/70     Weight: 103 kg (227 lb)  Body mass index is 32.57 kg/m².    Physical Exam   Constitutional: He is oriented to person, place, and time. He appears well-developed and well-nourished.   Seems fatigued.    HENT:   Head: Normocephalic and atraumatic.   Eyes: No scleral icterus.   Neck: Neck supple.   Cardiovascular: Normal rate and normal heart sounds.   Pulmonary/Chest: Effort normal. No respiratory distress. He has no wheezes.   Blunted breath sounds at the bases bilaterally    Abdominal: Soft. Bowel sounds are normal. He exhibits no distension. There is no tenderness. There is no guarding.   Musculoskeletal: He exhibits edema.   Neurological: He is alert and oriented to person, place, and time.   Skin:   LE wounds wrapped but noted clear drainage.   Psychiatric: He has a normal mood and affect.   Vitals reviewed.          Significant Labs: All pertinent labs within the past 24 hours have been reviewed.    Significant Imaging: I have reviewed all pertinent imaging results/findings within the past 24 hours.    Assessment/Plan:     * Acute on chronic systolic heart failure  CXR, elevated BNP, physical exam suggestive of volume overload, and history all consistent with CHF exacerbation  Last echo was in 2017 with EF of 10-15%; will repeat echo  Diuresis with lasix 80 mg IV BID; given 80 mg IV in ER with good response   On carvedilol but given poor EF on previous echo and CHF exacerbation, will hold for now until euvolemia achieved    Maintain euvolemia by monitoring I/O's and daily weights.  Fluid restriction (2 liters/24 hours)  Low Na diet  Monitor for signs of fluid overload: RR>30, O2 sat<92%, weight gain of >3 lbs, or urinary output <160ml/8hr  Maintain oxygen sats >92% via NC if supplemental oxygen needed.     Patient Vitals for the past 72 hrs (Last 3 readings):   Weight   05/22/19 0843 103 kg (227 lb)           Venous insufficiency of both lower extremities  Wound care consult  LE elevation  Diuresis as per above       Diabetic neuropathy  Continue gabapentin       Hyperlipidemia  Unclear why patient not on statin therapy      Atrial fibrillation  Per atrial flutter       Other insomnia  Takes temazepam at home but not on formulary  Ramelteon PRN     Type 2 diabetes mellitus with circulatory disorder, with long-term current use of insulin  Patient states only taking basal insulin at home of 40 units QHS but sometimes forgets  Wife took him off prandial insulin due to low sugars  Will continue with basal insulin at 30 units QHS for now  Low dose correction scale   Cont blood glucose monitoring   BG goal:  Preprandial blood glucose target <140 mg/dL  Random glucoses <180 mg/dL  Avoid hypoglycemia -  Reduce antihyperglycemic therapy when caloric intake is reduced. Avoid insulin stacking as a result of repeated injection of prandial insulin at close intervals.   Avoid severe hyperglycemia  Ensure adequate nutrition   ADA diet        Atrial flutter  Rate currently controlled  Continue amiodarone  Holding carvedilol as per above   Continue dabigatran       Cardiomyopathy, ischemic  Per CHF      Essential hypertension  Continue entresto  Diuresis with lasix  Holding carvedilol as per above  Controlled         VTE Risk Mitigation (From admission, onward)        Ordered     dabigatran etexilate capsule 150 mg  2 times daily      05/22/19 1223     Place EDWIN hose  Until discontinued      05/22/19 1223     IP VTE HIGH RISK PATIENT  Once       05/22/19 1223             Oni Cordoba MD  Department of Hospital Medicine   [facility]

## 2019-05-22 NOTE — ASSESSMENT & PLAN NOTE
Patient states only taking basal insulin at home of 40 units QHS but sometimes forgets  Wife took him off prandial insulin due to low sugars  Will continue with basal insulin at 30 units QHS for now  Low dose correction scale   Cont blood glucose monitoring   BG goal:  Preprandial blood glucose target <140 mg/dL  Random glucoses <180 mg/dL  Avoid hypoglycemia -  Reduce antihyperglycemic therapy when caloric intake is reduced. Avoid insulin stacking as a result of repeated injection of prandial insulin at close intervals.   Avoid severe hyperglycemia  Ensure adequate nutrition   ADA diet

## 2019-05-22 NOTE — ED NOTES
Patient identifiers verified and correct for Stanley Cortez  LOC: The patient is awake, alert and aware of environment with an appropriate affect, the patient is oriented x 3 and speaking appropriately.   APPEARANCE: Patient appears comfortable and in no acute distress, patient is clean and well groomed.  SKIN: The skin is warm and dry, color consistent with ethnicity, patient has normal skin turgor and moist mucus membranes. Patient has blisters to both BLE(to shins)  MUSCULOSKELETAL: Patient moving all extremities spontaneously, swelling noted to bilateral extremities  .  RESPIRATORY: Airway is open and patent,  patient has a normal effort and rate, Laboring breathing, pt placed on continuous pulse ox with O2 sats noted at 99% on room air.  CARDIAC: Pt placed on cardiac monitor. Patient has a normal rate and regular rhythm(pacemaker)  GASTRO: Soft and non tender to palpation, distention noted, normoactive bowel sounds present in all four quadrants. Pt states bowel movements have been regular.  : Pt denies any pain or frequency with urination.  NEURO: Pt opens eyes spontaneously, behavior appropriate to situation, follows commands, facial expression symmetrical, bilateral hand grasp equal and even, purposeful motor response noted, normal sensation in all extremities when touched with a finger.

## 2019-05-22 NOTE — ASSESSMENT & PLAN NOTE
CXR, elevated BNP, physical exam suggestive of volume overload, and history all consistent with CHF exacerbation  Last echo was in 2017 with EF of 10-15%; will repeat echo  Diuresis with lasix 80 mg IV BID; given 80 mg IV in ER with good response   On carvedilol but given poor EF on previous echo and CHF exacerbation, will hold for now until euvolemia achieved   Maintain euvolemia by monitoring I/O's and daily weights.  Fluid restriction (2 liters/24 hours)  Low Na diet  Monitor for signs of fluid overload: RR>30, O2 sat<92%, weight gain of >3 lbs, or urinary output <160ml/8hr  Maintain oxygen sats >92% via NC if supplemental oxygen needed.     Patient Vitals for the past 72 hrs (Last 3 readings):   Weight   05/22/19 0843 103 kg (227 lb)

## 2019-05-22 NOTE — HPI
The patient is a 78 y.o. male with PMH of CHF, HTN, atrial fib and atrial flutter on anticoagulation, HLP, and chronic LE wounds who presents with a one month history of worsening SOB. He states no changes in his medications or dietary compliance. SOB has gotten to the point where he cannot sleep. Reports orthopnea and MCBRIDE. Reports an occasional cough with some whitish-yellow phlegm. He also has LE swelling and wounds that are painful. Denies any actual CP, palpations, nausea, vomiting, abdominal distention, dizziness, or trouble urinating.

## 2019-05-22 NOTE — ED TRIAGE NOTES
Patient states he having trouble breathing for the past two weeks. Patient states he is unable to sleep due to sob. Patient denies chest pain. Patient states sitting up is the most comfortable for him to breath.

## 2019-05-22 NOTE — TELEPHONE ENCOUNTER
----- Message from Radha Szymanski NP sent at 5/22/2019  6:12 AM CDT -----  I have 19 patients today and 21 on Friday.  I am only here 2 days a week so this is not possible.  If this is urgent he can call [facility] to see if they can accommodate him.    So sorry but my access is limited.    Nivia  ----- Message -----  From: Judith Greco RN  Sent: 5/20/2019  11:19 AM  To: Radha Szymanski NP    Pt has wound appointment on 5/29/19.He would like to get in this week. Please call [phone]

## 2019-05-22 NOTE — ED PROVIDER NOTES
Encounter Date: 5/22/2019    SCRIBE #1 NOTE: I, Susan Hazel, am scribing for, and in the presence of,  Dr. Shook. I have scribed the entire note. the EKG reading.       History     Chief Complaint   Patient presents with    Shortness of Breath     worsening x 1 month. Reports severe orthopnea with mild lower extremity swelling. Reports extensive heart history     Time patient was seen by the provider: 8:58 AM      The patient is a 78 y.o. male with co-morbidities including: diabetes, HTN, Afib, CHF, hyperlipidemia, Aflutter, anticoagulant long-term use, MI who presents to the ED with a complaint of SOB. Patient reports worsening of chronic orthopnea x 1 month. He states now he is having SOB with any changes in position, but sitting up is the most comfortable for him. He notes decrease in PO intake and sleep disturbance secondary to SOB. He does not wear a C-pap at night. No changes in dose of spironolactone or diuretics. He also endorses leg swelling. He denies chest pain or vomiting. Normal bowel movements. No significant changes in urine. He states he urinates 3 times during the night, which is typical for him, but he does note he has been leaking after using bathroom.     The history is provided by the patient.     Review of patient's allergies indicates:  No Known Allergies  Past Medical History:   Diagnosis Date    *Atrial fibrillation     *Atrial flutter     Acute exacerbation of CHF (congestive heart failure) 8/2/2013    Anticoagulant long-term use     Anxiety     Arthritis     Atrial flutter     Back pain     Cardiomyopathy     Cataract     Coronary artery disease     Depression     Diabetes mellitus     Heart bloc     Hepatitis B     Hyperlipidemia 4/1/2014    Hypertension     Myocardial infarction     Non-STEMI (non-ST elevated myocardial infarction) 5/26/2013    Nuclear sclerosis - Both Eyes 2/18/2013    Post PTCA 8/7/2012    Presence of biventricular AICD 2/23/2016    Stage 3  "chronic kidney disease 12/11/2017    Tobacco dependence     Transaminitis 4/1/2014    Ventricular tachycardia      Past Surgical History:   Procedure Laterality Date    ABLATION N/A 4/8/2013    Per[facility] CATH [facility]    APPENDECTOMY      BIOPSY LIVER AND ULTRASOUND N/A 5/26/2014    Performed by [facility] Provider at [facility] OR [facility] F[facility]    CARDIAC DEFIBRILLATOR PLACEMENT      CARDIAC DEFIBRILLATOR PLACEMENT      CARDIOVERSION N/A 10/10/2013    Per[facility] by [facility] CATH LAB    CARDIOVERSION N/A 8/19/2013    Per[facility] by [facility] CATH LAB    COLONOSCOPY      CORONARY ANGIOPLASTY WITH STENT PLACEMENT      FRACTURE SURGERY      L arm    INSERT / REPLACE / REMOVE PACEMAKER  12/15    bi-V upgrade    INSERTION-ICD-BIVENTRICULAR N/A 11/11/2015    Performed by [facility] CATH LAB    RADIOFREQUENCY ABLATION      STEROID INJECTION KNEE Bilateral     received about every 4 months    TONSILLECTOMY      TRANSESOPHAGEAL ECHOCARDIOGRAM (DIOGO) N/A 10/10/2013    Performed by [facility] CATH LAB    TRANSESOPHAGEAL ECHOCARDIOGRAM (DIOGO) N/A 8/19/2013    Performed by [facility] CATH LAB    VASCULAR SURGERY       Family History   Problem Relation Age of Onset    Cancer Father     Diabetes Father     Bladder Cancer Father     Diabetes Mother     Heart attack Mother         "weak heart"    Bipolar disorder Son     Cancer Paternal Grandmother     Heart disease Maternal Grandmother     No Known Problems Maternal Grandfather     Cancer Paternal Grandfather     No Known Problems Daughter     No Known Problems Daughter     No Known Problems Son     No Known Problems Son     Cancer Maternal Uncle     No Known Problems Maternal Aunt     No Known Problems Paternal Aunt     No Known Problems Paternal Uncle     No Known Problems Sister     No Known Problems Brother     Amblyopia Neg Hx     Blindness Neg Hx     " Cataracts Neg Hx     Glaucoma Neg Hx     Hypertension Neg Hx     Macular degeneration Neg Hx     Retinal detachment Neg Hx     Strabismus Neg Hx     Stroke Neg Hx     Thyroid disease Neg Hx     Liver disease Neg Hx     Melanoma Neg Hx     Psoriasis Neg Hx     Lupus Neg Hx     Acne Neg Hx     Eczema Neg Hx      Social History     Tobacco Use    Smoking status: Former Smoker     Last attempt to quit: 1987     Years since quittin.8    Smokeless tobacco: Never Used    Tobacco comment: 25 years ago   Substance Use Topics    Alcohol use: No    Drug use: No     Review of Systems   Constitutional: Negative for fever.   HENT: Negative for sore throat.    Respiratory: Positive for shortness of breath.    Cardiovascular: Positive for leg swelling. Negative for chest pain.   Gastrointestinal: Negative for nausea.   Genitourinary: Negative for dysuria.   Musculoskeletal: Negative for back pain.   Skin: Negative for rash.   Neurological: Negative for weakness.   Hematological: Does not bruise/bleed easily.       Physical Exam     Initial Vitals [19 0843]   BP Pulse Resp Temp SpO2   134/73 60 18 97.4 °F (36.3 °C) 97 %      MAP       --         Physical Exam    Nursing note and vitals reviewed.  Constitutional: He appears well-developed and well-nourished. He is not diaphoretic. No distress.   78 y.o. male with no acute distress noted   HENT:   Head: Normocephalic and atraumatic.   Mouth/Throat: No oral lesions.   Eyes: EOM are normal. Pupils are equal, round, and reactive to light.   Neck: No tracheal deviation present.   Cardiovascular: Normal rate, regular rhythm, normal heart sounds and intact distal pulses.   Pulmonary/Chest: No stridor.   Pacemaker box noted to left upper chest without surrounding tenderness or erythema. Mildly diminished bibasilar breath sounds without respiratory distress.    Abdominal: Soft. He exhibits no distension. There is no tenderness.   Obese   Musculoskeletal:  Normal range of motion.   Moving all extremities x 4. 1+ pitting pretibial edema to right lower extremity. Trace edema to left lower extremity.    Neurological: He is alert and oriented to person, place, and time. GCS score is 15. GCS eye subscore is 4. GCS verbal subscore is 5. GCS motor subscore is 6.   Skin: Skin is warm and dry.   Psychiatric: His behavior is normal. Thought content normal.         ED Course   Procedures  Labs Reviewed   CBC W/ AUTO DIFFERENTIAL - Abnormal; Notable for the following components:       Result Value    RBC 4.22 (*)     Hemoglobin 13.0 (*)     RDW 17.3 (*)     Lymph% 14.0 (*)     All other components within normal limits   COMPREHENSIVE METABOLIC PANEL - Abnormal; Notable for the following components:    Glucose 57 (*)     AST 70 (*)     ALT 64 (*)     All other components within normal limits   PROTIME-INR - Abnormal; Notable for the following components:    Prothrombin Time 13.3 (*)     INR 1.3 (*)     All other components within normal limits   TROPONIN I - Abnormal; Notable for the following components:    Troponin I 0.052 (*)     All other components within normal limits   B-TYPE NATRIURETIC PEPTIDE - Abnormal; Notable for the following components:    BNP 1,978 (*)     All other components within normal limits   MAGNESIUM   PHOSPHORUS     EKG Readings: (Independently Interpreted)   Paced rhythm at a rate of 60. Normal ST segments.       Imaging Results          X-Ray Chest PA And Lateral (Final result)  Result time 05/22/19 10:07:15    Final result by Elvis Drake MD (05/22/19 10:07:15)                 Impression:      Detrimental interval change since 11/11/2015, with the current examination demonstrating the development of abnormal opacity in both inferior hemithoraces likely representing a combination of bilateral pleural fluid and bibasilar airspace consolidation/volume loss, more pronounced on the left side than the right.      Electronically signed by: Elvis Drake  MD  Date:    05/22/2019  Time:    10:07             Narrative:    EXAMINATION:  XR CHEST PA AND LATERAL    CLINICAL HISTORY:  shortness of breaTH;    TECHNIQUE:  Two views of the chest were obtained, with PA and lateral projections submitted.    COMPARISON:  Comparison is made to the most recent prior chest radiograph, dated 11/11/2015.    FINDINGS:  Cardiac pacing device again observed.  Opacity is now noted in both inferior hemithoraces, left greater than right, representing a significant detrimental change since the prior examination referenced above.  These opacities likely represent a combination of bilateral pleural fluid and bibasilar airspace consolidation/volume loss.  Cardiomediastinal silhouette is partially obscured, though as visualized there has been no appreciable change in its appearance since the prior exam.  The mid and upper lung zones remain clear and free of significant airspace consolidation or volume loss.  No pneumothorax.                                 Medical Decision Making:   History:   Old Medical Records: I decided to obtain old medical records.  Differential Diagnosis:   Pleural effusion, CHF exacerbation, lethal arrhythmia, SHANIQUA   Independently Interpreted Test(s):   I have ordered and independently interpreted EKG Reading(s) - see prior notes  Clinical Tests:   Lab Tests: Ordered and Reviewed  Radiological Study: Ordered and Reviewed  Medical Tests: Ordered and Reviewed            Scribe Attestation:   Scribe #1: I performed the above scribed service and the documentation accurately describes the services I performed. I attest to the accuracy of the note.    Attending Attestation:             Attending ED Notes:   Emergency department evaluation today does not reveal evidence of significant hematologic abnormality.  Chronic transaminitis is again noted without evidence of renal disorder.  The serum troponin is mildly elevated at 0.052 with an EKG that reveals a paced rhythm without  acute injury patterns.  The serum BNP is notably elevated at a almost 2000 with a chest x-ray that raises concern for developing pulmonary edema as well as bilateral pleural effusions left greater than right which had not previously been identified.  The patient is noted to have diuresed approximately 900 cc of urine after a dose of 80 mg Lasix IV.  Findings have been discussed with Cardiology on-call who recommends inpatient management, and he will be admitted to the Internal Medicine service in fair condition for further therapy and management.             Clinical Impression:       ICD-10-CM ICD-9-CM   1. Bilateral pleural effusion J90 511.9   2. Shortness of breath R06.02 786.05   3. Acute on chronic congestive heart failure, unspecified heart failure type I50.9 428.0         Disposition:   Disposition: Admitted  Condition: Fair  IM                        Shun Shook MD  05/22/19 1056

## 2019-05-23 PROBLEM — S91.309A WOUND OF FOOT: Status: ACTIVE | Noted: 2019-05-23

## 2019-05-23 PROBLEM — D72.829 LEUKOCYTOSIS: Status: ACTIVE | Noted: 2019-05-23

## 2019-05-23 LAB
ALBUMIN SERPL BCP-MCNC: 3.4 G/DL (ref 3.5–5.2)
ALP SERPL-CCNC: 80 U/L (ref 55–135)
ALT SERPL W/O P-5'-P-CCNC: 60 U/L (ref 10–44)
ANION GAP SERPL CALC-SCNC: 10 MMOL/L (ref 8–16)
ASCENDING AORTA: 3.48 CM
AST SERPL-CCNC: 64 U/L (ref 10–40)
AV INDEX (PROSTH): 0.34
AV MEAN GRADIENT: 4.55 MMHG
AV PEAK GRADIENT: 8.41 MMHG
AV VALVE AREA: 1.52 CM2
AV VELOCITY RATIO: 0.34
BACTERIA #/AREA URNS AUTO: ABNORMAL /HPF
BASOPHILS # BLD AUTO: 0.04 K/UL (ref 0–0.2)
BASOPHILS NFR BLD: 0.3 % (ref 0–1.9)
BILIRUB SERPL-MCNC: 1 MG/DL (ref 0.1–1)
BILIRUB UR QL STRIP: NEGATIVE
BSA FOR ECHO PROCEDURE: 2.25 M2
BUN SERPL-MCNC: 15 MG/DL (ref 8–23)
CALCIUM SERPL-MCNC: 9.5 MG/DL (ref 8.7–10.5)
CHLORIDE SERPL-SCNC: 103 MMOL/L (ref 95–110)
CLARITY UR REFRACT.AUTO: ABNORMAL
CO2 SERPL-SCNC: 27 MMOL/L (ref 23–29)
COLOR UR AUTO: ABNORMAL
CREAT SERPL-MCNC: 0.8 MG/DL (ref 0.5–1.4)
CRP SERPL-MCNC: 19.6 MG/L (ref 0–3.19)
CV ECHO LV RWT: 0.26 CM
DIFFERENTIAL METHOD: ABNORMAL
DOP CALC AO PEAK VEL: 1.45 M/S
DOP CALC AO VTI: 31.28 CM
DOP CALC LVOT AREA: 4.52 CM2
DOP CALC LVOT DIAMETER: 2.4 CM
DOP CALC LVOT PEAK VEL: 0.5 M/S
DOP CALC LVOT STROKE VOLUME: 47.48 CM3
DOP CALCLVOT PEAK VEL VTI: 10.5 CM
E WAVE DECELERATION TIME: 279.97 MSEC
E/A RATIO: 1.74
E/E' RATIO: 17.78
ECHO LV POSTERIOR WALL: 0.88 CM (ref 0.6–1.1)
EOSINOPHIL # BLD AUTO: 0 K/UL (ref 0–0.5)
EOSINOPHIL NFR BLD: 0.1 % (ref 0–8)
ERYTHROCYTE [DISTWIDTH] IN BLOOD BY AUTOMATED COUNT: 16.7 % (ref 11.5–14.5)
ERYTHROCYTE [SEDIMENTATION RATE] IN BLOOD BY WESTERGREN METHOD: 12 MM/HR (ref 0–23)
EST. GFR  (AFRICAN AMERICAN): >60 ML/MIN/1.73 M^2
EST. GFR  (NON AFRICAN AMERICAN): >60 ML/MIN/1.73 M^2
FRACTIONAL SHORTENING: 6 % (ref 28–44)
GLUCOSE SERPL-MCNC: 39 MG/DL (ref 70–110)
GLUCOSE UR QL STRIP: NEGATIVE
GRAM STN SPEC: NORMAL
HCT VFR BLD AUTO: 41.4 % (ref 40–54)
HGB BLD-MCNC: 13.8 G/DL (ref 14–18)
HGB UR QL STRIP: ABNORMAL
HYALINE CASTS UR QL AUTO: 0 /LPF
IMM GRANULOCYTES # BLD AUTO: 0.07 K/UL (ref 0–0.04)
IMM GRANULOCYTES NFR BLD AUTO: 0.4 % (ref 0–0.5)
INTERVENTRICULAR SEPTUM: 0.7 CM (ref 0.6–1.1)
KETONES UR QL STRIP: NEGATIVE
LA MAJOR: 6.76 CM
LA MINOR: 6.59 CM
LA WIDTH: 5.07 CM
LACTATE SERPL-SCNC: 0.8 MMOL/L (ref 0.5–2.2)
LEFT ATRIUM SIZE: 4.68 CM
LEFT ATRIUM VOLUME INDEX: 61.1 ML/M2
LEFT ATRIUM VOLUME: 134.6 CM3
LEFT INTERNAL DIMENSION IN SYSTOLE: 6.43 CM (ref 2.1–4)
LEFT VENTRICLE DIASTOLIC VOLUME INDEX: 111.25 ML/M2
LEFT VENTRICLE DIASTOLIC VOLUME: 245.06 ML
LEFT VENTRICLE MASS INDEX: 105.7 G/M2
LEFT VENTRICLE SYSTOLIC VOLUME INDEX: 95.8 ML/M2
LEFT VENTRICLE SYSTOLIC VOLUME: 211.06 ML
LEFT VENTRICULAR INTERNAL DIMENSION IN DIASTOLE: 6.87 CM (ref 3.5–6)
LEFT VENTRICULAR MASS: 232.82 G
LEUKOCYTE ESTERASE UR QL STRIP: ABNORMAL
LV LATERAL E/E' RATIO: 20
LV SEPTAL E/E' RATIO: 16
LYMPHOCYTES # BLD AUTO: 1.6 K/UL (ref 1–4.8)
LYMPHOCYTES NFR BLD: 10.3 % (ref 18–48)
MAGNESIUM SERPL-MCNC: 1.8 MG/DL (ref 1.6–2.6)
MCH RBC QN AUTO: 31.2 PG (ref 27–31)
MCHC RBC AUTO-ENTMCNC: 33.3 G/DL (ref 32–36)
MCV RBC AUTO: 94 FL (ref 82–98)
MICROSCOPIC COMMENT: ABNORMAL
MONOCYTES # BLD AUTO: 1.9 K/UL (ref 0.3–1)
MONOCYTES NFR BLD: 11.8 % (ref 4–15)
MV PEAK A VEL: 0.46 M/S
MV PEAK E VEL: 0.8 M/S
NEUTROPHILS # BLD AUTO: 12.4 K/UL (ref 1.8–7.7)
NEUTROPHILS NFR BLD: 77.1 % (ref 38–73)
NITRITE UR QL STRIP: NEGATIVE
NRBC BLD-RTO: 0 /100 WBC
PH UR STRIP: 5 [PH] (ref 5–8)
PHOSPHATE SERPL-MCNC: 4.3 MG/DL (ref 2.7–4.5)
PISA TR MAX VEL: 3.02 M/S
PLATELET # BLD AUTO: 246 K/UL (ref 150–350)
PMV BLD AUTO: 11.2 FL (ref 9.2–12.9)
POCT GLUCOSE: 102 MG/DL (ref 70–110)
POCT GLUCOSE: 132 MG/DL (ref 70–110)
POCT GLUCOSE: 146 MG/DL (ref 70–110)
POCT GLUCOSE: 251 MG/DL (ref 70–110)
POCT GLUCOSE: 61 MG/DL (ref 70–110)
POTASSIUM SERPL-SCNC: 3.3 MMOL/L (ref 3.5–5.1)
PROCALCITONIN SERPL IA-MCNC: 0.03 NG/ML
PROT SERPL-MCNC: 6.4 G/DL (ref 6–8.4)
PROT UR QL STRIP: ABNORMAL
RA MAJOR: 6.82 CM
RA PRESSURE: 8 MMHG
RA WIDTH: 5.94 CM
RBC # BLD AUTO: 4.43 M/UL (ref 4.6–6.2)
RBC #/AREA URNS AUTO: >100 /HPF (ref 0–4)
RIGHT VENTRICULAR END-DIASTOLIC DIMENSION: 5.62 CM
RV TISSUE DOPPLER FREE WALL SYSTOLIC VELOCITY 1 (APICAL 4 CHAMBER VIEW): 13.37 M/S
SINUS: 3.84 CM
SODIUM SERPL-SCNC: 140 MMOL/L (ref 136–145)
SP GR UR STRIP: 1.02 (ref 1–1.03)
STJ: 3.04 CM
TDI LATERAL: 0.04
TDI SEPTAL: 0.05
TDI: 0.05
TR MAX PG: 36.48 MMHG
TRICUSPID ANNULAR PLANE SYSTOLIC EXCURSION: 2.77 CM
TV REST PULMONARY ARTERY PRESSURE: 44 MMHG
URN SPEC COLLECT METH UR: ABNORMAL
WBC # BLD AUTO: 16 K/UL (ref 3.9–12.7)
WBC #/AREA URNS AUTO: 37 /HPF (ref 0–5)
WBC CLUMPS UR QL AUTO: ABNORMAL

## 2019-05-23 PROCEDURE — 83605 ASSAY OF LACTIC ACID: CPT | Mod: HCNC

## 2019-05-23 PROCEDURE — 83735 ASSAY OF MAGNESIUM: CPT | Mod: HCNC

## 2019-05-23 PROCEDURE — 97166 OT EVAL MOD COMPLEX 45 MIN: CPT | Mod: HCNC

## 2019-05-23 PROCEDURE — 87040 BLOOD CULTURE FOR BACTERIA: CPT | Mod: 59,HCNC

## 2019-05-23 PROCEDURE — 63600175 PHARM REV CODE 636 W HCPCS: Mod: HCNC | Performed by: HOSPITALIST

## 2019-05-23 PROCEDURE — 81001 URINALYSIS AUTO W/SCOPE: CPT | Mod: HCNC

## 2019-05-23 PROCEDURE — 87086 URINE CULTURE/COLONY COUNT: CPT | Mod: HCNC

## 2019-05-23 PROCEDURE — 99223 PR INITIAL HOSPITAL CARE,LEVL III: ICD-10-PCS | Mod: HCNC,,, | Performed by: PODIATRIST

## 2019-05-23 PROCEDURE — 85025 COMPLETE CBC W/AUTO DIFF WBC: CPT | Mod: HCNC

## 2019-05-23 PROCEDURE — 87070 CULTURE OTHR SPECIMN AEROBIC: CPT | Mod: HCNC

## 2019-05-23 PROCEDURE — 87088 URINE BACTERIA CULTURE: CPT | Mod: HCNC

## 2019-05-23 PROCEDURE — S5571 INSULIN DISPOS PEN 3 ML: HCPCS | Mod: HCNC | Performed by: HOSPITALIST

## 2019-05-23 PROCEDURE — 36415 COLL VENOUS BLD VENIPUNCTURE: CPT | Mod: HCNC

## 2019-05-23 PROCEDURE — 85652 RBC SED RATE AUTOMATED: CPT | Mod: HCNC

## 2019-05-23 PROCEDURE — 25000003 PHARM REV CODE 250: Mod: HCNC | Performed by: HOSPITALIST

## 2019-05-23 PROCEDURE — 11000001 HC ACUTE MED/SURG PRIVATE ROOM: Mod: HCNC

## 2019-05-23 PROCEDURE — 84100 ASSAY OF PHOSPHORUS: CPT | Mod: HCNC

## 2019-05-23 PROCEDURE — 80053 COMPREHEN METABOLIC PANEL: CPT | Mod: HCNC

## 2019-05-23 PROCEDURE — 87205 SMEAR GRAM STAIN: CPT | Mod: HCNC

## 2019-05-23 PROCEDURE — 99232 SBSQ HOSP IP/OBS MODERATE 35: CPT | Mod: HCNC,,, | Performed by: HOSPITALIST

## 2019-05-23 PROCEDURE — 87075 CULTR BACTERIA EXCEPT BLOOD: CPT | Mod: HCNC

## 2019-05-23 PROCEDURE — 87186 SC STD MICRODIL/AGAR DIL: CPT | Mod: HCNC

## 2019-05-23 PROCEDURE — 99232 PR SUBSEQUENT HOSPITAL CARE,LEVL II: ICD-10-PCS | Mod: HCNC,,, | Performed by: HOSPITALIST

## 2019-05-23 PROCEDURE — 86141 C-REACTIVE PROTEIN HS: CPT | Mod: HCNC

## 2019-05-23 PROCEDURE — 25000003 PHARM REV CODE 250: Mod: HCNC | Performed by: PHYSICIAN ASSISTANT

## 2019-05-23 PROCEDURE — 99223 1ST HOSP IP/OBS HIGH 75: CPT | Mod: HCNC,,, | Performed by: PODIATRIST

## 2019-05-23 PROCEDURE — 87077 CULTURE AEROBIC IDENTIFY: CPT | Mod: 59,HCNC

## 2019-05-23 PROCEDURE — 84145 PROCALCITONIN (PCT): CPT | Mod: HCNC

## 2019-05-23 RX ORDER — ACETAMINOPHEN 325 MG/1
650 TABLET ORAL EVERY 6 HOURS PRN
Status: DISCONTINUED | OUTPATIENT
Start: 2019-05-23 | End: 2019-05-28 | Stop reason: HOSPADM

## 2019-05-23 RX ADMIN — DABIGATRAN ETEXILATE MESYLATE 150 MG: 150 CAPSULE ORAL at 08:05

## 2019-05-23 RX ADMIN — INSULIN DETEMIR 30 UNITS: 100 INJECTION, SOLUTION SUBCUTANEOUS at 08:05

## 2019-05-23 RX ADMIN — RAMELTEON 8 MG: 8 TABLET, FILM COATED ORAL at 08:05

## 2019-05-23 RX ADMIN — GABAPENTIN 600 MG: 100 CAPSULE ORAL at 03:05

## 2019-05-23 RX ADMIN — ASPIRIN 81 MG CHEWABLE TABLET 81 MG: 81 TABLET CHEWABLE at 09:05

## 2019-05-23 RX ADMIN — GABAPENTIN 600 MG: 100 CAPSULE ORAL at 09:05

## 2019-05-23 RX ADMIN — OXYCODONE HYDROCHLORIDE AND ACETAMINOPHEN 1500 MG: 500 TABLET ORAL at 08:05

## 2019-05-23 RX ADMIN — GABAPENTIN 600 MG: 100 CAPSULE ORAL at 08:05

## 2019-05-23 RX ADMIN — ACETAMINOPHEN 650 MG: 325 TABLET ORAL at 08:05

## 2019-05-23 RX ADMIN — AMIODARONE HYDROCHLORIDE 200 MG: 200 TABLET ORAL at 08:05

## 2019-05-23 RX ADMIN — FUROSEMIDE 80 MG: 10 INJECTION, SOLUTION INTRAMUSCULAR; INTRAVENOUS at 09:05

## 2019-05-23 RX ADMIN — HUMAN ALBUMIN MICROSPHERES AND PERFLUTREN 0.11 MG: 10; .22 INJECTION, SOLUTION INTRAVENOUS at 03:05

## 2019-05-23 RX ADMIN — DABIGATRAN ETEXILATE MESYLATE 150 MG: 150 CAPSULE ORAL at 09:05

## 2019-05-23 RX ADMIN — AMIODARONE HYDROCHLORIDE 200 MG: 200 TABLET ORAL at 09:05

## 2019-05-23 RX ADMIN — SACUBITRIL AND VALSARTAN 1 TABLET: 97; 103 TABLET, FILM COATED ORAL at 08:05

## 2019-05-23 RX ADMIN — SACUBITRIL AND VALSARTAN 1 TABLET: 97; 103 TABLET, FILM COATED ORAL at 09:05

## 2019-05-23 RX ADMIN — FUROSEMIDE 80 MG: 10 INJECTION, SOLUTION INTRAMUSCULAR; INTRAVENOUS at 05:05

## 2019-05-23 RX ADMIN — OXYCODONE HYDROCHLORIDE AND ACETAMINOPHEN 1500 MG: 500 TABLET ORAL at 09:05

## 2019-05-23 NOTE — ASSESSMENT & PLAN NOTE
CXR, elevated BNP, physical exam suggestive of volume overload, and history all consistent with CHF exacerbation  Last echo was in 2017 with EF of 10-15%; will repeat echo  Diuresis with lasix 80 mg IV BID; given 80 mg IV in ER with good response   On carvedilol but given poor EF on previous echo and CHF exacerbation, will hold for now until euvolemia achieved   5.9L urine output overnight.  Remains slightly hypoxic at 90's  Maintain euvolemia by monitoring I/O's and daily weights.  Fluid restriction (2 liters/24 hours)  Low Na diet  Monitor for signs of fluid overload: RR>30, O2 sat<92%, weight gain of >3 lbs, or urinary output <160ml/8hr  Maintain oxygen sats >92% via NC if supplemental oxygen needed.     Patient Vitals for the past 72 hrs (Last 3 readings):   Weight   05/22/19 0843 103 kg (227 lb)

## 2019-05-23 NOTE — ASSESSMENT & PLAN NOTE
BP Readings from Last 3 Encounters:   05/23/19 131/78   05/13/19 109/78   04/24/19 118/78     Continue entresto  Diuresis with lasix  Holding carvedilol as per above  Controlled

## 2019-05-23 NOTE — HPI
Madhav Cortez is a 78 y.o. male who  has a past medical history of *Atrial fibrillation, *Atrial flutter, Acute exacerbation of CHF (congestive heart failure), Anticoagulant long-term use, Anxiety, Arthritis, Atrial flutter, Back pain, Cardiomyopathy, Cataract, Coronary artery disease, Depression, Diabetes mellitus, Heart bloc, Hepatitis B, Hyperlipidemia, Hypertension, Myocardial infarction, Non-STEMI (non-ST elevated myocardial infarction), Nuclear sclerosis - Both Eyes, Post PTCA, Presence of biventricular AICD, Stage 3 chronic kidney disease, Tobacco dependence, Transaminitis, and Ventricular tachycardia.    Patient admitted for CHF exacerbation.  Consulted to Podiatry for LE venous stasis wounds.  Patient recently began following Nivia at wound care once per month and wife has been performing woundcare at home.

## 2019-05-23 NOTE — ASSESSMENT & PLAN NOTE
Pulse Readings from Last 3 Encounters:   05/23/19 60   04/24/19 (!) 58   04/24/19 68     Rate currently controlled  Continue amiodarone  Holding carvedilol as per above   Continue dabigatran

## 2019-05-23 NOTE — CONSULTS
Spoke with Dr. Cordoba with medicine team and Naresh Dinero with podiatry regarding pt BLE wounds.  Podiatry will be managing pt wound care.  Wound care team to sign off.  Please re-consult prn.  [phone]

## 2019-05-23 NOTE — NURSING
Patient identified via spelling of name and date of birth. Patient denies blood transfusion reaction.    Optison 3ml IVP used as contrast for 2 d imaging. Tolerated procedure well.

## 2019-05-23 NOTE — SUBJECTIVE & OBJECTIVE
Interval History: No acute events overnight. Patient states he feels much better. SOB resolved. On Room air. WBC increased. Podiatry consulted to for LE wounds. Wife at bedside.     Review of Systems   Constitutional: Negative for chills and fever.   HENT: Negative for congestion.    Respiratory: Negative for cough, shortness of breath and wheezing.    Cardiovascular: Positive for leg swelling. Negative for chest pain and palpitations.   Gastrointestinal: Negative for abdominal distention and abdominal pain.   Endocrine: Negative.    Skin: Positive for wound.   Hematological: Negative.    Psychiatric/Behavioral: Negative.      Objective:     Vital Signs (Most Recent):  Temp: 97.6 °F (36.4 °C) (05/23/19 0403)  Pulse: 60 (05/23/19 0707)  Resp: 12 (05/23/19 0707)  BP: 131/78 (05/23/19 0707)  SpO2: (!) 89 % (05/23/19 0707) Vital Signs (24h Range):  Temp:  [97.6 °F (36.4 °C)-98.9 °F (37.2 °C)] 97.6 °F (36.4 °C)  Pulse:  [59-65] 60  Resp:  [12-20] 12  SpO2:  [89 %-99 %] 89 %  BP: (116-147)/() 131/78     Weight: 103 kg (227 lb)  Body mass index is 32.57 kg/m².    Intake/Output Summary (Last 24 hours) at 5/23/2019 0943  Last data filed at 5/23/2019 0500  Gross per 24 hour   Intake --   Output 5900 ml   Net -5900 ml      Physical Exam   Constitutional: He is oriented to person, place, and time. He appears well-developed and well-nourished. He does not have a sickly appearance. He does not appear ill.   No acute distress  On room air  Morbidly obese.    HENT:   Head: Normocephalic and atraumatic.   Eyes: No scleral icterus.   Neck: Neck supple.   Cardiovascular: Normal rate and normal heart sounds.   Pulmonary/Chest: Effort normal. No respiratory distress. He has no wheezes.   Blunted breath sounds at the bases bilaterally    Abdominal: Soft. Bowel sounds are normal. He exhibits no distension. There is no tenderness. There is no guarding.   Musculoskeletal: He exhibits edema.   Neurological: He is alert and oriented to  person, place, and time.   Skin:   LE wounds wrapped but noted clear drainage.   Psychiatric: He has a normal mood and affect.   Vitals reviewed.      Significant Labs: All pertinent labs within the past 24 hours have been reviewed.    Significant Imaging: I have reviewed all pertinent imaging results/findings within the past 24 hours.     X-Ray Chest PA And Lateral  Narrative: EXAMINATION:  XR CHEST PA AND LATERAL    CLINICAL HISTORY:  shortness of breaTH;    TECHNIQUE:  Two views of the chest were obtained, with PA and lateral projections submitted.    COMPARISON:  Comparison is made to the most recent prior chest radiograph, dated 11/11/2015.    FINDINGS:  Cardiac pacing device again observed.  Opacity is now noted in both inferior hemithoraces, left greater than right, representing a significant detrimental change since the prior examination referenced above.  These opacities likely represent a combination of bilateral pleural fluid and bibasilar airspace consolidation/volume loss.  Cardiomediastinal silhouette is partially obscured, though as visualized there has been no appreciable change in its appearance since the prior exam.  The mid and upper lung zones remain clear and free of significant airspace consolidation or volume loss.  No pneumothorax.  Impression: Detrimental interval change since 11/11/2015, with the current examination demonstrating the development of abnormal opacity in both inferior hemithoraces likely representing a combination of bilateral pleural fluid and bibasilar airspace consolidation/volume loss, more pronounced on the left side than the right.    Electronically signed by: Elvis Drake MD  Date:    05/22/2019  Time:    10:07

## 2019-05-23 NOTE — PLAN OF CARE
"CM met with patient to complete the discharge planning assessment. Patient was given the [facility] " My Health Packet". CM placed name and phone number on the whiteboard. After reviewing packet, patient expressed understanding of the discharge process.      Mirna Reyes MD   [address] [facility] - [place], [place] - [address]  [address] [facility] [address]  Phone: [phone] Fax: [phone]    [facility]  [address] [id number]  Phone: [phone] Fax: [phone]    [facility] [address] [address] AT [facility] OF Replaced by [facility] & Replaced by [facility]  [address] [place] [address]  Phone: [phone] Fax: [phone]    [facility]  [address] [address]  Phone: [phone] Fax: [phone]      Payor: [employer] MEDICARE / Plan: [employer] MEDICARE HMO / Product Type: Capitation /           05/23/19 1302   Discharge Assessment   Assessment Type Discharge Planning Assessment   Confirmed/corrected address and phone number on facesheet? Yes   Assessment information obtained from? Patient;Caregiver   Expected Length of Stay (days) 3   Communicated expected length of stay with patient/caregiver yes   Prior to hospitilization cognitive status: Alert/Oriented   Prior to hospitalization functional status: Assistive Equipment   Current cognitive status: Alert/Oriented   Current Functional Status: Assistive Equipment   Lives With spouse   Able to Return to Prior Arrangements yes   Is patient able to care for self after discharge? Unable to determine at this time (comments)   Who are your caregiver(s) and their phone number(s)? Joanne Cortez (spouse) [phone]   Patient's perception of discharge disposition home health   Readmission Within the Last 30 Days no previous admission in last 30 days   Patient currently being followed by outpatient case " management? No   Patient currently receives any other outside agency services? No   Equipment Currently Used at Home walker, rolling;cane, straight   Do you have any problems affording any of your prescribed medications? No   Is the patient taking medications as prescribed? yes   Does the patient have transportation home? Yes   Transportation Anticipated family or friend will provide  (Patient's spouse to transport patient home.)   Does the patient receive services at the [facility]? No   Discharge Plan A Home Health   Discharge Plan B Home with family   Patient/Family in Agreement with Plan yes

## 2019-05-23 NOTE — PROGRESS NOTES
Leads fail on tele, pt refusing to let this nurse fix problem, pt also refused this AM  standing weight. Pt wants to sleep , does not want to be bothered.

## 2019-05-23 NOTE — PROGRESS NOTES
[facility] Hospital Medicine  Progress Note    Patient Name: Madhav Cortez  MRN: [id number]  Patient Class: IP- Inpatient   Admission Date: 5/22/2019  Length of Stay: 1 days  Attending Physician: Cheng Cordoba MD  Primary Care Provider: Mirna Reyes MD    [facility] Medicine Team: [facility] MED  Cheng Austin MD    Subjective:     Principal Problem:Acute on chronic systolic heart failure    HPI:  The patient is a 78 y.o. male with PMH of CHF, HTN, atrial fib and atrial flutter on anticoagulation, HLP, and chronic LE wounds who presents with a one month history of worsening SOB. He states no changes in his medications or dietary compliance. SOB has gotten to the point where he cannot sleep. Reports orthopnea and MCBRIDE. Reports an occasional cough with some whitish-yellow phlegm. He also has LE swelling and wounds that are painful. Denies any actual CP, palpations, nausea, vomiting, abdominal distention, dizziness, or trouble urinating.       Hospital Course:  No notes on file    Interval History: No acute events overnight. Patient states he feels much better. SOB resolved. On Room air. WBC increased. Podiatry consulted to for LE wounds. Wife at bedside.     Review of Systems   Constitutional: Negative for chills and fever.   HENT: Negative for congestion.    Respiratory: Negative for cough, shortness of breath and wheezing.    Cardiovascular: Positive for leg swelling. Negative for chest pain and palpitations.   Gastrointestinal: Negative for abdominal distention and abdominal pain.   Endocrine: Negative.    Skin: Positive for wound.   Hematological: Negative.    Psychiatric/Behavioral: Negative.      Objective:     Vital Signs (Most Recent):  Temp: 97.6 °F (36.4 °C) (05/23/19 0403)  Pulse: 60 (05/23/19 0707)  Resp: 12 (05/23/19 0707)  BP: 131/78 (05/23/19 0707)  SpO2: (!) 89 % (05/23/19 0707) Vital Signs (24h Range):  Temp:  [97.6 °F (36.4 °C)-98.9 °F (37.2 °C)] 97.6 °F (36.4 °C)  Pulse:   [59-65] 60  Resp:  [12-20] 12  SpO2:  [89 %-99 %] 89 %  BP: (116-147)/() 131/78     Weight: 103 kg (227 lb)  Body mass index is 32.57 kg/m².    Intake/Output Summary (Last 24 hours) at 5/23/2019 0943  Last data filed at 5/23/2019 0500  Gross per 24 hour   Intake --   Output 5900 ml   Net -5900 ml      Physical Exam   Constitutional: He is oriented to person, place, and time. He appears well-developed and well-nourished. He does not have a sickly appearance. He does not appear ill.   No acute distress  On room air  Morbidly obese.    HENT:   Head: Normocephalic and atraumatic.   Eyes: No scleral icterus.   Neck: Neck supple.   Cardiovascular: Normal rate and normal heart sounds.   Pulmonary/Chest: Effort normal. No respiratory distress. He has no wheezes.   Blunted breath sounds at the bases bilaterally    Abdominal: Soft. Bowel sounds are normal. He exhibits no distension. There is no tenderness. There is no guarding.   Musculoskeletal: He exhibits edema.   Neurological: He is alert and oriented to person, place, and time.   Skin:   LE wounds wrapped but noted clear drainage.   Psychiatric: He has a normal mood and affect.   Vitals reviewed.      Significant Labs: All pertinent labs within the past 24 hours have been reviewed.    Significant Imaging: I have reviewed all pertinent imaging results/findings within the past 24 hours.     X-Ray Chest PA And Lateral  Narrative: EXAMINATION:  XR CHEST PA AND LATERAL    CLINICAL HISTORY:  shortness of breaTH;    TECHNIQUE:  Two views of the chest were obtained, with PA and lateral projections submitted.    COMPARISON:  Comparison is made to the most recent prior chest radiograph, dated 11/11/2015.    FINDINGS:  Cardiac pacing device again observed.  Opacity is now noted in both inferior hemithoraces, left greater than right, representing a significant detrimental change since the prior examination referenced above.  These opacities likely represent a combination of  bilateral pleural fluid and bibasilar airspace consolidation/volume loss.  Cardiomediastinal silhouette is partially obscured, though as visualized there has been no appreciable change in its appearance since the prior exam.  The mid and upper lung zones remain clear and free of significant airspace consolidation or volume loss.  No pneumothorax.  Impression: Detrimental interval change since 11/11/2015, with the current examination demonstrating the development of abnormal opacity in both inferior hemithoraces likely representing a combination of bilateral pleural fluid and bibasilar airspace consolidation/volume loss, more pronounced on the left side than the right.    Electronically signed by: Elvis Drake MD  Date:    05/22/2019  Time:    10:07      Assessment/Plan:      * Acute on chronic systolic heart failure  CXR, elevated BNP, physical exam suggestive of volume overload, and history all consistent with CHF exacerbation  Last echo was in 2017 with EF of 10-15%; will repeat echo  Diuresis with lasix 80 mg IV BID; given 80 mg IV in ER with good response   On carvedilol but given poor EF on previous echo and CHF exacerbation, will hold for now until euvolemia achieved   5.9L urine output overnight.  Remains slightly hypoxic at 90's  Maintain euvolemia by monitoring I/O's and daily weights.  Fluid restriction (2 liters/24 hours)  Low Na diet  Monitor for signs of fluid overload: RR>30, O2 sat<92%, weight gain of >3 lbs, or urinary output <160ml/8hr  Maintain oxygen sats >92% via NC if supplemental oxygen needed.     Patient Vitals for the past 72 hrs (Last 3 readings):   Weight   05/22/19 0843 103 kg (227 lb)           Leukocytosis  Afebrile  Will pan culture  Source likely LE wound. Looks like they need debridement  Will consult podiatry      Venous insufficiency of both lower extremities  Consult podiatry for wound care  Low Na diet  EDWIN hose  Leg elevation       Current use of long term anticoagulation  Plan as  above      Diabetic neuropathy  Continue gabapentin       Hyperlipidemia  Unclear why patient not on statin therapy      Atrial fibrillation  Per atrial flutter       Other insomnia  Takes temazepam at home but not on formulary  Ramelteon PRN     ICD (implantable cardioverter-defibrillator), biventricular, in situ  No acute issues   Follow up with device clinic       Type 2 diabetes mellitus with circulatory disorder, with long-term current use of insulin  POCT Glucose   Date Value Ref Range Status   05/22/2019 179 (H) 70 - 110 mg/dL Final   05/22/2019 152 (H) 70 - 110 mg/dL Final     Patient states only taking basal insulin at home of 40 units QHS but sometimes forgets  Wife took him off prandial insulin due to low sugars  Will continue with basal insulin at 30 units QHS for now  Low dose correction scale   Cont blood glucose monitoring   Avoid hypoglycemia  ADA diet    Atrial flutter  Pulse Readings from Last 3 Encounters:   05/23/19 60   04/24/19 (!) 58   04/24/19 68     Rate currently controlled  Continue amiodarone  Holding carvedilol as per above   Continue dabigatran       Cardiomyopathy, ischemic  Per CHF      Essential hypertension  BP Readings from Last 3 Encounters:   05/23/19 131/78   05/13/19 109/78   04/24/19 118/78     Continue entresto  Diuresis with lasix  Holding carvedilol as per above  Controlled         VTE Risk Mitigation (From admission, onward)        Ordered     dabigatran etexilate capsule 150 mg  2 times daily      05/22/19 1223     Place EDWIN hose  Until discontinued      05/22/19 1223     IP VTE HIGH RISK PATIENT  Once      05/22/19 1223              Cheng Austin MD  Department of Hospital Medicine   [facility]

## 2019-05-23 NOTE — PLAN OF CARE
Problem: Adult Inpatient Plan of Care  Goal: Plan of Care Review  Outcome: Ongoing (interventions implemented as appropriate)  Received from ED this shift. Pt continues to report orthopnea, slept in the recliner chair. Manzo intact. Pt free of injury or falls.

## 2019-05-23 NOTE — ASSESSMENT & PLAN NOTE
LLE wound with periwound erythema and increased drainage.   - Wound was cultured. Will follow results.  - Will likely need antibiotics for SSTI  - Woundcare orders placed.   - Recommend vascular f/u upon DC.  - Podiatry will follow

## 2019-05-23 NOTE — SUBJECTIVE & OBJECTIVE
Scheduled Meds:   amiodarone  200 mg Oral BID    ascorbic acid (vitamin C)  1,500 mg Oral BID    aspirin  81 mg Oral Daily    collagenase   Topical (Top) Daily    dabigatran etexilate  150 mg Oral BID    fluticasone propionate  1 spray Each Nare Daily    furosemide  80 mg Intravenous BID    gabapentin  600 mg Oral TID    insulin detemir U-100  30 Units Subcutaneous BID    sacubitril-valsartan  1 tablet Oral BID     Continuous Infusions:  PRN Meds:dextrose 50%, dextrose 50%, glucagon (human recombinant), glucose, glucose, ondansetron, ramelteon, sodium chloride 0.9%, sodium chloride 0.9%    Review of patient's allergies indicates:  No Known Allergies     Past Medical History:   Diagnosis Date    *Atrial fibrillation     *Atrial flutter     Acute exacerbation of CHF (congestive heart failure) 8/2/2013    Anticoagulant long-term use     Anxiety     Arthritis     Atrial flutter     Back pain     Cardiomyopathy     Cataract     Coronary artery disease     Depression     Diabetes mellitus     Heart bloc     Hepatitis B     Hyperlipidemia 4/1/2014    Hypertension     Myocardial infarction     Non-STEMI (non-ST elevated myocardial infarction) 5/26/2013    Nuclear sclerosis - Both Eyes 2/18/2013    Post PTCA 8/7/2012    Presence of biventricular AICD 2/23/2016    Stage 3 chronic kidney disease 12/11/2017    Tobacco dependence     Transaminitis 4/1/2014    Ventricular tachycardia      Past Surgical History:   Procedure Laterality Date    ABLATION N/A 4/8/2013    Per[facility] CATH LAB    APPENDECTOMY      BIOPSY LIVER AND ULTRASOUND N/A 5/26/2014    Performed by [facility] at [facility] OR [facility]    CARDIAC DEFIBRILLATOR PLACEMENT      CARDIAC DEFIBRILLATOR PLACEMENT      CARDIOVERSION N/A 10/10/2013    Performed by [facility] at [facility] CATH LAB    CARDIOVERSION N/A 8/19/2013    Performed by [facility] at [facility] CATH LAB    COLONOSCOPY       CORONARY ANGIOPLASTY WITH STENT PLACEMENT      FRACTURE SURGERY      L arm    INSERT / REPLACE / REMOVE PACEMAKER  12/15    bi-V upgrade    INSERTION-ICD-BIVENTRICULAR N/A 2015    Per[facility] at [facility] CATH LAB    RADIOFREQUENCY ABLATION      STEROID INJECTION KNEE Bilateral     received about every 4 months    TONSILLECTOMY      TRANSESOPHAGEAL ECHOCARDIOGRAM (DIOGO) N/A 10/10/2013    Performed by [facility] at [facility] CATH LAB    TRANSESOPHAGEAL ECHOCARDIOGRAM (DIOGO) N/A 2013    Performed by [facility] at [facility] CATH LAB    VASCULAR SURGERY         Family History     Problem Relation (Age of Onset)    Bipolar disorder Son    Bladder Cancer Father    Cancer Father, Paternal Grandmother, Paternal Grandfather, Maternal Uncle    Diabetes Father, Mother    Heart attack Mother    Heart disease Maternal Grandmother    No Known Problems Maternal Grandfather, Daughter, Daughter, Son, Son, Maternal Aunt, Paternal Aunt, Paternal Uncle, Sister, Brother        Tobacco Use    Smoking status: Former Smoker     Last attempt to quit: 1987     Years since quittin.9    Smokeless tobacco: Never Used    Tobacco comment: 25 years ago   Substance and Sexual Activity    Alcohol use: No    Drug use: No    Sexual activity: Never     Partners: Female     Review of Systems   Constitutional: Negative for chills and fever.   Gastrointestinal: Negative for nausea and vomiting.   Skin: Positive for color change and wound.     Objective:     Vital Signs (Most Recent):  Temp: 97 °F (36.1 °C) (19 0730)  Pulse: 60 (19 0730)  Resp: 18 (19 0730)  BP: 127/75 (19 0730)  SpO2: 95 % (19 07) Vital Signs (24h Range):  Temp:  [97 °F (36.1 °C)-98.9 °F (37.2 °C)] 97 °F (36.1 °C)  Pulse:  [59-65] 60  Resp:  [12-20] 18  SpO2:  [89 %-98 %] 95 %  BP: (116-147)/() 127/75     Weight: 103 kg (227 lb)  Body mass index is 32.57 kg/m².    Foot Exam    General  General  Appearance: appears stated age and healthy   Orientation: alert and oriented to person, place, and time       Right Foot/Ankle     Inspection and Palpation  Ecchymosis: none  Tenderness: none   Swelling: (+2 pitting)    Neurovascular  Dorsalis pedis: absent  Posterior tibial: absent  Saphenous nerve sensation: diminished  Tibial nerve sensation: diminished  Superficial peroneal nerve sensation: diminished  Deep peroneal nerve sensation: diminished  Sural nerve sensation: diminished      Left Foot/Ankle      Inspection and Palpation  Ecchymosis: none  Tenderness: none   Swelling: (+2 pitting)    Neurovascular  Dorsalis pedis: absent  Posterior tibial: absent  Saphenous nerve sensation: diminished  Tibial nerve sensation: diminished  Superficial peroneal nerve sensation: diminished  Deep peroneal nerve sensation: diminished  Sural nerve sensation: diminished            Laboratory:  CBC:   Recent Labs   Lab 05/23/19  0700   WBC 16.00*   RBC 4.43*   HGB 13.8*   HCT 41.4      MCV 94   MCH 31.2*   MCHC 33.3     CMP:   Recent Labs   Lab 05/23/19  0701   GLU 39*   CALCIUM 9.5   ALBUMIN 3.4*   PROT 6.4      K 3.3*   CO2 27      BUN 15   CREATININE 0.8   ALKPHOS 80   ALT 60*   AST 64*   BILITOT 1.0       Diagnostic Results:  None    Clinical Findings:  LLE: draining wound with periwound erythema and edema. Also with hemosiderotic changes consistent with chronic venous stasis. No fluctuance or crepitus.       RLE: Toe wound and abrasions to the shin.  Toe wound with fibrotic base. No signs of infection.

## 2019-05-23 NOTE — PLAN OF CARE
Problem: Diabetes Comorbidity  Goal: Blood Glucose Level Within Desired Range    Intervention: Maintain Glycemic Control  Patient alert and orientated , follows commands , bed in low position , call light within reach and patient demonstrated proper usage. Bed locked with brake , Room clutter free and personal items with reach, addressed care plan for today , all questions answered and allow patient time for clarification. Medications and diet fluid balance  instructions with signs and symptoms and the importance to continue once discharged .Reviewed discharged , next  Follow up appointment. Home therapy discussed blood sugars are monitored at home per patient and wife.

## 2019-05-23 NOTE — ASSESSMENT & PLAN NOTE
POCT Glucose   Date Value Ref Range Status   05/22/2019 179 (H) 70 - 110 mg/dL Final   05/22/2019 152 (H) 70 - 110 mg/dL Final     Patient states only taking basal insulin at home of 40 units QHS but sometimes forgets  Wife took him off prandial insulin due to low sugars  Will continue with basal insulin at 30 units QHS for now  Low dose correction scale   Cont blood glucose monitoring   Avoid hypoglycemia  ADA diet

## 2019-05-23 NOTE — ASSESSMENT & PLAN NOTE
Afebrile  Will pan culture  Source likely LE wound. Looks like they need debridement  Will consult podiatry

## 2019-05-23 NOTE — CONSULTS
[facility]  Podiatry  Consult Note    Patient Name: Madhav Cortez  MRN: [id number]  Admission Date: 5/22/2019  Hospital Length of Stay: 1 days  Attending Physician: Cheng Cordoba MD  Primary Care Provider: Mirna Reyes MD     Inpatient consult to Podiatry  Consult performed by: Naresh Dinero MD  Consult ordered by: Cheng Cordoba MD        Subjective:     History of Present Illness:  Madhav Cortez is a 78 y.o. male who  has a past medical history of *Atrial fibrillation, *Atrial flutter, Acute exacerbation of CHF (congestive heart failure), Anticoagulant long-term use, Anxiety, Arthritis, Atrial flutter, Back pain, Cardiomyopathy, Cataract, Coronary artery disease, Depression, Diabetes mellitus, Heart bloc, Hepatitis B, Hyperlipidemia, Hypertension, Myocardial infarction, Non-STEMI (non-ST elevated myocardial infarction), Nuclear sclerosis - Both Eyes, Post PTCA, Presence of biventricular AICD, Stage 3 chronic kidney disease, Tobacco dependence, Transaminitis, and Ventricular tachycardia.    Patient admitted for CHF exacerbation.  Consulted to Podiatry for LE venous stasis wounds.  Patient recently began following Nivia at wound care once per month and wife has been performing woundcare at home.         Scheduled Meds:   amiodarone  200 mg Oral BID    ascorbic acid (vitamin C)  1,500 mg Oral BID    aspirin  81 mg Oral Daily    collagenase   Topical (Top) Daily    dabigatran etexilate  150 mg Oral BID    fluticasone propionate  1 spray Each Nare Daily    furosemide  80 mg Intravenous BID    gabapentin  600 mg Oral TID    insulin detemir U-100  30 Units Subcutaneous BID    sacubitril-valsartan  1 tablet Oral BID     Continuous Infusions:  PRN Meds:dextrose 50%, dextrose 50%, glucagon (human recombinant), glucose, glucose, ondansetron, ramelteon, sodium chloride 0.9%, sodium chloride 0.9%    Review of patient's allergies indicates:  No Known Allergies     Past Medical  History:   Diagnosis Date    *Atrial fibrillation     *Atrial flutter     Acute exacerbation of CHF (congestive heart failure) 8/2/2013    Anticoagulant long-term use     Anxiety     Arthritis     Atrial flutter     Back pain     Cardiomyopathy     Cataract     Coronary artery disease     Depression     Diabetes mellitus     Heart bloc     Hepatitis B     Hyperlipidemia 4/1/2014    Hypertension     Myocardial infarction     Non-STEMI (non-ST elevated myocardial infarction) 5/26/2013    Nuclear sclerosis - Both Eyes 2/18/2013    Post PTCA 8/7/2012    Presence of biventricular AICD 2/23/2016    Stage 3 chronic kidney disease 12/11/2017    Tobacco dependence     Transaminitis 4/1/2014    Ventricular tachycardia      Past Surgical History:   Procedure Laterality Date    ABLATION N/A 4/8/2013    [facility] CATH LAB    APPENDECTOMY      BIOPSY LIVER AND ULTRASOUND N/A 5/26/2014    [facility]    CARDIAC DEFIBRILLATOR PLACEMENT      CARDIAC DEFIBRILLATOR PLACEMENT      CARDIOVERSION N/A 10/10/2013    Performed by [facility] at [facility] CATH LAB    CARDIOVERSION N/A 8/19/2013    Performed by [facility] at [facility] CATH LAB    COLONOSCOPY      CORONARY ANGIOPLASTY WITH STENT PLACEMENT      FRACTURE SURGERY      L arm    INSERT / REPLACE / REMOVE PACEMAKER  12/15    bi-V upgrade    INSERTION-ICD-BIVENTRICULAR N/A 11/11/2015    Performed by [facility] at [facility] CATH LAB    RADIOFREQUENCY ABLATION      STEROID INJECTION KNEE Bilateral     received about every 4 months    TONSILLECTOMY      TRANSESOPHAGEAL ECHOCARDIOGRAM (DIOGO) N/A 10/10/2013    Performed by [facility] at [facility] CATH LAB    TRANSESOPHAGEAL ECHOCARDIOGRAM (DIOGO) N/A 8/19/2013    Performed by [facility] at [facility] CATH LAB    VASCULAR SURGERY         Family History     Problem Relation (Age of Onset)    Bipolar disorder Son    Bladder  Cancer Father    Cancer Father, Paternal Grandmother, Paternal Grandfather, Maternal Uncle    Diabetes Father, Mother    Heart attack Mother    Heart disease Maternal Grandmother    No Known Problems Maternal Grandfather, Daughter, Daughter, Son, Son, Maternal Aunt, Paternal Aunt, Paternal Uncle, Sister, Brother        Tobacco Use    Smoking status: Former Smoker     Last attempt to quit: 1987     Years since quittin.9    Smokeless tobacco: Never Used    Tobacco comment: 25 years ago   Substance and Sexual Activity    Alcohol use: No    Drug use: No    Sexual activity: Never     Partners: Female     Review of Systems   Constitutional: Negative for chills and fever.   Gastrointestinal: Negative for nausea and vomiting.   Skin: Positive for color change and wound.     Objective:     Vital Signs (Most Recent):  Temp: 97 °F (36.1 °C) (19)  Pulse: 60 (19)  Resp: 18 (19)  BP: 127/75 (19)  SpO2: 95 % (19) Vital Signs (24h Range):  Temp:  [97 °F (36.1 °C)-98.9 °F (37.2 °C)] 97 °F (36.1 °C)  Pulse:  [59-65] 60  Resp:  [12-20] 18  SpO2:  [89 %-98 %] 95 %  BP: (116-147)/() 127/75     Weight: 103 kg (227 lb)  Body mass index is 32.57 kg/m².    Foot Exam    General  General Appearance: appears stated age and healthy   Orientation: alert and oriented to person, place, and time       Right Foot/Ankle     Inspection and Palpation  Ecchymosis: none  Tenderness: none   Swelling: (+2 pitting)    Neurovascular  Dorsalis pedis: absent  Posterior tibial: absent  Saphenous nerve sensation: diminished  Tibial nerve sensation: diminished  Superficial peroneal nerve sensation: diminished  Deep peroneal nerve sensation: diminished  Sural nerve sensation: diminished      Left Foot/Ankle      Inspection and Palpation  Ecchymosis: none  Tenderness: none   Swelling: (+2 pitting)    Neurovascular  Dorsalis pedis: absent  Posterior tibial: absent  Saphenous nerve sensation:  diminished  Tibial nerve sensation: diminished  Superficial peroneal nerve sensation: diminished  Deep peroneal nerve sensation: diminished  Sural nerve sensation: diminished            Laboratory:  CBC:   Recent Labs   Lab 05/23/19  0700   WBC 16.00*   RBC 4.43*   HGB 13.8*   HCT 41.4      MCV 94   MCH 31.2*   MCHC 33.3     CMP:   Recent Labs   Lab 05/23/19  0701   GLU 39*   CALCIUM 9.5   ALBUMIN 3.4*   PROT 6.4      K 3.3*   CO2 27      BUN 15   CREATININE 0.8   ALKPHOS 80   ALT 60*   AST 64*   BILITOT 1.0       Diagnostic Results:  None    Clinical Findings:  LLE: draining wound with periwound erythema and edema. Also with hemosiderotic changes consistent with chronic venous stasis. No fluctuance or crepitus.       RLE: Toe wound and abrasions to the shin.  Toe wound with fibrotic base. No signs of infection.          Assessment/Plan:     Venous insufficiency of both lower extremities  LLE wound with periwound erythema and increased drainage.   - Wound was cultured. Will follow results.  - Will likely need antibiotics for SSTI  - Woundcare orders placed.   - Wounds dressed with aquacel foam, aquacel extra, xeroform and DSD today.  - Recommend vascular f/u upon DC.  - Podiatry will follow          Thank you for your consult. I will follow-up with patient. Please contact us if you have any additional questions.    Naresh Dinero MD  Podiatry  [facility]

## 2019-05-24 LAB
ALBUMIN SERPL BCP-MCNC: 3.2 G/DL (ref 3.5–5.2)
ALP SERPL-CCNC: 86 U/L (ref 55–135)
ALT SERPL W/O P-5'-P-CCNC: 53 U/L (ref 10–44)
ANION GAP SERPL CALC-SCNC: 11 MMOL/L (ref 8–16)
AST SERPL-CCNC: 53 U/L (ref 10–40)
BASOPHILS # BLD AUTO: 0.03 K/UL (ref 0–0.2)
BASOPHILS NFR BLD: 0.3 % (ref 0–1.9)
BILIRUB SERPL-MCNC: 1 MG/DL (ref 0.1–1)
BUN SERPL-MCNC: 13 MG/DL (ref 8–23)
CALCIUM SERPL-MCNC: 9.1 MG/DL (ref 8.7–10.5)
CHLORIDE SERPL-SCNC: 97 MMOL/L (ref 95–110)
CO2 SERPL-SCNC: 33 MMOL/L (ref 23–29)
CREAT SERPL-MCNC: 0.8 MG/DL (ref 0.5–1.4)
DIFFERENTIAL METHOD: ABNORMAL
EOSINOPHIL # BLD AUTO: 0.1 K/UL (ref 0–0.5)
EOSINOPHIL NFR BLD: 0.5 % (ref 0–8)
ERYTHROCYTE [DISTWIDTH] IN BLOOD BY AUTOMATED COUNT: 16.7 % (ref 11.5–14.5)
EST. GFR  (AFRICAN AMERICAN): >60 ML/MIN/1.73 M^2
EST. GFR  (NON AFRICAN AMERICAN): >60 ML/MIN/1.73 M^2
GLUCOSE SERPL-MCNC: 207 MG/DL (ref 70–110)
HCT VFR BLD AUTO: 38.5 % (ref 40–54)
HGB BLD-MCNC: 12.9 G/DL (ref 14–18)
IMM GRANULOCYTES # BLD AUTO: 0.03 K/UL (ref 0–0.04)
IMM GRANULOCYTES NFR BLD AUTO: 0.3 % (ref 0–0.5)
LYMPHOCYTES # BLD AUTO: 1.2 K/UL (ref 1–4.8)
LYMPHOCYTES NFR BLD: 13 % (ref 18–48)
MAGNESIUM SERPL-MCNC: 1.8 MG/DL (ref 1.6–2.6)
MCH RBC QN AUTO: 31.2 PG (ref 27–31)
MCHC RBC AUTO-ENTMCNC: 33.5 G/DL (ref 32–36)
MCV RBC AUTO: 93 FL (ref 82–98)
MONOCYTES # BLD AUTO: 1.2 K/UL (ref 0.3–1)
MONOCYTES NFR BLD: 12.9 % (ref 4–15)
NEUTROPHILS # BLD AUTO: 6.7 K/UL (ref 1.8–7.7)
NEUTROPHILS NFR BLD: 73 % (ref 38–73)
NRBC BLD-RTO: 0 /100 WBC
PHOSPHATE SERPL-MCNC: 3.4 MG/DL (ref 2.7–4.5)
PLATELET # BLD AUTO: 206 K/UL (ref 150–350)
PMV BLD AUTO: 11.5 FL (ref 9.2–12.9)
POCT GLUCOSE: 117 MG/DL (ref 70–110)
POCT GLUCOSE: 140 MG/DL (ref 70–110)
POCT GLUCOSE: 186 MG/DL (ref 70–110)
POCT GLUCOSE: 216 MG/DL (ref 70–110)
POCT GLUCOSE: 89 MG/DL (ref 70–110)
POTASSIUM SERPL-SCNC: 3.4 MMOL/L (ref 3.5–5.1)
PROT SERPL-MCNC: 6.2 G/DL (ref 6–8.4)
RBC # BLD AUTO: 4.14 M/UL (ref 4.6–6.2)
SODIUM SERPL-SCNC: 141 MMOL/L (ref 136–145)
WBC # BLD AUTO: 9.21 K/UL (ref 3.9–12.7)

## 2019-05-24 PROCEDURE — 11000001 HC ACUTE MED/SURG PRIVATE ROOM: Mod: HCNC

## 2019-05-24 PROCEDURE — 99222 PR INITIAL HOSPITAL CARE,LEVL II: ICD-10-PCS | Mod: HCNC,,, | Performed by: INTERNAL MEDICINE

## 2019-05-24 PROCEDURE — 83735 ASSAY OF MAGNESIUM: CPT | Mod: HCNC

## 2019-05-24 PROCEDURE — 97161 PT EVAL LOW COMPLEX 20 MIN: CPT | Mod: HCNC

## 2019-05-24 PROCEDURE — 80053 COMPREHEN METABOLIC PANEL: CPT | Mod: HCNC

## 2019-05-24 PROCEDURE — 99232 SBSQ HOSP IP/OBS MODERATE 35: CPT | Mod: HCNC,,, | Performed by: HOSPITALIST

## 2019-05-24 PROCEDURE — 25000003 PHARM REV CODE 250: Mod: HCNC | Performed by: HOSPITALIST

## 2019-05-24 PROCEDURE — 84100 ASSAY OF PHOSPHORUS: CPT | Mod: HCNC

## 2019-05-24 PROCEDURE — 85025 COMPLETE CBC W/AUTO DIFF WBC: CPT | Mod: HCNC

## 2019-05-24 PROCEDURE — 63600175 PHARM REV CODE 636 W HCPCS: Mod: HCNC | Performed by: HOSPITALIST

## 2019-05-24 PROCEDURE — 99222 1ST HOSP IP/OBS MODERATE 55: CPT | Mod: HCNC,,, | Performed by: INTERNAL MEDICINE

## 2019-05-24 PROCEDURE — 25000003 PHARM REV CODE 250: Mod: HCNC | Performed by: STUDENT IN AN ORGANIZED HEALTH CARE EDUCATION/TRAINING PROGRAM

## 2019-05-24 PROCEDURE — 97530 THERAPEUTIC ACTIVITIES: CPT | Mod: HCNC

## 2019-05-24 PROCEDURE — 99232 PR SUBSEQUENT HOSPITAL CARE,LEVL II: ICD-10-PCS | Mod: HCNC,,, | Performed by: HOSPITALIST

## 2019-05-24 PROCEDURE — 99499 UNLISTED E&M SERVICE: CPT | Mod: HCNC,,, | Performed by: PHYSICIAN ASSISTANT

## 2019-05-24 PROCEDURE — 25000003 PHARM REV CODE 250: Mod: HCNC | Performed by: PHYSICIAN ASSISTANT

## 2019-05-24 PROCEDURE — 99499 NO LOS: ICD-10-PCS | Mod: HCNC,,, | Performed by: PHYSICIAN ASSISTANT

## 2019-05-24 PROCEDURE — 36415 COLL VENOUS BLD VENIPUNCTURE: CPT | Mod: HCNC

## 2019-05-24 RX ORDER — FUROSEMIDE 10 MG/ML
80 INJECTION INTRAMUSCULAR; INTRAVENOUS DAILY
Status: DISCONTINUED | OUTPATIENT
Start: 2019-05-25 | End: 2019-05-26

## 2019-05-24 RX ORDER — CEFPODOXIME PROXETIL 200 MG/1
200 TABLET, FILM COATED ORAL EVERY 12 HOURS
Status: DISCONTINUED | OUTPATIENT
Start: 2019-05-24 | End: 2019-05-28 | Stop reason: HOSPADM

## 2019-05-24 RX ORDER — POTASSIUM CHLORIDE 20 MEQ/1
40 TABLET, EXTENDED RELEASE ORAL ONCE
Status: COMPLETED | OUTPATIENT
Start: 2019-05-24 | End: 2019-05-24

## 2019-05-24 RX ADMIN — SACUBITRIL AND VALSARTAN 1 TABLET: 97; 103 TABLET, FILM COATED ORAL at 09:05

## 2019-05-24 RX ADMIN — ASPIRIN 81 MG CHEWABLE TABLET 81 MG: 81 TABLET CHEWABLE at 09:05

## 2019-05-24 RX ADMIN — DABIGATRAN ETEXILATE MESYLATE 150 MG: 150 CAPSULE ORAL at 09:05

## 2019-05-24 RX ADMIN — INSULIN DETEMIR 30 UNITS: 100 INJECTION, SOLUTION SUBCUTANEOUS at 09:05

## 2019-05-24 RX ADMIN — GABAPENTIN 600 MG: 100 CAPSULE ORAL at 10:05

## 2019-05-24 RX ADMIN — GABAPENTIN 600 MG: 100 CAPSULE ORAL at 09:05

## 2019-05-24 RX ADMIN — OXYCODONE HYDROCHLORIDE AND ACETAMINOPHEN 1500 MG: 500 TABLET ORAL at 10:05

## 2019-05-24 RX ADMIN — CEFPODOXIME PROXETIL 200 MG: 200 TABLET, FILM COATED ORAL at 10:05

## 2019-05-24 RX ADMIN — AMIODARONE HYDROCHLORIDE 200 MG: 200 TABLET ORAL at 10:05

## 2019-05-24 RX ADMIN — OXYCODONE HYDROCHLORIDE AND ACETAMINOPHEN 1500 MG: 500 TABLET ORAL at 09:05

## 2019-05-24 RX ADMIN — COLLAGENASE SANTYL: 250 OINTMENT TOPICAL at 09:05

## 2019-05-24 RX ADMIN — FUROSEMIDE 80 MG: 10 INJECTION, SOLUTION INTRAMUSCULAR; INTRAVENOUS at 09:05

## 2019-05-24 RX ADMIN — SACUBITRIL AND VALSARTAN 1 TABLET: 97; 103 TABLET, FILM COATED ORAL at 10:05

## 2019-05-24 RX ADMIN — DABIGATRAN ETEXILATE MESYLATE 150 MG: 150 CAPSULE ORAL at 10:05

## 2019-05-24 RX ADMIN — GABAPENTIN 600 MG: 100 CAPSULE ORAL at 03:05

## 2019-05-24 RX ADMIN — POTASSIUM CHLORIDE 40 MEQ: 1500 TABLET, EXTENDED RELEASE ORAL at 01:05

## 2019-05-24 RX ADMIN — AMIODARONE HYDROCHLORIDE 200 MG: 200 TABLET ORAL at 09:05

## 2019-05-24 NOTE — CONSULTS
[facility]  Infectious Disease  Consult Note    Patient Name: Madhav Cortez  MRN: [id number]  Admission Date: 5/22/2019  Hospital Length of Stay: 2 days  Attending Physician: Cheng Cordoba MD  Primary Care Provider: Mirna Reyes MD       Inpatient consult to Infectious Diseases  Consult performed by: Joseph Zayas Jr. PA  Consult ordered by: Cheng Cordoba MD        Consult received.  Full consult to follow.    SAILAJA Fairchild  Infectious Disease  [facility]

## 2019-05-24 NOTE — PLAN OF CARE
Problem: Adult Inpatient Plan of Care  Goal: Plan of Care Review  Outcome: Ongoing (interventions implemented as appropriate)  No acute events throughout shift. VS and assessment performed per orders. Pain medication given as ordered, moderate relief. Dressings to foot intact. Manzo intact.  Plan of care reviewed with pt, all concerns addressed. Pt free from injury or any falls.

## 2019-05-24 NOTE — PLAN OF CARE
Problem: Occupational Therapy Goal  Goal: Occupational Therapy Goal  Goals to be achieved by 6/6/19    Pt will complete functional transfers with supervision  Pt will perform G/H tasks with supervision standing at sink  Pt will complete UB dressing with supervision  Pt will be modified independent for BUE AROM HEP for improved endurance  Pt will perform toilet transfer with supervision  Outcome: Ongoing (interventions implemented as appropriate)  Zulma Ye, OT  5/23/19

## 2019-05-24 NOTE — SUBJECTIVE & OBJECTIVE
Interval History: No acute events overnight. Patient states he feels much better. SOB resolved. On Room air. WBC resolved. Wife at bedside and updated on plan of care    Review of Systems   Constitutional: Negative for chills and fever.   HENT: Negative for congestion.    Respiratory: Negative for cough, shortness of breath and wheezing.    Cardiovascular: Positive for leg swelling. Negative for chest pain and palpitations.   Gastrointestinal: Negative for abdominal distention and abdominal pain.   Endocrine: Negative.    Skin: Positive for wound.   Hematological: Negative.    Psychiatric/Behavioral: Negative.      Objective:     Vital Signs (Most Recent):  Temp: 97.2 °F (36.2 °C) (05/24/19 0735)  Pulse: 60 (05/24/19 0735)  Resp: 14 (05/24/19 0735)  BP: 123/74 (05/24/19 0735)  SpO2: (!) 93 % (05/24/19 0735) Vital Signs (24h Range):  Temp:  [97.2 °F (36.2 °C)-98.2 °F (36.8 °C)] 97.2 °F (36.2 °C)  Pulse:  [60-63] 60  Resp:  [12-20] 14  SpO2:  [91 %-94 %] 93 %  BP: (118-153)/(71-93) 123/74     Weight: 99 kg (218 lb 4.1 oz)  Body mass index is 31.32 kg/m².    Intake/Output Summary (Last 24 hours) at 5/24/2019 0948  Last data filed at 5/24/2019 0500  Gross per 24 hour   Intake 360 ml   Output 3500 ml   Net -3140 ml      Physical Exam   Constitutional: He is oriented to person, place, and time. He appears well-developed and well-nourished. He does not have a sickly appearance. He does not appear ill.   No acute distress  On room air  Morbidly obese.    HENT:   Head: Normocephalic and atraumatic.   Eyes: No scleral icterus.   Neck: Neck supple.   Cardiovascular: Normal rate and normal heart sounds.   Pulmonary/Chest: Effort normal. No respiratory distress. He has no wheezes.   Blunted breath sounds at the bases bilaterally    Abdominal: Soft. Bowel sounds are normal. He exhibits no distension. There is no tenderness. There is no guarding.   Musculoskeletal: He exhibits edema.   Neurological: He is alert and oriented to  person, place, and time.   Skin:   LE wounds wrapped but noted clear drainage.   Psychiatric: He has a normal mood and affect.   Vitals reviewed.      Significant Labs: All pertinent labs within the past 24 hours have been reviewed.    Significant Imaging: I have reviewed all pertinent imaging results/findings within the past 24 hours.     Transthoracic echo (TTE) 2D with Color Flow  · Severely decreased left ventricular systolic function. The estimated   ejection fraction is 10-15%. Severe global hypokinetic wall motion.  · Grade II (moderate) left ventricular diastolic dysfunction consistent   with pseudonormalization. Elevated left atrial pressure.  · Moderate left ventricular enlargement.  · The right ventricle is not well seen.  · Mild to moderate tricuspid regurgitation.  · The estimated PA systolic pressure is 44 mm Hg  · Intermediate central venous pressure (8 mm Hg).  · Pulmonary hypertension present.  · Severe left atrial enlargement.

## 2019-05-24 NOTE — PLAN OF CARE
05/24/19 1616   Discharge Reassessment   Assessment Type Discharge Planning Reassessment   Provided patient/caregiver education on the expected discharge date and the discharge plan Yes   Do you have any problems affording any of your prescribed medications? No   Discharge Plan A Home Health   Discharge Plan B Home with family   Post-Acute Status   Post-Acute Authorization Home Health/Hospice   Home Health/Hospice Status Awaiting Internal Medical Clearance   Discharge Delays None known at this time

## 2019-05-24 NOTE — SUBJECTIVE & OBJECTIVE
Past Medical History:   Diagnosis Date    *Atrial fibrillation     *Atrial flutter     Acute exacerbation of CHF (congestive heart failure) 8/2/2013    Anticoagulant long-term use     Anxiety     Arthritis     Atrial flutter     Back pain     Cardiomyopathy     Cataract     Coronary artery disease     Depression     Diabetes mellitus     Heart bloc     Hepatitis B     Hyperlipidemia 4/1/2014    Hypertension     Myocardial infarction     Non-STEMI (non-ST elevated myocardial infarction) 5/26/2013    Nuclear sclerosis - Both Eyes 2/18/2013    Post PTCA 8/7/2012    Presence of biventricular AICD 2/23/2016    Stage 3 chronic kidney disease 12/11/2017    Tobacco dependence     Transaminitis 4/1/2014    Ventricular tachycardia        Past Surgical History:   Procedure Laterality Date    ABLATION N/A 4/8/2013    Performed [facility] CATH LAB    APPENDECTOMY      BIOPSY LIVER AND ULTRASOUND N/A 5/26/2014    [facility]    CARDIAC DEFIBRILLATOR PLACEMENT      CARDIAC DEFIBRILLATOR PLACEMENT      CARDIOVERSION N/A 10/10/2013    Performed [facility] CATH [facility]    CARDIOVERSION N/A 8/19/2013    Performed by [facility] CATH LAB    COLONOSCOPY      CORONARY ANGIOPLASTY WITH STENT PLACEMENT      FRACTURE SURGERY      L arm    INSERT / REPLACE / REMOVE PACEMAKER  12/15    bi-V upgrade    INSERTION-ICD-BIVENTRICULAR N/A 11/11/2015    Performed by [facility] at [facility] CATH LAB    RADIOFREQUENCY ABLATION      STEROID INJECTION KNEE Bilateral     received about every 4 months    TONSILLECTOMY      TRANSESOPHAGEAL ECHOCARDIOGRAM (DIOGO) N/A 10/10/2013    Performed by [facility] CATH LAB    TRANSESOPHAGEAL ECHOCARDIOGRAM (DIOGO) N/A 8/19/2013    Performed by [facility] at [facility] CATH LAB    VASCULAR SURGERY         Review of patient's allergies indicates:  No Known  "Allergies    Medications:  Medications Prior to Admission   Medication Sig    amiodarone (PACERONE) 200 MG Tab TAKE 1 TABLET TWICE DAILY    ascorbic acid (VITAMIN C) 500 MG tablet Take 1,500 mg by mouth 2 (two) times daily.     aspirin 81 MG Chew Take 1 tablet (81 mg total) by mouth once daily.    beta carotene-C-E-lutein-min29 5,000-60-30-2 unit-mg-unit-mg Tab Take 1 capsule by mouth once daily.     carvedilol (COREG) 25 MG tablet Take 1 tablet (25 mg total) by mouth 2 (two) times daily.    co-enzyme Q-10 30 mg capsule 800 units daily    dabigatran etexilate (PRADAXA) 150 mg Cap Take 1 capsule (150 mg total) by mouth 2 (two) times daily. "Do NOT break, chew, or open capsules."    ENTRESTO  mg per tablet TAKE 1 TABLET TWICE DAILY    fluticasone (FLONASE) 50 mcg/actuation nasal spray 1 spray (50 mcg total) by Each Nare route once daily.    furosemide (LASIX) 40 MG tablet TAKE 1 TABLET ONE TIME DAILY (Patient taking differently: TAKE 1/2 TABLET ONE TIME DAILY)    gabapentin (NEURONTIN) 300 MG capsule TAKE 2 CAPSULES BY MOUTH THREE TIMES DAILY    glucosamine-chondroitin 500-400 mg tablet Take 1 tablet by mouth 2 (two) times daily.    LANTUS SOLOSTAR U-100 INSULIN 100 unit/mL (3 mL) InPn pen INJECT 40 UNITS INTO THE SKIN EVERY EVENING.    levothyroxine (SYNTHROID) 75 MCG tablet Take 1 tablet (75 mcg total) by mouth once daily.    magnesium oxide (MAG-OX) 400 mg tablet Take 400 mg by mouth 2 (two) times daily.     melatonin 5 mg Tab Take 1 tablet by mouth every evening.     RIBOSE ORAL Take 1 tablet by mouth once daily.     spironolactone (ALDACTONE) 25 MG tablet TAKE 1 TABLET ONE TIME DAILY    temazepam (RESTORIL) 15 mg Cap Take 1 capsule (15 mg total) by mouth every evening.    ACCU-CHEK JIE Misc USE AS INSTRUCTED    ACCU-CHEK SMARTVIEW TEST STRIP Strp CHECK BLOOD SUGAR THREE TIMES DAILY    blood-glucose meter kit Accu check jie meter Use as instructed    ciclopirox (LOPROX) 0.77 % Crea " "APPLY TO AFFECTED AREAS BILATERAL AXILLA TWICE DAILY UNTIL CLEAR    clindamycin (CLEOCIN T) 1 % lotion  apply to affected area twice daily as directed ( apply first)    ketoconazole (NIZORAL) 2 % cream Apply topically once daily.    nitroGLYCERIN (NITROSTAT) 0.4 MG SL tablet Place 1 tablet (0.4 mg total) under the tongue every 5 (five) minutes as needed for Chest pain.    nystatin (MYCOSTATIN) cream Apply topically 2 (two) times daily.    pen needle, diabetic 31 gauge x 1/4" Ndle Use 4 times daily    senna-docusate 8.6-50 mg (PERICOLACE) 8.6-50 mg per tablet Take 1 tablet by mouth 2 (two) times daily.    tizanidine (ZANAFLEX) 2 MG tablet TAKE 1 TABLET EVERY 8 HOURS AS NEEDED FOR MUSCLE PAIN    triamcinolone acetonide 0.025% (KENALOG) 0.025 % cream apply to affected area twice daily as directed    UBIDECARENONE (COQ-10 ORAL) Take 800 mg by mouth once daily. Takes 100mg tablet at lunch, and 600mg at dinner     Antibiotics (From admission, onward)    None        Antifungals (From admission, onward)    None        Antivirals (From admission, onward)    None           Immunization History   Administered Date(s) Administered    Influenza 10/27/2014    Influenza - High Dose 12/26/2013, 09/13/2015, 12/11/2017, 09/18/2018    Influenza - Trivalent (ADULT) 10/27/2014    Pneumococcal Conjugate - 13 Valent 12/11/2017    Pneumococcal Conjugate - 7 Valent 02/13/2003       Family History     Problem Relation (Age of Onset)    Bipolar disorder Son    Bladder Cancer Father    Cancer Father, Paternal Grandmother, Paternal Grandfather, Maternal Uncle    Diabetes Father, Mother    Heart attack Mother    Heart disease Maternal Grandmother    No Known Problems Maternal Grandfather, Daughter, Daughter, Son, Son, Maternal Aunt, Paternal Aunt, Paternal Uncle, Sister, Brother        Social History     Socioeconomic History    Marital status:      Spouse name: Not on file    Number of children: Not on file    Years of " education: Not on file    Highest education level: Not on file   Occupational History    Not on file   Social Needs    Financial resource strain: Not on file    Food insecurity:     Worry: Not on file     Inability: Not on file    Transportation needs:     Medical: Not on file     Non-medical: Not on file   Tobacco Use    Smoking status: Former Smoker     Last attempt to quit: 1987     Years since quittin.9    Smokeless tobacco: Never Used    Tobacco comment: 25 years ago   Substance and Sexual Activity    Alcohol use: No    Drug use: No    Sexual activity: Never     Partners: Female   Lifestyle    Physical activity:     Days per week: Not on file     Minutes per session: Not on file    Stress: Not on file   Relationships    Social connections:     Talks on phone: Not on file     Gets together: Not on file     Attends Temple service: Not on file     Active member of club or organization: Not on file     Attends meetings of clubs or organizations: Not on file     Relationship status: Not on file   Other Topics Concern    Not on file   Social History Narrative    Retired. Spends a lot of time gambling in the [employer]     Review of Systems   Constitutional: Negative for appetite change, chills, diaphoresis, fatigue, fever and unexpected weight change.   HENT: Negative for congestion, ear pain, sore throat and tinnitus.    Eyes: Negative for pain, redness and visual disturbance.   Respiratory: Positive for shortness of breath. Negative for cough and wheezing.    Cardiovascular: Positive for leg swelling. Negative for chest pain and palpitations.   Gastrointestinal: Negative for abdominal pain, constipation, diarrhea, rectal pain and vomiting.   Endocrine: Negative for cold intolerance and heat intolerance.   Genitourinary: Negative for dysuria, flank pain, frequency, hematuria and urgency.   Musculoskeletal: Negative for arthralgias, back pain, myalgias and neck pain.   Skin: Positive for color  change and wound. Negative for rash.   Allergic/Immunologic: Negative for immunocompromised state.   Neurological: Negative for dizziness, light-headedness, numbness and headaches.   Hematological: Negative for adenopathy. Does not bruise/bleed easily.   Psychiatric/Behavioral: Negative for confusion and sleep disturbance. The patient is not nervous/anxious.      Objective:     Vital Signs (Most Recent):  Temp: 97.2 °F (36.2 °C) (05/24/19 0735)  Pulse: 60 (05/24/19 0735)  Resp: 14 (05/24/19 0735)  BP: 123/74 (05/24/19 0735)  SpO2: (!) 93 % (05/24/19 0735) Vital Signs (24h Range):  Temp:  [97.2 °F (36.2 °C)-98.2 °F (36.8 °C)] 97.2 °F (36.2 °C)  Pulse:  [60-63] 60  Resp:  [12-20] 14  SpO2:  [91 %-94 %] 93 %  BP: (118-153)/(71-93) 123/74     Weight: 99 kg (218 lb 4.1 oz)  Body mass index is 31.32 kg/m².    Estimated Creatinine Clearance: 86.9 mL/min (based on SCr of 0.8 mg/dL).    Physical Exam   Constitutional: He is oriented to person, place, and time. He appears well-developed and well-nourished. No distress.       HENT:   Head: Normocephalic and atraumatic.   Mouth/Throat: Uvula is midline, oropharynx is clear and moist and mucous membranes are normal. No oral lesions.   Eyes: Pupils are equal, round, and reactive to light. EOM are normal. No scleral icterus.   Cardiovascular: Normal rate and regular rhythm. Exam reveals no gallop and no friction rub.   No murmur heard.  very distant HSs   Pulmonary/Chest: Effort normal. No stridor. No respiratory distress. He has decreased breath sounds in the right upper field, the right middle field, the right lower field, the left upper field, the left middle field and the left lower field. He has no wheezes. He has no rales.   Abdominal: Soft. Bowel sounds are normal. He exhibits no distension and no mass. There is no hepatosplenomegaly. There is no tenderness. There is no rebound and no guarding.   Musculoskeletal: He exhibits no edema.   Neurological: He is alert and  oriented to person, place, and time.   Skin: Skin is warm, dry and intact. No rash noted.   Psychiatric: He has a normal mood and affect. His behavior is normal.         R GT            Significant Labs:   Blood Culture:   Recent Labs   Lab 05/23/19  0900 05/23/19  0901   LABBLOO No Growth to date  No Growth to date No Growth to date  No Growth to date     CBC:   Recent Labs   Lab 05/23/19  0700 05/24/19  0352   WBC 16.00* 9.21   HGB 13.8* 12.9*   HCT 41.4 38.5*    206     CMP:   Recent Labs   Lab 05/23/19  0701 05/24/19  0352    141   K 3.3* 3.4*    97   CO2 27 33*   GLU 39* 207*   BUN 15 13   CREATININE 0.8 0.8   CALCIUM 9.5 9.1   PROT 6.4 6.2   ALBUMIN 3.4* 3.2*   BILITOT 1.0 1.0   ALKPHOS 80 86   AST 64* 53*   ALT 60* 53*   ANIONGAP 10 11   EGFRNONAA >60.0 >60.0     Wound Culture:   Recent Labs   Lab 05/23/19  1134   LABAERO GRAM NEGATIVE WAYNE  Moderate  Identification and susceptibility pending       All pertinent labs within the past 24 hours have been reviewed.    Significant Imaging: I have reviewed all pertinent imaging results/findings within the past 24 hours.   X-Ray Tibia Fibula 2 View Left [[id number]] Resulted: 05/24/19 1048   Order Status: Sent Updated: 05/24/19 1049   X-Ray Chest PA And Lateral [[id number]] Resulted: 05/22/19 1007   Order Status: Completed Updated: 05/22/19 1009   Narrative:     EXAMINATION:  XR CHEST PA AND LATERAL    CLINICAL HISTORY:  shortness of breaTH;    TECHNIQUE:  Two views of the chest were obtained, with PA and lateral projections submitted.    COMPARISON:  Comparison is made to the most recent prior chest radiograph, dated 11/11/2015.    FINDINGS:  Cardiac pacing device again observed.  Opacity is now noted in both inferior hemithoraces, left greater than right, representing a significant detrimental change since the prior examination referenced above.  These opacities likely represent a combination of bilateral pleural fluid and bibasilar airspace  consolidation/volume loss.  Cardiomediastinal silhouette is partially obscured, though as visualized there has been no appreciable change in its appearance since the prior exam.  The mid and upper lung zones remain clear and free of significant airspace consolidation or volume loss.  No pneumothorax.   Impression:       Detrimental interval change since 11/11/2015, with the current examination demonstrating the development of abnormal opacity in both inferior hemithoraces likely representing a combination of bilateral pleural fluid and bibasilar airspace consolidation/volume loss, more pronounced on the left side than the right.      Electronically signed by: Elvis Drake MD  Date: 05/22/2019  Time: 10:07

## 2019-05-24 NOTE — CONSULTS
[facility]  Infectious Disease  Consult Note    Patient Name: Madhav Cortez  MRN: [id number]  Admission Date: 5/22/2019  Hospital Length of Stay: 2 days  Attending Physician: Cheng Cordoba MD  Primary Care Provider: Mirna Reyes MD     Isolation Status: No active isolations    Patient information was obtained from patient and spouse/SO.      Consults  Assessment/Plan:     Venous insufficiency of both lower extremities  - has CVS BL LEs with stasis wounds and R foot wound without signs of active infection  - LLE wound culture with C koseri which is suspected as colonizer   - rec 7d treatment with cefpodoxime 200mg po bid for skin and soft tissue emanuel  - rec wound care consult for topical antimicrobial overlays on skin (hydrafera blue) to reduce colonization  - rec patient fu in wound care clinic at [place] for ongoing care and vascular sx for venous stasis - will set up  - will sign off    - Patient hs GNR in urine but is asymptomatic - rec DC mckenzie ASAP and no treatment    Thank you for your consult. I will sign off. Please contact us if you have any additional questions.    SAILAJA Fairchild  Infectious Disease  [facility]    Subjective:     Principal Problem: Acute on chronic systolic heart failure    HPI: Madhav Cortez is a 78 y.o. male who  has a past medical history of *Atrial fibrillation, *Atrial flutter, Acute exacerbation of CHF (congestive heart failure), Anticoagulant long-term use, Anxiety, Arthritis, Atrial flutter, Back pain, Cardiomyopathy, Cataract, Coronary artery disease, Depression, Diabetes mellitus, Heart bloc, Hepatitis B, Hyperlipidemia, Hypertension, Myocardial infarction, Non-STEMI (non-ST elevated myocardial infarction), Nuclear sclerosis - Both Eyes, Post PTCA, Presence of biventricular AICD, Stage 3 chronic kidney disease, Tobacco dependence, Transaminitis, and Ventricular tachycardia.     Patient admitted for CHF exacerbation.   Consulted to Podiatry for LE venous stasis wounds.  Patient recently began following Nivia at wound care once per month and wife has been performing woundcare at home.    Per discussion with him his legs have been very swollen.  Wounds on his legs keep appearing but he is at a baseline of intermittent stabbing pains in legs that is well treated with Tylenol.  Slight odor from wounds per wife.  The patient denies any recent fever, chills, or sweats.  Not on abx.  Podiatry has seen pt and rec wound care and possibly abx.      Past Medical History:   Diagnosis Date    *Atrial fibrillation     *Atrial flutter     Acute exacerbation of CHF (congestive heart failure) 8/2/2013    Anticoagulant long-term use     Anxiety     Arthritis     Atrial flutter     Back pain     Cardiomyopathy     Cataract     Coronary artery disease     Depression     Diabetes mellitus     Heart bloc     Hepatitis B     Hyperlipidemia 4/1/2014    Hypertension     Myocardial infarction     Non-STEMI (non-ST elevated myocardial infarction) 5/26/2013    Nuclear sclerosis - Both Eyes 2/18/2013    Post PTCA 8/7/2012    Presence of biventricular AICD 2/23/2016    Stage 3 chronic kidney disease 12/11/2017    Tobacco dependence     Transaminitis 4/1/2014    Ventricular tachycardia        Past Surgical History:   Procedure Laterality Date    ABLATION N/A 4/8/2013    [facility] CATH LAB    APPENDECTOMY      BIOPSY LIVER AND ULTRASOUND N/A 5/26/2014    [facility]    CARDIAC DEFIBRILLATOR PLACEMENT      CARDIAC DEFIBRILLATOR PLACEMENT      CARDIOVERSION N/A 10/10/2013    Per[facility] CATH LAB    CARDIOVERSION N/A 8/19/2013    Per[facility] by [facility] CATH LAB    COLONOSCOPY      CORONARY ANGIOPLASTY WITH STENT PLACEMENT      FRACTURE SURGERY      L arm    INSERT / REPLACE / REMOVE PACEMAKER  12/15    bi-V upgrade  "   INSERTION-ICD-BIVENTRICULAR N/A 11/11/2015    [facility]    RADIOFREQUENCY ABLATION      STEROID INJECTION KNEE Bilateral     received about every 4 months    TONSILLECTOMY      TRANSESOPHAGEAL ECHOCARDIOGRAM (DIOGO) N/A 10/10/2013    Per[facility] CATH LAB    TRANSESOPHAGEAL ECHOCARDIOGRAM (DIOGO) N/A 8/19/2013    Per[facility] by [facility] CATH LAB    VASCULAR SURGERY         Review of patient's allergies indicates:  No Known Allergies    Medications:  Medications Prior to Admission   Medication Sig    amiodarone (PACERONE) 200 MG Tab TAKE 1 TABLET TWICE DAILY    ascorbic acid (VITAMIN C) 500 MG tablet Take 1,500 mg by mouth 2 (two) times daily.     aspirin 81 MG Chew Take 1 tablet (81 mg total) by mouth once daily.    beta carotene-C-E-lutein-min29 5,000-60-30-2 unit-mg-unit-mg Tab Take 1 capsule by mouth once daily.     carvedilol (COREG) 25 MG tablet Take 1 tablet (25 mg total) by mouth 2 (two) times daily.    co-enzyme Q-10 30 mg capsule 800 units daily    dabigatran etexilate (PRADAXA) 150 mg Cap Take 1 capsule (150 mg total) by mouth 2 (two) times daily. "Do NOT break, chew, or open capsules."    ENTRESTO  mg per tablet TAKE 1 TABLET TWICE DAILY    fluticasone (FLONASE) 50 mcg/actuation nasal spray 1 spray (50 mcg total) by Each Nare route once daily.    furosemide (LASIX) 40 MG tablet TAKE 1 TABLET ONE TIME DAILY (Patient taking differently: TAKE 1/2 TABLET ONE TIME DAILY)    gabapentin (NEURONTIN) 300 MG capsule TAKE 2 CAPSULES BY MOUTH THREE TIMES DAILY    glucosamine-chondroitin 500-400 mg tablet Take 1 tablet by mouth 2 (two) times daily.    LANTUS SOLOSTAR U-100 INSULIN 100 unit/mL (3 mL) InPn pen INJECT 40 UNITS INTO THE SKIN EVERY EVENING.    levothyroxine (SYNTHROID) 75 MCG tablet Take 1 tablet (75 mcg total) by mouth once daily.    magnesium oxide (MAG-OX) 400 mg tablet Take 400 mg by mouth 2 (two) " "times daily.     melatonin 5 mg Tab Take 1 tablet by mouth every evening.     RIBOSE ORAL Take 1 tablet by mouth once daily.     spironolactone (ALDACTONE) 25 MG tablet TAKE 1 TABLET ONE TIME DAILY    temazepam (RESTORIL) 15 mg Cap Take 1 capsule (15 mg total) by mouth every evening.    ACCU-CHEK JIE Misc USE AS INSTRUCTED    ACCU-CHEK SMARTVIEW TEST STRIP Strp CHECK BLOOD SUGAR THREE TIMES DAILY    blood-glucose meter kit Accu check jie meter Use as instructed    ciclopirox (LOPROX) 0.77 % Crea APPLY TO AFFECTED AREAS BILATERAL AXILLA TWICE DAILY UNTIL CLEAR    clindamycin (CLEOCIN T) 1 % lotion  apply to affected area twice daily as directed ( apply first)    ketoconazole (NIZORAL) 2 % cream Apply topically once daily.    nitroGLYCERIN (NITROSTAT) 0.4 MG SL tablet Place 1 tablet (0.4 mg total) under the tongue every 5 (five) minutes as needed for Chest pain.    nystatin (MYCOSTATIN) cream Apply topically 2 (two) times daily.    pen needle, diabetic 31 gauge x 1/4" Ndle Use 4 times daily    senna-docusate 8.6-50 mg (PERICOLACE) 8.6-50 mg per tablet Take 1 tablet by mouth 2 (two) times daily.    tizanidine (ZANAFLEX) 2 MG tablet TAKE 1 TABLET EVERY 8 HOURS AS NEEDED FOR MUSCLE PAIN    triamcinolone acetonide 0.025% (KENALOG) 0.025 % cream apply to affected area twice daily as directed    UBIDECARENONE (COQ-10 ORAL) Take 800 mg by mouth once daily. Takes 100mg tablet at lunch, and 600mg at dinner     Antibiotics (From admission, onward)    None        Antifungals (From admission, onward)    None        Antivirals (From admission, onward)    None           Immunization History   Administered Date(s) Administered    Influenza 10/27/2014    Influenza - High Dose 12/26/2013, 09/13/2015, 12/11/2017, 09/18/2018    Influenza - Trivalent (ADULT) 10/27/2014    Pneumococcal Conjugate - 13 Valent 12/11/2017    Pneumococcal Conjugate - 7 Valent 02/13/2003       Family History     Problem Relation (Age of " Onset)    Bipolar disorder Son    Bladder Cancer Father    Cancer Father, Paternal Grandmother, Paternal Grandfather, Maternal Uncle    Diabetes Father, Mother    Heart attack Mother    Heart disease Maternal Grandmother    No Known Problems Maternal Grandfather, Daughter, Daughter, Son, Son, Maternal Aunt, Paternal Aunt, Paternal Uncle, Sister, Brother        Social History     Socioeconomic History    Marital status:      Spouse name: Not on file    Number of children: Not on file    Years of education: Not on file    Highest education level: Not on file   Occupational History    Not on file   Social Needs    Financial resource strain: Not on file    Food insecurity:     Worry: Not on file     Inability: Not on file    Transportation needs:     Medical: Not on file     Non-medical: Not on file   Tobacco Use    Smoking status: Former Smoker     Last attempt to quit: 1987     Years since quittin.9    Smokeless tobacco: Never Used    Tobacco comment: 25 years ago   Substance and Sexual Activity    Alcohol use: No    Drug use: No    Sexual activity: Never     Partners: Female   Lifestyle    Physical activity:     Days per week: Not on file     Minutes per session: Not on file    Stress: Not on file   Relationships    Social connections:     Talks on phone: Not on file     Gets together: Not on file     Attends [employer]ism service: Not on file     Active member of club or organization: Not on file     Attends meetings of clubs or organizations: Not on file     Relationship status: Not on file   Other Topics Concern    Not on file   Social History Narrative    Retired. Spends a lot of time gambling in the casino     Review of Systems   Constitutional: Negative for appetite change, chills, diaphoresis, fatigue, fever and unexpected weight change.   HENT: Negative for congestion, ear pain, sore throat and tinnitus.    Eyes: Negative for pain, redness and visual disturbance.   Respiratory:  Positive for shortness of breath. Negative for cough and wheezing.    Cardiovascular: Positive for leg swelling. Negative for chest pain and palpitations.   Gastrointestinal: Negative for abdominal pain, constipation, diarrhea, rectal pain and vomiting.   Endocrine: Negative for cold intolerance and heat intolerance.   Genitourinary: Negative for dysuria, flank pain, frequency, hematuria and urgency.   Musculoskeletal: Negative for arthralgias, back pain, myalgias and neck pain.   Skin: Positive for color change and wound. Negative for rash.   Allergic/Immunologic: Negative for immunocompromised state.   Neurological: Negative for dizziness, light-headedness, numbness and headaches.   Hematological: Negative for adenopathy. Does not bruise/bleed easily.   Psychiatric/Behavioral: Negative for confusion and sleep disturbance. The patient is not nervous/anxious.      Objective:     Vital Signs (Most Recent):  Temp: 97.2 °F (36.2 °C) (05/24/19 0735)  Pulse: 60 (05/24/19 0735)  Resp: 14 (05/24/19 0735)  BP: 123/74 (05/24/19 0735)  SpO2: (!) 93 % (05/24/19 0735) Vital Signs (24h Range):  Temp:  [97.2 °F (36.2 °C)-98.2 °F (36.8 °C)] 97.2 °F (36.2 °C)  Pulse:  [60-63] 60  Resp:  [12-20] 14  SpO2:  [91 %-94 %] 93 %  BP: (118-153)/(71-93) 123/74     Weight: 99 kg (218 lb 4.1 oz)  Body mass index is 31.32 kg/m².    Estimated Creatinine Clearance: 86.9 mL/min (based on SCr of 0.8 mg/dL).    Physical Exam   Constitutional: He is oriented to person, place, and time. He appears well-developed and well-nourished. No distress.       HENT:   Head: Normocephalic and atraumatic.   Mouth/Throat: Uvula is midline, oropharynx is clear and moist and mucous membranes are normal. No oral lesions.   Eyes: Pupils are equal, round, and reactive to light. EOM are normal. No scleral icterus.   Cardiovascular: Normal rate and regular rhythm. Exam reveals no gallop and no friction rub.   No murmur heard.  very distant HSs   Pulmonary/Chest: Effort  normal. No stridor. No respiratory distress. He has decreased breath sounds in the right upper field, the right middle field, the right lower field, the left upper field, the left middle field and the left lower field. He has no wheezes. He has no rales.   Abdominal: Soft. Bowel sounds are normal. He exhibits no distension and no mass. There is no hepatosplenomegaly. There is no tenderness. There is no rebound and no guarding.   Musculoskeletal: He exhibits no edema.   Neurological: He is alert and oriented to person, place, and time.   Skin: Skin is warm, dry and intact. No rash noted.   Psychiatric: He has a normal mood and affect. His behavior is normal.         R GT            Significant Labs:   Blood Culture:   Recent Labs   Lab 05/23/19  0900 05/23/19  0901   LABBLOO No Growth to date  No Growth to date No Growth to date  No Growth to date     CBC:   Recent Labs   Lab 05/23/19  0700 05/24/19  0352   WBC 16.00* 9.21   HGB 13.8* 12.9*   HCT 41.4 38.5*    206     CMP:   Recent Labs   Lab 05/23/19  0701 05/24/19  0352    141   K 3.3* 3.4*    97   CO2 27 33*   GLU 39* 207*   BUN 15 13   CREATININE 0.8 0.8   CALCIUM 9.5 9.1   PROT 6.4 6.2   ALBUMIN 3.4* 3.2*   BILITOT 1.0 1.0   ALKPHOS 80 86   AST 64* 53*   ALT 60* 53*   ANIONGAP 10 11   EGFRNONAA >60.0 >60.0     Wound Culture:   Recent Labs   Lab 05/23/19  1134   LABAERO GRAM NEGATIVE WAYNE  Moderate  Identification and susceptibility pending       All pertinent labs within the past 24 hours have been reviewed.    Significant Imaging: I have reviewed all pertinent imaging results/findings within the past 24 hours.   X-Ray Tibia Fibula 2 View Left [[id number]] Resulted: 05/24/19 1048   Order Status: Sent Updated: 05/24/19 1049   X-Ray Chest PA And Lateral [[id number]] Resulted: 05/22/19 1007   Order Status: Completed Updated: 05/22/19 1009   Narrative:     EXAMINATION:  XR CHEST PA AND LATERAL    CLINICAL HISTORY:  shortness of  breaTH;    TECHNIQUE:  Two views of the chest were obtained, with PA and lateral projections submitted.    COMPARISON:  Comparison is made to the most recent prior chest radiograph, dated 11/11/2015.    FINDINGS:  Cardiac pacing device again observed.  Opacity is now noted in both inferior hemithoraces, left greater than right, representing a significant detrimental change since the prior examination referenced above.  These opacities likely represent a combination of bilateral pleural fluid and bibasilar airspace consolidation/volume loss.  Cardiomediastinal silhouette is partially obscured, though as visualized there has been no appreciable change in its appearance since the prior exam.  The mid and upper lung zones remain clear and free of significant airspace consolidation or volume loss.  No pneumothorax.   Impression:       Detrimental interval change since 11/11/2015, with the current examination demonstrating the development of abnormal opacity in both inferior hemithoraces likely representing a combination of bilateral pleural fluid and bibasilar airspace consolidation/volume loss, more pronounced on the left side than the right.      Electronically signed by: Elvis Drake MD  Date: 05/22/2019  Time: 10:07

## 2019-05-24 NOTE — PT/OT/SLP EVAL
"Physical Therapy Evaluation    Patient Name:  Madhav Cortez   MRN:  [id number]    Recommendations:     Discharge Recommendations:  home health PT   Discharge Equipment Recommendations: none   Barriers to discharge: Inaccessible home    Assessment:     Madhav Cortez is a 78 y.o. male admitted with a medical diagnosis of Acute on chronic systolic heart failure.  He presents with the following impairments/functional limitations:  impaired self care skills, impaired balance, weakness, impaired functional mobilty, gait instability, impaired endurance, pain, decreased lower extremity function, impaired cardiopulmonary response to activity.  Pt demo decreased functional mobility secondary to weakness and unsteadiness on feet.  Performed bed mobility c CGA-mod A, transfer c CGA and gait c min A using HHAx1.  Pt ambulating short distance and demo decreased gait speed, flat foot contact, posterior lean, unsteadiness c no overt LOB, and c/o weakness.  Pt safe to transfer to/from bedside chair c assistance of 1x person. Pt would benefit from continued skilled acute PT 3x/wk to improve functional mobility.  Recommending pt receive PT services in  setting following d/c from hospital once medically cleared.      Rehab Prognosis: Good; patient would benefit from acute skilled PT services to address these deficits and reach maximum level of function.    Recent Surgery: * No surgery found *      Plan:     During this hospitalization, patient to be seen 3 x/week to address the identified rehab impairments via gait training, therapeutic exercises, therapeutic activities, neuromuscular re-education and progress toward the following goals:    · Plan of Care Expires:  06/18/19    Subjective     Chief Complaint: weakness; fatigue  Patient/Family Comments/goals: "I can't walk too much because of my knees. They're bone on bone now."  Pain/Comfort:  · Pain Rating 1: (reports pain at mckenzie insertion)    Patients cultural, spiritual, " Yarsani conflicts given the current situation: no    Living Environment:  Pt lives c wife in [facility] c [facility].  PTA not driving/working and reports 2x falls.  Enjoys playing poker.  Prior to admission, patients level of function was independent and using rollator for mobility.  Equipment used at home: rollator, cane, straight.  DME owned (not currently used): none.  Upon discharge, patient will have assistance from family.    Objective:     Communicated with RN prior to session.  Patient found HOB elevated with peripheral IV, telemetry, mckenzie catheter  upon PT entry to room.    General Precautions: Standard, fall   Orthopedic Precautions:N/A   Braces: N/A     Exams:  · Cognitive Exam:  Patient is oriented to Person, Place, Time and Situation  · RLE ROM: WFL  · RLE Strength: grossly 4/5  · LLE ROM: WFL  · LLE Strength: grossly 4/5    Functional Mobility:  · Bed Mobility:     · Rolling Left:  contact guard assistance  · Scooting: contact guard assistance  · Supine to Sit: moderate assistance  · Transfers:     · Sit to Stand:  contact guard assistance with hand-held assist  · Gait: 20ft c HHAx1 min A   · demo decreased gait speed, flat foot contact, posterior lean, unsteadiness c no overt LOB, and c/o weakness  · Balance: sitting (S); standing (CGA- min A)      Therapeutic Activities and Exercises:   Pt educated on: PT role/POC; safety c mobility; benefits of OOB activities; performing therex; d/c recs - v/u  -Sat EOB x6mins  -sit<>stand from bed in lowest position 3x  -therex (LAQ, hip flex) 1x5 ea    AM-PAC 6 CLICK MOBILITY  Total Score:17     Patient left sitting EOB with all lines intact, call button in reach and RN and wife present.    GOALS:   Multidisciplinary Problems     Physical Therapy Goals        Problem: Physical Therapy Goal    Goal Priority Disciplines Outcome Goal Variances Interventions   Physical Therapy Goal     PT, PT/OT Ongoing (interventions implemented as appropriate)     Description:  Goals to  be met by: 2019     Patient will increase functional independence with mobility by performin. Supine to sit with Modified [place]  2. Sit to supine with Modified [place]  3. Sit to stand transfer with Supervision  4. Bed to chair transfer with Supervision using Rolling Walker  5. Gait  x 100 feet with Supervision using Rolling Walker.   6. Ascend/descend 5 stair with bilateral Handrails Contact Guard Assistance                    History:     Past Medical History:   Diagnosis Date    *Atrial fibrillation     *Atrial flutter     Acute exacerbation of CHF (congestive heart failure) 2013    Anticoagulant long-term use     Anxiety     Arthritis     Atrial flutter     Back pain     Cardiomyopathy     Cataract     Coronary artery disease     Depression     Diabetes mellitus     Heart bloc     Hepatitis B     Hyperlipidemia 2014    Hypertension     Myocardial infarction     Non-STEMI (non-ST elevated myocardial infarction) 2013    Nuclear sclerosis - Both Eyes 2013    Post PTCA 2012    Presence of biventricular AICD 2016    Stage 3 chronic kidney disease 2017    Tobacco dependence     Transaminitis 2014    Ventricular tachycardia        Past Surgical History:   Procedure Laterality Date    ABLATION N/A 2013    [facility] CATH LAB    APPENDECTOMY      BIOPSY LIVER AND ULTRASOUND N/A 2014    Performed by [facility] Diagnostic Provider at [facility] OR [facility]    CARDIAC DEFIBRILLATOR PLACEMENT      CARDIAC DEFIBRILLATOR PLACEMENT      CARDIOVERSION N/A 10/10/2013    Performed by [facility] CATH LAB    CARDIOVERSION N/A 2013    Per[facility] CATH LAB    COLONOSCOPY      CORONARY ANGIOPLASTY WITH STENT PLACEMENT      FRACTURE SURGERY      L arm    INSERT / REPLACE / REMOVE PACEMAKER  12/15    bi-V upgrade    INSERTION-ICD-BIVENTRICULAR N/A 2015    Per[facility]  [facility] CATH [facility]    RADIOFREQUENCY ABLATION      STEROID INJECTION KNEE Bilateral     received about every 4 months    TONSILLECTOMY      TRANSESOPHAGEAL ECHOCARDIOGRAM (DIOGO) N/A 10/10/2013    Per[facility] by [facility] CATH LAB    TRANSESOPHAGEAL ECHOCARDIOGRAM (DIOGO) N/A 8/19/2013    Performed by [facility] CATH LAB    VASCULAR SURGERY         Time Tracking:     PT Received On: 05/24/19  PT Start Time: 1138     PT Stop Time: 1159  PT Total Time (min): 21 min     Billable Minutes: Evaluation 10 min and Therapeutic Activity 10 min      Guero Mejia, PT  05/24/2019

## 2019-05-24 NOTE — ASSESSMENT & PLAN NOTE
Improved   Afebrile  Cultures pending  Source likely LE wound. Cont wound care orders  Will consult podiatry

## 2019-05-24 NOTE — PT/OT/SLP EVAL
"Occupational Therapy   Evaluation    Name: Madhav Cortez  MRN: [id number]  Admitting Diagnosis:  Acute on chronic systolic heart failure      Recommendations:     Discharge Recommendations: home health OT  Discharge Equipment Recommendations:   Shower chair  Barriers to discharge:  None    Assessment:     Madhav Cortez is a 78 y.o. male with a medical diagnosis of Acute on chronic systolic heart failure.  He presents with need for encouragement to participate, self-aware of functional limitations and safety with functional mobility, and the following performance deficits affecting function: impaired endurance, impaired self care skills, impaired functional mobilty, gait instability, impaired cardiopulmonary response to activity, edema.      Rehab Prognosis: Good; patient would benefit from acute skilled OT services to address these deficits and reach maximum level of function.       Plan:     Patient to be seen 3 x/week to address the above listed problems via self-care/home management, therapeutic activities, therapeutic exercises  · Plan of Care Expires:  6/6/19  · Plan of Care Reviewed with: patient    Subjective   "I  need my coffee and my breakfast, that's what I need!"    Chief Complaint: Patient tired and wanting coffee  Patient/Family Comments/goals: Return to prior level of function    Occupational Profile:  Living Environment: Patient lives with his wife (home 24/7 and in good health), in [facility], 4-5 AUBREE, bilateral rails.   Previous level of function: Modified independent,patient reports using RW inside the home in open spaces, WC for community (doctors appts) Pt's wife completes grocery shopping and driving to     Equipment Used at Home:  walker, rolling, cane, straight  Assistance upon Discharge: Patient's wife available    Pain/Comfort:  ·  No complaint of pain    Patients cultural, spiritual, Sikhism conflicts given the current situation: no    Objective:     Communicated with:Nsg. prior to " session.  Patient found up in chair with blood pressure cuff, telemetry, peripheral IV upon OT entry to room.    General Precautions: Standard, fall   Orthopedic Precautions:N/A   Braces: N/A     Occupational Performance:    Bed Mobility:    · NT - patient up in bedside chair upon arrival    Functional Mobility/Transfers:  · Sit <> stand from bedside chair with CGA, cues for hand placement and improved technique; limited participation with transfers and monbility      Activities of Daily Living:  · Feeding:  modified independence seated in beside chair  · Grooming: stand by assistance seated in bedside chair  · Upper Body Dressing: minimum assistance    · Lower Body Dressing: moderate assistance per patient request due to edema, reporting requiring some A intermittently at home at baseline    Cognitive/Visual Perceptual:  Cognitive/Psychosocial Skills:     -       Oriented to: Person, Place, Time and Situation   -       Follows Commands/attention:Follows multistep  commands  -       Safety awareness/insight to disability: intact   -       Mood/Affect/Coping skills/emotional control: Appropriate to situation    Physical Exam:  Edema:  Moderate BLE's, R > L  Upper Extremity Range of Motion:     -       Right Upper Extremity: WFL  -       Left Upper Extremity: WFL  Upper Extremity Strength:    -       Right Upper Extremity: WFL  -       Left Upper Extremity: WFL   Strength:    -       Right Upper Extremity: WFL  -       Left Upper Extremity: WFL    AMPAC 6 Click ADL:  AMPAC Total Score: 18    Treatment & Education:  Evaluation completed, patient's performance with ADL's and functional t/f's outlined above.  Pt educated on roles/goals of OT and POC.  Pt educated on safety awareness, energy conservation, and adaptive strategies.  Education:    Patient left up in chair with all lines intact, call button in reach and nursing notified    GOALS:   Multidisciplinary Problems     Occupational Therapy Goals        Problem:  Occupational Therapy Goal    Goal Priority Disciplines Outcome Interventions   Occupational Therapy Goal     OT, PT/OT Ongoing (interventions implemented as appropriate)    Description:  Goals to be achieved by 6/6/19    Pt will complete functional transfers with supervision  Pt will perform G/H tasks with supervision standing at sink  Pt will complete UB dressing with supervision  Pt will be modified independent for BUE AROM HEP for improved endurance  Pt will perform toilet transfer with supervision                    History:     Past Medical History:   Diagnosis Date    *Atrial fibrillation     *Atrial flutter     Acute exacerbation of CHF (congestive heart failure) 8/2/2013    Anticoagulant long-term use     Anxiety     Arthritis     Atrial flutter     Back pain     Cardiomyopathy     Cataract     Coronary artery disease     Depression     Diabetes mellitus     Heart bloc     Hepatitis B     Hyperlipidemia 4/1/2014    Hypertension     Myocardial infarction     Non-STEMI (non-ST elevated myocardial infarction) 5/26/2013    Nuclear sclerosis - Both Eyes 2/18/2013    Post PTCA 8/7/2012    Presence of biventricular AICD 2/23/2016    Stage 3 chronic kidney disease 12/11/2017    Tobacco dependence     Transaminitis 4/1/2014    Ventricular tachycardia        Past Surgical History:   Procedure Laterality Date    ABLATION N/A 4/8/2013    [facility]    APPENDECTOMY      BIOPSY LIVER AND ULTRASOUND N/A 5/26/2014    [facility]    CARDIAC DEFIBRILLATOR PLACEMENT      CARDIAC DEFIBRILLATOR PLACEMENT      CARDIOVERSION N/A 10/10/2013    Performed [facility]    CARDIOVERSION N/A 8/19/2013    [facility]    COLONOSCOPY      CORONARY ANGIOPLASTY WITH STENT PLACEMENT      FRACTURE SURGERY      L arm    INSERT / REPLACE / REMOVE PACEMAKER  12/15    bi-V  upgrade    INSERTION-ICD-BIVENTRICULAR N/A 11/11/2015    Performed by Humberto Koehler MD at [facility] CATH LAB    RADIOFREQUENCY ABLATION      STEROID INJECTION KNEE Bilateral     received about every 4 months    TONSILLECTOMY      TRANSESOPHAGEAL ECHOCARDIOGRAM (DIOGO) N/A 10/10/2013    Performed by [facility] Koehler MD at [facility] CATH LAB    TRANSESOPHAGEAL ECHOCARDIOGRAM (DIOGO) N/A 8/19/2013    Performed by [facility]oehler MD at [facility] CATH LAB    VASCULAR SURGERY         Time Tracking:     OT Date of Treatment: 05/23/19  OT Start Time: 0822  OT Stop Time: 0840  OT Total Time (min): 18 min    Billable Minutes:Evaluation 18 mins    Zulma Ye, OT  5/23/2019

## 2019-05-24 NOTE — ASSESSMENT & PLAN NOTE
CXR, elevated BNP, physical exam suggestive of volume overload, and history all consistent with CHF exacerbation  Last echo was in 2017 with EF of 10-15%  Repeat echo results below. Unchanged  Diuresis with lasix 80 mg IV BID; will decrease dose due to contraction alkalosis    On carvedilol but given poor EF on previous echo and CHF exacerbation, will hold for now until euvolemia achieved   8.5L urine output overnight.  Weight down to 99kg from 103 kg  Remains slightly hypoxic at 90's  Maintain euvolemia by monitoring I/O's and daily weights.  Fluid restriction (2 liters/24 hours)  Low Na diet  Monitor for signs of fluid overload: RR>30, O2 sat<92%, weight gain of >3 lbs, or urinary output <160ml/8hr  Maintain oxygen sats >92% via NC if supplemental oxygen needed.     Patient Vitals for the past 72 hrs (Last 3 readings):   Weight   05/24/19 0400 99 kg (218 lb 4.1 oz)   05/23/19 1516 103 kg (227 lb)   05/22/19 0843 103 kg (227 lb)     Transthoracic echo (TTE) 2D with Color Flow  · Severely decreased left ventricular systolic function. The estimated   ejection fraction is 10-15%. Severe global hypokinetic wall motion.  · Grade II (moderate) left ventricular diastolic dysfunction consistent   with pseudonormalization. Elevated left atrial pressure.  · Moderate left ventricular enlargement.  · The right ventricle is not well seen.  · Mild to moderate tricuspid regurgitation.  · The estimated PA systolic pressure is 44 mm Hg  · Intermediate central venous pressure (8 mm Hg).  · Pulmonary hypertension present.  · Severe left atrial enlargement.

## 2019-05-24 NOTE — ASSESSMENT & PLAN NOTE
POCT Glucose   Date Value Ref Range Status   05/24/2019 89 70 - 110 mg/dL Final   05/23/2019 251 (H) 70 - 110 mg/dL Final   05/23/2019 132 (H) 70 - 110 mg/dL Final   05/23/2019 146 (H) 70 - 110 mg/dL Final   05/23/2019 102 70 - 110 mg/dL Final   05/23/2019 61 (L) 70 - 110 mg/dL Final   05/22/2019 179 (H) 70 - 110 mg/dL Final   05/22/2019 152 (H) 70 - 110 mg/dL Final     Patient states only taking basal insulin at home of 40 units QHS but sometimes forgets  Wife took him off prandial insulin due to low sugars  Will decrease insulin to detemir 15 units BID  Low dose correction scale   Cont blood glucose monitoring   Avoid hypoglycemia  ADA diet

## 2019-05-24 NOTE — ASSESSMENT & PLAN NOTE
BP Readings from Last 3 Encounters:   05/24/19 123/74   05/13/19 109/78   04/24/19 118/78     Continue entresto  Diuresis with lasix  Holding carvedilol as per above  Controlled

## 2019-05-24 NOTE — PT/OT/SLP PROGRESS
Occupational Therapy      Patient Name:  Madhav Cortez   MRN:  [id number]    Patient continues to be appropriate for therapy.  Will treat next on 5/27/19.    JOSHUA Ayala  5/24/2019

## 2019-05-24 NOTE — ASSESSMENT & PLAN NOTE
Pulse Readings from Last 3 Encounters:   05/24/19 60   04/24/19 (!) 58   04/24/19 68     Rate currently controlled  Continue amiodarone  Holding carvedilol as per above   Continue dabigatran

## 2019-05-24 NOTE — HPI
Madhav Cortez is a 78 y.o. male who  has a past medical history of *Atrial fibrillation, *Atrial flutter, Acute exacerbation of CHF (congestive heart failure), Anticoagulant long-term use, Anxiety, Arthritis, Atrial flutter, Back pain, Cardiomyopathy, Cataract, Coronary artery disease, Depression, Diabetes mellitus, Heart bloc, Hepatitis B, Hyperlipidemia, Hypertension, Myocardial infarction, Non-STEMI (non-ST elevated myocardial infarction), Nuclear sclerosis - Both Eyes, Post PTCA, Presence of biventricular AICD, Stage 3 chronic kidney disease, Tobacco dependence, Transaminitis, and Ventricular tachycardia.     Patient admitted for CHF exacerbation.  Consulted to Podiatry for LE venous stasis wounds.  Patient recently began following Nivia at wound care once per month and wife has been performing woundcare at home.   Per discussion with him his legs have been very swollen.  Wounds on his legs keep appearing but he is at a baseline of intermittent stabbing pains in legs that is well treated with Tylenol.  Slight odor from wounds per wife.  The patient denies any recent fever, chills, or sweats.  Not on abx.  Podiatry has seen pt and rec wound care and possibly abx.

## 2019-05-24 NOTE — PROGRESS NOTES
[facility] Hospital Medicine  Progress Note    Patient Name: Madhav Cortez  MRN: [id number]  Patient Class: IP- Inpatient   Admission Date: 5/22/2019  Length of Stay: 2 days  Attending Physician: Cheng Cordoba MD  Primary Care Provider: Mirna Reyes MD    [facility] Medicine Team: [facility]  Cheng Austin MD    Subjective:     Principal Problem:Acute on chronic systolic heart failure    HPI:  The patient is a 78 y.o. male with PMH of CHF, HTN, atrial fib and atrial flutter on anticoagulation, HLP, and chronic LE wounds who presents with a one month history of worsening SOB. He states no changes in his medications or dietary compliance. SOB has gotten to the point where he cannot sleep. Reports orthopnea and MCBRIDE. Reports an occasional cough with some whitish-yellow phlegm. He also has LE swelling and wounds that are painful. Denies any actual CP, palpations, nausea, vomiting, abdominal distention, dizziness, or trouble urinating.       Hospital Course:  No notes on file    Interval History: No acute events overnight. Patient states he feels much better. SOB resolved. On Room air. WBC resolved. Wife at bedside and updated on plan of care    Review of Systems   Constitutional: Negative for chills and fever.   HENT: Negative for congestion.    Respiratory: Negative for cough, shortness of breath and wheezing.    Cardiovascular: Positive for leg swelling. Negative for chest pain and palpitations.   Gastrointestinal: Negative for abdominal distention and abdominal pain.   Endocrine: Negative.    Skin: Positive for wound.   Hematological: Negative.    Psychiatric/Behavioral: Negative.      Objective:     Vital Signs (Most Recent):  Temp: 97.2 °F (36.2 °C) (05/24/19 0735)  Pulse: 60 (05/24/19 0735)  Resp: 14 (05/24/19 0735)  BP: 123/74 (05/24/19 0735)  SpO2: (!) 93 % (05/24/19 0735) Vital Signs (24h Range):  Temp:  [97.2 °F (36.2 °C)-98.2 °F (36.8 °C)] 97.2 °F (36.2 °C)  Pulse:  [60-63]  60  Resp:  [12-20] 14  SpO2:  [91 %-94 %] 93 %  BP: (118-153)/(71-93) 123/74     Weight: 99 kg (218 lb 4.1 oz)  Body mass index is 31.32 kg/m².    Intake/Output Summary (Last 24 hours) at 5/24/2019 0990  Last data filed at 5/24/2019 0500  Gross per 24 hour   Intake 360 ml   Output 3500 ml   Net -3140 ml      Physical Exam   Constitutional: He is oriented to person, place, and time. He appears well-developed and well-nourished. He does not have a sickly appearance. He does not appear ill.   No acute distress  On room air  Morbidly obese.    HENT:   Head: Normocephalic and atraumatic.   Eyes: No scleral icterus.   Neck: Neck supple.   Cardiovascular: Normal rate and normal heart sounds.   Pulmonary/Chest: Effort normal. No respiratory distress. He has no wheezes.   Blunted breath sounds at the bases bilaterally    Abdominal: Soft. Bowel sounds are normal. He exhibits no distension. There is no tenderness. There is no guarding.   Musculoskeletal: He exhibits edema.   Neurological: He is alert and oriented to person, place, and time.   Skin:   LE wounds wrapped but noted clear drainage.   Psychiatric: He has a normal mood and affect.   Vitals reviewed.      Significant Labs: All pertinent labs within the past 24 hours have been reviewed.    Significant Imaging: I have reviewed all pertinent imaging results/findings within the past 24 hours.     Transthoracic echo (TTE) 2D with Color Flow  · Severely decreased left ventricular systolic function. The estimated   ejection fraction is 10-15%. Severe global hypokinetic wall motion.  · Grade II (moderate) left ventricular diastolic dysfunction consistent   with pseudonormalization. Elevated left atrial pressure.  · Moderate left ventricular enlargement.  · The right ventricle is not well seen.  · Mild to moderate tricuspid regurgitation.  · The estimated PA systolic pressure is 44 mm Hg  · Intermediate central venous pressure (8 mm Hg).  · Pulmonary hypertension present.  ·  Severe left atrial enlargement.         Assessment/Plan:      * Acute on chronic systolic heart failure  CXR, elevated BNP, physical exam suggestive of volume overload, and history all consistent with CHF exacerbation  Last echo was in 2017 with EF of 10-15%  Repeat echo results below. Unchanged  Diuresis with lasix 80 mg IV BID; will decrease dose due to contraction alkalosis    On carvedilol but given poor EF on previous echo and CHF exacerbation, will hold for now until euvolemia achieved   8.5L urine output overnight.  Weight down to 99kg from 103 kg  Remains slightly hypoxic at 90's  Maintain euvolemia by monitoring I/O's and daily weights.  Fluid restriction (2 liters/24 hours)  Low Na diet  Monitor for signs of fluid overload: RR>30, O2 sat<92%, weight gain of >3 lbs, or urinary output <160ml/8hr  Maintain oxygen sats >92% via NC if supplemental oxygen needed.     Patient Vitals for the past 72 hrs (Last 3 readings):   Weight   05/24/19 0400 99 kg (218 lb 4.1 oz)   05/23/19 1516 103 kg (227 lb)   05/22/19 0843 103 kg (227 lb)     Transthoracic echo (TTE) 2D with Color Flow  · Severely decreased left ventricular systolic function. The estimated   ejection fraction is 10-15%. Severe global hypokinetic wall motion.  · Grade II (moderate) left ventricular diastolic dysfunction consistent   with pseudonormalization. Elevated left atrial pressure.  · Moderate left ventricular enlargement.  · The right ventricle is not well seen.  · Mild to moderate tricuspid regurgitation.  · The estimated PA systolic pressure is 44 mm Hg  · Intermediate central venous pressure (8 mm Hg).  · Pulmonary hypertension present.  · Severe left atrial enlargement.         Venous insufficiency of both lower extremities  Podiatry following  LLE wound with periwound erythema and increased drainage.   Wound was cultured.   Consult ID for antibiotics recs   Cont current woundcare orders: Wounds dressed with aquacel foam, aquacel extra,  xeroform and DSD today.  Recommend vascular f/u upon DC.  Low Na diet  EDWIN hose  Leg elevation     Leukocytosis  Improved   Afebrile  Cultures pending  Source likely LE wound. Cont wound care orders  Will consult podiatry      Wound of foot  Plan as above      Current use of long term anticoagulation  Plan as above      Diabetic neuropathy  Continue gabapentin       Hyperlipidemia  Unclear why patient not on statin therapy      Atrial fibrillation  Per atrial flutter       Other insomnia  Takes temazepam at home but not on formulary  Ramelteon PRN     ICD (implantable cardioverter-defibrillator), biventricular, in situ  No acute issues   Follow up with device clinic       Type 2 diabetes mellitus with circulatory disorder, with long-term current use of insulin  POCT Glucose   Date Value Ref Range Status   05/24/2019 89 70 - 110 mg/dL Final   05/23/2019 251 (H) 70 - 110 mg/dL Final   05/23/2019 132 (H) 70 - 110 mg/dL Final   05/23/2019 146 (H) 70 - 110 mg/dL Final   05/23/2019 102 70 - 110 mg/dL Final   05/23/2019 61 (L) 70 - 110 mg/dL Final   05/22/2019 179 (H) 70 - 110 mg/dL Final   05/22/2019 152 (H) 70 - 110 mg/dL Final     Patient states only taking basal insulin at home of 40 units QHS but sometimes forgets  Wife took him off prandial insulin due to low sugars  Will decrease insulin to detemir 15 units BID  Low dose correction scale   Cont blood glucose monitoring   Avoid hypoglycemia  ADA diet    Atrial flutter  Pulse Readings from Last 3 Encounters:   05/24/19 60   04/24/19 (!) 58   04/24/19 68     Rate currently controlled  Continue amiodarone  Holding carvedilol as per above   Continue dabigatran       Cardiomyopathy, ischemic  Per CHF      Essential hypertension  BP Readings from Last 3 Encounters:   05/24/19 123/74   05/13/19 109/78   04/24/19 118/78     Continue entresto  Diuresis with lasix  Holding carvedilol as per above  Controlled         VTE Risk Mitigation (From admission, onward)        Ordered      dabigatran etexilate capsule 150 mg  2 times daily      05/22/19 1223     Place EDWIN hose  Until discontinued      05/22/19 1223     IP VTE HIGH RISK PATIENT  Once      05/22/19 1223              Cheng Austin MD  Department of Hospital Medicine   [facility]

## 2019-05-24 NOTE — ASSESSMENT & PLAN NOTE
- has [facility] BL LEs with stasis wounds and R foot wound without signs of active infection  - LLE wound culture with C koseri which is suspected as colonizer   - rec 7d treatment with cefpodoxime 200mg po bid for skin and soft tissue emanuel  - rec wound care consult for topical antimicrobial overlays on skin (hydrafera blue) to reduce colonization  - rec patient fu in wound care clinic at [facility] for ongoing care and vascular sx for venous stasis - will set up  - will sign off

## 2019-05-24 NOTE — PLAN OF CARE
Problem: Physical Therapy Goal  Goal: Physical Therapy Goal  Goals to be met by: 2019     Patient will increase functional independence with mobility by performin. Supine to sit with Modified [place]  2. Sit to supine with Modified [place]  3. Sit to stand transfer with Supervision  4. Bed to chair transfer with Supervision using Rolling Walker  5. Gait  x 100 feet with Supervision using Rolling Walker.   6. Ascend/descend 5 stair with bilateral Handrails Contact Guard Assistance  Outcome: Ongoing (interventions implemented as appropriate)  Eval completed and POC established    Guero Mejia, PT,DPT  2019

## 2019-05-24 NOTE — ASSESSMENT & PLAN NOTE
Podiatry following  LLE wound with periwound erythema and increased drainage.   Wound was cultured.   Consult ID for antibiotics recs   Cont current woundcare orders: Wounds dressed with aquacel foam, aquacel extra, xeroform and DSD today.  Recommend vascular f/u upon DC.  Low Na diet  EDWIN hose  Leg elevation

## 2019-05-25 LAB
ALBUMIN SERPL BCP-MCNC: 3.3 G/DL (ref 3.5–5.2)
ALP SERPL-CCNC: 75 U/L (ref 55–135)
ALT SERPL W/O P-5'-P-CCNC: 59 U/L (ref 10–44)
ANION GAP SERPL CALC-SCNC: 8 MMOL/L (ref 8–16)
AST SERPL-CCNC: 66 U/L (ref 10–40)
BACTERIA #/AREA URNS AUTO: ABNORMAL /HPF
BACTERIA SPEC AEROBE CULT: NORMAL
BACTERIA UR CULT: NORMAL
BASOPHILS # BLD AUTO: 0.04 K/UL (ref 0–0.2)
BASOPHILS NFR BLD: 0.4 % (ref 0–1.9)
BILIRUB SERPL-MCNC: 1.3 MG/DL (ref 0.1–1)
BILIRUB UR QL STRIP: NEGATIVE
BUN SERPL-MCNC: 11 MG/DL (ref 8–23)
CALCIUM SERPL-MCNC: 9.2 MG/DL (ref 8.7–10.5)
CHLORIDE SERPL-SCNC: 102 MMOL/L (ref 95–110)
CLARITY UR REFRACT.AUTO: ABNORMAL
CO2 SERPL-SCNC: 31 MMOL/L (ref 23–29)
COLOR UR AUTO: ABNORMAL
CREAT SERPL-MCNC: 0.8 MG/DL (ref 0.5–1.4)
DIFFERENTIAL METHOD: ABNORMAL
EOSINOPHIL # BLD AUTO: 0.1 K/UL (ref 0–0.5)
EOSINOPHIL NFR BLD: 0.9 % (ref 0–8)
ERYTHROCYTE [DISTWIDTH] IN BLOOD BY AUTOMATED COUNT: 16.8 % (ref 11.5–14.5)
EST. GFR  (AFRICAN AMERICAN): >60 ML/MIN/1.73 M^2
EST. GFR  (NON AFRICAN AMERICAN): >60 ML/MIN/1.73 M^2
GLUCOSE SERPL-MCNC: 99 MG/DL (ref 70–110)
GLUCOSE UR QL STRIP: NEGATIVE
HCT VFR BLD AUTO: 39.6 % (ref 40–54)
HGB BLD-MCNC: 12.8 G/DL (ref 14–18)
HGB UR QL STRIP: ABNORMAL
HYALINE CASTS UR QL AUTO: 0 /LPF
IMM GRANULOCYTES # BLD AUTO: 0.04 K/UL (ref 0–0.04)
IMM GRANULOCYTES NFR BLD AUTO: 0.4 % (ref 0–0.5)
KETONES UR QL STRIP: ABNORMAL
LEUKOCYTE ESTERASE UR QL STRIP: ABNORMAL
LYMPHOCYTES # BLD AUTO: 1.6 K/UL (ref 1–4.8)
LYMPHOCYTES NFR BLD: 15.5 % (ref 18–48)
MAGNESIUM SERPL-MCNC: 1.9 MG/DL (ref 1.6–2.6)
MCH RBC QN AUTO: 30.8 PG (ref 27–31)
MCHC RBC AUTO-ENTMCNC: 32.3 G/DL (ref 32–36)
MCV RBC AUTO: 95 FL (ref 82–98)
MICROSCOPIC COMMENT: ABNORMAL
MONOCYTES # BLD AUTO: 1.1 K/UL (ref 0.3–1)
MONOCYTES NFR BLD: 11.4 % (ref 4–15)
NEUTROPHILS # BLD AUTO: 7.2 K/UL (ref 1.8–7.7)
NEUTROPHILS NFR BLD: 71.4 % (ref 38–73)
NITRITE UR QL STRIP: NEGATIVE
NRBC BLD-RTO: 0 /100 WBC
PH UR STRIP: 8 [PH] (ref 5–8)
PHOSPHATE SERPL-MCNC: 2.6 MG/DL (ref 2.7–4.5)
PLATELET # BLD AUTO: 197 K/UL (ref 150–350)
PMV BLD AUTO: 11.1 FL (ref 9.2–12.9)
POCT GLUCOSE: 113 MG/DL (ref 70–110)
POCT GLUCOSE: 208 MG/DL (ref 70–110)
POCT GLUCOSE: 225 MG/DL (ref 70–110)
POCT GLUCOSE: 226 MG/DL (ref 70–110)
POTASSIUM SERPL-SCNC: 3.7 MMOL/L (ref 3.5–5.1)
PROT SERPL-MCNC: 6.2 G/DL (ref 6–8.4)
PROT UR QL STRIP: ABNORMAL
RBC # BLD AUTO: 4.16 M/UL (ref 4.6–6.2)
RBC #/AREA URNS AUTO: >100 /HPF (ref 0–4)
SODIUM SERPL-SCNC: 141 MMOL/L (ref 136–145)
SP GR UR STRIP: 1.01 (ref 1–1.03)
SQUAMOUS #/AREA URNS AUTO: 1 /HPF
URN SPEC COLLECT METH UR: ABNORMAL
WBC # BLD AUTO: 10.01 K/UL (ref 3.9–12.7)
WBC #/AREA URNS AUTO: 73 /HPF (ref 0–5)

## 2019-05-25 PROCEDURE — 63600175 PHARM REV CODE 636 W HCPCS: Mod: HCNC | Performed by: PHYSICIAN ASSISTANT

## 2019-05-25 PROCEDURE — 25000003 PHARM REV CODE 250: Mod: HCNC | Performed by: STUDENT IN AN ORGANIZED HEALTH CARE EDUCATION/TRAINING PROGRAM

## 2019-05-25 PROCEDURE — 63600175 PHARM REV CODE 636 W HCPCS: Mod: HCNC | Performed by: HOSPITALIST

## 2019-05-25 PROCEDURE — 11000001 HC ACUTE MED/SURG PRIVATE ROOM: Mod: HCNC

## 2019-05-25 PROCEDURE — 87186 SC STD MICRODIL/AGAR DIL: CPT | Mod: HCNC

## 2019-05-25 PROCEDURE — 36415 COLL VENOUS BLD VENIPUNCTURE: CPT | Mod: HCNC

## 2019-05-25 PROCEDURE — 25000003 PHARM REV CODE 250: Mod: HCNC | Performed by: HOSPITALIST

## 2019-05-25 PROCEDURE — 99232 PR SUBSEQUENT HOSPITAL CARE,LEVL II: ICD-10-PCS | Mod: HCNC,,, | Performed by: HOSPITALIST

## 2019-05-25 PROCEDURE — 84100 ASSAY OF PHOSPHORUS: CPT | Mod: HCNC

## 2019-05-25 PROCEDURE — 25000003 PHARM REV CODE 250: Mod: HCNC | Performed by: PHYSICIAN ASSISTANT

## 2019-05-25 PROCEDURE — 87086 URINE CULTURE/COLONY COUNT: CPT | Mod: HCNC

## 2019-05-25 PROCEDURE — 87088 URINE BACTERIA CULTURE: CPT | Mod: HCNC

## 2019-05-25 PROCEDURE — 99232 SBSQ HOSP IP/OBS MODERATE 35: CPT | Mod: HCNC,,, | Performed by: HOSPITALIST

## 2019-05-25 PROCEDURE — 87077 CULTURE AEROBIC IDENTIFY: CPT | Mod: HCNC

## 2019-05-25 PROCEDURE — 83735 ASSAY OF MAGNESIUM: CPT | Mod: HCNC

## 2019-05-25 PROCEDURE — 81001 URINALYSIS AUTO W/SCOPE: CPT | Mod: HCNC

## 2019-05-25 PROCEDURE — 25000242 PHARM REV CODE 250 ALT 637 W/ HCPCS: Mod: HCNC | Performed by: HOSPITALIST

## 2019-05-25 PROCEDURE — 80053 COMPREHEN METABOLIC PANEL: CPT | Mod: HCNC

## 2019-05-25 PROCEDURE — 85025 COMPLETE CBC W/AUTO DIFF WBC: CPT | Mod: HCNC

## 2019-05-25 RX ORDER — POTASSIUM CHLORIDE 20 MEQ/1
40 TABLET, EXTENDED RELEASE ORAL ONCE
Status: COMPLETED | OUTPATIENT
Start: 2019-05-25 | End: 2019-05-25

## 2019-05-25 RX ORDER — INSULIN ASPART 100 [IU]/ML
0-5 INJECTION, SOLUTION INTRAVENOUS; SUBCUTANEOUS
Status: DISCONTINUED | OUTPATIENT
Start: 2019-05-25 | End: 2019-05-28 | Stop reason: HOSPADM

## 2019-05-25 RX ORDER — DABIGATRAN ETEXILATE 150 MG/1
150 CAPSULE ORAL 2 TIMES DAILY
Status: DISCONTINUED | OUTPATIENT
Start: 2019-05-26 | End: 2019-05-28 | Stop reason: HOSPADM

## 2019-05-25 RX ADMIN — GABAPENTIN 600 MG: 100 CAPSULE ORAL at 10:05

## 2019-05-25 RX ADMIN — SACUBITRIL AND VALSARTAN 1 TABLET: 97; 103 TABLET, FILM COATED ORAL at 09:05

## 2019-05-25 RX ADMIN — ASPIRIN 81 MG CHEWABLE TABLET 81 MG: 81 TABLET CHEWABLE at 09:05

## 2019-05-25 RX ADMIN — COLLAGENASE SANTYL: 250 OINTMENT TOPICAL at 09:05

## 2019-05-25 RX ADMIN — OXYCODONE HYDROCHLORIDE AND ACETAMINOPHEN 1500 MG: 500 TABLET ORAL at 10:05

## 2019-05-25 RX ADMIN — AMIODARONE HYDROCHLORIDE 200 MG: 200 TABLET ORAL at 09:05

## 2019-05-25 RX ADMIN — POTASSIUM CHLORIDE 40 MEQ: 1500 TABLET, EXTENDED RELEASE ORAL at 09:05

## 2019-05-25 RX ADMIN — CEFPODOXIME PROXETIL 200 MG: 200 TABLET, FILM COATED ORAL at 09:05

## 2019-05-25 RX ADMIN — INSULIN ASPART 2 UNITS: 100 INJECTION, SOLUTION INTRAVENOUS; SUBCUTANEOUS at 11:05

## 2019-05-25 RX ADMIN — OXYCODONE HYDROCHLORIDE AND ACETAMINOPHEN 1500 MG: 500 TABLET ORAL at 09:05

## 2019-05-25 RX ADMIN — CEFPODOXIME PROXETIL 200 MG: 200 TABLET, FILM COATED ORAL at 10:05

## 2019-05-25 RX ADMIN — SACUBITRIL AND VALSARTAN 1 TABLET: 97; 103 TABLET, FILM COATED ORAL at 10:05

## 2019-05-25 RX ADMIN — FLUTICASONE PROPIONATE 50 MCG: 50 SPRAY, METERED NASAL at 05:05

## 2019-05-25 RX ADMIN — GABAPENTIN 600 MG: 100 CAPSULE ORAL at 03:05

## 2019-05-25 RX ADMIN — GABAPENTIN 600 MG: 100 CAPSULE ORAL at 09:05

## 2019-05-25 RX ADMIN — AMIODARONE HYDROCHLORIDE 200 MG: 200 TABLET ORAL at 10:05

## 2019-05-25 RX ADMIN — FUROSEMIDE 80 MG: 10 INJECTION, SOLUTION INTRAMUSCULAR; INTRAVENOUS at 09:05

## 2019-05-25 NOTE — ASSESSMENT & PLAN NOTE
POCT Glucose   Date Value Ref Range Status   05/24/2019 186 (H) 70 - 110 mg/dL Final   05/24/2019 117 (H) 70 - 110 mg/dL Final   05/24/2019 140 (H) 70 - 110 mg/dL Final   05/24/2019 216 (H) 70 - 110 mg/dL Final   05/24/2019 89 70 - 110 mg/dL Final   05/23/2019 251 (H) 70 - 110 mg/dL Final   05/23/2019 132 (H) 70 - 110 mg/dL Final   05/23/2019 146 (H) 70 - 110 mg/dL Final   05/23/2019 102 70 - 110 mg/dL Final   05/23/2019 61 (L) 70 - 110 mg/dL Final   05/22/2019 179 (H) 70 - 110 mg/dL Final   05/22/2019 152 (H) 70 - 110 mg/dL Final     Patient states only taking basal insulin at home of 40 units QHS but sometimes forgets  Wife took him off prandial insulin due to low sugars  Will decrease insulin to detemir 15 units BID  Low dose correction scale   Cont blood glucose monitoring   Avoid hypoglycemia  ADA diet

## 2019-05-25 NOTE — ASSESSMENT & PLAN NOTE
Podiatry following  LLE wound with periwound erythema and increased drainage.   Wound was cultured.   Consult ID: 7 day treatment with cefpodoxime 200mg po bid for skin and soft tissue emanuel  Cont current woundcare orders: Wounds dressed with aquacel foam, aquacel extra, xeroform and DSD today.  Recommend vascular f/u upon DC.  Low Na diet  EDWIN hose  Leg elevation

## 2019-05-25 NOTE — NURSING
Notified NEO MENARD of pt urine now a dark red, dark roxanne prior since mckenzie removal per day nurse; and urine frequency; pt refusing tele monitor; orders rcvd to get a urine sample and hold pradaxa tomorrow if issue has not resolved

## 2019-05-25 NOTE — SUBJECTIVE & OBJECTIVE
Past Medical History:   Diagnosis Date    *Atrial fibrillation     *Atrial flutter     Acute exacerbation of CHF (congestive heart failure) 8/2/2013    Anticoagulant long-term use     Anxiety     Arthritis     Atrial flutter     Back pain     Cardiomyopathy     Cataract     Coronary artery disease     Depression     Diabetes mellitus     Heart bloc     Hepatitis B     Hyperlipidemia 4/1/2014    Hypertension     Myocardial infarction     Non-STEMI (non-ST elevated myocardial infarction) 5/26/2013    Nuclear sclerosis - Both Eyes 2/18/2013    Post PTCA 8/7/2012    Presence of biventricular AICD 2/23/2016    Stage 3 chronic kidney disease 12/11/2017    Tobacco dependence     Transaminitis 4/1/2014    Ventricular tachycardia        Past Surgical History:   Procedure Laterality Date    ABLATION N/A 4/8/2013    Performed by [facility]hler [facility] at [facility] CATH LAB    APPENDECTOMY      BIOPSY LIVER AND ULTRASOUND N/A 5/26/2014    Per[facility]    CARDIAC DEFIBRILLATOR PLACEMENT      CARDIAC DEFIBRILLATOR PLACEMENT      CARDIOVERSION N/A 10/10/2013    Performed by [facility] at [facility] CATH LAB    CARDIOVERSION N/A 8/19/2013    Performed by [facility] at [facility] CATH LAB    COLONOSCOPY      CORONARY ANGIOPLASTY WITH STENT PLACEMENT      FRACTURE SURGERY      L arm    INSERT / REPLACE / REMOVE PACEMAKER  12/15    bi-V upgrade    INSERTION-ICD-BIVENTRICULAR N/A 11/11/2015    Performed by [facility]oehler [facility] at [facility] CATH LAB    RADIOFREQUENCY ABLATION      STEROID INJECTION KNEE Bilateral     received about every 4 months    TONSILLECTOMY      TRANSESOPHAGEAL ECHOCARDIOGRAM (DIOGO) N/A 10/10/2013    Performed by [facility]oehler [facility] at [facility] CATH LAB    TRANSESOPHAGEAL ECHOCARDIOGRAM (DIOGO) N/A 8/19/2013    Performed by [facility]oehler [facility] at [facility] CATH LAB    VASCULAR SURGERY         Review of patient's allergies indicates:  No Known  "Allergies    Medications:  Medications Prior to Admission   Medication Sig    amiodarone (PACERONE) 200 MG Tab TAKE 1 TABLET TWICE DAILY    ascorbic acid (VITAMIN C) 500 MG tablet Take 1,500 mg by mouth 2 (two) times daily.     aspirin 81 MG Chew Take 1 tablet (81 mg total) by mouth once daily.    beta carotene-C-E-lutein-min29 5,000-60-30-2 unit-mg-unit-mg Tab Take 1 capsule by mouth once daily.     carvedilol (COREG) 25 MG tablet Take 1 tablet (25 mg total) by mouth 2 (two) times daily.    co-enzyme Q-10 30 mg capsule 800 units daily    dabigatran etexilate (PRADAXA) 150 mg Cap Take 1 capsule (150 mg total) by mouth 2 (two) times daily. "Do NOT break, chew, or open capsules."    ENTRESTO  mg per tablet TAKE 1 TABLET TWICE DAILY    fluticasone (FLONASE) 50 mcg/actuation nasal spray 1 spray (50 mcg total) by Each Nare route once daily.    furosemide (LASIX) 40 MG tablet TAKE 1 TABLET ONE TIME DAILY (Patient taking differently: TAKE 1/2 TABLET ONE TIME DAILY)    gabapentin (NEURONTIN) 300 MG capsule TAKE 2 CAPSULES BY MOUTH THREE TIMES DAILY    glucosamine-chondroitin 500-400 mg tablet Take 1 tablet by mouth 2 (two) times daily.    LANTUS SOLOSTAR U-100 INSULIN 100 unit/mL (3 mL) InPn pen INJECT 40 UNITS INTO THE SKIN EVERY EVENING.    levothyroxine (SYNTHROID) 75 MCG tablet Take 1 tablet (75 mcg total) by mouth once daily.    magnesium oxide (MAG-OX) 400 mg tablet Take 400 mg by mouth 2 (two) times daily.     melatonin 5 mg Tab Take 1 tablet by mouth every evening.     RIBOSE ORAL Take 1 tablet by mouth once daily.     spironolactone (ALDACTONE) 25 MG tablet TAKE 1 TABLET ONE TIME DAILY    temazepam (RESTORIL) 15 mg Cap Take 1 capsule (15 mg total) by mouth every evening.    ACCU-CHEK JIE Misc USE AS INSTRUCTED    ACCU-CHEK SMARTVIEW TEST STRIP Strp CHECK BLOOD SUGAR THREE TIMES DAILY    blood-glucose meter kit Accu check jie meter Use as instructed    ciclopirox (LOPROX) 0.77 % Crea " "APPLY TO AFFECTED AREAS BILATERAL AXILLA TWICE DAILY UNTIL CLEAR    clindamycin (CLEOCIN T) 1 % lotion  apply to affected area twice daily as directed ( apply first)    ketoconazole (NIZORAL) 2 % cream Apply topically once daily.    nitroGLYCERIN (NITROSTAT) 0.4 MG SL tablet Place 1 tablet (0.4 mg total) under the tongue every 5 (five) minutes as needed for Chest pain.    nystatin (MYCOSTATIN) cream Apply topically 2 (two) times daily.    pen needle, diabetic 31 gauge x 1/4" Ndle Use 4 times daily    senna-docusate 8.6-50 mg (PERICOLACE) 8.6-50 mg per tablet Take 1 tablet by mouth 2 (two) times daily.    tizanidine (ZANAFLEX) 2 MG tablet TAKE 1 TABLET EVERY 8 HOURS AS NEEDED FOR MUSCLE PAIN    triamcinolone acetonide 0.025% (KENALOG) 0.025 % cream apply to affected area twice daily as directed    UBIDECARENONE (COQ-10 ORAL) Take 800 mg by mouth once daily. Takes 100mg tablet at lunch, and 600mg at dinner     Antibiotics (From admission, onward)    Start     Stop Route Frequency Ordered    05/24/19 2100  cefpodoxime tablet 200 mg      -- Oral Every 12 hours 05/24/19 1750        Antifungals (From admission, onward)    None        Antivirals (From admission, onward)    None           Immunization History   Administered Date(s) Administered    Influenza 10/27/2014    Influenza - High Dose 12/26/2013, 09/13/2015, 12/11/2017, 09/18/2018    Influenza - Trivalent (ADULT) 10/27/2014    Pneumococcal Conjugate - 13 Valent 12/11/2017    Pneumococcal Conjugate - 7 Valent 02/13/2003       Family History     Problem Relation (Age of Onset)    Bipolar disorder Son    Bladder Cancer Father    Cancer Father, Paternal Grandmother, Paternal Grandfather, Maternal Uncle    Diabetes Father, Mother    Heart attack Mother    Heart disease Maternal Grandmother    No Known Problems Maternal Grandfather, Daughter, Daughter, Son, Son, Maternal Aunt, Paternal Aunt, Paternal Uncle, Sister, Brother        Social History "     Socioeconomic History    Marital status:      Spouse name: Not on file    Number of children: Not on file    Years of education: Not on file    Highest education level: Not on file   Occupational History    Not on file   Social Needs    Financial resource strain: Not on file    Food insecurity:     Worry: Not on file     Inability: Not on file    Transportation needs:     Medical: Not on file     Non-medical: Not on file   Tobacco Use    Smoking status: Former Smoker     Last attempt to quit: 1987     Years since quittin.9    Smokeless tobacco: Never Used    Tobacco comment: 25 years ago   Substance and Sexual Activity    Alcohol use: No    Drug use: No    Sexual activity: Never     Partners: Female   Lifestyle    Physical activity:     Days per week: Not on file     Minutes per session: Not on file    Stress: Not on file   Relationships    Social connections:     Talks on phone: Not on file     Gets together: Not on file     Attends Rastafarian service: Not on file     Active member of club or organization: Not on file     Attends meetings of clubs or organizations: Not on file     Relationship status: Not on file   Other Topics Concern    Not on file   Social History Narrative    Retired. Spends a lot of time gambling in the [place]     Review of Systems  Objective:     Vital Signs (Most Recent):  Temp: 97.2 °F (36.2 °C) (19 07)  Pulse: 60 (19 1449)  Resp: 14 (19 07)  BP: 123/74 (19 0735)  SpO2: (!) 93 % (19) Vital Signs (24h Range):  Temp:  [97.2 °F (36.2 °C)-98.2 °F (36.8 °C)] 97.2 °F (36.2 °C)  Pulse:  [60-63] 60  Resp:  [14-20] 14  SpO2:  [91 %-93 %] 93 %  BP: (118-153)/(71-93) 123/74     Weight: 99 kg (218 lb 4.1 oz)  Body mass index is 31.32 kg/m².    Estimated Creatinine Clearance: 86.9 mL/min (based on SCr of 0.8 mg/dL).    Physical Exam    Significant Labs: {Results:85238}    Significant Imaging: {Imaging Review:28165}

## 2019-05-25 NOTE — ASSESSMENT & PLAN NOTE
BP Readings from Last 3 Encounters:   05/24/19 119/65   05/13/19 109/78   04/24/19 118/78     Continue entresto  Diuresis with lasix  Holding carvedilol as per above  Controlled

## 2019-05-25 NOTE — SUBJECTIVE & OBJECTIVE
Interval History: No acute events overnight. Patient states he feels much better. SOB resolved. On Room air. WBC resolved. Wife at bedside and updated on plan of care. ID started oral antibiotics for LE wounds. Urine is now clear after removing mckenzie cath.     Review of Systems   Constitutional: Negative for chills and fever.   HENT: Negative for congestion.    Respiratory: Negative for cough, shortness of breath and wheezing.    Cardiovascular: Positive for leg swelling. Negative for chest pain and palpitations.   Gastrointestinal: Negative for abdominal distention and abdominal pain.   Endocrine: Negative.    Skin: Positive for wound.   Hematological: Negative.    Psychiatric/Behavioral: Negative.      Objective:     Vital Signs (Most Recent):  Temp: 97.6 °F (36.4 °C) (05/25/19 0355)  Pulse: 60 (05/24/19 2000)  Resp: 16 (05/24/19 2000)  BP: 119/65 (05/24/19 2000)  SpO2: 95 % (05/24/19 2000) Vital Signs (24h Range):  Temp:  [97.6 °F (36.4 °C)-98 °F (36.7 °C)] 97.6 °F (36.4 °C)  Pulse:  [60] 60  Resp:  [16] 16  SpO2:  [95 %] 95 %  BP: (119)/(65) 119/65     Weight: 99 kg (218 lb 4.1 oz)  Body mass index is 31.32 kg/m².    Intake/Output Summary (Last 24 hours) at 5/25/2019 0803  Last data filed at 5/25/2019 0245  Gross per 24 hour   Intake 540 ml   Output 1330 ml   Net -790 ml      Physical Exam   Constitutional: He is oriented to person, place, and time. He appears well-developed and well-nourished. He does not have a sickly appearance. He does not appear ill.   No acute distress  On room air  Morbidly obese.    HENT:   Head: Normocephalic and atraumatic.   Eyes: No scleral icterus.   Neck: Neck supple.   Cardiovascular: Normal rate and normal heart sounds.   Pulmonary/Chest: Effort normal. No respiratory distress. He has no wheezes.   Blunted breath sounds at the bases bilaterally (improved)   Abdominal: Soft. Bowel sounds are normal. He exhibits no distension. There is no tenderness. There is no guarding.    Musculoskeletal: He exhibits edema.   Neurological: He is alert and oriented to person, place, and time.   Skin:   LE wounds wrapped but noted clear drainage.   Psychiatric: He has a normal mood and affect.   Vitals reviewed.      Significant Labs: All pertinent labs within the past 24 hours have been reviewed.    Significant Imaging: I have reviewed all pertinent imaging results/findings within the past 24 hours.     X-Ray Tibia Fibula 2 View Left  Narrative: EXAMINATION:  XR TIBIA FIBULA 2 VIEW LEFT    CLINICAL HISTORY:  wound;    TECHNIQUE:  AP and lateral views of the left tibia and fibula were performed.    COMPARISON:  None.    FINDINGS:  Mild DJD.  No fracture or dislocation.  No bone destruction identified.  Vascular calcifications.  Impression: See above    Electronically signed by: Elvis Landa MD  Date:    05/24/2019  Time:    10:53

## 2019-05-25 NOTE — PROGRESS NOTES
[facility] Hospital Medicine  Progress Note    Patient Name: Madhav Cortez  MRN: [id number]  Patient Class: IP- Inpatient   Admission Date: 5/22/2019  Length of Stay: 3 days  Attending Physician: Cheng Cordoba MD  Primary Care Provider: Mirna Reyes MD    [facility] Medicine Team: [facility] G Cheng Austin MD    Subjective:     Principal Problem:Acute on chronic systolic heart failure    HPI:  The patient is a 78 y.o. male with PMH of CHF, HTN, atrial fib and atrial flutter on anticoagulation, HLP, and chronic LE wounds who presents with a one month history of worsening SOB. He states no changes in his medications or dietary compliance. SOB has gotten to the point where he cannot sleep. Reports orthopnea and MCBRIDE. Reports an occasional cough with some whitish-yellow phlegm. He also has LE swelling and wounds that are painful. Denies any actual CP, palpations, nausea, vomiting, abdominal distention, dizziness, or trouble urinating.       Hospital Course:  No notes on file    Interval History: No acute events overnight. Patient states he feels much better. SOB resolved. On Room air. WBC resolved. Wife at bedside and updated on plan of care. ID started oral antibiotics for LE wounds. Urine is now clear after removing mckenzie cath.     Review of Systems   Constitutional: Negative for chills and fever.   HENT: Negative for congestion.    Respiratory: Negative for cough, shortness of breath and wheezing.    Cardiovascular: Positive for leg swelling. Negative for chest pain and palpitations.   Gastrointestinal: Negative for abdominal distention and abdominal pain.   Endocrine: Negative.    Skin: Positive for wound.   Hematological: Negative.    Psychiatric/Behavioral: Negative.      Objective:     Vital Signs (Most Recent):  Temp: 97.6 °F (36.4 °C) (05/25/19 0355)  Pulse: 60 (05/24/19 2000)  Resp: 16 (05/24/19 2000)  BP: 119/65 (05/24/19 2000)  SpO2: 95 % (05/24/19 2000) Vital Signs (24h  Range):  Temp:  [97.6 °F (36.4 °C)-98 °F (36.7 °C)] 97.6 °F (36.4 °C)  Pulse:  [60] 60  Resp:  [16] 16  SpO2:  [95 %] 95 %  BP: (119)/(65) 119/65     Weight: 99 kg (218 lb 4.1 oz)  Body mass index is 31.32 kg/m².    Intake/Output Summary (Last 24 hours) at 5/25/2019 0803  Last data filed at 5/25/2019 0245  Gross per 24 hour   Intake 540 ml   Output 1330 ml   Net -790 ml      Physical Exam   Constitutional: He is oriented to person, place, and time. He appears well-developed and well-nourished. He does not have a sickly appearance. He does not appear ill.   No acute distress  On room air  Morbidly obese.    HENT:   Head: Normocephalic and atraumatic.   Eyes: No scleral icterus.   Neck: Neck supple.   Cardiovascular: Normal rate and normal heart sounds.   Pulmonary/Chest: Effort normal. No respiratory distress. He has no wheezes.   Blunted breath sounds at the bases bilaterally (improved)   Abdominal: Soft. Bowel sounds are normal. He exhibits no distension. There is no tenderness. There is no guarding.   Musculoskeletal: He exhibits edema.   Neurological: He is alert and oriented to person, place, and time.   Skin:   LE wounds wrapped but noted clear drainage.   Psychiatric: He has a normal mood and affect.   Vitals reviewed.      Significant Labs: All pertinent labs within the past 24 hours have been reviewed.    Significant Imaging: I have reviewed all pertinent imaging results/findings within the past 24 hours.     X-Ray Tibia Fibula 2 View Left  Narrative: EXAMINATION:  XR TIBIA FIBULA 2 VIEW LEFT    CLINICAL HISTORY:  wound;    TECHNIQUE:  AP and lateral views of the left tibia and fibula were performed.    COMPARISON:  None.    FINDINGS:  Mild DJD.  No fracture or dislocation.  No bone destruction identified.  Vascular calcifications.  Impression: See above    Electronically signed by: Elvis Landa MD  Date:    05/24/2019  Time:    10:53      Assessment/Plan:      * Acute on chronic systolic heart failure  CXR,  elevated BNP, physical exam suggestive of volume overload, and history all consistent with CHF exacerbation  Last echo was in 2017 with EF of 10-15%  Repeat echo results below. Unchanged  Diuresis with lasix 80 mg IV BID; will decrease dose due to contraction alkalosis    On carvedilol but given poor EF on previous echo and CHF exacerbation, will hold for now until euvolemia achieved   9.6L urine output since admit.  Weight down to 99kg from 103 kg  Hypoxia improved. Now on RA  Maintain euvolemia by monitoring I/O's and daily weights.  Fluid restriction (2 liters/24 hours)  Low Na diet  Monitor for signs of fluid overload: RR>30, O2 sat<92%, weight gain of 3 lbs, or urinary output <160ml/8hr  Maintain oxygen sats >92% via NC if supplemental oxygen needed.     Patient Vitals for the past 72 hrs (Last 3 readings):   Weight   05/24/19 0400 99 kg (218 lb 4.1 oz)   05/23/19 1516 103 kg (227 lb)   05/22/19 0843 103 kg (227 lb)       Venous insufficiency of both lower extremities  Podiatry following  LLE wound with periwound erythema and increased drainage.   Wound was cultured.   Consult ID: 7 day treatment with cefpodoxime 200mg po bid for skin and soft tissue emanuel  Cont current woundcare orders: Wounds dressed with aquacel foam, aquacel extra, xeroform and DSD today.  Recommend vascular f/u upon DC.  Low Na diet  EDWIN hose  Leg elevation     Leukocytosis  resolved  Afebrile  Source likely LE wound. Cont wound care orders  Cont above care      Wound of foot  Plan as above      Current use of long term anticoagulation  Plan as above      Diabetic neuropathy  Continue gabapentin       Hyperlipidemia  Unclear why patient not on statin therapy      Atrial fibrillation  Per atrial flutter       Other insomnia  Takes temazepam at home but not on formulary  Ramelteon PRN     ICD (implantable cardioverter-defibrillator), biventricular, in situ  No acute issues   Follow up with device clinic       Type 2 diabetes mellitus with  circulatory disorder, with long-term current use of insulin  POCT Glucose   Date Value Ref Range Status   05/24/2019 186 (H) 70 - 110 mg/dL Final   05/24/2019 117 (H) 70 - 110 mg/dL Final   05/24/2019 140 (H) 70 - 110 mg/dL Final   05/24/2019 216 (H) 70 - 110 mg/dL Final   05/24/2019 89 70 - 110 mg/dL Final   05/23/2019 251 (H) 70 - 110 mg/dL Final   05/23/2019 132 (H) 70 - 110 mg/dL Final   05/23/2019 146 (H) 70 - 110 mg/dL Final   05/23/2019 102 70 - 110 mg/dL Final   05/23/2019 61 (L) 70 - 110 mg/dL Final   05/22/2019 179 (H) 70 - 110 mg/dL Final   05/22/2019 152 (H) 70 - 110 mg/dL Final     Patient states only taking basal insulin at home of 40 units QHS but sometimes forgets  Wife took him off prandial insulin due to low sugars  Will decrease insulin to detemir 15 units BID  Low dose correction scale   Cont blood glucose monitoring   Avoid hypoglycemia  ADA diet    Atrial flutter  Pulse Readings from Last 3 Encounters:   05/24/19 60   04/24/19 (!) 58   04/24/19 68     Rate currently controlled  Continue amiodarone  Holding carvedilol as per above   Continue dabigatran       Cardiomyopathy, ischemic  Per CHF      Essential hypertension  BP Readings from Last 3 Encounters:   05/24/19 119/65   05/13/19 109/78   04/24/19 118/78     Continue entresto  Diuresis with lasix  Holding carvedilol as per above  Controlled         VTE Risk Mitigation (From admission, onward)        Ordered     dabigatran etexilate capsule 150 mg  2 times daily      05/25/19 0802     Place EDWIN hose  Until discontinued      05/22/19 1223     IP VTE HIGH RISK PATIENT  Once      05/22/19 1223              Cheng Austin MD  Department of Hospital Medicine   [facility]

## 2019-05-26 LAB
ALBUMIN SERPL BCP-MCNC: 3.5 G/DL (ref 3.5–5.2)
ALP SERPL-CCNC: 88 U/L (ref 55–135)
ALT SERPL W/O P-5'-P-CCNC: 66 U/L (ref 10–44)
ANION GAP SERPL CALC-SCNC: 9 MMOL/L (ref 8–16)
AST SERPL-CCNC: 73 U/L (ref 10–40)
BASOPHILS # BLD AUTO: 0.04 K/UL (ref 0–0.2)
BASOPHILS NFR BLD: 0.4 % (ref 0–1.9)
BILIRUB SERPL-MCNC: 1.4 MG/DL (ref 0.1–1)
BUN SERPL-MCNC: 13 MG/DL (ref 8–23)
CALCIUM SERPL-MCNC: 9.8 MG/DL (ref 8.7–10.5)
CHLORIDE SERPL-SCNC: 101 MMOL/L (ref 95–110)
CO2 SERPL-SCNC: 31 MMOL/L (ref 23–29)
CREAT SERPL-MCNC: 0.8 MG/DL (ref 0.5–1.4)
DIFFERENTIAL METHOD: ABNORMAL
EOSINOPHIL # BLD AUTO: 0.1 K/UL (ref 0–0.5)
EOSINOPHIL NFR BLD: 1 % (ref 0–8)
ERYTHROCYTE [DISTWIDTH] IN BLOOD BY AUTOMATED COUNT: 16.6 % (ref 11.5–14.5)
EST. GFR  (AFRICAN AMERICAN): >60 ML/MIN/1.73 M^2
EST. GFR  (NON AFRICAN AMERICAN): >60 ML/MIN/1.73 M^2
GLUCOSE SERPL-MCNC: 129 MG/DL (ref 70–110)
HCT VFR BLD AUTO: 42 % (ref 40–54)
HGB BLD-MCNC: 13.9 G/DL (ref 14–18)
IMM GRANULOCYTES # BLD AUTO: 0.03 K/UL (ref 0–0.04)
IMM GRANULOCYTES NFR BLD AUTO: 0.3 % (ref 0–0.5)
LYMPHOCYTES # BLD AUTO: 1.7 K/UL (ref 1–4.8)
LYMPHOCYTES NFR BLD: 17.8 % (ref 18–48)
MAGNESIUM SERPL-MCNC: 2 MG/DL (ref 1.6–2.6)
MCH RBC QN AUTO: 31 PG (ref 27–31)
MCHC RBC AUTO-ENTMCNC: 33.1 G/DL (ref 32–36)
MCV RBC AUTO: 94 FL (ref 82–98)
MONOCYTES # BLD AUTO: 1.1 K/UL (ref 0.3–1)
MONOCYTES NFR BLD: 11 % (ref 4–15)
NEUTROPHILS # BLD AUTO: 6.8 K/UL (ref 1.8–7.7)
NEUTROPHILS NFR BLD: 69.5 % (ref 38–73)
NRBC BLD-RTO: 0 /100 WBC
PHOSPHATE SERPL-MCNC: 2.9 MG/DL (ref 2.7–4.5)
PLATELET # BLD AUTO: 220 K/UL (ref 150–350)
PMV BLD AUTO: 11.2 FL (ref 9.2–12.9)
POCT GLUCOSE: 176 MG/DL (ref 70–110)
POCT GLUCOSE: 227 MG/DL (ref 70–110)
POCT GLUCOSE: 241 MG/DL (ref 70–110)
POTASSIUM SERPL-SCNC: 3.7 MMOL/L (ref 3.5–5.1)
PROT SERPL-MCNC: 6.8 G/DL (ref 6–8.4)
RBC # BLD AUTO: 4.49 M/UL (ref 4.6–6.2)
SODIUM SERPL-SCNC: 141 MMOL/L (ref 136–145)
WBC # BLD AUTO: 9.78 K/UL (ref 3.9–12.7)

## 2019-05-26 PROCEDURE — 63600175 PHARM REV CODE 636 W HCPCS: Mod: HCNC | Performed by: HOSPITALIST

## 2019-05-26 PROCEDURE — 11000001 HC ACUTE MED/SURG PRIVATE ROOM: Mod: HCNC

## 2019-05-26 PROCEDURE — 84100 ASSAY OF PHOSPHORUS: CPT | Mod: HCNC

## 2019-05-26 PROCEDURE — 83735 ASSAY OF MAGNESIUM: CPT | Mod: HCNC

## 2019-05-26 PROCEDURE — 85025 COMPLETE CBC W/AUTO DIFF WBC: CPT | Mod: HCNC

## 2019-05-26 PROCEDURE — 63600175 PHARM REV CODE 636 W HCPCS: Mod: HCNC | Performed by: PHYSICIAN ASSISTANT

## 2019-05-26 PROCEDURE — 99232 SBSQ HOSP IP/OBS MODERATE 35: CPT | Mod: HCNC,,, | Performed by: HOSPITALIST

## 2019-05-26 PROCEDURE — 25000003 PHARM REV CODE 250: Mod: HCNC | Performed by: HOSPITALIST

## 2019-05-26 PROCEDURE — 25000003 PHARM REV CODE 250: Mod: HCNC | Performed by: PHYSICIAN ASSISTANT

## 2019-05-26 PROCEDURE — 36415 COLL VENOUS BLD VENIPUNCTURE: CPT | Mod: HCNC

## 2019-05-26 PROCEDURE — 80053 COMPREHEN METABOLIC PANEL: CPT | Mod: HCNC

## 2019-05-26 PROCEDURE — 99232 PR SUBSEQUENT HOSPITAL CARE,LEVL II: ICD-10-PCS | Mod: HCNC,,, | Performed by: HOSPITALIST

## 2019-05-26 RX ORDER — RAMELTEON 8 MG/1
8 TABLET ORAL NIGHTLY
Status: DISCONTINUED | OUTPATIENT
Start: 2019-05-26 | End: 2019-05-28 | Stop reason: HOSPADM

## 2019-05-26 RX ORDER — INSULIN ASPART 100 [IU]/ML
3 INJECTION, SOLUTION INTRAVENOUS; SUBCUTANEOUS
Status: DISCONTINUED | OUTPATIENT
Start: 2019-05-26 | End: 2019-05-28 | Stop reason: HOSPADM

## 2019-05-26 RX ORDER — HYDROXYZINE PAMOATE 25 MG/1
25 CAPSULE ORAL NIGHTLY PRN
Status: DISCONTINUED | OUTPATIENT
Start: 2019-05-26 | End: 2019-05-28 | Stop reason: HOSPADM

## 2019-05-26 RX ORDER — FUROSEMIDE 40 MG/1
40 TABLET ORAL 2 TIMES DAILY
Status: DISCONTINUED | OUTPATIENT
Start: 2019-05-27 | End: 2019-05-28 | Stop reason: HOSPADM

## 2019-05-26 RX ADMIN — DABIGATRAN ETEXILATE MESYLATE 150 MG: 150 CAPSULE ORAL at 08:05

## 2019-05-26 RX ADMIN — GABAPENTIN 600 MG: 100 CAPSULE ORAL at 08:05

## 2019-05-26 RX ADMIN — RAMELTEON 8 MG: 8 TABLET, FILM COATED ORAL at 02:05

## 2019-05-26 RX ADMIN — INSULIN ASPART 2 UNITS: 100 INJECTION, SOLUTION INTRAVENOUS; SUBCUTANEOUS at 08:05

## 2019-05-26 RX ADMIN — OXYCODONE HYDROCHLORIDE AND ACETAMINOPHEN 1500 MG: 500 TABLET ORAL at 08:05

## 2019-05-26 RX ADMIN — ASPIRIN 81 MG CHEWABLE TABLET 81 MG: 81 TABLET CHEWABLE at 08:05

## 2019-05-26 RX ADMIN — SACUBITRIL AND VALSARTAN 1 TABLET: 97; 103 TABLET, FILM COATED ORAL at 08:05

## 2019-05-26 RX ADMIN — COLLAGENASE SANTYL: 250 OINTMENT TOPICAL at 01:05

## 2019-05-26 RX ADMIN — FUROSEMIDE 80 MG: 10 INJECTION, SOLUTION INTRAMUSCULAR; INTRAVENOUS at 09:05

## 2019-05-26 RX ADMIN — AMIODARONE HYDROCHLORIDE 200 MG: 200 TABLET ORAL at 08:05

## 2019-05-26 RX ADMIN — CEFPODOXIME PROXETIL 200 MG: 200 TABLET, FILM COATED ORAL at 08:05

## 2019-05-26 RX ADMIN — RAMELTEON 8 MG: 8 TABLET, FILM COATED ORAL at 08:05

## 2019-05-26 RX ADMIN — OXYCODONE HYDROCHLORIDE AND ACETAMINOPHEN 1500 MG: 500 TABLET ORAL at 01:05

## 2019-05-26 NOTE — PLAN OF CARE
Problem: Fall Injury Risk  Goal: Absence of Fall and Fall-Related Injury  No falls during this shift.     Problem: Diabetes Comorbidity  Goal: Blood Glucose Level Within Desired Range  Basal and prn insulin administered.

## 2019-05-26 NOTE — ASSESSMENT & PLAN NOTE
CXR, elevated BNP, physical exam suggestive of volume overload, and history all consistent with CHF exacerbation  Last echo was in 2017 with EF of 10-15%  Repeat echo unchanged  Diuresis with lasix 80 mg IV BID initially but decreased to 80 IV daily due to contraction alkalosis. Switch to 40 mg PO BID starting 5/27.     On carvedilol but given poor EF on previous echo and CHF exacerbation, will hold for now until euvolemia achieved   9.5L urine output since admit.  Weight down to 99kg from 103 kg  Hypoxia improved. Now on RA  Maintain euvolemia by monitoring I/O's and daily weights.  Fluid restriction (1.5 liters/24 hours)  Low Na diet  Monitor for signs of fluid overload: RR>30, O2 sat<92%, weight gain of 3 lbs, or urinary output <160ml/8hr  Maintain oxygen sats >92% via NC if supplemental oxygen needed.     Patient Vitals for the past 72 hrs (Last 3 readings):   Weight   05/26/19 0400 96.3 kg (212 lb 4.9 oz)   05/24/19 0400 99 kg (218 lb 4.1 oz)   05/23/19 1516 103 kg (227 lb)

## 2019-05-26 NOTE — ASSESSMENT & PLAN NOTE
resolved  Afebrile  Source likely LE wound. Cont wound care orders  No treatment needed for UTI per ID; patient asymptomatic and mckenzie dc'd   Cont above care

## 2019-05-26 NOTE — PROGRESS NOTES
[facility] Hospital Medicine  Progress Note    Patient Name: Madhav Cortez  MRN: [id number]  Patient Class: IP- Inpatient   Admission Date: 5/22/2019  Length of Stay: 4 days  Attending Physician: Cheng Cordoba MD  Primary Care Provider: Mirna Reyes MD    [facility] Medicine Team: [facility] HOSP MED  Oni Cordoba MD    Subjective:     Principal Problem:Acute on chronic systolic heart failure    HPI:  The patient is a 78 y.o. male with PMH of CHF, HTN, atrial fib and atrial flutter on anticoagulation, HLP, and chronic LE wounds who presents with a one month history of worsening SOB. He states no changes in his medications or dietary compliance. SOB has gotten to the point where he cannot sleep. Reports orthopnea and MCBRIDE. Reports an occasional cough with some whitish-yellow phlegm. He also has LE swelling and wounds that are painful. Denies any actual CP, palpations, nausea, vomiting, abdominal distention, dizziness, or trouble urinating.       Hospital Course:  No notes on file    Interval History: No acute events overnight. Diuresed 9.6 L since admit. Feels better overall but still complaining of inability to sleep.     Review of Systems   Constitutional: Negative for chills and fever.   HENT: Negative for congestion.    Respiratory: Negative for cough, shortness of breath (improved) and wheezing.    Cardiovascular: Positive for leg swelling. Negative for chest pain and palpitations.   Gastrointestinal: Negative for abdominal distention and abdominal pain.   Endocrine: Negative.    Skin: Positive for wound.   Hematological: Negative.    Psychiatric/Behavioral: Positive for sleep disturbance.     Objective:     Vital Signs (Most Recent):  Temp: 98.1 °F (36.7 °C) (05/26/19 0822)  Pulse: 66 (05/26/19 0822)  Resp: 20 (05/26/19 0822)  BP: (!) 123/59 (05/26/19 0822)  SpO2: 100 % (05/26/19 0822) Vital Signs (24h Range):  Temp:  [97 °F (36.1 °C)-98.3 °F (36.8 °C)] 98.1 °F (36.7 °C)  Pulse:  [61-75]  66  Resp:  [16-20] 20  SpO2:  [93 %-100 %] 100 %  BP: (123-148)/(59-92) 123/59     Weight: 96.3 kg (212 lb 4.9 oz)  Body mass index is 30.46 kg/m².    Intake/Output Summary (Last 24 hours) at 5/26/2019 1523  Last data filed at 5/26/2019 0600  Gross per 24 hour   Intake 954 ml   Output 600 ml   Net 354 ml      Physical Exam   Constitutional: He is oriented to person, place, and time. He appears well-developed and well-nourished. He does not have a sickly appearance. He does not appear ill. No distress.   HENT:   Head: Normocephalic and atraumatic.   Eyes: No scleral icterus.   Neck: Neck supple.   Cardiovascular: Normal rate and normal heart sounds.   Pulmonary/Chest: Effort normal. No respiratory distress. He has no wheezes.   Blunted breath sounds at the bases bilaterally (improved)   Abdominal: Soft. Bowel sounds are normal. He exhibits no distension. There is no tenderness. There is no guarding.   Musculoskeletal: He exhibits edema (improved).   Neurological: He is alert and oriented to person, place, and time.   Skin:   LE wounds wrapped with mild drainage   Psychiatric: He has a normal mood and affect.   Vitals reviewed.      Significant Labs: All pertinent labs within the past 24 hours have been reviewed.    Significant Imaging: I have reviewed all pertinent imaging results/findings within the past 24 hours.     X-Ray Tibia Fibula 2 View Left  Narrative: EXAMINATION:  XR TIBIA FIBULA 2 VIEW LEFT    CLINICAL HISTORY:  wound;    TECHNIQUE:  AP and lateral views of the left tibia and fibula were performed.    COMPARISON:  None.    FINDINGS:  Mild DJD.  No fracture or dislocation.  No bone destruction identified.  Vascular calcifications.  Impression: See above    Electronically signed by: Elvis Landa MD  Date:    05/24/2019  Time:    10:53      Assessment/Plan:      * Acute on chronic systolic heart failure  CXR, elevated BNP, physical exam suggestive of volume overload, and history all consistent with CHF  exacerbation  Last echo was in 2017 with EF of 10-15%  Repeat echo unchanged  Diuresis with lasix 80 mg IV BID initially but decreased to 80 IV daily due to contraction alkalosis. Switch to 40 mg PO BID starting 5/27.     On carvedilol but given poor EF on previous echo and CHF exacerbation, will hold for now until euvolemia achieved   9.5L urine output since admit.  Weight down to 99kg from 103 kg  Hypoxia improved. Now on RA  Maintain euvolemia by monitoring I/O's and daily weights.  Fluid restriction (1.5 liters/24 hours)  Low Na diet  Monitor for signs of fluid overload: RR>30, O2 sat<92%, weight gain of 3 lbs, or urinary output <160ml/8hr  Maintain oxygen sats >92% via NC if supplemental oxygen needed.     Patient Vitals for the past 72 hrs (Last 3 readings):   Weight   05/26/19 0400 96.3 kg (212 lb 4.9 oz)   05/24/19 0400 99 kg (218 lb 4.1 oz)   05/23/19 1516 103 kg (227 lb)       Wound of foot  Plan as above      Leukocytosis  resolved  Afebrile  Source likely LE wound. Cont wound care orders  No treatment needed for UTI per ID; patient asymptomatic and mckenzie dc'd   Cont above care      Venous insufficiency of both lower extremities  Podiatry following  LLE wound with periwound erythema and increased drainage.   Wound was cultured.   Consulted ID and recommending 7 day treatment with cefpodoxime 200mg po bid for skin and soft tissue emanuel  Cont current woundcare orders: Wounds dressed with aquacel foam, aquacel extra, xeroform and DSD today.  Recommend vascular f/u upon DC.  Low Na diet  EDWIN hose  Leg elevation     Current use of long term anticoagulation  Plan as above      Diabetic neuropathy  Continue gabapentin       Hyperlipidemia  Unclear why patient not on statin therapy      Atrial fibrillation  Per atrial flutter       Other insomnia  Takes temazepam at home but not on formulary  Will schedule rozerem  Vistaril prn     ICD (implantable cardioverter-defibrillator), biventricular, in situ  No acute  issues   Follow up with device clinic       Type 2 diabetes mellitus with circulatory disorder, with long-term current use of insulin  POCT Glucose   Date Value Ref Range Status   05/26/2019 227 (H) 70 - 110 mg/dL Final   05/25/2019 226 (H) 70 - 110 mg/dL Final   05/25/2019 208 (H) 70 - 110 mg/dL Final   05/25/2019 225 (H) 70 - 110 mg/dL Final   05/25/2019 113 (H) 70 - 110 mg/dL Final   05/24/2019 186 (H) 70 - 110 mg/dL Final   05/24/2019 117 (H) 70 - 110 mg/dL Final   05/24/2019 140 (H) 70 - 110 mg/dL Final   05/24/2019 216 (H) 70 - 110 mg/dL Final   05/24/2019 89 70 - 110 mg/dL Final   05/23/2019 251 (H) 70 - 110 mg/dL Final   05/23/2019 132 (H) 70 - 110 mg/dL Final   05/23/2019 146 (H) 70 - 110 mg/dL Final     Patient states only taking basal insulin at home of 40 units QHS but sometimes forgets  Wife took him off prandial insulin due to low sugars  Increase basal dose to 20 units  Start low dose scheduled aspart at 3 units   Low dose correction scale   Cont blood glucose monitoring   Avoid hypoglycemia  ADA diet    Atrial flutter  Pulse Readings from Last 3 Encounters:   05/26/19 66   04/24/19 (!) 58   04/24/19 68     Rate currently controlled  Continue amiodarone  Holding carvedilol as per above   Continue dabigatran       Cardiomyopathy, ischemic  Per CHF      Essential hypertension  BP Readings from Last 3 Encounters:   05/26/19 (!) 123/59   05/13/19 109/78   04/24/19 118/78     Continue entresto  Diuresis with lasix  Holding carvedilol as per above  Controlled         VTE Risk Mitigation (From admission, onward)        Ordered     dabigatran etexilate capsule 150 mg  2 times daily      05/25/19 0802     Place EDWIN hose  Until discontinued      05/22/19 1223     IP VTE HIGH RISK PATIENT  Once      05/22/19 1223              Oni Cordoba MD  Department of Hospital Medicine   [facility]

## 2019-05-26 NOTE — ASSESSMENT & PLAN NOTE
Pulse Readings from Last 3 Encounters:   05/26/19 66   04/24/19 (!) 58   04/24/19 68     Rate currently controlled  Continue amiodarone  Holding carvedilol as per above   Continue dabigatran

## 2019-05-26 NOTE — ASSESSMENT & PLAN NOTE
Podiatry following  LLE wound with periwound erythema and increased drainage.   Wound was cultured.   Consulted ID and recommending 7 day treatment with cefpodoxime 200mg po bid for skin and soft tissue meanuel  Cont current woundcare orders: Wounds dressed with aquacel foam, aquacel extra, xeroform and DSD today.  Recommend vascular f/u upon DC.  Low Na diet  EDWIN hose  Leg elevation

## 2019-05-26 NOTE — SUBJECTIVE & OBJECTIVE
Interval History: No acute events overnight. Diuresed 9.6 L since admit. Feels better overall but still complaining of inability to sleep.     Review of Systems   Constitutional: Negative for chills and fever.   HENT: Negative for congestion.    Respiratory: Negative for cough, shortness of breath (improved) and wheezing.    Cardiovascular: Positive for leg swelling. Negative for chest pain and palpitations.   Gastrointestinal: Negative for abdominal distention and abdominal pain.   Endocrine: Negative.    Skin: Positive for wound.   Hematological: Negative.    Psychiatric/Behavioral: Positive for sleep disturbance.     Objective:     Vital Signs (Most Recent):  Temp: 98.1 °F (36.7 °C) (05/26/19 0822)  Pulse: 66 (05/26/19 0822)  Resp: 20 (05/26/19 0822)  BP: (!) 123/59 (05/26/19 0822)  SpO2: 100 % (05/26/19 0822) Vital Signs (24h Range):  Temp:  [97 °F (36.1 °C)-98.3 °F (36.8 °C)] 98.1 °F (36.7 °C)  Pulse:  [61-75] 66  Resp:  [16-20] 20  SpO2:  [93 %-100 %] 100 %  BP: (123-148)/(59-92) 123/59     Weight: 96.3 kg (212 lb 4.9 oz)  Body mass index is 30.46 kg/m².    Intake/Output Summary (Last 24 hours) at 5/26/2019 1523  Last data filed at 5/26/2019 0600  Gross per 24 hour   Intake 954 ml   Output 600 ml   Net 354 ml      Physical Exam   Constitutional: He is oriented to person, place, and time. He appears well-developed and well-nourished. He does not have a sickly appearance. He does not appear ill. No distress.   HENT:   Head: Normocephalic and atraumatic.   Eyes: No scleral icterus.   Neck: Neck supple.   Cardiovascular: Normal rate and normal heart sounds.   Pulmonary/Chest: Effort normal. No respiratory distress. He has no wheezes.   Blunted breath sounds at the bases bilaterally (improved)   Abdominal: Soft. Bowel sounds are normal. He exhibits no distension. There is no tenderness. There is no guarding.   Musculoskeletal: He exhibits edema (improved).   Neurological: He is alert and oriented to person, place,  and time.   Skin:   LE wounds wrapped with mild drainage   Psychiatric: He has a normal mood and affect.   Vitals reviewed.      Significant Labs: All pertinent labs within the past 24 hours have been reviewed.    Significant Imaging: I have reviewed all pertinent imaging results/findings within the past 24 hours.     X-Ray Tibia Fibula 2 View Left  Narrative: EXAMINATION:  XR TIBIA FIBULA 2 VIEW LEFT    CLINICAL HISTORY:  wound;    TECHNIQUE:  AP and lateral views of the left tibia and fibula were performed.    COMPARISON:  None.    FINDINGS:  Mild DJD.  No fracture or dislocation.  No bone destruction identified.  Vascular calcifications.  Impression: See above    Electronically signed by: Elvis Landa MD  Date:    05/24/2019  Time:    10:53

## 2019-05-26 NOTE — NURSING
Dr. Adalid landa. Call returned by SAILAJA Ellis. Provider notified of pt bedtime glucose of 226 and that there is not an order available for prn insulin coverage. Also, provider made aware that pt basal insulin decreased by 1/2 and pt unhappy at this time with dosage change due to increasing glucose. Provider stated will order prn sliding scale fast acting insulin. Will continue to monitor closely.

## 2019-05-27 LAB
ALBUMIN SERPL BCP-MCNC: 3.4 G/DL (ref 3.5–5.2)
ALP SERPL-CCNC: 91 U/L (ref 55–135)
ALT SERPL W/O P-5'-P-CCNC: 68 U/L (ref 10–44)
ANION GAP SERPL CALC-SCNC: 12 MMOL/L (ref 8–16)
AST SERPL-CCNC: 72 U/L (ref 10–40)
BACTERIA SPEC ANAEROBE CULT: NORMAL
BACTERIA UR CULT: NORMAL
BASOPHILS # BLD AUTO: 0.04 K/UL (ref 0–0.2)
BASOPHILS NFR BLD: 0.5 % (ref 0–1.9)
BILIRUB SERPL-MCNC: 1.2 MG/DL (ref 0.1–1)
BUN SERPL-MCNC: 14 MG/DL (ref 8–23)
CALCIUM SERPL-MCNC: 9.4 MG/DL (ref 8.7–10.5)
CHLORIDE SERPL-SCNC: 101 MMOL/L (ref 95–110)
CO2 SERPL-SCNC: 29 MMOL/L (ref 23–29)
CREAT SERPL-MCNC: 0.8 MG/DL (ref 0.5–1.4)
DIFFERENTIAL METHOD: ABNORMAL
EOSINOPHIL # BLD AUTO: 0.1 K/UL (ref 0–0.5)
EOSINOPHIL NFR BLD: 0.7 % (ref 0–8)
ERYTHROCYTE [DISTWIDTH] IN BLOOD BY AUTOMATED COUNT: 16.2 % (ref 11.5–14.5)
EST. GFR  (AFRICAN AMERICAN): >60 ML/MIN/1.73 M^2
EST. GFR  (NON AFRICAN AMERICAN): >60 ML/MIN/1.73 M^2
GLUCOSE SERPL-MCNC: 96 MG/DL (ref 70–110)
HCT VFR BLD AUTO: 41.1 % (ref 40–54)
HGB BLD-MCNC: 13.6 G/DL (ref 14–18)
IMM GRANULOCYTES # BLD AUTO: 0.02 K/UL (ref 0–0.04)
IMM GRANULOCYTES NFR BLD AUTO: 0.2 % (ref 0–0.5)
LYMPHOCYTES # BLD AUTO: 1.5 K/UL (ref 1–4.8)
LYMPHOCYTES NFR BLD: 17.2 % (ref 18–48)
MAGNESIUM SERPL-MCNC: 1.9 MG/DL (ref 1.6–2.6)
MCH RBC QN AUTO: 31 PG (ref 27–31)
MCHC RBC AUTO-ENTMCNC: 33.1 G/DL (ref 32–36)
MCV RBC AUTO: 94 FL (ref 82–98)
MONOCYTES # BLD AUTO: 0.9 K/UL (ref 0.3–1)
MONOCYTES NFR BLD: 10.6 % (ref 4–15)
NEUTROPHILS # BLD AUTO: 6.1 K/UL (ref 1.8–7.7)
NEUTROPHILS NFR BLD: 70.8 % (ref 38–73)
NRBC BLD-RTO: 0 /100 WBC
PHOSPHATE SERPL-MCNC: 3.1 MG/DL (ref 2.7–4.5)
PLATELET # BLD AUTO: 237 K/UL (ref 150–350)
PMV BLD AUTO: 11.4 FL (ref 9.2–12.9)
POCT GLUCOSE: 114 MG/DL (ref 70–110)
POCT GLUCOSE: 126 MG/DL (ref 70–110)
POCT GLUCOSE: 152 MG/DL (ref 70–110)
POCT GLUCOSE: 158 MG/DL (ref 70–110)
POCT GLUCOSE: 187 MG/DL (ref 70–110)
POTASSIUM SERPL-SCNC: 3.7 MMOL/L (ref 3.5–5.1)
PROT SERPL-MCNC: 6.7 G/DL (ref 6–8.4)
RBC # BLD AUTO: 4.39 M/UL (ref 4.6–6.2)
SODIUM SERPL-SCNC: 142 MMOL/L (ref 136–145)
WBC # BLD AUTO: 8.59 K/UL (ref 3.9–12.7)

## 2019-05-27 PROCEDURE — 85025 COMPLETE CBC W/AUTO DIFF WBC: CPT | Mod: HCNC

## 2019-05-27 PROCEDURE — 63600175 PHARM REV CODE 636 W HCPCS: Mod: HCNC | Performed by: HOSPITALIST

## 2019-05-27 PROCEDURE — 99232 SBSQ HOSP IP/OBS MODERATE 35: CPT | Mod: HCNC,,, | Performed by: HOSPITALIST

## 2019-05-27 PROCEDURE — 97116 GAIT TRAINING THERAPY: CPT | Mod: HCNC

## 2019-05-27 PROCEDURE — 97535 SELF CARE MNGMENT TRAINING: CPT | Mod: HCNC

## 2019-05-27 PROCEDURE — 84100 ASSAY OF PHOSPHORUS: CPT | Mod: HCNC

## 2019-05-27 PROCEDURE — 25000003 PHARM REV CODE 250: Mod: HCNC | Performed by: HOSPITALIST

## 2019-05-27 PROCEDURE — 80053 COMPREHEN METABOLIC PANEL: CPT | Mod: HCNC

## 2019-05-27 PROCEDURE — 25000242 PHARM REV CODE 250 ALT 637 W/ HCPCS: Mod: HCNC | Performed by: HOSPITALIST

## 2019-05-27 PROCEDURE — 11000001 HC ACUTE MED/SURG PRIVATE ROOM: Mod: HCNC

## 2019-05-27 PROCEDURE — 99232 PR SUBSEQUENT HOSPITAL CARE,LEVL II: ICD-10-PCS | Mod: HCNC,,, | Performed by: HOSPITALIST

## 2019-05-27 PROCEDURE — 36415 COLL VENOUS BLD VENIPUNCTURE: CPT | Mod: HCNC

## 2019-05-27 PROCEDURE — 83735 ASSAY OF MAGNESIUM: CPT | Mod: HCNC

## 2019-05-27 RX ORDER — SENNOSIDES 8.6 MG/1
8.6 TABLET ORAL 2 TIMES DAILY
Status: DISCONTINUED | OUTPATIENT
Start: 2019-05-27 | End: 2019-05-28 | Stop reason: HOSPADM

## 2019-05-27 RX ADMIN — ASPIRIN 81 MG CHEWABLE TABLET 81 MG: 81 TABLET CHEWABLE at 09:05

## 2019-05-27 RX ADMIN — SACUBITRIL AND VALSARTAN 1 TABLET: 97; 103 TABLET, FILM COATED ORAL at 09:05

## 2019-05-27 RX ADMIN — FUROSEMIDE 40 MG: 40 TABLET ORAL at 06:05

## 2019-05-27 RX ADMIN — INSULIN ASPART 3 UNITS: 100 INJECTION, SOLUTION INTRAVENOUS; SUBCUTANEOUS at 04:05

## 2019-05-27 RX ADMIN — SACUBITRIL AND VALSARTAN 1 TABLET: 97; 103 TABLET, FILM COATED ORAL at 11:05

## 2019-05-27 RX ADMIN — INSULIN ASPART 3 UNITS: 100 INJECTION, SOLUTION INTRAVENOUS; SUBCUTANEOUS at 08:05

## 2019-05-27 RX ADMIN — OXYCODONE HYDROCHLORIDE AND ACETAMINOPHEN 1500 MG: 500 TABLET ORAL at 09:05

## 2019-05-27 RX ADMIN — CEFPODOXIME PROXETIL 200 MG: 200 TABLET, FILM COATED ORAL at 09:05

## 2019-05-27 RX ADMIN — CEFPODOXIME PROXETIL 200 MG: 200 TABLET, FILM COATED ORAL at 11:05

## 2019-05-27 RX ADMIN — GABAPENTIN 600 MG: 100 CAPSULE ORAL at 03:05

## 2019-05-27 RX ADMIN — DABIGATRAN ETEXILATE MESYLATE 150 MG: 150 CAPSULE ORAL at 09:05

## 2019-05-27 RX ADMIN — COLLAGENASE SANTYL: 250 OINTMENT TOPICAL at 09:05

## 2019-05-27 RX ADMIN — INSULIN ASPART 3 UNITS: 100 INJECTION, SOLUTION INTRAVENOUS; SUBCUTANEOUS at 12:05

## 2019-05-27 RX ADMIN — AMIODARONE HYDROCHLORIDE 200 MG: 200 TABLET ORAL at 09:05

## 2019-05-27 RX ADMIN — FLUTICASONE PROPIONATE 50 MCG: 50 SPRAY, METERED NASAL at 09:05

## 2019-05-27 RX ADMIN — FUROSEMIDE 40 MG: 40 TABLET ORAL at 09:05

## 2019-05-27 RX ADMIN — GABAPENTIN 600 MG: 100 CAPSULE ORAL at 09:05

## 2019-05-27 NOTE — PROGRESS NOTES
[facility] Hospital Medicine  Progress Note    Patient Name: Madhav Cortez  MRN: [id number]  Patient Class: IP- Inpatient   Admission Date: 5/22/2019  Length of Stay: 5 days  Attending Physician: Oni Cordoba MD  Primary Care Provider: Mirna Reyes MD    [place] [facility] Medicine Team: [facility]OSP MED  Oni Cordoba MD    Subjective:     Principal Problem:Acute on chronic systolic heart failure    HPI:  The patient is a 78 y.o. male with PMH of CHF, HTN, atrial fib and atrial flutter on anticoagulation, HLP, and chronic LE wounds who presents with a one month history of worsening SOB. He states no changes in his medications or dietary compliance. SOB has gotten to the point where he cannot sleep. Reports orthopnea and MCBRIDE. Reports an occasional cough with some whitish-yellow phlegm. He also has LE swelling and wounds that are painful. Denies any actual CP, palpations, nausea, vomiting, abdominal distention, dizziness, or trouble urinating.       Hospital Course:  No notes on file    Interval History: No acute events overnight. Feels better overall.     Review of Systems   Constitutional: Negative for chills and fever.   HENT: Negative for congestion.    Respiratory: Negative for cough, shortness of breath (improved) and wheezing.    Cardiovascular: Positive for leg swelling (improved). Negative for chest pain and palpitations.   Gastrointestinal: Negative for abdominal distention and abdominal pain.   Endocrine: Negative.    Skin: Positive for wound.   Hematological: Negative.    Psychiatric/Behavioral: Positive for sleep disturbance.     Objective:     Vital Signs (Most Recent):  Temp: 97.8 °F (36.6 °C) (05/27/19 0455)  Pulse: 70 (05/27/19 0455)  Resp: 20 (05/27/19 0455)  BP: 133/60 (05/27/19 0455)  SpO2: 95 % (05/27/19 0455) Vital Signs (24h Range):  Temp:  [97.6 °F (36.4 °C)-97.9 °F (36.6 °C)] 97.8 °F (36.6 °C)  Pulse:  [63-72] 70  Resp:  [18-20] 20  SpO2:  [95 %-100 %] 95 %  BP:  (126-142)/(59-78) 133/60     Weight: 94.6 kg (208 lb 8.9 oz)  Body mass index is 29.92 kg/m².    Intake/Output Summary (Last 24 hours) at 5/27/2019 1544  Last data filed at 5/27/2019 1100  Gross per 24 hour   Intake 477 ml   Output 700 ml   Net -223 ml      Physical Exam   Constitutional: He is oriented to person, place, and time. He appears well-developed and well-nourished. He does not have a sickly appearance. He does not appear ill. No distress.   HENT:   Head: Normocephalic and atraumatic.   Eyes: No scleral icterus.   Neck: Neck supple.   Cardiovascular: Normal rate and normal heart sounds.   Pulmonary/Chest: Effort normal. No respiratory distress. He has no wheezes.   Blunted breath sounds at the bases bilaterally (improved)   Abdominal: Soft. Bowel sounds are normal. He exhibits no distension. There is no tenderness. There is no guarding.   Musculoskeletal: He exhibits edema (improved).   Neurological: He is alert and oriented to person, place, and time.   Skin:   LE wounds wrapped with mild drainage   Psychiatric: He has a normal mood and affect.   Vitals reviewed.      Significant Labs: All pertinent labs within the past 24 hours have been reviewed.    Significant Imaging: I have reviewed all pertinent imaging results/findings within the past 24 hours.             Assessment/Plan:      * Acute on chronic systolic heart failure  CXR, elevated BNP, physical exam suggestive of volume overload, and history all consistent with CHF exacerbation  Last echo was in 2017 with EF of 10-15%  Repeat echo unchanged  Diuresis with lasix 80 mg IV BID initially but decreased to 80 IV daily due to contraction alkalosis. Switch to 40 mg PO BID starting 5/27 and will monitor over the next 24 hrs. Plan to dc on 5/28 if he continues to have good output and symptoms continue to improve.     On carvedilol but given poor EF on previous echo and CHF exacerbation, will hold for now until euvolemia achieved   9.5L urine output since  admit.  Hypoxia improved. Now on RA  Maintain euvolemia by monitoring I/O's and daily weights.  Fluid restriction (1.5 liters/24 hours)  Low Na diet  Monitor for signs of fluid overload: RR>30, O2 sat<92%, weight gain of 3 lbs, or urinary output <160ml/8hr  Maintain oxygen sats >92% via NC if supplemental oxygen needed.     Patient Vitals for the past 72 hrs (Last 3 readings):   Weight   05/27/19 0500 94.6 kg (208 lb 8.9 oz)   05/26/19 0400 96.3 kg (212 lb 4.9 oz)       Wound of foot  Plan as above      Leukocytosis  resolved  Afebrile  Source likely LE wound. Cont wound care orders  No treatment needed for UTI per ID; patient asymptomatic and mckenzie dc'd   Cont above care      Venous insufficiency of both lower extremities  Podiatry following  LLE wound with periwound erythema and increased drainage.   Wound was cultured.   Consulted ID and recommending 7 day treatment with cefpodoxime 200mg po bid for skin and soft tissue emanuel  Cont current woundcare orders: Wounds dressed with aquacel foam, aquacel extra, xeroform and DSD today.  Recommend vascular f/u upon DC.  Low Na diet  EDWIN hose  Leg elevation     Current use of long term anticoagulation  Plan as above      Diabetic neuropathy  Continue gabapentin       Hyperlipidemia  Unclear why patient not on statin therapy      Atrial fibrillation  Per atrial flutter       Other insomnia  Takes temazepam at home but not on formulary  Will schedule rozerem  Vistaril prn     ICD (implantable cardioverter-defibrillator), biventricular, in situ  No acute issues   Follow up with device clinic       Type 2 diabetes mellitus with circulatory disorder, with long-term current use of insulin  POCT Glucose   Date Value Ref Range Status   05/26/2019 227 (H) 70 - 110 mg/dL Final   05/25/2019 226 (H) 70 - 110 mg/dL Final   05/25/2019 208 (H) 70 - 110 mg/dL Final   05/25/2019 225 (H) 70 - 110 mg/dL Final   05/25/2019 113 (H) 70 - 110 mg/dL Final   05/24/2019 186 (H) 70 - 110 mg/dL  Final   05/24/2019 117 (H) 70 - 110 mg/dL Final   05/24/2019 140 (H) 70 - 110 mg/dL Final   05/24/2019 216 (H) 70 - 110 mg/dL Final   05/24/2019 89 70 - 110 mg/dL Final   05/23/2019 251 (H) 70 - 110 mg/dL Final   05/23/2019 132 (H) 70 - 110 mg/dL Final   05/23/2019 146 (H) 70 - 110 mg/dL Final     Patient states only taking basal insulin at home of 40 units QHS but sometimes forgets  Wife took him off prandial insulin due to low sugars  Increase basal dose to 20 units  Start low dose scheduled aspart at 3 units   Low dose correction scale   Cont blood glucose monitoring   Avoid hypoglycemia  ADA diet    Atrial flutter  Pulse Readings from Last 3 Encounters:   05/26/19 66   04/24/19 (!) 58   04/24/19 68     Rate currently controlled  Continue amiodarone  Holding carvedilol as per above   Continue dabigatran       Cardiomyopathy, ischemic  Per CHF      Essential hypertension  BP Readings from Last 3 Encounters:   05/26/19 (!) 123/59   05/13/19 109/78   04/24/19 118/78     Continue entresto  Diuresis with lasix  Holding carvedilol as per above  Controlled         VTE Risk Mitigation (From admission, onward)        Ordered     dabigatran etexilate capsule 150 mg  2 times daily      05/25/19 0802     Place EDWIN hose  Until discontinued      05/22/19 1223     IP VTE HIGH RISK PATIENT  Once      05/22/19 1223              Oni Cordoba MD  Department of Hospital Medicine   [facility]

## 2019-05-27 NOTE — PLAN OF CARE
Problem: Fall Injury Risk  Goal: Absence of Fall and Fall-Related Injury  No falls during this shift.     Problem: Diabetes Comorbidity  Goal: Blood Glucose Level Within Desired Range  Prn and basal insulin administered at bedtime.

## 2019-05-27 NOTE — ASSESSMENT & PLAN NOTE
Podiatry following  LLE wound with periwound erythema and increased drainage.   Wound was cultured.   Consulted ID and recommending 7 day treatment with cefpodoxime 200mg po bid for skin and soft tissue emanuel  Cont current woundcare orders: Wounds dressed with aquacel foam, aquacel extra, xeroform and DSD today.  Recommend vascular f/u upon DC.  Low Na diet  EDWIN hose  Leg elevation

## 2019-05-27 NOTE — PT/OT/SLP PROGRESS
"Physical Therapy Treatment    Patient Name:  Madhav Cortez   MRN:  [id number]    Recommendations:     Discharge Recommendations:  home   Discharge Equipment Recommendations: none   Barriers to discharge: Decreased caregiver support    Assessment:     Madhav Cortez is a 78 y.o. male admitted with a medical diagnosis of Acute on chronic systolic heart failure.  He presents with the following impairments/functional limitations:  weakness, gait instability, impaired balance, impaired endurance, impaired self care skills, impaired functional mobilty, impaired cardiopulmonary response to activity.  Pt tolerated session c min c/o fatigue.  Performed transfer c S and gait c SBA using RW.  Pt ambulating short household distance and demo decreased gait speed, mild unsteadiness, FFP, BLE tremors towards end of gait session and c/o fatigue.  Pt safe to ambulate c assistance of 1x person and RW.  Pt would benefit from continued skilled acute PT 3x/wk to improve functional mobility.      Rehab Prognosis: Good; patient would benefit from acute skilled PT services to address these deficits and reach maximum level of function.    Recent Surgery: * No surgery found *      Plan:     During this hospitalization, patient to be seen 3 x/week to address the identified rehab impairments via gait training, therapeutic exercises, therapeutic activities, neuromuscular re-education and progress toward the following goals:    · Plan of Care Expires:  06/18/19    Subjective     Chief Complaint: fatigue; weakness  Patient/Family Comments/goals: "I just finished working c therapy."  Pain/Comfort:  · Pain Rating 1: 0/10      Objective:     Communicated with RN prior to session.  Patient found up in chair with peripheral IV, telemetry upon PT entry to room.     General Precautions: Standard, fall   Orthopedic Precautions:N/A   Braces: N/A     Functional Mobility:  · Transfers:     · Sit to Stand:  supervision with rollator  · Gait: 150ft c " rollator SBA   · demo decreased gait speed, mild unsteadiness, FFP, BLE tremors towards end of gait session and c/o fatigue  · Balance: sitting (S); standing (SBA      AM-PAC 6 CLICK MOBILITY  Turning over in bed (including adjusting bedclothes, sheets and blankets)?: 4  Sitting down on and standing up from a chair with arms (e.g., wheelchair, bedside commode, etc.): 3  Moving from lying on back to sitting on the side of the bed?: 3  Moving to and from a bed to a chair (including a wheelchair)?: 3  Need to walk in hospital room?: 3  Climbing 3-5 steps with a railing?: 2  Basic Mobility Total Score: 18       Therapeutic Activities and Exercises:  Pt educated on: PT role/POC; safety c mobility; benefits of OOB activities; performing therex; d/c recs - v/u  -sit<>Stand 3x from bedside chair    Patient left up in chair with all lines intact, call button in reach, RN notified and wife present..    GOALS:   Multidisciplinary Problems     Physical Therapy Goals        Problem: Physical Therapy Goal    Goal Priority Disciplines Outcome Goal Variances Interventions   Physical Therapy Goal     PT, PT/OT Ongoing (interventions implemented as appropriate)     Description:  Goals to be met by: 2019     Patient will increase functional independence with mobility by performin. Supine to sit with Modified Arthur  2. Sit to supine with Modified Arthur  3. Sit to stand transfer with Supervision  4. Bed to chair transfer with Supervision using Rolling Walker  5. Gait  x 100 feet with Supervision using Rolling Walker.   6. Ascend/descend 5 stair with bilateral Handrails Contact Guard Assistance                    Time Tracking:     PT Received On: 19  PT Start Time: 959     PT Stop Time:   PT Total Time (min): 13 min     Billable Minutes: Gait Training 13 min    Treatment Type: Treatment  PT/PTA: PT           Guero Mejia, PT  2019

## 2019-05-27 NOTE — ASSESSMENT & PLAN NOTE
CXR, elevated BNP, physical exam suggestive of volume overload, and history all consistent with CHF exacerbation  Last echo was in 2017 with EF of 10-15%  Repeat echo unchanged  Diuresis with lasix 80 mg IV BID initially but decreased to 80 IV daily due to contraction alkalosis. Switch to 40 mg PO BID starting 5/27 and will monitor over the next 24 hrs. Plan to dc on 5/28 if he continues to have good output and symptoms continue to improve.     On carvedilol but given poor EF on previous echo and CHF exacerbation, will hold for now until euvolemia achieved   9.5L urine output since admit.  Hypoxia improved. Now on RA  Maintain euvolemia by monitoring I/O's and daily weights.  Fluid restriction (1.5 liters/24 hours)  Low Na diet  Monitor for signs of fluid overload: RR>30, O2 sat<92%, weight gain of 3 lbs, or urinary output <160ml/8hr  Maintain oxygen sats >92% via NC if supplemental oxygen needed.     Patient Vitals for the past 72 hrs (Last 3 readings):   Weight   05/27/19 0500 94.6 kg (208 lb 8.9 oz)   05/26/19 0400 96.3 kg (212 lb 4.9 oz)

## 2019-05-27 NOTE — PLAN OF CARE
Problem: Physical Therapy Goal  Goal: Physical Therapy Goal  Goals to be met by: 2019     Patient will increase functional independence with mobility by performin. Supine to sit with Modified [place]  2. Sit to supine with Modified [place]  3. Sit to stand transfer with Supervision  4. Bed to chair transfer with Supervision using Rolling Walker  5. Gait  x 100 feet with Supervision using Rolling Walker.   6. Ascend/descend 5 stair with bilateral Handrails Contact Guard Assistance   Outcome: Ongoing (interventions implemented as appropriate)  Progressing towards goals    Guero Mejia PT,DPT  2019

## 2019-05-27 NOTE — SUBJECTIVE & OBJECTIVE
Interval History: No acute events overnight. Feels better overall.     Review of Systems   Constitutional: Negative for chills and fever.   HENT: Negative for congestion.    Respiratory: Negative for cough, shortness of breath (improved) and wheezing.    Cardiovascular: Positive for leg swelling (improved). Negative for chest pain and palpitations.   Gastrointestinal: Negative for abdominal distention and abdominal pain.   Endocrine: Negative.    Skin: Positive for wound.   Hematological: Negative.    Psychiatric/Behavioral: Positive for sleep disturbance.     Objective:     Vital Signs (Most Recent):  Temp: 97.8 °F (36.6 °C) (05/27/19 0455)  Pulse: 70 (05/27/19 0455)  Resp: 20 (05/27/19 0455)  BP: 133/60 (05/27/19 0455)  SpO2: 95 % (05/27/19 0455) Vital Signs (24h Range):  Temp:  [97.6 °F (36.4 °C)-97.9 °F (36.6 °C)] 97.8 °F (36.6 °C)  Pulse:  [63-72] 70  Resp:  [18-20] 20  SpO2:  [95 %-100 %] 95 %  BP: (126-142)/(59-78) 133/60     Weight: 94.6 kg (208 lb 8.9 oz)  Body mass index is 29.92 kg/m².    Intake/Output Summary (Last 24 hours) at 5/27/2019 1544  Last data filed at 5/27/2019 1100  Gross per 24 hour   Intake 477 ml   Output 700 ml   Net -223 ml      Physical Exam   Constitutional: He is oriented to person, place, and time. He appears well-developed and well-nourished. He does not have a sickly appearance. He does not appear ill. No distress.   HENT:   Head: Normocephalic and atraumatic.   Eyes: No scleral icterus.   Neck: Neck supple.   Cardiovascular: Normal rate and normal heart sounds.   Pulmonary/Chest: Effort normal. No respiratory distress. He has no wheezes.   Blunted breath sounds at the bases bilaterally (improved)   Abdominal: Soft. Bowel sounds are normal. He exhibits no distension. There is no tenderness. There is no guarding.   Musculoskeletal: He exhibits edema (improved).   Neurological: He is alert and oriented to person, place, and time.   Skin:   LE wounds wrapped with mild drainage    Psychiatric: He has a normal mood and affect.   Vitals reviewed.      Significant Labs: All pertinent labs within the past 24 hours have been reviewed.    Significant Imaging: I have reviewed all pertinent imaging results/findings within the past 24 hours.

## 2019-05-27 NOTE — PLAN OF CARE
Problem: Occupational Therapy Goal  Goal: Occupational Therapy Goal  Goals to be achieved by 6/6/19    Pt will complete functional transfers with supervision - met  Pt will perform G/H tasks with supervision standing at sink - met  Pt will complete UB dressing with supervision - met  Pt will be modified independent for BUE AROM HEP for improved endurance - met  Pt will perform toilet transfer with supervision - met  Outcome: Outcome(s) achieved Date Met: 05/27/19  Goals met. No further OT needed on acute.

## 2019-05-27 NOTE — PT/OT/SLP PROGRESS
Occupational Therapy   Treatment & Discharge Summary    Name: Madhav Cortez  MRN: [id number]  Admitting Diagnosis:  Acute on chronic systolic heart failure       Recommendations:     Discharge Recommendations: home      Assessment:     Madhav Cortez is a 78 y.o. male with a medical diagnosis of Acute on chronic systolic heart failure.  He presents with good participation and mobility with ADL's.  Pt no longer has need of skilled OT on acute therefore will discharge from OT.     Plan:     Discharge OT on acute.  No further OT needed.  · Plan of Care Reviewed with: patient, spouse    Subjective     Pain/Comfort:  · Pain Rating 1: 0/10  · Pain Rating Post-Intervention 1: 0/10    Objective:     Communicated with: RN prior to session.  Patient found walking in room with (lines intact; spouse present in room) upon OT entry to room.    General Precautions: Standard, fall     Occupational Performance:     Functional Mobility/Transfers:  · Patient completed Sit <> Stand Transfer with supervision  with  no assistive device from sofa x 2 trials  · Functional Mobility: SBA in room without using AD (offerred rollator to pt however pt declined)    Activities of Daily Living:  · Lower Body Dressing: supervision elham mckeon seated in part & standing in part      [facility] 6 Click ADL: 19    Treatment & Education:  Pt performed AROM exercises with BUE in 3 planes x 15 reps each while seated on sofa.   Discussed safety & need to use rollator with functional mobility with pt verbalizing understanding.  Discussed POC with pt verbalizing no need for further OT & OT in agreement. Pt had no further questions & when asked whether there were any concerns pt reported none.      Patient left seated on sofa with all lines intact, call button in reach, RN notified, spouse present and white board updated.Education:      GOALS:   Multidisciplinary Problems     Occupational Therapy Goals     Not on file          Multidisciplinary Problems  (Resolved)        Problem: Occupational Therapy Goal    Goal Priority Disciplines Outcome Interventions   Occupational Therapy Goal   (Resolved)     OT, PT/OT Outcome(s) achieved    Description:  Goals to be achieved by 6/6/19    Pt will complete functional transfers with supervision - met  Pt will perform G/H tasks with supervision standing at sink - met  Pt will complete UB dressing with supervision - met  Pt will be modified independent for BUE AROM HEP for improved endurance - met  Pt will perform toilet transfer with supervision - met                    Time Tracking:     OT Date of Treatment: 05/27/19  OT Start Time: 0921  OT Stop Time: 0934  OT Total Time (min): 13 min    Billable Minutes:Self Care/Home Management 13    JOSHUA Ayala  5/27/2019

## 2019-05-28 VITALS
SYSTOLIC BLOOD PRESSURE: 127 MMHG | BODY MASS INDEX: 29.86 KG/M2 | DIASTOLIC BLOOD PRESSURE: 61 MMHG | HEART RATE: 67 BPM | WEIGHT: 208.56 LBS | HEIGHT: 70 IN | TEMPERATURE: 99 F | RESPIRATION RATE: 14 BRPM | OXYGEN SATURATION: 99 %

## 2019-05-28 LAB
ALBUMIN SERPL BCP-MCNC: 3.8 G/DL (ref 3.5–5.2)
ALP SERPL-CCNC: 86 U/L (ref 55–135)
ALT SERPL W/O P-5'-P-CCNC: 69 U/L (ref 10–44)
ANION GAP SERPL CALC-SCNC: 11 MMOL/L (ref 8–16)
AST SERPL-CCNC: 78 U/L (ref 10–40)
BACTERIA BLD CULT: NORMAL
BACTERIA BLD CULT: NORMAL
BASOPHILS # BLD AUTO: 0.06 K/UL (ref 0–0.2)
BASOPHILS NFR BLD: 0.6 % (ref 0–1.9)
BILIRUB SERPL-MCNC: 1.2 MG/DL (ref 0.1–1)
BUN SERPL-MCNC: 13 MG/DL (ref 8–23)
CALCIUM SERPL-MCNC: 9.9 MG/DL (ref 8.7–10.5)
CHLORIDE SERPL-SCNC: 101 MMOL/L (ref 95–110)
CO2 SERPL-SCNC: 29 MMOL/L (ref 23–29)
CREAT SERPL-MCNC: 0.8 MG/DL (ref 0.5–1.4)
DIFFERENTIAL METHOD: ABNORMAL
EOSINOPHIL # BLD AUTO: 0.1 K/UL (ref 0–0.5)
EOSINOPHIL NFR BLD: 1.3 % (ref 0–8)
ERYTHROCYTE [DISTWIDTH] IN BLOOD BY AUTOMATED COUNT: 16.2 % (ref 11.5–14.5)
EST. GFR  (AFRICAN AMERICAN): >60 ML/MIN/1.73 M^2
EST. GFR  (NON AFRICAN AMERICAN): >60 ML/MIN/1.73 M^2
GLUCOSE SERPL-MCNC: 45 MG/DL (ref 70–110)
HCT VFR BLD AUTO: 42 % (ref 40–54)
HGB BLD-MCNC: 14 G/DL (ref 14–18)
IMM GRANULOCYTES # BLD AUTO: 0.03 K/UL (ref 0–0.04)
IMM GRANULOCYTES NFR BLD AUTO: 0.3 % (ref 0–0.5)
LYMPHOCYTES # BLD AUTO: 3 K/UL (ref 1–4.8)
LYMPHOCYTES NFR BLD: 28.1 % (ref 18–48)
MAGNESIUM SERPL-MCNC: 2 MG/DL (ref 1.6–2.6)
MCH RBC QN AUTO: 30.8 PG (ref 27–31)
MCHC RBC AUTO-ENTMCNC: 33.3 G/DL (ref 32–36)
MCV RBC AUTO: 93 FL (ref 82–98)
MONOCYTES # BLD AUTO: 1.5 K/UL (ref 0.3–1)
MONOCYTES NFR BLD: 14 % (ref 4–15)
NEUTROPHILS # BLD AUTO: 5.9 K/UL (ref 1.8–7.7)
NEUTROPHILS NFR BLD: 55.7 % (ref 38–73)
NRBC BLD-RTO: 0 /100 WBC
PHOSPHATE SERPL-MCNC: 3.6 MG/DL (ref 2.7–4.5)
PLATELET # BLD AUTO: 267 K/UL (ref 150–350)
PMV BLD AUTO: 11.2 FL (ref 9.2–12.9)
POCT GLUCOSE: 134 MG/DL (ref 70–110)
POCT GLUCOSE: 227 MG/DL (ref 70–110)
POCT GLUCOSE: 42 MG/DL (ref 70–110)
POTASSIUM SERPL-SCNC: 3.4 MMOL/L (ref 3.5–5.1)
PROT SERPL-MCNC: 7.2 G/DL (ref 6–8.4)
RBC # BLD AUTO: 4.54 M/UL (ref 4.6–6.2)
SODIUM SERPL-SCNC: 141 MMOL/L (ref 136–145)
WBC # BLD AUTO: 10.64 K/UL (ref 3.9–12.7)

## 2019-05-28 PROCEDURE — 25000003 PHARM REV CODE 250: Mod: HCNC | Performed by: PHYSICIAN ASSISTANT

## 2019-05-28 PROCEDURE — 36415 COLL VENOUS BLD VENIPUNCTURE: CPT | Mod: HCNC

## 2019-05-28 PROCEDURE — 84100 ASSAY OF PHOSPHORUS: CPT | Mod: HCNC

## 2019-05-28 PROCEDURE — 80053 COMPREHEN METABOLIC PANEL: CPT | Mod: HCNC

## 2019-05-28 PROCEDURE — 99233 SBSQ HOSP IP/OBS HIGH 50: CPT | Mod: HCNC,,, | Performed by: PODIATRIST

## 2019-05-28 PROCEDURE — 99238 PR HOSPITAL DISCHARGE DAY,<30 MIN: ICD-10-PCS | Mod: HCNC,,, | Performed by: HOSPITALIST

## 2019-05-28 PROCEDURE — 99238 HOSP IP/OBS DSCHRG MGMT 30/<: CPT | Mod: HCNC,,, | Performed by: HOSPITALIST

## 2019-05-28 PROCEDURE — 99233 PR SUBSEQUENT HOSPITAL CARE,LEVL III: ICD-10-PCS | Mod: HCNC,,, | Performed by: PODIATRIST

## 2019-05-28 PROCEDURE — 85025 COMPLETE CBC W/AUTO DIFF WBC: CPT | Mod: HCNC

## 2019-05-28 PROCEDURE — 25000003 PHARM REV CODE 250: Mod: HCNC | Performed by: HOSPITALIST

## 2019-05-28 PROCEDURE — 25000242 PHARM REV CODE 250 ALT 637 W/ HCPCS: Mod: HCNC | Performed by: HOSPITALIST

## 2019-05-28 PROCEDURE — 83735 ASSAY OF MAGNESIUM: CPT | Mod: HCNC

## 2019-05-28 RX ORDER — CEFPODOXIME PROXETIL 200 MG/1
200 TABLET, FILM COATED ORAL EVERY 12 HOURS
Qty: 6 TABLET | Refills: 0 | Status: SHIPPED | OUTPATIENT
Start: 2019-05-28 | End: 2019-06-01

## 2019-05-28 RX ORDER — RAMELTEON 8 MG/1
8 TABLET ORAL NIGHTLY PRN
Qty: 30 TABLET | Refills: 3 | Status: ON HOLD | OUTPATIENT
Start: 2019-05-28 | End: 2019-06-18 | Stop reason: SDUPTHER

## 2019-05-28 RX ORDER — POTASSIUM CHLORIDE 20 MEQ/1
40 TABLET, EXTENDED RELEASE ORAL ONCE
Status: DISCONTINUED | OUTPATIENT
Start: 2019-05-28 | End: 2019-05-28 | Stop reason: HOSPADM

## 2019-05-28 RX ORDER — RAMELTEON 8 MG/1
8 TABLET ORAL NIGHTLY PRN
Qty: 30 TABLET | Refills: 3 | Status: SHIPPED | OUTPATIENT
Start: 2019-05-28 | End: 2019-05-28

## 2019-05-28 RX ORDER — FUROSEMIDE 40 MG/1
40 TABLET ORAL 2 TIMES DAILY
Qty: 60 TABLET | Refills: 11 | Status: SHIPPED | OUTPATIENT
Start: 2019-05-28 | End: 2019-07-22

## 2019-05-28 RX ORDER — CEFPODOXIME PROXETIL 200 MG/1
200 TABLET, FILM COATED ORAL EVERY 12 HOURS
Qty: 6 TABLET | Refills: 0 | Status: SHIPPED | OUTPATIENT
Start: 2019-05-28 | End: 2019-05-28

## 2019-05-28 RX ORDER — HYDROXYZINE HYDROCHLORIDE 25 MG/1
25 TABLET, FILM COATED ORAL ONCE AS NEEDED
Status: COMPLETED | OUTPATIENT
Start: 2019-05-28 | End: 2019-05-28

## 2019-05-28 RX ORDER — INSULIN GLARGINE 100 [IU]/ML
15 INJECTION, SOLUTION SUBCUTANEOUS NIGHTLY
Qty: 45 ML | Refills: 4 | Status: SHIPPED | OUTPATIENT
Start: 2019-05-28 | End: 2019-05-28 | Stop reason: SDUPTHER

## 2019-05-28 RX ORDER — INSULIN ASPART 100 [IU]/ML
3 INJECTION, SOLUTION INTRAVENOUS; SUBCUTANEOUS 3 TIMES DAILY
Qty: 8.1 ML | Refills: 3 | Status: SHIPPED | OUTPATIENT
Start: 2019-05-28 | End: 2019-05-28

## 2019-05-28 RX ORDER — INSULIN ASPART 100 [IU]/ML
3 INJECTION, SOLUTION INTRAVENOUS; SUBCUTANEOUS 3 TIMES DAILY
Qty: 8.1 ML | Refills: 3 | Status: ON HOLD | OUTPATIENT
Start: 2019-05-28 | End: 2019-06-18 | Stop reason: ALTCHOICE

## 2019-05-28 RX ORDER — INSULIN GLARGINE 100 [IU]/ML
15 INJECTION, SOLUTION SUBCUTANEOUS NIGHTLY
Qty: 45 ML | Refills: 4 | Status: SHIPPED | OUTPATIENT
Start: 2019-05-28 | End: 2019-07-27 | Stop reason: SDUPTHER

## 2019-05-28 RX ORDER — FUROSEMIDE 40 MG/1
40 TABLET ORAL 2 TIMES DAILY
Qty: 60 TABLET | Refills: 11 | Status: SHIPPED | OUTPATIENT
Start: 2019-05-28 | End: 2019-05-28

## 2019-05-28 RX ADMIN — GABAPENTIN 600 MG: 100 CAPSULE ORAL at 12:05

## 2019-05-28 RX ADMIN — RAMELTEON 8 MG: 8 TABLET, FILM COATED ORAL at 12:05

## 2019-05-28 RX ADMIN — SENNOSIDES 8.6 MG: 8.6 TABLET, FILM COATED ORAL at 12:05

## 2019-05-28 RX ADMIN — OXYCODONE HYDROCHLORIDE AND ACETAMINOPHEN 1500 MG: 500 TABLET ORAL at 09:05

## 2019-05-28 RX ADMIN — DABIGATRAN ETEXILATE MESYLATE 150 MG: 150 CAPSULE ORAL at 12:05

## 2019-05-28 RX ADMIN — HYDROXYZINE HYDROCHLORIDE 25 MG: 25 TABLET, FILM COATED ORAL at 05:05

## 2019-05-28 RX ADMIN — CEFPODOXIME PROXETIL 200 MG: 200 TABLET, FILM COATED ORAL at 09:05

## 2019-05-28 RX ADMIN — AMIODARONE HYDROCHLORIDE 200 MG: 200 TABLET ORAL at 12:05

## 2019-05-28 RX ADMIN — FUROSEMIDE 40 MG: 40 TABLET ORAL at 09:05

## 2019-05-28 RX ADMIN — COLLAGENASE SANTYL: 250 OINTMENT TOPICAL at 09:05

## 2019-05-28 RX ADMIN — FLUTICASONE PROPIONATE 50 MCG: 50 SPRAY, METERED NASAL at 09:05

## 2019-05-28 RX ADMIN — DABIGATRAN ETEXILATE MESYLATE 150 MG: 150 CAPSULE ORAL at 09:05

## 2019-05-28 RX ADMIN — HYDROXYZINE PAMOATE 25 MG: 25 CAPSULE ORAL at 12:05

## 2019-05-28 RX ADMIN — SENNOSIDES 8.6 MG: 8.6 TABLET, FILM COATED ORAL at 09:05

## 2019-05-28 RX ADMIN — OXYCODONE HYDROCHLORIDE AND ACETAMINOPHEN 1500 MG: 500 TABLET ORAL at 12:05

## 2019-05-28 RX ADMIN — ACETAMINOPHEN 650 MG: 325 TABLET ORAL at 12:05

## 2019-05-28 RX ADMIN — AMIODARONE HYDROCHLORIDE 200 MG: 200 TABLET ORAL at 09:05

## 2019-05-28 RX ADMIN — GABAPENTIN 600 MG: 100 CAPSULE ORAL at 09:05

## 2019-05-28 RX ADMIN — DEXTROSE MONOHYDRATE 25 G: 25 INJECTION, SOLUTION INTRAVENOUS at 08:05

## 2019-05-28 RX ADMIN — SACUBITRIL AND VALSARTAN 1 TABLET: 97; 103 TABLET, FILM COATED ORAL at 09:05

## 2019-05-28 RX ADMIN — ASPIRIN 81 MG CHEWABLE TABLET 81 MG: 81 TABLET CHEWABLE at 09:05

## 2019-05-28 NOTE — PLAN OF CARE
[facility]    HOME HEALTH ORDERS  FACE TO FACE ENCOUNTER    Patient Name: Madhav Cortez  YOB: 1940    PCP: Mirna Reyes MD   PCP Address: [address] / [place] [address]  PCP Phone Number: [phone]  PCP Fax: [phone]    Encounter Date: 05/28/2019    Admit to Home Health    Diagnoses:  Active Hospital Problems    Diagnosis  POA    *Acute on chronic systolic heart failure [I50.23]  Yes    Leukocytosis [D72.829]  Yes    Wound of foot [S91.309A]  Yes    Venous insufficiency of both lower extremities [I87.2]  Yes    Current use of long term anticoagulation [Z79.01]  Not Applicable    Diabetic neuropathy [E11.40]  Yes    Hyperlipidemia [E78.5]  Yes    Atrial fibrillation [I48.91]  Yes    Other insomnia [G47.09]  Yes    Atrial flutter [I48.92]  Yes     Isthmus dependent, s/p RFA 5/11      Essential hypertension [I10]  Yes    Cardiomyopathy, ischemic [I25.5]  Yes    ICD (implantable cardioverter-defibrillator), biventricular, in situ [Z95.810]  Yes     Dual chamber ICD 2/22/12      Type 2 diabetes mellitus with circulatory disorder, with long-term current use of insulin [E11.59, Z79.4]  Not Applicable      Resolved Hospital Problems   No resolved problems to display.       Future Appointments   Date Time Provider Department Center   5/29/2019  2:00 PM VASCULAR, LAB [facility] VASCLAB [facility]   5/29/2019  3:20 PM Radha Szymanski NP [facility] WOUND [facility]   6/28/2019  4:20 PM Yonas Steve MD [facility] IM [facility] PCW   8/6/2019  8:15 AM HOME MONITOR DEVICE CHECK, [facility] ARRHPLADARIUS [facility]   11/5/2019  8:15 AM HOME MONITOR DEVICE CHECK, [facility] DEVON [facility]   2/4/2020  8:15 AM HOME MONITOR DEVICE CHECK, [facility] DEVON Ayala           I have seen and examined this patient face to face today. My clinical findings that support the need for the home health skilled services and home bound status are the following:  Weakness/numbness causing balance  "and gait disturbance due to Heart Failure and Infection making it taxing to leave home.    Allergies:Review of patient's allergies indicates:  No Known Allergies    Diet: cardiac diet and diabetic diet: 2000 calorie    Activities: activity as tolerated    Nursing:   SN to complete comprehensive assessment including routine vital signs. Instruct on disease process and s/s of complications to report to MD. Review/verify medication list sent home with the patient at time of discharge  and instruct patient/caregiver as needed. Frequency may be adjusted depending on start of care date.    Notify MD if SBP > 160 or < 90; DBP > 90 or < 50; HR > 120 or < 50; Temp > 101      CONSULTS:    Physical Therapy to evaluate and treat. Evaluate for home safety and equipment needs; Establish/upgrade home exercise program. Perform / instruct on therapeutic exercises, gait training, transfer training, and Range of Motion.  Occupational Therapy to evaluate and treat. Evaluate home environment for safety and equipment needs. Perform/Instruct on transfers, ADL training, ROM, and therapeutic exercises.    WOUND CARE ORDERS:      Wound Care Orders to be done on MWF: + Right toe wound: remove old dressing and cleanse with wound cleanser. Pat dry. apply small amount of santyl to the wound and cover with adhesive bandage. Wrap foot up to mid shin with cast padding and ACE.      Medications: Review discharge medications with patient and family and provide education.      Current Discharge Medication List      START taking these medications    Details   cefpodoxime (VANTIN) 200 MG tablet Take 1 tablet (200 mg total) by mouth every 12 (twelve) hours. for 3 days  Qty: 6 tablet, Refills: 0      collagenase (SANTYL) ointment Apply topically once daily.  Qty: 15 g, Refills: 0      hydrocolloid dressing (AQUACEL EXTRA) 2 X 2 " Bndg Apply 1 Units topically once as needed.  Qty: 20 each, Refills: 3      insulin aspart U-100 (NOVOLOG) 100 unit/mL (3 mL) " "InPn pen Inject 3 Units into the skin 3 (three) times daily.  Qty: 8.1 mL, Refills: 3      pva-gentian violet-methyl blue (HYDROFERA BLUE) 2 X 2 " Bndg Apply 1 Units topically as needed.  Qty: 10 each, Refills: 3      ramelteon (ROZEREM) 8 mg tablet Take 1 tablet (8 mg total) by mouth nightly as needed for Insomnia.  Qty: 30 tablet, Refills: 3         CONTINUE these medications which have CHANGED    Details   furosemide (LASIX) 40 MG tablet Take 1 tablet (40 mg total) by mouth 2 (two) times daily.  Qty: 60 tablet, Refills: 11      insulin (LANTUS SOLOSTAR U-100 INSULIN) glargine 100 units/mL (3mL) SubQ pen Inject 15 Units into the skin every evening.  Qty: 45 mL, Refills: 4         CONTINUE these medications which have NOT CHANGED    Details   amiodarone (PACERONE) 200 MG Tab TAKE 1 TABLET TWICE DAILY  Qty: 180 tablet, Refills: 3      ascorbic acid (VITAMIN C) 500 MG tablet Take 1,500 mg by mouth 2 (two) times daily.       aspirin 81 MG Chew Take 1 tablet (81 mg total) by mouth once daily.  Qty: 30 tablet, Refills: 11      beta carotene-C-E-lutein-min29 5,000-60-30-2 unit-mg-unit-mg Tab Take 1 capsule by mouth once daily.       carvedilol (COREG) 25 MG tablet Take 1 tablet (25 mg total) by mouth 2 (two) times daily.  Qty: 180 tablet, Refills: 1      !! co-enzyme Q-10 30 mg capsule 800 units daily      dabigatran etexilate (PRADAXA) 150 mg Cap Take 1 capsule (150 mg total) by mouth 2 (two) times daily. "Do NOT break, chew, or open capsules."  Qty: 180 capsule, Refills: 3    Associated Diagnoses: Essential hypertension; Coronary artery disease involving native coronary artery of native heart without angina pectoris; Cardiomyopathy, ischemic; Diabetes mellitus due to underlying condition with diabetic polyneuropathy, with long-term current use of insulin; Transaminitis; Uncontrolled type 2 diabetes mellitus with diabetic polyneuropathy, with long-term current use of insulin; Hyperlipidemia, unspecified hyperlipidemia " type; Automatic implantable cardioverter-defibrillator in situ; Acute on chronic systolic and diastolic heart failure, NYHA class 4; Chronic systolic heart failure; Non-STEMI (non-ST elevated myocardial infarction); Ventricular fibrillation; Post PTCA; Implantable cardioverter-defibrillator discharge      ENTRESTO  mg per tablet TAKE 1 TABLET TWICE DAILY  Qty: 180 tablet, Refills: 1    Associated Diagnoses: Essential hypertension; Coronary artery disease involving native coronary artery of native heart without angina pectoris; Cardiomyopathy, ischemic; Transaminitis      fluticasone (FLONASE) 50 mcg/actuation nasal spray 1 spray (50 mcg total) by Each Nare route once daily.  Qty: 2 Bottle, Refills: 0      gabapentin (NEURONTIN) 300 MG capsule TAKE 2 CAPSULES BY MOUTH THREE TIMES DAILY  Qty: 540 capsule, Refills: 3      glucosamine-chondroitin 500-400 mg tablet Take 1 tablet by mouth 2 (two) times daily.      levothyroxine (SYNTHROID) 75 MCG tablet Take 1 tablet (75 mcg total) by mouth once daily.  Qty: 90 tablet, Refills: 4    Comments: Dose increased. Stop 50 mcg dose      magnesium oxide (MAG-OX) 400 mg tablet Take 400 mg by mouth 2 (two) times daily.       melatonin 5 mg Tab Take 1 tablet by mouth every evening.       RIBOSE ORAL Take 1 tablet by mouth once daily.       spironolactone (ALDACTONE) 25 MG tablet TAKE 1 TABLET ONE TIME DAILY  Qty: 90 tablet, Refills: 3    Associated Diagnoses: Chronic systolic heart failure      temazepam (RESTORIL) 15 mg Cap Take 1 capsule (15 mg total) by mouth every evening.  Qty: 90 capsule, Refills: 1      ACCU-CHEK JIE Misc USE AS INSTRUCTED  Qty: 6 each, Refills: 11    Associated Diagnoses: Diabetes mellitus due to abnormal insulin      ACCU-CHEK SMARTVIEW TEST STRIP Strp CHECK BLOOD SUGAR THREE TIMES DAILY  Qty: 300 strip, Refills: 4    Associated Diagnoses: Diabetes mellitus due to abnormal insulin      blood-glucose meter kit Accu check jie meter Use as  "instructed  Qty: 1 each, Refills: 0    Associated Diagnoses: Diabetes mellitus due to abnormal insulin      ciclopirox (LOPROX) 0.77 % Crea APPLY TO AFFECTED AREAS BILATERAL AXILLA TWICE DAILY UNTIL CLEAR  Qty: 90 g, Refills: 0    Associated Diagnoses: Candida albicans infection      ketoconazole (NIZORAL) 2 % cream Apply topically once daily.  Qty: 60 g, Refills: 3      nitroGLYCERIN (NITROSTAT) 0.4 MG SL tablet Place 1 tablet (0.4 mg total) under the tongue every 5 (five) minutes as needed for Chest pain.  Qty: 30 tablet, Refills: 3      nystatin (MYCOSTATIN) cream Apply topically 2 (two) times daily.  Qty: 60 g, Refills: 3      pen needle, diabetic 31 gauge x 1/4" Ndle Use 4 times daily  Qty: 360 each, Refills: 4    Associated Diagnoses: Diabetes mellitus due to abnormal insulin      senna-docusate 8.6-50 mg (PERICOLACE) 8.6-50 mg per tablet Take 1 tablet by mouth 2 (two) times daily.  Qty: 60 tablet, Refills: 0      tizanidine (ZANAFLEX) 2 MG tablet TAKE 1 TABLET EVERY 8 HOURS AS NEEDED FOR MUSCLE PAIN  Qty: 270 tablet, Refills: 4      triamcinolone acetonide 0.025% (KENALOG) 0.025 % cream apply to affected area twice daily as directed  Qty: 30 g, Refills: 3    Associated Diagnoses: Disease of skin and subcutaneous tissue      !! UBIDECARENONE (COQ-10 ORAL) Take 800 mg by mouth once daily. Takes 100mg tablet at lunch, and 600mg at dinner       !! - Potential duplicate medications found. Please discuss with provider.      STOP taking these medications       clindamycin (CLEOCIN T) 1 % lotion Comments:   Reason for Stopping:               I certify that this patient is confined to his home and needs physical therapy and occupational therapy.        "

## 2019-05-28 NOTE — NURSING
Patient refused tele box and refused potassium ordered. Informed patient that we needed to know what his heart was doing and he continued to refuse. Notified md

## 2019-05-28 NOTE — PLAN OF CARE
Problem: Adult Inpatient Plan of Care  Goal: Plan of Care Review  Outcome: Outcome(s) achieved Date Met: 05/28/19  Patient discharged. Discharge reviewed with patient and spouse. Meds called into pharmacy of choice. IV removed with tip intact

## 2019-05-28 NOTE — PHYSICIAN QUERY
PT Name: Madhav Cortez  MR #: [id number]    Physician Query Form - Atrial Flutter Specificity Clarification     CDS/: Millie Whitney               Contact information: [email or web address]    This form is a permanent document in the medical record.     Query Date: May 28, 2019    By submitting this query, we are merely seeking further clarification of documentation. Please utilize your independent clinical judgment when addressing the question(s) below.    The medical record contains the following:   Indicators     Supporting Clinical Findings Location in Medical Record   x Atrial Flutter atrial fib and atrial flutter on anticoagulation     Atrial fibrillation  Per atrial flutter      Atrial flutter  Pulse Readings from Last 3 Encounters:  05/26/19        66  04/24/19        (!) 58  04/24/19        68     Rate currently controlled  Continue amiodarone  Holding carvedilol as per above   Continue dabigatran  HM note 5/27      HM note 5/27        HM note 5/27    EKG results     x Medication amiodarone tablet 200 mg   Dose: 200 mg  Freq: 2 times daily Route: Oral  Start: 05/22/19 2100     dabigatran etexilate capsule 150 mg   Dose: 150 mg  Freq: 2 times daily Route: Oral  Start: 05/26/19 0900 MAR              MAR     Treatment      Other       Provider, please further specify the Atrial Flutter diagnosis.    [   ] Atypical   [   ] Type I   [   ] Type II   [   ] Typical   [   ] Other (please specify):   [ x ] Clinically Undetermined         Please document in your progress notes daily for the duration of treatment until resolved, and include in your discharge summary.

## 2019-05-28 NOTE — ASSESSMENT & PLAN NOTE
BP Readings from Last 3 Encounters:   05/28/19 127/61   05/13/19 109/78   04/24/19 118/78     Continue entresto  Diuresis with lasix  Holding carvedilol as per above  Controlled

## 2019-05-28 NOTE — ASSESSMENT & PLAN NOTE
POCT Glucose   Date Value Ref Range Status   05/28/2019 134 (H) 70 - 110 mg/dL Final   05/28/2019 227 (H) 70 - 110 mg/dL Final   05/28/2019 42 (LL) 70 - 110 mg/dL Final   05/27/2019 187 (H) 70 - 110 mg/dL Final   05/27/2019 126 (H) 70 - 110 mg/dL Final   05/27/2019 152 (H) 70 - 110 mg/dL Final   05/27/2019 158 (H) 70 - 110 mg/dL Final   05/27/2019 114 (H) 70 - 110 mg/dL Final   05/26/2019 241 (H) 70 - 110 mg/dL Final   05/26/2019 176 (H) 70 - 110 mg/dL Final   05/26/2019 227 (H) 70 - 110 mg/dL Final   05/25/2019 226 (H) 70 - 110 mg/dL Final   05/25/2019 208 (H) 70 - 110 mg/dL Final     Patient states only taking basal insulin at home of 40 units QHS but sometimes forgets  Wife took him off prandial insulin due to low sugars  Increase basal dose to 20 units  Start low dose scheduled aspart at 3 units   Low dose correction scale   Cont blood glucose monitoring   Avoid hypoglycemia  ADA diet

## 2019-05-28 NOTE — SUBJECTIVE & OBJECTIVE
Scheduled Meds:   amiodarone  200 mg Oral BID    ascorbic acid (vitamin C)  1,500 mg Oral BID    aspirin  81 mg Oral Daily    cefpodoxime  200 mg Oral Q12H    collagenase   Topical (Top) Daily    dabigatran etexilate  150 mg Oral BID    fluticasone propionate  1 spray Each Nare Daily    furosemide  40 mg Oral BID    gabapentin  600 mg Oral TID    insulin aspart U-100  3 Units Subcutaneous TIDWM    insulin detemir U-100  20 Units Subcutaneous BID    potassium chloride  40 mEq Oral Once    ramelteon  8 mg Oral QHS    sacubitril-valsartan  1 tablet Oral BID    senna  8.6 mg Oral BID     Continuous Infusions:  PRN Meds:acetaminophen, dextrose 50%, dextrose 50%, glucagon (human recombinant), glucose, glucose, hydrOXYzine pamoate, insulin aspart U-100, ondansetron, sodium chloride 0.9%, sodium chloride 0.9%    Review of patient's allergies indicates:  No Known Allergies     Past Medical History:   Diagnosis Date    *Atrial fibrillation     *Atrial flutter     Acute exacerbation of CHF (congestive heart failure) 8/2/2013    Anticoagulant long-term use     Anxiety     Arthritis     Atrial flutter     Back pain     Cardiomyopathy     Cataract     Coronary artery disease     Depression     Diabetes mellitus     Heart bloc     Hepatitis B     Hyperlipidemia 4/1/2014    Hypertension     Myocardial infarction     Non-STEMI (non-ST elevated myocardial infarction) 5/26/2013    Nuclear sclerosis - Both Eyes 2/18/2013    Post PTCA 8/7/2012    Presence of biventricular AICD 2/23/2016    Stage 3 chronic kidney disease 12/11/2017    Tobacco dependence     Transaminitis 4/1/2014    Ventricular tachycardia      Past Surgical History:   Procedure Laterality Date    ABLATION N/A 4/8/2013    [facility] CATH LAB    APPENDECTOMY      BIOPSY LIVER AND ULTRASOUND N/A 5/26/2014    [facility]    CARDIAC DEFIBRILLATOR PLACEMENT       CARDIAC DEFIBRILLATOR PLACEMENT      CARDIOVERSION N/A 10/10/2013    [facility] CATH LAB    CARDIOVERSION N/A 2013    [facility] CATH LAB    COLONOSCOPY      CORONARY ANGIOPLASTY WITH STENT PLACEMENT      FRACTURE SURGERY      L arm    INSERT / REPLACE / REMOVE PACEMAKER  12/15    bi-V upgrade    INSERTION-ICD-BIVENTRICULAR N/A 2015    [facility] CATH LAB    RADIOFREQUENCY ABLATION      STEROID INJECTION KNEE Bilateral     received about every 4 months    TONSILLECTOMY      TRANSESOPHAGEAL ECHOCARDIOGRAM (DIOGO) N/A 10/10/2013    [facility] CATH LAB    TRANSESOPHAGEAL ECHOCARDIOGRAM (DIOGO) N/A 2013    [facility] CATH LAB    VASCULAR SURGERY         Family History     Problem Relation (Age of Onset)    Bipolar disorder Son    Bladder Cancer Father    Cancer Father, Paternal Grandmother, Paternal Grandfather, Maternal Uncle    Diabetes Father, Mother    Heart attack Mother    Heart disease Maternal Grandmother    No Known Problems Maternal Grandfather, Daughter, Daughter, Son, Son, Maternal Aunt, Paternal Aunt, Paternal Uncle, Sister, Brother        Tobacco Use    Smoking status: Former Smoker     Last attempt to quit: 1987     Years since quittin.9    Smokeless tobacco: Never Used    Tobacco comment: 25 years ago   Substance and Sexual Activity    Alcohol use: No    Drug use: No    Sexual activity: Never     Partners: Female     Review of Systems   Constitutional: Negative for chills and fever.   Gastrointestinal: Negative for nausea and vomiting.   Skin: Positive for color change and wound.     Objective:     Vital Signs (Most Recent):  Temp: 98.9 °F (37.2 °C) (19 1059)  Pulse: 67 (19 1059)  Resp: 14 (19 1059)  BP: 127/61 (19 1059)  SpO2: 99 % (19 0834) Vital Signs (24h Range):  Temp:  [97.5 °F (36.4 °C)-98.9  °F (37.2 °C)] 98.9 °F (37.2 °C)  Pulse:  [65-71] 67  Resp:  [14-20] 14  SpO2:  [96 %-99 %] 99 %  BP: (116-135)/(58-71) 127/61     Weight: 94.6 kg (208 lb 8.9 oz)  Body mass index is 29.92 kg/m².    Foot Exam    General  General Appearance: appears stated age and healthy   Orientation: alert and oriented to person, place, and time       Right Foot/Ankle     Inspection and Palpation  Ecchymosis: none  Tenderness: none   Swelling: (+2 pitting)    Neurovascular  Dorsalis pedis: absent  Posterior tibial: absent  Saphenous nerve sensation: diminished  Tibial nerve sensation: diminished  Superficial peroneal nerve sensation: diminished  Deep peroneal nerve sensation: diminished  Sural nerve sensation: diminished      Left Foot/Ankle      Inspection and Palpation  Ecchymosis: none  Tenderness: none   Swelling: (+2 pitting)    Neurovascular  Dorsalis pedis: absent  Posterior tibial: absent  Saphenous nerve sensation: diminished  Tibial nerve sensation: diminished  Superficial peroneal nerve sensation: diminished  Deep peroneal nerve sensation: diminished  Sural nerve sensation: diminished            Laboratory:  CBC:   Recent Labs   Lab 05/28/19  0520   WBC 10.64   RBC 4.54*   HGB 14.0   HCT 42.0      MCV 93   MCH 30.8   MCHC 33.3     CMP:   Recent Labs   Lab 05/28/19  0520   GLU 45*   CALCIUM 9.9   ALBUMIN 3.8   PROT 7.2      K 3.4*   CO2 29      BUN 13   CREATININE 0.8   ALKPHOS 86   ALT 69*   AST 78*   BILITOT 1.2*       Diagnostic Results:  None    Clinical Findings:  LLE: draining wound with periwound erythema and edema. Also with hemosiderotic changes consistent with chronic venous stasis. No fluctuance or crepitus.       RLE: Toe wound and abrasions to the shin.  Toe wound with fibrotic base. No signs of infection.      5/28

## 2019-05-28 NOTE — ASSESSMENT & PLAN NOTE
Recommend local wound care via home health.  Recommend establishing follow up with Podiatry as patient would like more access to wound care provider.    Upon DC:  -HH to change dressings MWF as follows:  + Right toe wound: remove old dressing and cleanse with wound cleanser. Pat dry. apply small amount of santyl to the wound and cover with adhesive bandage. Wrap foot up to mid shin with cast padding and ACE.  + Left leg wound: Remove old dressing and cleanse leg with wound cleanser. Pat dry. Apply barrier paste around the wounds. Cover wounds with small piece of aquacel and hydrafera blue. Cover with Kerlix, cast padding and ACE.    Patient to follow up with podiatry within 2 weeks to establish follow up.

## 2019-05-28 NOTE — ASSESSMENT & PLAN NOTE
CXR, elevated BNP, physical exam suggestive of volume overload, and history all consistent with CHF exacerbation  Last echo was in 2017 with EF of 10-15%  Repeat echo unchanged  Diuresis with lasix 80 mg IV BID initially but decreased to 80 IV daily due to contraction alkalosis. Switch to 40 mg PO BID starting 5/27 and will monitor over the next 24 hrs. Plan to dc on 5/28 if he continues to have good output and symptoms continue to improve.     On carvedilol but given poor EF on previous echo and CHF exacerbation, will hold for now until euvolemia achieved   9.5L urine output since admit.  Hypoxia improved. Now on RA  Maintain euvolemia by monitoring I/O's and daily weights.  Fluid restriction (1.5 liters/24 hours)  Low Na diet  Monitor for signs of fluid overload: RR>30, O2 sat<92%, weight gain of 3 lbs, or urinary output <160ml/8hr  Maintain oxygen sats >92% via NC if supplemental oxygen needed.     Patient Vitals for the past 72 hrs (Last 3 readings):   Weight   05/28/19 1059 94.6 kg (208 lb 8.9 oz)   05/27/19 0500 94.6 kg (208 lb 8.9 oz)   05/26/19 0400 96.3 kg (212 lb 4.9 oz)

## 2019-05-28 NOTE — PROGRESS NOTES
[facility]  Podiatry  Progress Note    Patient Name: Madhav Cortez  MRN: [id number]  Admission Date: 5/22/2019  Hospital Length of Stay: 6 days  Attending Physician: Oni Cordoba MD  Primary Care Provider: Mirna Reyes MD   Scheduled Meds:   amiodarone  200 mg Oral BID    ascorbic acid (vitamin C)  1,500 mg Oral BID    aspirin  81 mg Oral Daily    cefpodoxime  200 mg Oral Q12H    collagenase   Topical (Top) Daily    dabigatran etexilate  150 mg Oral BID    fluticasone propionate  1 spray Each Nare Daily    furosemide  40 mg Oral BID    gabapentin  600 mg Oral TID    insulin aspart U-100  3 Units Subcutaneous TIDWM    insulin detemir U-100  20 Units Subcutaneous BID    potassium chloride  40 mEq Oral Once    ramelteon  8 mg Oral QHS    sacubitril-valsartan  1 tablet Oral BID    senna  8.6 mg Oral BID     Continuous Infusions:  PRN Meds:acetaminophen, dextrose 50%, dextrose 50%, glucagon (human recombinant), glucose, glucose, hydrOXYzine pamoate, insulin aspart U-100, ondansetron, sodium chloride 0.9%, sodium chloride 0.9%    Review of patient's allergies indicates:  No Known Allergies     Past Medical History:   Diagnosis Date    *Atrial fibrillation     *Atrial flutter     Acute exacerbation of CHF (congestive heart failure) 8/2/2013    Anticoagulant long-term use     Anxiety     Arthritis     Atrial flutter     Back pain     Cardiomyopathy     Cataract     Coronary artery disease     Depression     Diabetes mellitus     Heart bloc     Hepatitis B     Hyperlipidemia 4/1/2014    Hypertension     Myocardial infarction     Non-STEMI (non-ST elevated myocardial infarction) 5/26/2013    Nuclear sclerosis - Both Eyes 2/18/2013    Post PTCA 8/7/2012    Presence of biventricular AICD 2/23/2016    Stage 3 chronic kidney disease 12/11/2017    Tobacco dependence     Transaminitis 4/1/2014    Ventricular tachycardia      Past Surgical History:   Procedure  Laterality Date    ABLATION N/A 2013    [facility]formed [facility] CATH LAB    APPENDECTOMY      BIOPSY LIVER AND ULTRASOUND N/A 2014    Per[facility] by [facility] OR [facility]    CARDIAC DEFIBRILLATOR PLACEMENT      CARDIAC DEFIBRILLATOR PLACEMENT      CARDIOVERSION N/A 10/10/2013    Performed by [facility] at [facility] CATH LAB    CARDIOVERSION N/A 2013    Performed by [facility] CATH LAB    COLONOSCOPY      CORONARY ANGIOPLASTY WITH STENT PLACEMENT      FRACTURE SURGERY      L arm    INSERT / REPLACE / REMOVE PACEMAKER  12/15    bi-V upgrade    INSERTION-ICD-BIVENTRICULAR N/A 2015    Performed by [facility] at [facility] CATH LAB    RADIOFREQUENCY ABLATION      STEROID INJECTION KNEE Bilateral     received about every 4 months    TONSILLECTOMY      TRANSESOPHAGEAL ECHOCARDIOGRAM (DIOGO) N/A 10/10/2013    Performed by [facility] at [facility] CATH LAB    TRANSESOPHAGEAL ECHOCARDIOGRAM (DIOGO) N/A 2013    Performed by [facility] at [facility] CATH LAB    VASCULAR SURGERY         Family History     Problem Relation (Age of Onset)    Bipolar disorder Son    Bladder Cancer Father    Cancer Father, Paternal Grandmother, Paternal Grandfather, Maternal Uncle    Diabetes Father, Mother    Heart attack Mother    Heart disease Maternal Grandmother    No Known Problems Maternal Grandfather, Daughter, Daughter, Son, Son, Maternal Aunt, Paternal Aunt, Paternal Uncle, Sister, Brother        Tobacco Use    Smoking status: Former Smoker     Last attempt to quit: 1987     Years since quittin.9    Smokeless tobacco: Never Used    Tobacco comment: 25 years ago   Substance and Sexual Activity    Alcohol use: No    Drug use: No    Sexual activity: Never     Partners: Female     Review of Systems   Constitutional: Negative for chills and fever.   Gastrointestinal: Negative for nausea and vomiting.   Skin: Positive for color change and  wound.     Objective:     Vital Signs (Most Recent):  Temp: 98.9 °F (37.2 °C) (05/28/19 1059)  Pulse: 67 (05/28/19 1059)  Resp: 14 (05/28/19 1059)  BP: 127/61 (05/28/19 1059)  SpO2: 99 % (05/28/19 0834) Vital Signs (24h Range):  Temp:  [97.5 °F (36.4 °C)-98.9 °F (37.2 °C)] 98.9 °F (37.2 °C)  Pulse:  [65-71] 67  Resp:  [14-20] 14  SpO2:  [96 %-99 %] 99 %  BP: (116-135)/(58-71) 127/61     Weight: 94.6 kg (208 lb 8.9 oz)  Body mass index is 29.92 kg/m².    Foot Exam    General  General Appearance: appears stated age and healthy   Orientation: alert and oriented to person, place, and time       Right Foot/Ankle     Inspection and Palpation  Ecchymosis: none  Tenderness: none   Swelling: (+2 pitting)    Neurovascular  Dorsalis pedis: absent  Posterior tibial: absent  Saphenous nerve sensation: diminished  Tibial nerve sensation: diminished  Superficial peroneal nerve sensation: diminished  Deep peroneal nerve sensation: diminished  Sural nerve sensation: diminished      Left Foot/Ankle      Inspection and Palpation  Ecchymosis: none  Tenderness: none   Swelling: (+2 pitting)    Neurovascular  Dorsalis pedis: absent  Posterior tibial: absent  Saphenous nerve sensation: diminished  Tibial nerve sensation: diminished  Superficial peroneal nerve sensation: diminished  Deep peroneal nerve sensation: diminished  Sural nerve sensation: diminished            Laboratory:  CBC:   Recent Labs   Lab 05/28/19  0520   WBC 10.64   RBC 4.54*   HGB 14.0   HCT 42.0      MCV 93   MCH 30.8   MCHC 33.3     CMP:   Recent Labs   Lab 05/28/19  0520   GLU 45*   CALCIUM 9.9   ALBUMIN 3.8   PROT 7.2      K 3.4*   CO2 29      BUN 13   CREATININE 0.8   ALKPHOS 86   ALT 69*   AST 78*   BILITOT 1.2*       Diagnostic Results:  None    Clinical Findings:  LLE: draining wound with periwound erythema and edema. Also with hemosiderotic changes consistent with chronic venous stasis. No fluctuance or crepitus.       RLE: Toe wound and  abrasions to the shin.  Toe wound with fibrotic base. No signs of infection.      5/28                  Assessment/Plan:     Venous insufficiency of both lower extremities      Upon DC:  -HH to change dressings MWF as follows:  + Right toe wound: remove old dressing and cleanse with wound cleanser. Pat dry. apply small amount of santyl to the wound and cover with adhesive bandage. Wrap foot up to mid shin with cast padding and ACE.  + Left leg wound: Remove old dressing and cleanse leg with wound cleanser. Pat dry. Apply barrier paste around the wounds. Cover wounds with small piece of aquacel and hydrafera blue. Cover with Kerlix, cast padding and ACE.    -Patient can follow up with Podiatry PRN.  -Patient already has follow up with Wound Care and can resume care with Radha Szymanski.          Naresh Dinero MD  Podiatry  [facility]

## 2019-05-28 NOTE — HOSPITAL COURSE
The patient was admitted for CHF exacerbation. He was initially diuresed with lasix 80 mg IV BID then transitioned to 80 IV daily after his labs suggested contraction alkalosis. He was then transitioned to 40 mg PO BID with continued good UOP. He diuresed about 10 L during his admit with improvement in symptoms. He will continue with lasix 40 mg PO BID upon dc. Referral made to follow up with cardiology; patient looking to change cardiologist so he can get earlier appointments. Wound care and podiatry were consulted along with ID during this admit for LE wounds. He grew citrobacter from the wound and ID recommended a 7 day course of cefpodoxime. He grew < 50 K of citrobacter in his urine as well. He will continue to complete this regimen at home. He was prescribed rozerem upon dc for sleep as he frequently complained of lack of sleep. Patient will be dc'd with HH and wound care.

## 2019-05-28 NOTE — PLAN OF CARE
Future Appointments   Date Time Provider Department Center   5/29/2019  2:00 PM VASCULAR, LAB [facility] VASCLAB [address]   5/29/2019  3:20 PM Radha Szymanski NP [facility] WOUND [facility]   6/28/2019  4:20 PM Yonas Steve MD [facility] IM [address] [facility]   8/6/2019  8:15 AM HOME MONITOR DEVICE CHECK, [facility] ARRHPRO [facility]   11/5/2019  8:15 AM HOME MONITOR DEVICE CHECK, [facility] ARRLADARIUS [facility]   2/4/2020  8:15 AM HOME MONITOR DEVICE CHECK, [facility]          05/28/19 1649   Final Note   Assessment Type Final Discharge Note   Anticipated Discharge Disposition Home-Health   What phone number can be called within the next 1-3 days to see how you are doing after discharge? [phone]   Hospital Follow Up  Appt(s) scheduled? Yes   Right Care Referral Info   Post Acute Recommendation Home-care   Facility Name [facility]

## 2019-05-28 NOTE — ASSESSMENT & PLAN NOTE
Pulse Readings from Last 3 Encounters:   05/28/19 67   04/24/19 (!) 58   04/24/19 68     Rate currently controlled  Continue amiodarone  Holding carvedilol as per above   Continue dabigatran

## 2019-05-28 NOTE — PLAN OF CARE
HH orders were sent to [facility] via [facility] at pt's request.            Destinee Arambula, [facility]  Ext [phone]

## 2019-05-28 NOTE — PHYSICIAN QUERY
PT Name: Madhav Cortez  MR #: [id number]    Physician Query Form - Atrial Fibrillation Specificity     CDS/: Millie Whitney               Contact information: [email or web address]       This form is a permanent document in the medical record.     Query Date: May 28, 2019    By submitting this query, we are merely seeking further clarification of documentation. Please utilize your independent clinical judgment when addressing the question(s) below.    The medical record contains the following:   Indicators     Supporting Clinical Findings Location in Medical Record   x Atrial Fibrillation atrial fib and atrial flutter on anticoagulation    Atrial fibrillation  Per atrial flutter     Atrial flutter  Pulse Readings from Last 3 Encounters:  05/26/19 66  04/24/19 (!) 58  04/24/19 68     Rate currently controlled  Continue amiodarone  Holding carvedilol as per above   Continue dabigatran    HM note 5/27     HM note 5/27      HM note 5/27    EKG results     x Medication amiodarone tablet 200 mg   Dose: 200 mg  Freq: 2 times daily Route: Oral  Start: 05/22/19 2100    dabigatran etexilate capsule 150 mg   Dose: 150 mg  Freq: 2 times daily Route: Oral  Start: 05/26/19 0900 MAR          MAR     Treatment      Other         Provider, please further specify the Atrial Fibrillation diagnosis.    [  ] Chronic   [  ] Paroxysmal   [  ] Permanent   [  ] Persistent   [  ] Other (please specify):   [ x ] Clinically Undetermined       Please document in your progress notes daily for the duration of treatment until resolved, and include in your discharge summary.

## 2019-05-28 NOTE — DISCHARGE SUMMARY
[facility] Hospital Medicine  Discharge Summary      Patient Name: Madhav Cortez  MRN: [id number]  Admission Date: 5/22/2019  Hospital Length of Stay: 6 days  Discharge Date and Time:  05/28/2019 3:53 PM  Attending Physician: Oni Cordoba MD   Discharging Provider: Oni Cordoba MD  Primary Care Provider: Mirna Reyes MD  [facility] Medicine Team: [facility] MED  Oni Cordoba MD    HPI:   The patient is a 78 y.o. male with PMH of CHF, HTN, atrial fib and atrial flutter on anticoagulation, HLP, and chronic LE wounds who presents with a one month history of worsening SOB. He states no changes in his medications or dietary compliance. SOB has gotten to the point where he cannot sleep. Reports orthopnea and MCBRIDE. Reports an occasional cough with some whitish-yellow phlegm. He also has LE swelling and wounds that are painful. Denies any actual CP, palpations, nausea, vomiting, abdominal distention, dizziness, or trouble urinating.       * No surgery found *      Hospital Course:   The patient was admitted for CHF exacerbation. He was initially diuresed with lasix 80 mg IV BID then transitioned to 80 IV daily after his labs suggested contraction alkalosis. He was then transitioned to 40 mg PO BID with continued good UOP. He diuresed about 10 L during his admit with improvement in symptoms. He will continue with lasix 40 mg PO BID upon dc. Referral made to follow up with cardiology; patient looking to change cardiologist so he can get earlier appointments. Wound care and podiatry were consulted along with ID during this admit for LE wounds. He grew citrobacter from the wound and ID recommended a 7 day course of cefpodoxime. He grew < 50 K of citrobacter in his urine as well. He will continue to complete this regimen at home. He was prescribed rozerem upon dc for sleep as he frequently complained of lack of sleep. Patient will be dc'd with HH and wound care.      Consults:   Consults (From  admission, onward)        Status Ordering Provider     Inpatient consult to Infectious Diseases  Once     Provider:  (Not yet assigned)    Completed MEERA JIMENEZ     Inpatient consult to Podiatry  Once     Provider:  (Not yet assigned)    Completed MEERA JIMENEZ     Inpatient consult to Social Work/Case Management  Once     Provider:  (Not yet assigned)    Acknowledged CARMEL JIMENEZ          * Acute on chronic systolic heart failure  CXR, elevated BNP, physical exam suggestive of volume overload, and history all consistent with CHF exacerbation  Last echo was in 2017 with EF of 10-15%  Repeat echo unchanged  Diuresis with lasix 80 mg IV BID initially but decreased to 80 IV daily due to contraction alkalosis. Switch to 40 mg PO BID starting 5/27 and will monitor over the next 24 hrs. Plan to dc on 5/28 if he continues to have good output and symptoms continue to improve.     On carvedilol but given poor EF on previous echo and CHF exacerbation, will hold for now until euvolemia achieved   9.5L urine output since admit.  Hypoxia improved. Now on RA  Maintain euvolemia by monitoring I/O's and daily weights.  Fluid restriction (1.5 liters/24 hours)  Low Na diet  Monitor for signs of fluid overload: RR>30, O2 sat<92%, weight gain of 3 lbs, or urinary output <160ml/8hr  Maintain oxygen sats >92% via NC if supplemental oxygen needed.     Patient Vitals for the past 72 hrs (Last 3 readings):   Weight   05/28/19 1059 94.6 kg (208 lb 8.9 oz)   05/27/19 0500 94.6 kg (208 lb 8.9 oz)   05/26/19 0400 96.3 kg (212 lb 4.9 oz)       Wound of foot  Plan as above      Leukocytosis  resolved  Afebrile  Source likely LE wound. Cont wound care orders  No treatment needed for UTI per ID; patient asymptomatic and mckenzie dc'd   Cont above care      Venous insufficiency of both lower extremities  Podiatry following  LLE wound with periwound erythema and increased drainage.   Wound was cultured.   Consulted ID and recommending 7 day  treatment with cefpodoxime 200mg po bid for skin and soft tissue emanuel  Cont current woundcare orders: Wounds dressed with aquacel foam, aquacel extra, xeroform and DSD today.  Recommend vascular f/u upon DC.  Low Na diet  EDWIN hose  Leg elevation     Current use of long term anticoagulation  Plan as above      Diabetic neuropathy  Continue gabapentin       Hyperlipidemia  Unclear why patient not on statin therapy      Atrial fibrillation  Per atrial flutter       Other insomnia  Takes temazepam at home but not on formulary  Will schedule rozerem  Vistaril prn     ICD (implantable cardioverter-defibrillator), biventricular, in situ  No acute issues   Follow up with device clinic       Type 2 diabetes mellitus with circulatory disorder, with long-term current use of insulin  POCT Glucose   Date Value Ref Range Status   05/28/2019 134 (H) 70 - 110 mg/dL Final   05/28/2019 227 (H) 70 - 110 mg/dL Final   05/28/2019 42 (LL) 70 - 110 mg/dL Final   05/27/2019 187 (H) 70 - 110 mg/dL Final   05/27/2019 126 (H) 70 - 110 mg/dL Final   05/27/2019 152 (H) 70 - 110 mg/dL Final   05/27/2019 158 (H) 70 - 110 mg/dL Final   05/27/2019 114 (H) 70 - 110 mg/dL Final   05/26/2019 241 (H) 70 - 110 mg/dL Final   05/26/2019 176 (H) 70 - 110 mg/dL Final   05/26/2019 227 (H) 70 - 110 mg/dL Final   05/25/2019 226 (H) 70 - 110 mg/dL Final   05/25/2019 208 (H) 70 - 110 mg/dL Final     Patient states only taking basal insulin at home of 40 units QHS but sometimes forgets  Wife took him off prandial insulin due to low sugars  Increase basal dose to 20 units  Start low dose scheduled aspart at 3 units   Low dose correction scale   Cont blood glucose monitoring   Avoid hypoglycemia  ADA diet    Atrial flutter  Pulse Readings from Last 3 Encounters:   05/28/19 67   04/24/19 (!) 58   04/24/19 68     Rate currently controlled  Continue amiodarone  Holding carvedilol as per above   Continue dabigatran       Cardiomyopathy, ischemic  Per  "CHF      Essential hypertension  BP Readings from Last 3 Encounters:   05/28/19 127/61   05/13/19 109/78   04/24/19 118/78     Continue entresto  Diuresis with lasix  Holding carvedilol as per above  Controlled         Final Active Diagnoses:    Diagnosis Date Noted POA    PRINCIPAL PROBLEM:  Acute on chronic systolic heart failure [I50.23] 05/22/2019 Yes    Leukocytosis [D72.829] 05/23/2019 Yes    Wound of foot [S91.309A] 05/23/2019 Yes    Venous insufficiency of both lower extremities [I87.2] 04/24/2019 Yes    Current use of long term anticoagulation [Z79.01] 04/03/2017 Not Applicable    Diabetic neuropathy [E11.40] 07/11/2014 Yes    Hyperlipidemia [E78.5] 04/01/2014 Yes    Atrial fibrillation [I48.91] 08/15/2013 Yes    Other insomnia [G47.09] 06/07/2013 Yes    Atrial flutter [I48.92] 08/07/2012 Yes    Essential hypertension [I10] 08/07/2012 Yes    Cardiomyopathy, ischemic [I25.5] 08/07/2012 Yes    ICD (implantable cardioverter-defibrillator), biventricular, in situ [Z95.810] 08/07/2012 Yes    Type 2 diabetes mellitus with circulatory disorder, with long-term current use of insulin [E11.59, Z79.4] 08/07/2012 Not Applicable      Problems Resolved During this Admission:       Discharged Condition: good    Disposition: Home or Self Care    Follow Up:    Patient Instructions:   No discharge procedures on file.    Significant Diagnostic Studies: see micro and echo    Pending Diagnostic Studies:     None         Medications:  Reconciled Home Medications:      Medication List      START taking these medications    cefpodoxime 200 MG tablet  Commonly known as:  VANTIN  Take 1 tablet (200 mg total) by mouth every 12 (twelve) hours. for 3 days     collagenase ointment  Commonly known as:  SANTYL  Apply topically once daily.  Start taking on:  5/29/2019     hydrocolloid dressing 2 X 2 " Bndg  Commonly known as:  AQUACEL EXTRA  Apply 1 Units topically as needed.     insulin aspart U-100 100 unit/mL (3 mL) Inpn " "pen  Commonly known as:  NovoLOG  Inject 3 Units into the skin 3 (three) times daily.     insulin glargine 100 units/mL (3mL) SubQ pen  Commonly known as:  LANTUS SOLOSTAR U-100 INSULIN  Inject 15 Units into the skin every evening.     pva-gentian violet-methyl blue 2.5 " Bndg  Commonly known as:  HYDROFERA BLUE  Apply 1 Units topically as needed.     ramelteon 8 mg tablet  Commonly known as:  ROZEREM  Take 1 tablet (8 mg total) by mouth nightly as needed for Insomnia.        CHANGE how you take these medications    furosemide 40 MG tablet  Commonly known as:  LASIX  Take 1 tablet (40 mg total) by mouth 2 (two) times daily.  What changed:  See the new instructions.        CONTINUE taking these medications    ACCU-CHEK SMARTVIEW TEST STRIP Strp  Generic drug:  blood sugar diagnostic  CHECK BLOOD SUGAR THREE TIMES DAILY     amiodarone 200 MG Tab  Commonly known as:  PACERONE  TAKE 1 TABLET TWICE DAILY     aspirin 81 MG Chew  Take 1 tablet (81 mg total) by mouth once daily.     beta carotene-C-E-lutein-min29 5,000-60-30-2 unit-mg-unit-mg Tab  Take 1 capsule by mouth once daily.     * blood-glucose meter kit  Accu check monique meter Use as instructed     * ACCU-CHEK MONIQUE Misc  Generic drug:  blood-glucose meter  USE AS INSTRUCTED     carvedilol 25 MG tablet  Commonly known as:  COREG  Take 1 tablet (25 mg total) by mouth 2 (two) times daily.     ciclopirox 0.77 % Crea  Commonly known as:  LOPROX  APPLY TO AFFECTED AREAS BILATERAL AXILLA TWICE DAILY UNTIL CLEAR     * COQ-10 ORAL  Take 800 mg by mouth once daily. Takes 100mg tablet at lunch, and 600mg at dinner     * co-enzyme Q-10 30 mg capsule  800 units daily     dabigatran etexilate 150 mg Cap  Commonly known as:  PRADAXA  Take 1 capsule (150 mg total) by mouth 2 (two) times daily. "Do NOT break, chew, or open capsules."     ENTRESTO  mg per tablet  Generic drug:  sacubitril-valsartan  TAKE 1 TABLET TWICE DAILY     fluticasone propionate 50 mcg/actuation nasal " "spray  Commonly known as:  FLONASE  1 spray (50 mcg total) by Each Nare route once daily.     gabapentin 300 MG capsule  Commonly known as:  NEURONTIN  TAKE 2 CAPSULES BY MOUTH THREE TIMES DAILY     glucosamine-chondroitin 500-400 mg tablet  Take 1 tablet by mouth 2 (two) times daily.     ketoconazole 2 % cream  Commonly known as:  NIZORAL  Apply topically once daily.     levothyroxine 75 MCG tablet  Commonly known as:  SYNTHROID  Take 1 tablet (75 mcg total) by mouth once daily.     magnesium oxide 400 mg (241.3 mg magnesium) tablet  Commonly known as:  MAG-OX  Take 400 mg by mouth 2 (two) times daily.     melatonin 5 mg Tab  Take 1 tablet by mouth every evening.     nitroGLYCERIN 0.4 MG SL tablet  Commonly known as:  NITROSTAT  Place 1 tablet (0.4 mg total) under the tongue every 5 (five) minutes as needed for Chest pain.     nystatin cream  Commonly known as:  MYCOSTATIN  Apply topically 2 (two) times daily.     pen needle, diabetic 31 gauge x 1/4" Ndle  Use 4 times daily     RIBOSE ORAL  Take 1 tablet by mouth once daily.     senna-docusate 8.6-50 mg 8.6-50 mg per tablet  Commonly known as:  PERICOLACE  Take 1 tablet by mouth 2 (two) times daily.     spironolactone 25 MG tablet  Commonly known as:  ALDACTONE  TAKE 1 TABLET ONE TIME DAILY     temazepam 15 mg Cap  Commonly known as:  RESTORIL  Take 1 capsule (15 mg total) by mouth every evening.     tiZANidine 2 MG tablet  Commonly known as:  ZANAFLEX  TAKE 1 TABLET EVERY 8 HOURS AS NEEDED FOR MUSCLE PAIN     triamcinolone acetonide 0.025% 0.025 % cream  Commonly known as:  KENALOG  apply to affected area twice daily as directed     VITAMIN C 500 MG tablet  Generic drug:  ascorbic acid (vitamin C)  Take 1,500 mg by mouth 2 (two) times daily.         * This list has 4 medication(s) that are the same as other medications prescribed for you. Read the directions carefully, and ask your doctor or other care provider to review them with you.            STOP taking these " medications    clindamycin 1 % lotion  Commonly known as:  CLEOCIN T            Indwelling Lines/Drains at time of discharge:   Lines/Drains/Airways          None            Oni Cordoba MD  Department of Hospital Medicine  [facility]

## 2019-05-29 ENCOUNTER — HOSPITAL ENCOUNTER (OUTPATIENT)
Dept: VASCULAR SURGERY | Facility: CLINIC | Age: 79
Discharge: HOME | End: 2019-05-29
Attending: NURSE PRACTITIONER
Payer: MEDICARE

## 2019-05-29 ENCOUNTER — OFFICE VISIT (OUTPATIENT)
Dept: WOUND CARE | Facility: CLINIC | Age: 79
End: 2019-05-29
Payer: MEDICARE

## 2019-05-29 ENCOUNTER — TELEPHONE (OUTPATIENT)
Dept: INTERNAL MEDICINE | Facility: CLINIC | Age: 79
End: 2019-05-29

## 2019-05-29 ENCOUNTER — TELEPHONE (OUTPATIENT)
Dept: WOUND CARE | Facility: CLINIC | Age: 79
End: 2019-05-29

## 2019-05-29 VITALS
HEIGHT: 70 IN | WEIGHT: 207.38 LBS | SYSTOLIC BLOOD PRESSURE: 102 MMHG | DIASTOLIC BLOOD PRESSURE: 58 MMHG | TEMPERATURE: 98 F | BODY MASS INDEX: 29.69 KG/M2 | HEART RATE: 60 BPM

## 2019-05-29 DIAGNOSIS — I87.2 VENOUS INSUFFICIENCY OF BOTH LOWER EXTREMITIES: ICD-10-CM

## 2019-05-29 DIAGNOSIS — R60.0 BILATERAL LEG EDEMA: ICD-10-CM

## 2019-05-29 DIAGNOSIS — Z79.4 TYPE 2 DIABETES MELLITUS WITH DIABETIC PERIPHERAL ANGIOPATHY WITHOUT GANGRENE, WITH LONG-TERM CURRENT USE OF INSULIN: ICD-10-CM

## 2019-05-29 DIAGNOSIS — L97.512 SKIN ULCER OF RIGHT FOOT WITH FAT LAYER EXPOSED: ICD-10-CM

## 2019-05-29 DIAGNOSIS — L97.521 SKIN ULCER OF TOE OF LEFT FOOT, LIMITED TO BREAKDOWN OF SKIN: ICD-10-CM

## 2019-05-29 DIAGNOSIS — L97.921 ULCER OF LEFT LOWER EXTREMITY, LIMITED TO BREAKDOWN OF SKIN: Primary | ICD-10-CM

## 2019-05-29 DIAGNOSIS — E11.51 TYPE 2 DIABETES MELLITUS WITH DIABETIC PERIPHERAL ANGIOPATHY WITHOUT GANGRENE, WITH LONG-TERM CURRENT USE OF INSULIN: ICD-10-CM

## 2019-05-29 DIAGNOSIS — L97.521 ULCERATED, FOOT, LEFT, LIMITED TO BREAKDOWN OF SKIN: ICD-10-CM

## 2019-05-29 DIAGNOSIS — L89.612 PRESSURE INJURY OF RIGHT HEEL, STAGE 2: ICD-10-CM

## 2019-05-29 PROBLEM — L97.501 SKIN ULCER OF TOE, LIMITED TO BREAKDOWN OF SKIN: Status: ACTIVE | Noted: 2019-05-29

## 2019-05-29 PROCEDURE — 99212 PR OFFICE/OUTPT VISIT, EST, LEVL II, 10-19 MIN: ICD-10-PCS | Mod: 25,HCNC,S$GLB, | Performed by: NURSE PRACTITIONER

## 2019-05-29 PROCEDURE — 93970 PR US DUPLEX, UPPER OR LOWER EXT VENOUS,COMPLETE BILAT: ICD-10-PCS | Mod: HCNC,S$GLB,, | Performed by: SURGERY

## 2019-05-29 PROCEDURE — 3078F PR MOST RECENT DIASTOLIC BLOOD PRESSURE < 80 MM HG: ICD-10-PCS | Mod: HCNC,CPTII,S$GLB, | Performed by: NURSE PRACTITIONER

## 2019-05-29 PROCEDURE — 99999 PR PBB SHADOW E&M-EST. PATIENT-LVL V: ICD-10-PCS | Mod: PBBFAC,HCNC,, | Performed by: NURSE PRACTITIONER

## 2019-05-29 PROCEDURE — 99212 OFFICE O/P EST SF 10 MIN: CPT | Mod: 25,HCNC,S$GLB, | Performed by: NURSE PRACTITIONER

## 2019-05-29 PROCEDURE — 3078F DIAST BP <80 MM HG: CPT | Mod: HCNC,CPTII,S$GLB, | Performed by: NURSE PRACTITIONER

## 2019-05-29 PROCEDURE — 29580 STRAPPING UNNA BOOT: CPT | Mod: 59,51,HCNC,LT | Performed by: NURSE PRACTITIONER

## 2019-05-29 PROCEDURE — 1101F PT FALLS ASSESS-DOCD LE1/YR: CPT | Mod: HCNC,CPTII,S$GLB, | Performed by: NURSE PRACTITIONER

## 2019-05-29 PROCEDURE — 3074F PR MOST RECENT SYSTOLIC BLOOD PRESSURE < 130 MM HG: ICD-10-PCS | Mod: HCNC,CPTII,S$GLB, | Performed by: NURSE PRACTITIONER

## 2019-05-29 PROCEDURE — 3074F SYST BP LT 130 MM HG: CPT | Mod: HCNC,CPTII,S$GLB, | Performed by: NURSE PRACTITIONER

## 2019-05-29 PROCEDURE — 29581 PR APPL MLT-LAYER VENOUS WOUND COMPRESS BELOW KNEE: ICD-10-PCS | Mod: HCNC,RT,S$GLB, | Performed by: NURSE PRACTITIONER

## 2019-05-29 PROCEDURE — 29581 APPL MULTLAYER CMPRN SYS LEG: CPT | Mod: HCNC,RT,S$GLB, | Performed by: NURSE PRACTITIONER

## 2019-05-29 PROCEDURE — 29580 PR STRAPPING UNNA BOOT: ICD-10-PCS | Mod: 59,51,HCNC,LT | Performed by: NURSE PRACTITIONER

## 2019-05-29 PROCEDURE — 99999 PR PBB SHADOW E&M-EST. PATIENT-LVL V: CPT | Mod: PBBFAC,HCNC,, | Performed by: NURSE PRACTITIONER

## 2019-05-29 PROCEDURE — 1101F PR PT FALLS ASSESS DOC 0-1 FALLS W/OUT INJ PAST YR: ICD-10-PCS | Mod: HCNC,CPTII,S$GLB, | Performed by: NURSE PRACTITIONER

## 2019-05-29 PROCEDURE — 93970 EXTREMITY STUDY: CPT | Mod: HCNC,S$GLB,, | Performed by: SURGERY

## 2019-05-29 NOTE — TELEPHONE ENCOUNTER
----- Message from Elizabeth Carroll sent at 5/29/2019  4:42 PM CDT -----  Contact: Viji, Patient's wife   Needs Advice    Reason for call: Caller is asking to speak with a nurse regarding the patient and his current wound care needs and restricitions in his activity etc        Communication Preference: [phone]    Additional Information: please contact the caller

## 2019-05-29 NOTE — PROGRESS NOTES
Subjective:       Patient ID: Madhav Cortez is a 78 y.o. male.    Chief Complaint: Wound Check    HPI     This patient is seen today for reevaluation of wounds to the right foot and left lower leg.  These wounds began as blisters that erupted. He has used betadine and topical antibiotic ointment on the wounds and they were not healing. He was hospitalized from 5/22-5/28/19 for worsening SOB.  While hospitalized he developed new ulcers to the right heel and left foot.  He was discharged yesterday. His medical history is significant for CHF, CAD, atrial flutter, type II diabetes, hyperlipidemia, diabetic neuropathy, transaminitis, anxiety, depression, aortic arch atherosclerosis, insomnia, hypothyroidism and renal insufficiency. He is afebrile. He denies increased redness, swelling or purulent drainage.  He does not complain of pain.  Review of Systems   Constitutional: Negative for chills, diaphoresis and fever.   HENT: Positive for hearing loss and postnasal drip. Negative for rhinorrhea, sinus pressure, sneezing, sore throat, tinnitus and trouble swallowing.    Eyes: Negative for visual disturbance.   Respiratory: Positive for shortness of breath. Negative for apnea, cough and wheezing.    Cardiovascular: Positive for leg swelling. Negative for chest pain and palpitations.   Gastrointestinal: Negative for constipation, diarrhea, nausea and vomiting.   Genitourinary: Negative for difficulty urinating, dysuria, frequency and hematuria.   Musculoskeletal: Negative for arthralgias, back pain and joint swelling.   Skin: Positive for wound.   Neurological: Negative for dizziness, weakness, light-headedness and headaches.   Hematological: Bruises/bleeds easily.   Psychiatric/Behavioral: Positive for dysphoric mood and sleep disturbance. Negative for confusion and decreased concentration. The patient is not nervous/anxious.        Objective:      Physical Exam   Constitutional: He is oriented to person, place, and  time. He appears well-developed and well-nourished. No distress.   HENT:   Head: Normocephalic and atraumatic.   Musculoskeletal: Normal range of motion. He exhibits edema (1-2+ lower leg). He exhibits no tenderness.        Legs:       Feet:    Neurological: He is alert and oriented to person, place, and time.   Skin: Skin is warm and dry. No rash noted. He is not diaphoretic. No erythema.   Psychiatric: He has a normal mood and affect. His behavior is normal. Judgment and thought content normal.   Nursing note and vitals reviewed.      ..  Hemoglobin A1C   Date Value Ref Range Status   03/27/2019 8.3 (H) 4.0 - 5.6 % Final     Comment:     ADA Screening Guidelines:  5.7-6.4%  Consistent with prediabetes  >or=6.5%  Consistent with diabetes  High levels of fetal hemoglobin interfere with the HbA1C  assay. Heterozygous hemoglobin variants (HbS, HgC, etc)do  not significantly interfere with this assay.   However, presence of multiple variants may affect accuracy.     01/28/2019 8.3 (H) 4.0 - 5.6 % Final     Comment:     ADA Screening Guidelines:  5.7-6.4%  Consistent with prediabetes  >or=6.5%  Consistent with diabetes  High levels of fetal hemoglobin interfere with the HbA1C  assay. Heterozygous hemoglobin variants (HbS, HgC, etc)do  not significantly interfere with this assay.   However, presence of multiple variants may affect accuracy.     09/18/2018 8.2 (H) 4.0 - 5.6 % Final     Comment:     ADA Screening Guidelines:  5.7-6.4%  Consistent with prediabetes  >or=6.5%  Consistent with diabetes  High levels of fetal hemoglobin interfere with the HbA1C  assay. Heterozygous hemoglobin variants (HbS, HgC, etc)do  not significantly interfere with this assay.   However, presence of multiple variants may affect accuracy.       ..  Lab Results   Component Value Date    ALBUMIN 3.8 05/28/2019    A vascular lab study performed 3/27/19y revealed:    The right JOSSY was 1.98 and the left was 1.65. The presence of artifactually  elevated pressure in the arteries of the lower extremity renders all pressure measurement unreliable due to suspected medial calcinosis. Bilateral PVR waveforms suggest minimal PAOD.    Madhav was seen in the clinic room and placed in the supine position on the treatment table.  The right foot and leg were cleansed with Easi-clense sponges and dried thoroughly.  Medihoney gel, a hydrofiber dressing and a double foam dressing was applied to the right medial foot ulcer.  A hydrofiber dressing was applied to the right heel wound.  Eucerin cream was applied to the foot and ankle.  The patient's foot was positioned at a 90 degree angle.  Cast padding to provide offloading, followed by kerlix roll gauze and coban were applied using a spiral technique avoiding creases or folds.  The wrap was started covering the toes and progressing to above the ankle.  There was overlap of each turn half the width of the previous turn.  The football dressing will be changed every 2-3 days.    The left leg was cleansed with Easi-clense sponges and dried thoroughly.  Eucerin cream was applied to the lower legs. A hydrofiber dressing was applied to the left leg and foot wounds.  The patient's foot was positioned at a 90 degree angle.  The coflex was applied per package instructions.  A two layered application was performed using a spiral technique beginning with the foam layer followed by the cohesive bandage avoiding creases or folds.  The wrap was started behind the first metatarsal and ended below the tibial tubercle of the knee.  There was overlap of each turn half the width of the previous turn.  The compression wrap will be changed every 2-3 days.      Assessment:       1. Ulcer of left lower extremity, limited to breakdown of skin    2. Skin ulcer of right foot with fat layer exposed    3. Venous insufficiency of both lower extremities               Plan:            Football dressing as detailed above right foot.  Kerlix and coban  right lower leg from top of calf to ankle.  Coflex compression wrap as detailed above left lower leg.  Apply hydrofiber to left great toe ulcer, cover with cotton gauze and secure with coban.  Change dressings three times weekly.  [facility] notified of orders via Epic fax.  We will ask them to see the patient three times weekly.    Home health orders:  Cleanse wounds with wound cleanser.  Apply medihoney gel to right medial foot ulcer.  Cover right medial foot and right heel ulcers with hydrofiber and double foam dressing.  Football dressing right foot.  Kerlix and coban right lower leg.  Apply hydrofiber to left leg and left medial foot wounds.  Coflex compression wrap with zinc oxide left lower leg.  Apply hydrofiber to left great toe ulcer, cover with cotton gauze and secure with coban.  Antonio dressings three times weekly.      Right medial foot    Right posterior heel      Left medial leg      Left shin    Left medial foot

## 2019-05-29 NOTE — TELEPHONE ENCOUNTER
----- Message from Herminio Elizabeth sent at 5/29/2019  4:32 PM CDT -----  Contact: Spouse [phone]  RX request - refill or new RX.  Is this a refill or new RX:  New   RX name and strength: Anxiety Rx     Pharmacy name and phone # [facility] [phone] (Phone)  [phone] (Fax    Comments:  Spouse calling stating the patient is needing Anxiety Rx, feels patient is going to go crazy, would like a call back asap. Spouse [phone]    Please call an advise  Thank you

## 2019-05-30 ENCOUNTER — TELEPHONE (OUTPATIENT)
Dept: ELECTROPHYSIOLOGY | Facility: CLINIC | Age: 79
End: 2019-05-30

## 2019-05-30 ENCOUNTER — OFFICE VISIT (OUTPATIENT)
Dept: INTERNAL MEDICINE | Facility: CLINIC | Age: 79
End: 2019-05-30
Payer: MEDICARE

## 2019-05-30 VITALS
OXYGEN SATURATION: 98 % | BODY MASS INDEX: 29.76 KG/M2 | HEART RATE: 60 BPM | SYSTOLIC BLOOD PRESSURE: 112 MMHG | TEMPERATURE: 98 F | HEIGHT: 70 IN | DIASTOLIC BLOOD PRESSURE: 62 MMHG

## 2019-05-30 DIAGNOSIS — F41.9 ANXIETY: Primary | ICD-10-CM

## 2019-05-30 PROCEDURE — 99213 PR OFFICE/OUTPT VISIT, EST, LEVL III, 20-29 MIN: ICD-10-PCS | Mod: HCNC,S$GLB,, | Performed by: INTERNAL MEDICINE

## 2019-05-30 PROCEDURE — 3074F PR MOST RECENT SYSTOLIC BLOOD PRESSURE < 130 MM HG: ICD-10-PCS | Mod: HCNC,CPTII,S$GLB, | Performed by: INTERNAL MEDICINE

## 2019-05-30 PROCEDURE — 99213 OFFICE O/P EST LOW 20 MIN: CPT | Mod: HCNC,S$GLB,, | Performed by: INTERNAL MEDICINE

## 2019-05-30 PROCEDURE — 1101F PT FALLS ASSESS-DOCD LE1/YR: CPT | Mod: HCNC,CPTII,S$GLB, | Performed by: INTERNAL MEDICINE

## 2019-05-30 PROCEDURE — 3078F PR MOST RECENT DIASTOLIC BLOOD PRESSURE < 80 MM HG: ICD-10-PCS | Mod: HCNC,CPTII,S$GLB, | Performed by: INTERNAL MEDICINE

## 2019-05-30 PROCEDURE — 3074F SYST BP LT 130 MM HG: CPT | Mod: HCNC,CPTII,S$GLB, | Performed by: INTERNAL MEDICINE

## 2019-05-30 PROCEDURE — 99999 PR PBB SHADOW E&M-EST. PATIENT-LVL III: CPT | Mod: PBBFAC,HCNC,, | Performed by: INTERNAL MEDICINE

## 2019-05-30 PROCEDURE — 99999 PR PBB SHADOW E&M-EST. PATIENT-LVL III: ICD-10-PCS | Mod: PBBFAC,HCNC,, | Performed by: INTERNAL MEDICINE

## 2019-05-30 PROCEDURE — 1101F PR PT FALLS ASSESS DOC 0-1 FALLS W/OUT INJ PAST YR: ICD-10-PCS | Mod: HCNC,CPTII,S$GLB, | Performed by: INTERNAL MEDICINE

## 2019-05-30 PROCEDURE — 3078F DIAST BP <80 MM HG: CPT | Mod: HCNC,CPTII,S$GLB, | Performed by: INTERNAL MEDICINE

## 2019-05-30 RX ORDER — ALPRAZOLAM 0.5 MG/1
0.5 TABLET ORAL 3 TIMES DAILY
Qty: 90 TABLET | Refills: 0 | Status: ON HOLD | OUTPATIENT
Start: 2019-05-30 | End: 2019-06-18 | Stop reason: HOSPADM

## 2019-05-30 NOTE — PROGRESS NOTES
Subjective:       Patient ID: Madhav Cortez is a 78 y.o. male.    Chief Complaint: Anxiety    Patient reports he is so anxious that he cannot sit still, cannot tolerate the bandages on his legs placed in wound care clinic and is generally just frantic.  Wife says he is driving her crazy too.  She appears exhausted    Review of Systems   Constitutional: Negative for activity change, appetite change and fever.   HENT: Negative for congestion, postnasal drip and sore throat.    Respiratory: Negative for cough, shortness of breath and wheezing.    Cardiovascular: Negative for chest pain and palpitations.   Gastrointestinal: Negative for abdominal pain, blood in stool, constipation, diarrhea, nausea and vomiting.   Genitourinary: Negative for decreased urine volume, difficulty urinating, flank pain and frequency.   Musculoskeletal: Negative for arthralgias.   Neurological: Negative for dizziness, weakness and headaches.       Objective:      Physical Exam   Constitutional: He appears well-developed and well-nourished.   Psychiatric: His speech is normal. Thought content normal. His mood appears anxious. His affect is angry and labile. He is agitated.       Assessment:       1. Anxiety        Plan:   Madhav was seen today for anxiety.    Diagnoses and all orders for this visit:    Anxiety    Other orders  -     ALPRAZolam (XANAX) 0.5 MG tablet; Take 1 tablet (0.5 mg total) by mouth 3 (three) times daily.

## 2019-05-30 NOTE — TELEPHONE ENCOUNTER
Called pt's wife to inform her that Dr. Koehler would like her  to see Dr. Kaminski, and someone would be calling to schedule the appt.  She verbalized understanding.

## 2019-05-30 NOTE — PT/OT/SLP DISCHARGE
Physical Therapy Discharge Summary    Name: Madhav Cortez  MRN: [id number]   Principal Problem: Acute on chronic systolic heart failure     Patient Discharged from acute Physical Therapy on 2019.  Please refer to prior PT noted date on 2019 for functional status.     Assessment:     Patient was discharged unexpectedly.  Information required to complete an accurate discharge summary is unknown.  Refer to therapy initial evaluation and last progress note for initial and most recent functional status and goal achievement.  Recommendations made may be found in medical record.    Objective:     GOALS:   Multidisciplinary Problems     Physical Therapy Goals        Problem: Physical Therapy Goal    Goal Priority Disciplines Outcome Goal Variances Interventions   Physical Therapy Goal     PT, PT/OT Ongoing (interventions implemented as appropriate)     Description:  Goals to be met by: 2019     Patient will increase functional independence with mobility by performin. Supine to sit with Modified [place]  2. Sit to supine with Modified [place]  3. Sit to stand transfer with Supervision  4. Bed to chair transfer with Supervision using Rolling Walker  5. Gait  x 100 feet with Supervision using Rolling Walker.   6. Ascend/descend 5 stair with bilateral Handrails Contact Guard Assistance                    Reasons for Discontinuation of Therapy Services  Transfer to alternate level of care.      Plan:     Patient Discharged to: Home with Home Health Service.    Guero Mejia, PT  2019

## 2019-05-30 NOTE — TELEPHONE ENCOUNTER
----- Message from Kari Leal MA sent at 5/30/2019  9:12 AM CDT -----  Contact: Pt wife       ----- Message -----  From: Bridget Arzate  Sent: 5/29/2019   5:14 PM  To: Zakia Paul Staff    Pt wife is requesting a call back in regards to possibly calling in an rx for pt anxiety. Pt wife is very concerned and would like to speak with someone as soon as possible.       Pt wife can be contacted at  [phone]

## 2019-06-03 ENCOUNTER — EXTERNAL HOME HEALTH (OUTPATIENT)
Dept: HOME HEALTH SERVICES | Facility: HOSPITAL | Age: 79
End: 2019-06-03

## 2019-06-04 DIAGNOSIS — I50.22 CHRONIC SYSTOLIC HEART FAILURE: Primary | ICD-10-CM

## 2019-06-05 ENCOUNTER — TELEPHONE (OUTPATIENT)
Dept: INTERNAL MEDICINE | Facility: CLINIC | Age: 79
End: 2019-06-05

## 2019-06-05 NOTE — TELEPHONE ENCOUNTER
Spoke with pharmacist from [facility] they stated that the Santyl only comes in 30g not 15g.    Please advise,

## 2019-06-05 NOTE — TELEPHONE ENCOUNTER
----- Message from Thiago Rosenthal sent at 6/5/2019  2:53 PM CDT -----  Contact: [employer] [facility] [phone]  Pharmacy is calling to clarify an RX.  RX name:  RX name and strength: collagenase (SANTYL) ointment  What do they need to clarify: the quantity    Ref# [id number]

## 2019-06-05 NOTE — TELEPHONE ENCOUNTER
----- Message from Nikki Campuzano sent at 6/5/2019  8:08 AM CDT -----  Contact: [phone]  Patient's wife is requesting a bedside commode for the patient.  Patient has been discharge out of the hospital.    Please advise, thank you.

## 2019-06-06 NOTE — TELEPHONE ENCOUNTER
Called in new prescription for the pt Sanctyl 30 grams with 4 refills  to [facility] pharmacy.

## 2019-06-07 ENCOUNTER — TELEPHONE (OUTPATIENT)
Dept: INTERNAL MEDICINE | Facility: CLINIC | Age: 79
End: 2019-06-07

## 2019-06-07 DIAGNOSIS — R26.9 GAIT ABNORMALITY: Primary | ICD-10-CM

## 2019-06-07 NOTE — TELEPHONE ENCOUNTER
----- Message from Tori Tovar sent at 6/7/2019  2:22 PM CDT -----  Contact: Wife Joanne call her at [phone]  She has requested a potty chair for her  and since Dr. Reyes is out she wanted to know if another doctor will order this today for  today.

## 2019-06-10 ENCOUNTER — TELEPHONE (OUTPATIENT)
Dept: INTERNAL MEDICINE | Facility: CLINIC | Age: 79
End: 2019-06-10

## 2019-06-10 NOTE — TELEPHONE ENCOUNTER
----- Message from Lucien Carbajal sent at 6/10/2019  8:18 AM CDT -----  Contact: Joanne Wife [phone]  Pt wife is calling to follow up on bedside commode.

## 2019-06-10 NOTE — TELEPHONE ENCOUNTER
----- Message from Herminio Elizabeth sent at 6/10/2019  3:55 PM CDT -----  Contact: H/H Nurse Josias [phone]  Patient would like to get medical advice.  Symptoms (please be specific):  Two falls within four day    Comments: H/H Nurse calling stating patient had two falls within four days and would like to speak with the office regarding patient and other concerns. H/H Nurse Josias [phone]    Please call an advise  Thank you

## 2019-06-11 ENCOUNTER — OFFICE VISIT (OUTPATIENT)
Dept: INTERNAL MEDICINE | Facility: CLINIC | Age: 79
End: 2019-06-11
Payer: MEDICARE

## 2019-06-11 VITALS — DIASTOLIC BLOOD PRESSURE: 60 MMHG | OXYGEN SATURATION: 99 % | HEART RATE: 60 BPM | SYSTOLIC BLOOD PRESSURE: 110 MMHG

## 2019-06-11 DIAGNOSIS — R26.9 GAIT ABNORMALITY: Primary | ICD-10-CM

## 2019-06-11 DIAGNOSIS — Z79.4 DIABETES MELLITUS DUE TO UNDERLYING CONDITION WITH DIABETIC POLYNEUROPATHY, WITH LONG-TERM CURRENT USE OF INSULIN: ICD-10-CM

## 2019-06-11 DIAGNOSIS — I48.0 PAROXYSMAL ATRIAL FIBRILLATION: ICD-10-CM

## 2019-06-11 DIAGNOSIS — E08.42 DIABETES MELLITUS DUE TO UNDERLYING CONDITION WITH DIABETIC POLYNEUROPATHY, WITH LONG-TERM CURRENT USE OF INSULIN: ICD-10-CM

## 2019-06-11 DIAGNOSIS — S81.839S: ICD-10-CM

## 2019-06-11 DIAGNOSIS — I10 ESSENTIAL HYPERTENSION: ICD-10-CM

## 2019-06-11 PROCEDURE — 99215 OFFICE O/P EST HI 40 MIN: CPT | Mod: HCNC,S$GLB,, | Performed by: INTERNAL MEDICINE

## 2019-06-11 PROCEDURE — 3074F PR MOST RECENT SYSTOLIC BLOOD PRESSURE < 130 MM HG: ICD-10-PCS | Mod: HCNC,CPTII,S$GLB, | Performed by: INTERNAL MEDICINE

## 2019-06-11 PROCEDURE — 99215 PR OFFICE/OUTPT VISIT, EST, LEVL V, 40-54 MIN: ICD-10-PCS | Mod: HCNC,S$GLB,, | Performed by: INTERNAL MEDICINE

## 2019-06-11 PROCEDURE — 1101F PR PT FALLS ASSESS DOC 0-1 FALLS W/OUT INJ PAST YR: ICD-10-PCS | Mod: HCNC,CPTII,S$GLB, | Performed by: INTERNAL MEDICINE

## 2019-06-11 PROCEDURE — 1101F PT FALLS ASSESS-DOCD LE1/YR: CPT | Mod: HCNC,CPTII,S$GLB, | Performed by: INTERNAL MEDICINE

## 2019-06-11 PROCEDURE — 99999 PR PBB SHADOW E&M-EST. PATIENT-LVL V: ICD-10-PCS | Mod: PBBFAC,HCNC,, | Performed by: INTERNAL MEDICINE

## 2019-06-11 PROCEDURE — 99999 PR PBB SHADOW E&M-EST. PATIENT-LVL V: CPT | Mod: PBBFAC,HCNC,, | Performed by: INTERNAL MEDICINE

## 2019-06-11 PROCEDURE — 3078F DIAST BP <80 MM HG: CPT | Mod: HCNC,CPTII,S$GLB, | Performed by: INTERNAL MEDICINE

## 2019-06-11 PROCEDURE — 3074F SYST BP LT 130 MM HG: CPT | Mod: HCNC,CPTII,S$GLB, | Performed by: INTERNAL MEDICINE

## 2019-06-11 PROCEDURE — 3078F PR MOST RECENT DIASTOLIC BLOOD PRESSURE < 80 MM HG: ICD-10-PCS | Mod: HCNC,CPTII,S$GLB, | Performed by: INTERNAL MEDICINE

## 2019-06-11 RX ORDER — CEFPODOXIME PROXETIL 200 MG/1
TABLET, FILM COATED ORAL
COMMUNITY
Start: 2019-06-05 | End: 2019-06-11

## 2019-06-11 RX ORDER — TEMAZEPAM 15 MG/1
CAPSULE ORAL
Status: ON HOLD | COMMUNITY
Start: 2019-06-10 | End: 2019-06-18

## 2019-06-11 NOTE — TELEPHONE ENCOUNTER
Please notify home health, cesar, that would like to go into skilled nursing.  Have  go to home to help with transition to skilled nursing.  I am placing a referral to [facility] skilled nursing.

## 2019-06-11 NOTE — TELEPHONE ENCOUNTER
Please notify wife that I had a cancellation and want to see him earlier today at 1:30 pm - please have him come in for 1:30 incase need to do any testing.

## 2019-06-11 NOTE — PROGRESS NOTES
CHIEF COMPLAINT: falls and wounds on legs      HISTORY OF PRESENT ILLNESS: This is a 79-year-old man who presents with this wife and daughter for follow up of above.    Home health called yesterday reporting that he has fallen twice in the last 4 days. One time, he was getting out of bed and he began to slide to the floor against the bed.  His wife was able to put a chair underneth him before he fell to the ground. The other fall, he fell on his left side in the bathroom. His wife fell on top of him trying to prevent his fall. EMS had to be called to get them off the floor. His wife was taken to the emergency room due to the fall for evaluation. He scrapped his left buttocks and left elbow during this fall. No pain from either fall. Wife is having a very hard time caring for him at home.    His gait has been very unsteady.  He continues to have wounds on both lower extremities with very slow improvement in the wounds.  Home health nurse comes three times a week to change dressings on the wounds. He does not change the dressings when home health does not come as he or his wife cannot do the dressing changes.     He was admitted from 5/22/19 to 5/28/19 due to CHF exacerbation. He was diuresed.  No chest pain, shortness of breath, swelling. He had a lot of anxiety in the hospital and saw Dr Maxwell on 5/30/19. He has been taking xanax 0.5 mg three times daily as needed for anxiety. Anxiety has been better since he has been out of the hospital.        No chest pain or palpitations. He continues to take spironolactone 25 mg 1/2 tablet daily, carvediolol 25 mg bid and Entresto 97/103 twice daily for his hypertension and CHF. He is also on amiodarone 200 mg twice daily for his atrial flutter. Aspirin 81 mg daily.       His back is doing well. He is doing stretching at home which helps.  HE takes one tizanidine in the evening.       Sleep continues to be problematic. HE continues to take trazodone 50 mg 2 tablets at bedtime,  "temazepam 15 mg at bedtime and melatonin 5 mg at bedtime. No anxiety or depression now.       He currently takes Lantus at night.  HE states he has not been eating much so he has been taking less lantus. Twice a week, he does not take Lantus. "It all depends what the reading is" He denies any polydipsia, polyuria, hypoglycemia. HE checks his blood sugars 2-3 times a day.       He has hyperlipidemia, currently off Lipitor 20 mg daily      His liver enzymes have been mildly elevated. Dr Palm stopped fenofibrate due to the elevated liver enzymes. He had a liver biopsy and the elevated liver enzymes are NOT due to amiodarone. No further work up is needed at this time.       Periperhal neuropathy controlled with gabapentin 300 mg 2 tablets three times daily. Shooting pain has resolved with increasing dose of gabapentin.     He is  taking levothyroxine 75 mcg daily for his hypothyroidism           PAST MEDICAL HISTORY:    1. Hypertension, coronary artery disease, status post stenting.    2. Ischemic cardiomyopathy.    3. Atrial flutter.    4. Diabetes mellitus.    5. ICD placed 02/22/2012.    6. Hyperlipidemia.    7. Osteoarthritis of the knees.  8. Atrial fibrillation  9. Transaminitis      PAST SURGICAL HISTORY: Appendectomy in 2003.        SOCIAL HISTORY: He is a former smoker, quit in 1987. Does not drink alcohol.    .       PHYSICAL EXAMINATION:    /60   Pulse 60   SpO2 99%    GENERAL: He is alert, oriented, no apparent distress. Affect within normal limits.    HEENT: Conjunctivae anicteric. Pupils are equal, round and reactive to light.    Tympanic membranes are clear. Oropharynx is clear.    NECK: Supple. No cervical lymphadenopathy, no thyroid enlargement.    RESPIRATORY: Effort normal. Lungs are clear to auscultation.    HEART: Regular rate and rhythm without murmurs, gallops or rubs. No lower extremity edema. .  Multiple wounds on the left lower extremity. Wound on right great toe.    Excoriation " on the left buttocks.   No erythema or warmth around the wounds.  NO drainage from the wounds    Medical records reviewed               ASSESSMENT AND PLAN:.   1. Gait abnormality and wounds on lower extremities and left buttocks cheek-struggling at home. Currently has home health. Highly recommend skilled nursing placement for physical therapy occupational therapy and wound care. Long discussion regarding need for  therapy and wound care. HE is in agreement to go into skilled nursing. No signs of infection around the wounds.  2.  CHF - stable  4. Coronary artery disease - stable - to follow up with Cardiology.    5. Atrial flutter - s/p cardioversion - stable    6. 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 Watch blood sugars.   7. History of anemia on last blood work, stable    8. Hyperlipidemia. At goal    9  Transaminitis -better, watch close  10. Diabetic neuropathy -stable. gabapentin 300 mg 2 capsules three times daily    11. Anxiety and depression - stable  12. Aortic arch atherosclerosis - modifying risk factors  13. Insomnia - stable  14. Hypothyroidism - on supplement  15.   I will see him back in 3 weeks, sooner if problems arise.     Spent greater than 40 minutes with the patient, greater than 50% in face to face counseling.

## 2019-06-12 ENCOUNTER — HOSPITAL ENCOUNTER (OUTPATIENT)
Facility: HOSPITAL | Age: 79
Discharge: NURSING, REHAB OR LONG-TERM CARE | End: 2019-06-18
Attending: EMERGENCY MEDICINE | Admitting: HOSPITALIST
Payer: MEDICARE

## 2019-06-12 ENCOUNTER — TELEPHONE (OUTPATIENT)
Dept: INTERNAL MEDICINE | Facility: CLINIC | Age: 79
End: 2019-06-12

## 2019-06-12 DIAGNOSIS — I50.9 CHF (CONGESTIVE HEART FAILURE): ICD-10-CM

## 2019-06-12 DIAGNOSIS — R79.89 ELEVATED TROPONIN: ICD-10-CM
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
## 2019-06-12 DIAGNOSIS — R07.9 CHEST PAIN: ICD-10-CM

## 2019-06-12 DIAGNOSIS — T50.901A ACCIDENTAL DRUG OVERDOSE, INITIAL ENCOUNTER: ICD-10-CM

## 2019-06-12 DIAGNOSIS — I21.4 NSTEMI (NON-ST ELEVATED MYOCARDIAL INFARCTION): Primary | ICD-10-CM

## 2019-06-12 PROBLEM — Z75.8 DISCHARGE PLANNING ISSUES: Status: ACTIVE | Noted: 2019-06-12

## 2019-06-12 LAB
ALBUMIN SERPL BCP-MCNC: 3.4 G/DL (ref 3.5–5.2)
ALP SERPL-CCNC: 88 U/L (ref 55–135)
ALT SERPL W/O P-5'-P-CCNC: 78 U/L (ref 10–44)
AMPHET+METHAMPHET UR QL: NORMAL
ANION GAP SERPL CALC-SCNC: 11 MMOL/L (ref 8–16)
APAP SERPL-MCNC: <3 UG/ML (ref 10–20)
AST SERPL-CCNC: 98 U/L (ref 10–40)
BACTERIA #/AREA URNS AUTO: ABNORMAL /HPF
BARBITURATES UR QL SCN>200 NG/ML: NEGATIVE
BASOPHILS # BLD AUTO: 0.02 K/UL (ref 0–0.2)
BASOPHILS NFR BLD: 0.2 % (ref 0–1.9)
BENZODIAZ UR QL SCN>200 NG/ML: NORMAL
BILIRUB SERPL-MCNC: 0.8 MG/DL (ref 0.1–1)
BILIRUB UR QL STRIP: NEGATIVE
BNP SERPL-MCNC: 908 PG/ML (ref 0–99)
BUN SERPL-MCNC: 17 MG/DL (ref 8–23)
BZE UR QL SCN: NEGATIVE
CALCIUM SERPL-MCNC: 9.6 MG/DL (ref 8.7–10.5)
CANNABINOIDS UR QL SCN: NEGATIVE
CHLORIDE SERPL-SCNC: 100 MMOL/L (ref 95–110)
CLARITY UR REFRACT.AUTO: CLEAR
CO2 SERPL-SCNC: 28 MMOL/L (ref 23–29)
COLOR UR AUTO: YELLOW
CREAT SERPL-MCNC: 0.8 MG/DL (ref 0.5–1.4)
CREAT UR-MCNC: 61 MG/DL (ref 23–375)
DIFFERENTIAL METHOD: ABNORMAL
EOSINOPHIL # BLD AUTO: 0 K/UL (ref 0–0.5)
EOSINOPHIL NFR BLD: 0.1 % (ref 0–8)
ERYTHROCYTE [DISTWIDTH] IN BLOOD BY AUTOMATED COUNT: 15.1 % (ref 11.5–14.5)
EST. GFR  (AFRICAN AMERICAN): >60 ML/MIN/1.73 M^2
EST. GFR  (NON AFRICAN AMERICAN): >60 ML/MIN/1.73 M^2
ETHANOL SERPL-MCNC: <10 MG/DL
GLUCOSE SERPL-MCNC: 239 MG/DL (ref 70–110)
GLUCOSE UR QL STRIP: ABNORMAL
HCT VFR BLD AUTO: 39 % (ref 40–54)
HGB BLD-MCNC: 12.7 G/DL (ref 14–18)
HGB UR QL STRIP: NEGATIVE
HYALINE CASTS UR QL AUTO: 3 /LPF
IMM GRANULOCYTES # BLD AUTO: 0.03 K/UL (ref 0–0.04)
IMM GRANULOCYTES NFR BLD AUTO: 0.4 % (ref 0–0.5)
KETONES UR QL STRIP: NEGATIVE
LEUKOCYTE ESTERASE UR QL STRIP: NEGATIVE
LYMPHOCYTES # BLD AUTO: 0.9 K/UL (ref 1–4.8)
LYMPHOCYTES NFR BLD: 11.2 % (ref 18–48)
MAGNESIUM SERPL-MCNC: 2 MG/DL (ref 1.6–2.6)
MCH RBC QN AUTO: 31 PG (ref 27–31)
MCHC RBC AUTO-ENTMCNC: 32.6 G/DL (ref 32–36)
MCV RBC AUTO: 95 FL (ref 82–98)
METHADONE UR QL SCN>300 NG/ML: NEGATIVE
MICROSCOPIC COMMENT: ABNORMAL
MONOCYTES # BLD AUTO: 0.9 K/UL (ref 0.3–1)
MONOCYTES NFR BLD: 10.3 % (ref 4–15)
NEUTROPHILS # BLD AUTO: 6.4 K/UL (ref 1.8–7.7)
NEUTROPHILS NFR BLD: 77.8 % (ref 38–73)
NITRITE UR QL STRIP: NEGATIVE
NRBC BLD-RTO: 0 /100 WBC
OPIATES UR QL SCN: NEGATIVE
PCP UR QL SCN>25 NG/ML: NEGATIVE
PH UR STRIP: 6 [PH] (ref 5–8)
PLATELET # BLD AUTO: 217 K/UL (ref 150–350)
PMV BLD AUTO: 11.6 FL (ref 9.2–12.9)
POCT GLUCOSE: 248 MG/DL (ref 70–110)
POTASSIUM SERPL-SCNC: 4.5 MMOL/L (ref 3.5–5.1)
PROT SERPL-MCNC: 6.8 G/DL (ref 6–8.4)
PROT UR QL STRIP: NEGATIVE
RBC # BLD AUTO: 4.1 M/UL (ref 4.6–6.2)
SALICYLATES SERPL-MCNC: <5 MG/DL (ref 15–30)
SODIUM SERPL-SCNC: 139 MMOL/L (ref 136–145)
SP GR UR STRIP: 1.01 (ref 1–1.03)
SQUAMOUS #/AREA URNS AUTO: 0 /HPF
TOXICOLOGY INFORMATION: NORMAL
TROPONIN I SERPL DL<=0.01 NG/ML-MCNC: 6.66 NG/ML (ref 0–0.03)
TROPONIN I SERPL DL<=0.01 NG/ML-MCNC: 7.2 NG/ML (ref 0–0.03)
URN SPEC COLLECT METH UR: ABNORMAL
WBC # BLD AUTO: 8.27 K/UL (ref 3.9–12.7)
WBC #/AREA URNS AUTO: 1 /HPF (ref 0–5)

## 2019-06-12 PROCEDURE — 82962 GLUCOSE BLOOD TEST: CPT | Mod: HCNC

## 2019-06-12 PROCEDURE — 63600175 PHARM REV CODE 636 W HCPCS: Mod: HCNC | Performed by: PHYSICIAN ASSISTANT

## 2019-06-12 PROCEDURE — 99220 PR INITIAL OBSERVATION CARE,LEVL III: ICD-10-PCS | Mod: HCNC,,, | Performed by: PHYSICIAN ASSISTANT

## 2019-06-12 PROCEDURE — G0378 HOSPITAL OBSERVATION PER HR: HCPCS | Mod: HCNC

## 2019-06-12 PROCEDURE — 80329 ANALGESICS NON-OPIOID 1 OR 2: CPT | Mod: HCNC

## 2019-06-12 PROCEDURE — 80320 DRUG SCREEN QUANTALCOHOLS: CPT | Mod: HCNC

## 2019-06-12 PROCEDURE — 81001 URINALYSIS AUTO W/SCOPE: CPT | Mod: HCNC,59

## 2019-06-12 PROCEDURE — 80307 DRUG TEST PRSMV CHEM ANLYZR: CPT | Mod: HCNC

## 2019-06-12 PROCEDURE — 99285 PR EMERGENCY DEPT VISIT,LEVEL V: ICD-10-PCS | Mod: HCNC,,, | Performed by: EMERGENCY MEDICINE

## 2019-06-12 PROCEDURE — 83880 ASSAY OF NATRIURETIC PEPTIDE: CPT | Mod: HCNC

## 2019-06-12 PROCEDURE — 99285 EMERGENCY DEPT VISIT HI MDM: CPT | Mod: HCNC,,, | Performed by: EMERGENCY MEDICINE

## 2019-06-12 PROCEDURE — 85025 COMPLETE CBC W/AUTO DIFF WBC: CPT | Mod: HCNC

## 2019-06-12 PROCEDURE — 80053 COMPREHEN METABOLIC PANEL: CPT | Mod: HCNC

## 2019-06-12 PROCEDURE — 83735 ASSAY OF MAGNESIUM: CPT | Mod: HCNC

## 2019-06-12 PROCEDURE — 99285 EMERGENCY DEPT VISIT HI MDM: CPT | Mod: 25,HCNC

## 2019-06-12 PROCEDURE — 99220 PR INITIAL OBSERVATION CARE,LEVL III: CPT | Mod: HCNC,,, | Performed by: PHYSICIAN ASSISTANT

## 2019-06-12 PROCEDURE — 25000003 PHARM REV CODE 250: Mod: HCNC | Performed by: EMERGENCY MEDICINE

## 2019-06-12 PROCEDURE — 84484 ASSAY OF TROPONIN QUANT: CPT | Mod: HCNC

## 2019-06-12 PROCEDURE — 25000003 PHARM REV CODE 250: Mod: HCNC | Performed by: PHYSICIAN ASSISTANT

## 2019-06-12 PROCEDURE — S5571 INSULIN DISPOS PEN 3 ML: HCPCS | Mod: HCNC | Performed by: PHYSICIAN ASSISTANT

## 2019-06-12 RX ORDER — ACETAMINOPHEN 325 MG/1
650 TABLET ORAL EVERY 4 HOURS PRN
Status: DISCONTINUED | OUTPATIENT
Start: 2019-06-12 | End: 2019-06-18 | Stop reason: HOSPADM

## 2019-06-12 RX ORDER — ALPRAZOLAM 0.25 MG/1
0.25 TABLET ORAL 2 TIMES DAILY PRN
Status: DISCONTINUED | OUTPATIENT
Start: 2019-06-13 | End: 2019-06-13

## 2019-06-12 RX ORDER — ONDANSETRON 4 MG/1
4 TABLET, ORALLY DISINTEGRATING ORAL EVERY 8 HOURS PRN
Status: DISCONTINUED | OUTPATIENT
Start: 2019-06-12 | End: 2019-06-18 | Stop reason: HOSPADM

## 2019-06-12 RX ORDER — INSULIN ASPART 100 [IU]/ML
0-5 INJECTION, SOLUTION INTRAVENOUS; SUBCUTANEOUS
Status: DISCONTINUED | OUTPATIENT
Start: 2019-06-12 | End: 2019-06-18 | Stop reason: HOSPADM

## 2019-06-12 RX ORDER — DABIGATRAN ETEXILATE 150 MG/1
150 CAPSULE ORAL 2 TIMES DAILY
Status: DISCONTINUED | OUTPATIENT
Start: 2019-06-12 | End: 2019-06-18 | Stop reason: HOSPADM

## 2019-06-12 RX ORDER — CARVEDILOL 12.5 MG/1
25 TABLET ORAL 2 TIMES DAILY
Status: DISCONTINUED | OUTPATIENT
Start: 2019-06-12 | End: 2019-06-12

## 2019-06-12 RX ORDER — RAMELTEON 8 MG/1
8 TABLET ORAL NIGHTLY PRN
Status: DISCONTINUED | OUTPATIENT
Start: 2019-06-12 | End: 2019-06-13

## 2019-06-12 RX ORDER — IBUPROFEN 200 MG
24 TABLET ORAL
Status: DISCONTINUED | OUTPATIENT
Start: 2019-06-12 | End: 2019-06-18 | Stop reason: HOSPADM

## 2019-06-12 RX ORDER — AMOXICILLIN 250 MG
1 CAPSULE ORAL 2 TIMES DAILY
Status: DISCONTINUED | OUTPATIENT
Start: 2019-06-13 | End: 2019-06-18 | Stop reason: HOSPADM

## 2019-06-12 RX ORDER — ASCORBIC ACID 500 MG
1500 TABLET ORAL 2 TIMES DAILY
Status: DISCONTINUED | OUTPATIENT
Start: 2019-06-13 | End: 2019-06-18 | Stop reason: HOSPADM

## 2019-06-12 RX ORDER — SPIRONOLACTONE 25 MG/1
25 TABLET ORAL DAILY
Status: DISCONTINUED | OUTPATIENT
Start: 2019-06-13 | End: 2019-06-18 | Stop reason: HOSPADM

## 2019-06-12 RX ORDER — IPRATROPIUM BROMIDE AND ALBUTEROL SULFATE 2.5; .5 MG/3ML; MG/3ML
3 SOLUTION RESPIRATORY (INHALATION) EVERY 4 HOURS PRN
Status: DISCONTINUED | OUTPATIENT
Start: 2019-06-12 | End: 2019-06-18 | Stop reason: HOSPADM

## 2019-06-12 RX ORDER — SODIUM CHLORIDE 0.9 % (FLUSH) 0.9 %
5 SYRINGE (ML) INJECTION
Status: DISCONTINUED | OUTPATIENT
Start: 2019-06-12 | End: 2019-06-18 | Stop reason: HOSPADM

## 2019-06-12 RX ORDER — GLUCAGON 1 MG
1 KIT INJECTION
Status: DISCONTINUED | OUTPATIENT
Start: 2019-06-12 | End: 2019-06-18 | Stop reason: HOSPADM

## 2019-06-12 RX ORDER — FUROSEMIDE 40 MG/1
40 TABLET ORAL 2 TIMES DAILY
Status: DISCONTINUED | OUTPATIENT
Start: 2019-06-13 | End: 2019-06-14

## 2019-06-12 RX ORDER — NAPROXEN SODIUM 220 MG/1
81 TABLET, FILM COATED ORAL DAILY
Status: DISCONTINUED | OUTPATIENT
Start: 2019-06-13 | End: 2019-06-18 | Stop reason: HOSPADM

## 2019-06-12 RX ORDER — ASPIRIN 325 MG
325 TABLET, DELAYED RELEASE (ENTERIC COATED) ORAL
Status: COMPLETED | OUTPATIENT
Start: 2019-06-12 | End: 2019-06-12

## 2019-06-12 RX ORDER — AMIODARONE HYDROCHLORIDE 200 MG/1
200 TABLET ORAL 2 TIMES DAILY
Status: DISCONTINUED | OUTPATIENT
Start: 2019-06-13 | End: 2019-06-18 | Stop reason: HOSPADM

## 2019-06-12 RX ORDER — CARVEDILOL 25 MG/1
25 TABLET ORAL 2 TIMES DAILY
Status: DISCONTINUED | OUTPATIENT
Start: 2019-06-13 | End: 2019-06-14

## 2019-06-12 RX ORDER — GABAPENTIN 300 MG/1
600 CAPSULE ORAL 3 TIMES DAILY
Status: DISCONTINUED | OUTPATIENT
Start: 2019-06-13 | End: 2019-06-18 | Stop reason: HOSPADM

## 2019-06-12 RX ORDER — AMOXICILLIN 250 MG
1 CAPSULE ORAL 2 TIMES DAILY PRN
Status: DISCONTINUED | OUTPATIENT
Start: 2019-06-12 | End: 2019-06-18 | Stop reason: HOSPADM

## 2019-06-12 RX ORDER — IBUPROFEN 200 MG
16 TABLET ORAL
Status: DISCONTINUED | OUTPATIENT
Start: 2019-06-12 | End: 2019-06-18 | Stop reason: HOSPADM

## 2019-06-12 RX ADMIN — RAMELTEON 8 MG: 8 TABLET, FILM COATED ORAL at 11:06

## 2019-06-12 RX ADMIN — INSULIN DETEMIR 8 UNITS: 100 INJECTION, SOLUTION SUBCUTANEOUS at 11:06

## 2019-06-12 RX ADMIN — DABIGATRAN ETEXILATE MESYLATE 150 MG: 150 CAPSULE ORAL at 11:06

## 2019-06-12 RX ADMIN — ASPIRIN 325 MG: 325 TABLET, DELAYED RELEASE ORAL at 10:06

## 2019-06-12 RX ADMIN — SACUBITRIL AND VALSARTAN 1 TABLET: 97; 103 TABLET, FILM COATED ORAL at 11:06

## 2019-06-12 NOTE — TELEPHONE ENCOUNTER
Spoke with wife.   She could not arouse him this morning.  He took 5 sleeping pills last night.  She let him sleep. He could nto get out of the bed.  He is being transported to ED.  He needs admission to be transferred to skilled nursing for rehab

## 2019-06-12 NOTE — ED PROVIDER NOTES
Encounter Date: 6/12/2019    SCRIBE #1 NOTE: I, Susan Hazel, am scribing for, and in the presence of,  Dr. Rdz . I have scribed the following portions of the note - Other sections scribed: HPI, ROS.       History     Chief Complaint   Patient presents with    Ingestion     arrived to ED via NO EMS with c/o fatigue after taking 5 tramadol, denies SI, pt states was just trying to get some sleep, aao at this time, difficulty ambulating per pt wife and EMS s/t drowsiness     Time patient was seen by the provider: 5:07 PM      The patient is a 79 y.o. male with co-morbidities including diabetes, CAD, Afib, CHF, cardiomyopathy, HDL, MI, anxiety, Hep B, CKD and chronic insomnia who presents to the ED bc she is unable ot continue to care for him at home;. Wife reports patient ingested 5 trazodone 50 mg at 3am. Wife states she had difficulty waking him up and he has been drowsy. He was seen by Internal medicine, Dr. eRyes for abnormal gait, as patient has been falling more secondary to complications with his knees. Wife reports Dr. Reyes is working on getting patient set up with skilled nursing, as she is unable to care for the patient any longer on her own.     The history is provided by the patient and medical records.     Review of patient's allergies indicates:  No Known Allergies  Past Medical History:   Diagnosis Date    *Atrial fibrillation     *Atrial flutter     Acute exacerbation of CHF (congestive heart failure) 8/2/2013    Anticoagulant long-term use     Anxiety     Arthritis     Atrial flutter     Back pain     Cardiomyopathy     Cataract     Coronary artery disease     Depression     Diabetes mellitus     Heart bloc     Hepatitis B     Hyperlipidemia 4/1/2014    Hypertension     Myocardial infarction     Non-STEMI (non-ST elevated myocardial infarction) 5/26/2013    Nuclear sclerosis - Both Eyes 2/18/2013    Post PTCA 8/7/2012    Presence of biventricular AICD 2/23/2016     "Stage 3 chronic kidney disease 12/11/2017    Tobacco dependence     Transaminitis 4/1/2014    Ventricular tachycardia      Past Surgical History:   Procedure Laterality Date    ABLATION N/A 4/8/2013    Performed by [facility] CATH LAB    APPENDECTOMY      BIOPSY LIVER AND ULTRASOUND N/A 5/26/2014    Performed by [facility] at [facility] OR [facility]    CARDIAC DEFIBRILLATOR PLACEMENT      CARDIAC DEFIBRILLATOR PLACEMENT      CARDIOVERSION N/A 10/10/2013    [facility] CATH LAB    CARDIOVERSION N/A 8/19/2013    Per[facility] by [facility] CATH LAB    COLONOSCOPY      CORONARY ANGIOPLASTY WITH STENT PLACEMENT      FRACTURE SURGERY      L arm    INSERT / REPLACE / REMOVE PACEMAKER  12/15    bi-V upgrade    INSERTION-ICD-BIVENTRICULAR N/A 11/11/2015    Per[facility] by [facility] CATH LAB    RADIOFREQUENCY ABLATION      STEROID INJECTION KNEE Bilateral     received about every 4 months    TONSILLECTOMY      TRANSESOPHAGEAL ECHOCARDIOGRAM (DIOGO) N/A 10/10/2013    Per[facility] CATH LAB    TRANSESOPHAGEAL ECHOCARDIOGRAM (DIOGO) N/A 8/19/2013    Performed by [facility] CATH LAB    VASCULAR SURGERY       Family History   Problem Relation Age of Onset    Cancer Father     Diabetes Father     Bladder Cancer Father     Diabetes Mother     Heart attack Mother         "weak heart"    Bipolar disorder Son     Cancer Paternal Grandmother     Heart disease Maternal Grandmother     No Known Problems Maternal Grandfather     Cancer Paternal Grandfather     No Known Problems Daughter     No Known Problems Daughter     No Known Problems Son     No Known Problems Son     Cancer Maternal Uncle     No Known Problems Maternal Aunt     No Known Problems Paternal Aunt     No Known Problems Paternal Uncle     No Known Problems Sister     No Known Problems Brother     Amblyopia Neg Hx     Blindness Neg " Hx     Cataracts Neg Hx     Glaucoma Neg Hx     Hypertension Neg Hx     Macular degeneration Neg Hx     Retinal detachment Neg Hx     Strabismus Neg Hx     Stroke Neg Hx     Thyroid disease Neg Hx     Liver disease Neg Hx     Melanoma Neg Hx     Psoriasis Neg Hx     Lupus Neg Hx     Acne Neg Hx     Eczema Neg Hx      Social History     Tobacco Use    Smoking status: Former Smoker     Last attempt to quit: 1987     Years since quittin.9    Smokeless tobacco: Never Used    Tobacco comment: 25 years ago   Substance Use Topics    Alcohol use: No    Drug use: No     Review of Systems   Constitutional: Negative for fever.   HENT: Negative for sore throat.    Respiratory: Negative for shortness of breath.    Cardiovascular: Negative for chest pain.   Gastrointestinal: Negative for nausea.   Genitourinary: Negative for dysuria.   Musculoskeletal: Negative for back pain.   Skin: Negative for rash.   Neurological: Negative for weakness.   Hematological: Does not bruise/bleed easily.   Psychiatric/Behavioral: Positive for sleep disturbance.   All other systems reviewed and are negative.      Physical Exam     Initial Vitals [19 1513]   BP Pulse Resp Temp SpO2   (!) 124/50 80 16 97.4 °F (36.3 °C) 96 %      MAP       --         Physical Exam    Nursing note and vitals reviewed.  Constitutional: He appears well-developed and well-nourished. He is not diaphoretic. No distress.   HENT:   Head: Normocephalic and atraumatic.   Eyes: Conjunctivae and EOM are normal.   Neck: Normal range of motion. Neck supple.   Cardiovascular: Normal rate and regular rhythm.   Pulmonary/Chest: No respiratory distress.   Abdominal: Soft. He exhibits no distension. There is no tenderness.   Musculoskeletal: Normal range of motion.   Neurological: He is alert and oriented to person, place, and time. He has normal strength. No cranial nerve deficit. GCS score is 15. GCS eye subscore is 4. GCS verbal subscore is 5. GCS  motor subscore is 6.   Skin: Skin is warm and dry.   Chronic ulcers on bilateral great toes and shins bilateral.  Dressed.   Psychiatric: He has a normal mood and affect.         ED Course   Procedures  Labs Reviewed   URINALYSIS, REFLEX TO URINE CULTURE - Abnormal; Notable for the following components:       Result Value    Glucose, UA 2+ (*)     All other components within normal limits    Narrative:     Preferred Collection Type->Urine, Clean Catch   URINALYSIS MICROSCOPIC - Abnormal; Notable for the following components:    Hyaline Casts, UA 3 (*)     All other components within normal limits    Narrative:     Preferred Collection Type->Urine, Clean Catch   POCT GLUCOSE - Abnormal; Notable for the following components:    POCT Glucose 248 (*)     All other components within normal limits   CBC W/ AUTO DIFFERENTIAL   COMPREHENSIVE METABOLIC PANEL   TROPONIN I   TROPONIN I   B-TYPE NATRIURETIC PEPTIDE   ACETAMINOPHEN LEVEL   SALICYLATE LEVEL   MAGNESIUM   ALCOHOL,MEDICAL (ETHANOL)   DRUG SCREEN PANEL, URINE EMERGENCY     EKG Readings: (Independently Interpreted)   Patient with chronically wide QRS.  No significant change from previous.  Except previous EKG has obvious pacer spikes.  Baseline poor here.       Imaging Results    None          Medical Decision Making:   History:   Old Medical Records: I decided to obtain old medical records.  Old Records Summarized: records from clinic visits.  Clinical Tests:   Lab Tests: Ordered and Reviewed  Radiological Study: Ordered and Reviewed  Medical Tests: Ordered and Reviewed            Scribe Attestation:   Scribe #1: I performed the above scribed service and the documentation accurately describes the services I performed. I attest to the accuracy of the note.    Attending Attestation:             Attending ED Notes:   Patient with complaint of chronic insomnia took 5 trazodone tablets over night.  Wife reports he can no longer take care of him at home.  I spoke with  poison Control.  They recommend symptomatic relief.  Patient pupils equal and reactive to light.  Patient with fall last week      Last cath 5/27  Left Heart Cath 5/27/2013  DIAGNOSTIC:       Patient has a right dominant coronary artery.      - Left Main Coronary Artery:             The LM is normal. There is GALLO 3 flow.     - Left Anterior Descending Artery:             The mid LAD has a 50% stenosis. There is GALLO 3 flow. The remaining portion of the vessel has luminal irregularities. Patent bifurcation stents in the mid LAD and adjacent diagonal.     - Left Circumflex Artery:             The LCX has luminal irregularities. There is GALLO 3 flow.     - Right Coronary Artery:             The mid RCA has a 50% stenosis. There is GALLO 3 flow.     SUMMARY:  1. Two vessel coronary artery disease.  2. Patent LAD and DIagonal stents.              I spoken with Cardiology on-call.  They will review chart.  They are inclined not to start ACS protocol.  Patient has no chest pain or shortness of breath.  He has had elevated troponins in the past.  Last catheterization 2013.      Denies defib firing             Clinical Impression:                                  Meagan Rdz MD  06/12/19 1902       Meagan Rdz MD  06/12/19 1946       Meagan Rdz MD  06/12/19 2042       Meagan Rdz MD  06/12/19 2053

## 2019-06-12 NOTE — TELEPHONE ENCOUNTER
----- Message from Tracy Champion sent at 6/12/2019  2:22 PM CDT -----  Contact: Spouse [phone]  Spouse was calling to report that the patient took to many sleeping pills and she she cant wake him.   She has called emergency 911 to transport him to the hospital.    Please call and advise  Thank you

## 2019-06-13 ENCOUNTER — DOCUMENTATION ONLY (OUTPATIENT)
Dept: CARDIOLOGY | Facility: HOSPITAL | Age: 79
End: 2019-06-13

## 2019-06-13 PROBLEM — R68.89 SUSPECTED DEEP TISSUE INJURY: Status: ACTIVE | Noted: 2019-06-13

## 2019-06-13 PROBLEM — L97.912 ULCERS OF BOTH LOWER LEGS WITH FAT LAYER EXPOSED: Status: ACTIVE | Noted: 2019-06-13

## 2019-06-13 PROBLEM — L97.922 ULCERS OF BOTH LOWER LEGS WITH FAT LAYER EXPOSED: Status: ACTIVE | Noted: 2019-06-13

## 2019-06-13 LAB
ANION GAP SERPL CALC-SCNC: 11 MMOL/L (ref 8–16)
BASOPHILS # BLD AUTO: 0.02 K/UL (ref 0–0.2)
BASOPHILS NFR BLD: 0.3 % (ref 0–1.9)
BUN SERPL-MCNC: 17 MG/DL (ref 8–23)
CALCIUM SERPL-MCNC: 9.4 MG/DL (ref 8.7–10.5)
CHLORIDE SERPL-SCNC: 102 MMOL/L (ref 95–110)
CO2 SERPL-SCNC: 26 MMOL/L (ref 23–29)
CREAT SERPL-MCNC: 0.8 MG/DL (ref 0.5–1.4)
DIFFERENTIAL METHOD: ABNORMAL
EOSINOPHIL # BLD AUTO: 0 K/UL (ref 0–0.5)
EOSINOPHIL NFR BLD: 0.3 % (ref 0–8)
ERYTHROCYTE [DISTWIDTH] IN BLOOD BY AUTOMATED COUNT: 15.2 % (ref 11.5–14.5)
EST. GFR  (AFRICAN AMERICAN): >60 ML/MIN/1.73 M^2
EST. GFR  (NON AFRICAN AMERICAN): >60 ML/MIN/1.73 M^2
ESTIMATED AVG GLUCOSE: 183 MG/DL (ref 68–131)
GLUCOSE SERPL-MCNC: 193 MG/DL (ref 70–110)
HBA1C MFR BLD HPLC: 8 % (ref 4–5.6)
HCT VFR BLD AUTO: 36.6 % (ref 40–54)
HGB BLD-MCNC: 12 G/DL (ref 14–18)
IMM GRANULOCYTES # BLD AUTO: 0.03 K/UL (ref 0–0.04)
IMM GRANULOCYTES NFR BLD AUTO: 0.4 % (ref 0–0.5)
LYMPHOCYTES # BLD AUTO: 1.1 K/UL (ref 1–4.8)
LYMPHOCYTES NFR BLD: 14 % (ref 18–48)
MAGNESIUM SERPL-MCNC: 2 MG/DL (ref 1.6–2.6)
MCH RBC QN AUTO: 30.6 PG (ref 27–31)
MCHC RBC AUTO-ENTMCNC: 32.8 G/DL (ref 32–36)
MCV RBC AUTO: 93 FL (ref 82–98)
MONOCYTES # BLD AUTO: 0.8 K/UL (ref 0.3–1)
MONOCYTES NFR BLD: 10.3 % (ref 4–15)
NEUTROPHILS # BLD AUTO: 6 K/UL (ref 1.8–7.7)
NEUTROPHILS NFR BLD: 74.7 % (ref 38–73)
NRBC BLD-RTO: 0 /100 WBC
PHOSPHATE SERPL-MCNC: 2.9 MG/DL (ref 2.7–4.5)
PLATELET # BLD AUTO: 223 K/UL (ref 150–350)
PMV BLD AUTO: 11.7 FL (ref 9.2–12.9)
POCT GLUCOSE: 159 MG/DL (ref 70–110)
POCT GLUCOSE: 178 MG/DL (ref 70–110)
POCT GLUCOSE: 200 MG/DL (ref 70–110)
POCT GLUCOSE: 223 MG/DL (ref 70–110)
POCT GLUCOSE: >500 MG/DL (ref 70–110)
POTASSIUM SERPL-SCNC: 4.5 MMOL/L (ref 3.5–5.1)
RBC # BLD AUTO: 3.92 M/UL (ref 4.6–6.2)
SODIUM SERPL-SCNC: 139 MMOL/L (ref 136–145)
TROPONIN I SERPL DL<=0.01 NG/ML-MCNC: 4.86 NG/ML (ref 0–0.03)
TROPONIN I SERPL DL<=0.01 NG/ML-MCNC: 6.13 NG/ML (ref 0–0.03)
TROPONIN I SERPL DL<=0.01 NG/ML-MCNC: 6.37 NG/ML (ref 0–0.03)
TROPONIN I SERPL DL<=0.01 NG/ML-MCNC: 7.72 NG/ML (ref 0–0.03)
WBC # BLD AUTO: 8 K/UL (ref 3.9–12.7)

## 2019-06-13 PROCEDURE — G0378 HOSPITAL OBSERVATION PER HR: HCPCS | Mod: HCNC

## 2019-06-13 PROCEDURE — 85025 COMPLETE CBC W/AUTO DIFF WBC: CPT | Mod: HCNC

## 2019-06-13 PROCEDURE — 83735 ASSAY OF MAGNESIUM: CPT | Mod: HCNC

## 2019-06-13 PROCEDURE — S5571 INSULIN DISPOS PEN 3 ML: HCPCS | Mod: HCNC | Performed by: PHYSICIAN ASSISTANT

## 2019-06-13 PROCEDURE — 93005 ELECTROCARDIOGRAM TRACING: CPT | Mod: HCNC

## 2019-06-13 PROCEDURE — 25000003 PHARM REV CODE 250: Mod: HCNC | Performed by: PHYSICIAN ASSISTANT

## 2019-06-13 PROCEDURE — 83036 HEMOGLOBIN GLYCOSYLATED A1C: CPT | Mod: HCNC

## 2019-06-13 PROCEDURE — 84484 ASSAY OF TROPONIN QUANT: CPT | Mod: 91,HCNC

## 2019-06-13 PROCEDURE — 63600175 PHARM REV CODE 636 W HCPCS: Mod: HCNC | Performed by: PHYSICIAN ASSISTANT

## 2019-06-13 PROCEDURE — 93010 EKG 12-LEAD: ICD-10-PCS | Mod: HCNC,,, | Performed by: INTERNAL MEDICINE

## 2019-06-13 PROCEDURE — 99226 PR SUBSEQUENT OBSERVATION CARE,LEVEL III: ICD-10-PCS | Mod: HCNC,,, | Performed by: PHYSICIAN ASSISTANT

## 2019-06-13 PROCEDURE — 84484 ASSAY OF TROPONIN QUANT: CPT | Mod: HCNC

## 2019-06-13 PROCEDURE — 25000003 PHARM REV CODE 250: Mod: HCNC | Performed by: INTERNAL MEDICINE

## 2019-06-13 PROCEDURE — 84100 ASSAY OF PHOSPHORUS: CPT | Mod: HCNC

## 2019-06-13 PROCEDURE — 80048 BASIC METABOLIC PNL TOTAL CA: CPT | Mod: HCNC

## 2019-06-13 PROCEDURE — 99226 PR SUBSEQUENT OBSERVATION CARE,LEVEL III: CPT | Mod: HCNC,,, | Performed by: PHYSICIAN ASSISTANT

## 2019-06-13 PROCEDURE — 36415 COLL VENOUS BLD VENIPUNCTURE: CPT | Mod: HCNC

## 2019-06-13 PROCEDURE — 93010 ELECTROCARDIOGRAM REPORT: CPT | Mod: HCNC,,, | Performed by: INTERNAL MEDICINE

## 2019-06-13 RX ORDER — INSULIN ASPART 100 [IU]/ML
5 INJECTION, SOLUTION INTRAVENOUS; SUBCUTANEOUS
Status: DISCONTINUED | OUTPATIENT
Start: 2019-06-13 | End: 2019-06-13

## 2019-06-13 RX ORDER — INSULIN ASPART 100 [IU]/ML
5 INJECTION, SOLUTION INTRAVENOUS; SUBCUTANEOUS
Status: DISCONTINUED | OUTPATIENT
Start: 2019-06-13 | End: 2019-06-18 | Stop reason: HOSPADM

## 2019-06-13 RX ADMIN — SENNOSIDES AND DOCUSATE SODIUM 1 TABLET: 8.6; 5 TABLET ORAL at 09:06

## 2019-06-13 RX ADMIN — DABIGATRAN ETEXILATE MESYLATE 150 MG: 150 CAPSULE ORAL at 09:06

## 2019-06-13 RX ADMIN — CARVEDILOL 25 MG: 25 TABLET, FILM COATED ORAL at 09:06

## 2019-06-13 RX ADMIN — OXYCODONE HYDROCHLORIDE AND ACETAMINOPHEN 1500 MG: 500 TABLET ORAL at 09:06

## 2019-06-13 RX ADMIN — CASTOR OIL AND BALSAM, PERU: 788; 87 OINTMENT TOPICAL at 11:06

## 2019-06-13 RX ADMIN — LEVOTHYROXINE SODIUM 75 MCG: 25 TABLET ORAL at 07:06

## 2019-06-13 RX ADMIN — AMIODARONE HYDROCHLORIDE 200 MG: 200 TABLET ORAL at 09:06

## 2019-06-13 RX ADMIN — INSULIN DETEMIR 10 UNITS: 100 INJECTION, SOLUTION SUBCUTANEOUS at 10:06

## 2019-06-13 RX ADMIN — ALPRAZOLAM 0.25 MG: 0.25 TABLET ORAL at 09:06

## 2019-06-13 RX ADMIN — FUROSEMIDE 40 MG: 40 TABLET ORAL at 09:06

## 2019-06-13 RX ADMIN — SENNOSIDES,DOCUSATE SODIUM 1 TABLET: 8.6; 5 TABLET, FILM COATED ORAL at 04:06

## 2019-06-13 RX ADMIN — SPIRONOLACTONE 25 MG: 25 TABLET, FILM COATED ORAL at 09:06

## 2019-06-13 RX ADMIN — SACUBITRIL AND VALSARTAN 1 TABLET: 97; 103 TABLET, FILM COATED ORAL at 09:06

## 2019-06-13 RX ADMIN — GABAPENTIN 600 MG: 300 CAPSULE ORAL at 09:06

## 2019-06-13 RX ADMIN — INSULIN ASPART 5 UNITS: 100 INJECTION, SOLUTION INTRAVENOUS; SUBCUTANEOUS at 09:06

## 2019-06-13 RX ADMIN — GABAPENTIN 600 MG: 300 CAPSULE ORAL at 03:06

## 2019-06-13 RX ADMIN — ASPIRIN 81 MG CHEWABLE TABLET 81 MG: 81 TABLET CHEWABLE at 09:06

## 2019-06-13 RX ADMIN — INSULIN ASPART 5 UNITS: 100 INJECTION, SOLUTION INTRAVENOUS; SUBCUTANEOUS at 04:06

## 2019-06-13 RX ADMIN — INSULIN ASPART 2 UNITS: 100 INJECTION, SOLUTION INTRAVENOUS; SUBCUTANEOUS at 11:06

## 2019-06-13 RX ADMIN — INSULIN DETEMIR 10 UNITS: 100 INJECTION, SOLUTION SUBCUTANEOUS at 09:06

## 2019-06-13 RX ADMIN — INSULIN ASPART 5 UNITS: 100 INJECTION, SOLUTION INTRAVENOUS; SUBCUTANEOUS at 10:06

## 2019-06-13 NOTE — HOSPITAL COURSE
Madhav Cortez was admitted to hospital medicine for accidental drug overdose and elevated troponin. Cardiology was consulted however believed elevation to be secondary to demand/stress, likely NSTEMI (type II). No further intervention. Education provided to patient and family regarding safe medication practices and administration.  PT/OT recommended SNF, patient discharged in stable condition.

## 2019-06-13 NOTE — ASSESSMENT & PLAN NOTE
- c/w ASA, statin  - f/w cardiology  - Last heart cath in 2013 shows two vessel coronary artery disease and Patent LAD and DIagonal stents.  GALLO flow 3 throughout.   - will need outpatient (vs inpatient) stress.  No recent stress.

## 2019-06-13 NOTE — PROGRESS NOTES
Pt arrived to floor per stretcher with telemetry monitoring. VS stable, AAOX4. Pt oriented to room, call bell in reach. Bed at lowest position, side rails upx2. No complaints reported. . Will continue to monitor.

## 2019-06-13 NOTE — SUBJECTIVE & OBJECTIVE
Past Medical History:   Diagnosis Date    *Atrial fibrillation     *Atrial flutter     Acute exacerbation of CHF (congestive heart failure) 8/2/2013    Anticoagulant long-term use     Anxiety     Arthritis     Atrial flutter     Back pain     Cardiomyopathy     Cataract     Coronary artery disease     Depression     Diabetes mellitus     Heart bloc     Hepatitis B     Hyperlipidemia 4/1/2014    Hypertension     Myocardial infarction     Non-STEMI (non-ST elevated myocardial infarction) 5/26/2013    Nuclear sclerosis - Both Eyes 2/18/2013    Post PTCA 8/7/2012    Presence of biventricular AICD 2/23/2016    Stage 3 chronic kidney disease 12/11/2017    Tobacco dependence     Transaminitis 4/1/2014    Ventricular tachycardia        Past Surgical History:   Procedure Laterality Date    ABLATION N/A 4/8/2013    Per[facility]    APPENDECTOMY      BIOPSY LIVER AND ULTRASOUND N/A 5/26/2014    [facility]    CARDIAC DEFIBRILLATOR PLACEMENT      CARDIAC DEFIBRILLATOR PLACEMENT      CARDIOVERSION N/A 10/10/2013    Performed [facility]    CARDIOVERSION N/A 8/19/2013    Per[facility] by [facility] C[facility]    COLONOSCOPY      CORONARY ANGIOPLASTY WITH STENT PLACEMENT      FRACTURE SURGERY      L arm    INSERT / REPLACE / REMOVE PACEMAKER  12/15    bi-V upgrade    INSERTION-ICD-BIVENTRICULAR N/A 11/11/2015    Performed by [facility] C[facility]    RADIOFREQUENCY ABLATION      STEROID INJECTION KNEE Bilateral     received about every 4 months    TONSILLECTOMY      TRANSESOPHAGEAL ECHOCARDIOGRAM (DIOGO) N/A 10/10/2013    Performed by [facility] CATH LAB    TRANSESOPHAGEAL ECHOCARDIOGRAM (DIOGO) N/A 8/19/2013    Performed by [facility] CATH [facility]    VASCULAR SURGERY         Review of patient's allergies indicates:  No Known Allergies    No  "current facility-administered medications on file prior to encounter.      Current Outpatient Medications on File Prior to Encounter   Medication Sig    ACCU-CHEK JIE Misc USE AS INSTRUCTED    ACCU-CHEK SMARTVIEW TEST STRIP Strp CHECK BLOOD SUGAR THREE TIMES DAILY    ALPRAZolam (XANAX) 0.5 MG tablet Take 1 tablet (0.5 mg total) by mouth 3 (three) times daily.    amiodarone (PACERONE) 200 MG Tab TAKE 1 TABLET TWICE DAILY    ascorbic acid (VITAMIN C) 500 MG tablet Take 1,500 mg by mouth 2 (two) times daily.     aspirin 81 MG Chew Take 1 tablet (81 mg total) by mouth once daily.    beta carotene-C-E-lutein-min29 5,000-60-30-2 unit-mg-unit-mg Tab Take 1 capsule by mouth once daily.     blood-glucose meter kit Accu check jie meter Use as instructed    carvedilol (COREG) 25 MG tablet Take 1 tablet (25 mg total) by mouth 2 (two) times daily.    ciclopirox (LOPROX) 0.77 % Crea APPLY TO AFFECTED AREAS BILATERAL AXILLA TWICE DAILY UNTIL CLEAR    co-enzyme Q-10 30 mg capsule 800 units daily    collagenase (SANTYL) ointment Apply topically once daily.    dabigatran etexilate (PRADAXA) 150 mg Cap Take 1 capsule (150 mg total) by mouth 2 (two) times daily. "Do NOT break, chew, or open capsules."    ENTRESTO  mg per tablet TAKE 1 TABLET TWICE DAILY    fluticasone (FLONASE) 50 mcg/actuation nasal spray 1 spray (50 mcg total) by Each Nare route once daily.    furosemide (LASIX) 40 MG tablet Take 1 tablet (40 mg total) by mouth 2 (two) times daily.    gabapentin (NEURONTIN) 300 MG capsule TAKE 2 CAPSULES BY MOUTH THREE TIMES DAILY    glucosamine-chondroitin 500-400 mg tablet Take 1 tablet by mouth 2 (two) times daily.    hydrocolloid dressing (AQUACEL EXTRA) 2 X 2 " Bndg Apply 1 Units topically as needed.    insulin (LANTUS SOLOSTAR U-100 INSULIN) glargine 100 units/mL (3mL) SubQ pen Inject 15 Units into the skin every evening.    insulin aspart U-100 (NOVOLOG) 100 unit/mL (3 mL) InPn pen Inject 3 Units " "into the skin 3 (three) times daily.    ketoconazole (NIZORAL) 2 % cream Apply topically once daily.    levothyroxine (SYNTHROID) 75 MCG tablet Take 1 tablet (75 mcg total) by mouth once daily.    magnesium oxide (MAG-OX) 400 mg tablet Take 400 mg by mouth 2 (two) times daily.     melatonin 5 mg Tab Take 1 tablet by mouth every evening.     nitroGLYCERIN (NITROSTAT) 0.4 MG SL tablet Place 1 tablet (0.4 mg total) under the tongue every 5 (five) minutes as needed for Chest pain.    nystatin (MYCOSTATIN) cream Apply topically 2 (two) times daily.    pen needle, diabetic 31 gauge x 1/4" Ndle Use 4 times daily    pva-gentian violet-methyl blue (HYDROFERA BLUE) 2.5 " Bndg Apply 1 Units topically as needed.    ramelteon (ROZEREM) 8 mg tablet Take 1 tablet (8 mg total) by mouth nightly as needed for Insomnia.    RIBOSE ORAL Take 1 tablet by mouth once daily.     senna-docusate 8.6-50 mg (PERICOLACE) 8.6-50 mg per tablet Take 1 tablet by mouth 2 (two) times daily.    spironolactone (ALDACTONE) 25 MG tablet TAKE 1 TABLET ONE TIME DAILY    temazepam (RESTORIL) 15 mg Cap     tizanidine (ZANAFLEX) 2 MG tablet TAKE 1 TABLET EVERY 8 HOURS AS NEEDED FOR MUSCLE PAIN    triamcinolone acetonide 0.025% (KENALOG) 0.025 % cream apply to affected area twice daily as directed    UBIDECARENONE (COQ-10 ORAL) Take 800 mg by mouth once daily. Takes 100mg tablet at lunch, and 600mg at dinner     Family History     Problem Relation (Age of Onset)    Bipolar disorder Son    Bladder Cancer Father    Cancer Father, Paternal Grandmother, Paternal Grandfather, Maternal Uncle    Diabetes Father, Mother    Heart attack Mother    Heart disease Maternal Grandmother    No Known Problems Maternal Grandfather, Daughter, Daughter, Son, Son, Maternal Aunt, Paternal Aunt, Paternal Uncle, Sister, Brother        Tobacco Use    Smoking status: Former Smoker     Last attempt to quit: 1987     Years since quittin.9    Smokeless tobacco: " Never Used    Tobacco comment: 25 years ago   Substance and Sexual Activity    Alcohol use: No    Drug use: No    Sexual activity: Never     Partners: Female     Review of Systems   Constitution: Positive for malaise/fatigue. Negative for chills and fever.   HENT: Negative.    Cardiovascular: Negative for chest pain, dyspnea on exertion, irregular heartbeat, leg swelling, orthopnea, palpitations and paroxysmal nocturnal dyspnea.   Respiratory: Negative for shortness of breath and wheezing.    Skin: Negative for color change and rash.   Musculoskeletal: Negative for arthritis, back pain and myalgias.   Gastrointestinal: Negative for abdominal pain, constipation, diarrhea and nausea.   Neurological: Negative for dizziness, numbness and paresthesias.   Psychiatric/Behavioral: Positive for altered mental status.     Objective:     Vital Signs (Most Recent):  Temp: 97.4 °F (36.3 °C) (06/12/19 1513)  Pulse: 60 (06/12/19 2017)  Resp: 16 (06/12/19 1513)  BP: (!) 122/98 (06/12/19 2017)  SpO2: (!) 94 % (06/12/19 2017) Vital Signs (24h Range):  Temp:  [97.4 °F (36.3 °C)] 97.4 °F (36.3 °C)  Pulse:  [60-90] 60  Resp:  [16] 16  SpO2:  [94 %-97 %] 94 %  BP: (122-162)/(50-98) 122/98     Weight: 93.9 kg (207 lb)  Body mass index is 29.7 kg/m².    SpO2: (!) 94 %  O2 Device (Oxygen Therapy): room air    No intake or output data in the 24 hours ending 06/12/19 2154    Lines/Drains/Airways     Peripheral Intravenous Line                 Peripheral IV - Single Lumen 05/24/19 1125 20 G Right Forearm 19 days         Peripheral IV - Single Lumen 06/12/19 1753 18 G Left Hand less than 1 day                Physical Exam   Constitutional: Vital signs are normal. He appears well-developed and well-nourished. He is cooperative.  Non-toxic appearance. No distress.   HENT:   Head: Normocephalic and atraumatic.   Neck: No hepatojugular reflux and no JVD present. Carotid bruit is not present.   Cardiovascular: Normal rate, regular rhythm, S1  normal, S2 normal and normal heart sounds. Exam reveals no gallop.   No murmur heard.  Pulses:       Dorsalis pedis pulses are 2+ on the right side, and 2+ on the left side.        Posterior tibial pulses are 2+ on the right side, and 2+ on the left side.   No lower extremity edema   Pulmonary/Chest: Effort normal and breath sounds normal. No respiratory distress. He has no decreased breath sounds. He has no wheezes. He has no rhonchi. He has no rales.   On supine exam   Abdominal: Soft. Normal appearance and bowel sounds are normal. He exhibits no distension and no mass. There is tenderness in the right upper quadrant.   Neurological: He is alert.   Skin: Skin is warm, dry and intact.       Significant Labs:   CMP   Recent Labs   Lab 06/12/19  1756      K 4.5      CO2 28   *   BUN 17   CREATININE 0.8   CALCIUM 9.6   PROT 6.8   ALBUMIN 3.4*   BILITOT 0.8   ALKPHOS 88   AST 98*   ALT 78*   ANIONGAP 11   ESTGFRAFRICA >60.0   EGFRNONAA >60.0   , CBC   Recent Labs   Lab 06/12/19  1756   WBC 8.27   HGB 12.7*   HCT 39.0*      , INR No results for input(s): INR, PROTIME in the last 48 hours. and Troponin   Recent Labs   Lab 06/12/19  1756 06/12/19  1933   TROPONINI 7.202* 6.660*         Significant Imaging:     EKG: ventricularly paced rhythm    2D Echo (05/23/2019):  · Severely decreased left ventricular systolic function. The estimated ejection fraction is 10-15%. Severe global hypokinetic wall motion.  · Grade II (moderate) left ventricular diastolic dysfunction consistent with pseudonormalization. Elevated left atrial pressure.  · Moderate left ventricular enlargement.  · The right ventricle is not well seen.  · Mild to moderate tricuspid regurgitation.  · The estimated PA systolic pressure is 44 mm Hg  · Intermediate central venous pressure (8 mm Hg).  · Pulmonary hypertension present.  · Severe left atrial enlargement.

## 2019-06-13 NOTE — PROGRESS NOTES
[facility]  Cardiology  Progress Note    Patient Name: Madhav Cortez  MRN: [id number]  Admission Date: 6/12/2019  Hospital Length of Stay: 0 days  Code Status: Prior   Attending Physician: Heather Moore MD   Primary Care Physician: Mirna Reyes MD  Expected Discharge Date:   Principal Problem:Elevated troponin    Subjective:     Hospital Course:   No notes on file    Interval History: No acute events. Denies chest pain, SOB, dizziness, loss of consciousness.    Review of Systems   Constitution: Negative for chills, fever and malaise/fatigue.   HENT: Negative.    Cardiovascular: Negative for chest pain, dyspnea on exertion, irregular heartbeat, leg swelling, orthopnea, palpitations and paroxysmal nocturnal dyspnea.   Respiratory: Negative for shortness of breath and wheezing.    Skin: Negative for color change and rash.   Musculoskeletal: Negative for arthritis, back pain and myalgias.   Gastrointestinal: Negative for abdominal pain, constipation, diarrhea and nausea.   Neurological: Negative for dizziness, numbness and paresthesias.   Psychiatric/Behavioral: Positive for altered mental status.     Objective:     Vital Signs (Most Recent):  Temp: 98.1 °F (36.7 °C) (06/13/19 1220)  Pulse: 60 (06/13/19 1220)  Resp: 20 (06/13/19 1220)  BP: (!) 90/52 (06/13/19 1220)  SpO2: (!) 91 % (06/13/19 1220) Vital Signs (24h Range):  Temp:  [97.4 °F (36.3 °C)-98.7 °F (37.1 °C)] 98.1 °F (36.7 °C)  Pulse:  [60-90] 60  Resp:  [16-20] 20  SpO2:  [91 %-97 %] 91 %  BP: ()/(47-98) 90/52     Weight: 90.9 kg (200 lb 6.4 oz)  Body mass index is 28.75 kg/m².     SpO2: (!) 91 %  O2 Device (Oxygen Therapy): room air      Intake/Output Summary (Last 24 hours) at 6/13/2019 1256  Last data filed at 6/13/2019 0600  Gross per 24 hour   Intake 240 ml   Output 525 ml   Net -285 ml       Lines/Drains/Airways     Peripheral Intravenous Line                 Peripheral IV - Single Lumen 05/24/19 1125 20 G Right  Forearm 20 days         Peripheral IV - Single Lumen 06/12/19 1753 18 G Left Hand less than 1 day                Physical Exam   Constitutional: Vital signs are normal. He appears well-developed and well-nourished. He is cooperative.  Non-toxic appearance. No distress.   HENT:   Head: Normocephalic and atraumatic.   Neck: No hepatojugular reflux and no JVD present. Carotid bruit is not present.   Cardiovascular: Normal rate, regular rhythm, S1 normal, S2 normal and normal heart sounds. Exam reveals no gallop.   No murmur heard.  No lower extremity edema   Pulmonary/Chest: Effort normal and breath sounds normal. No respiratory distress. He has no decreased breath sounds. He has no wheezes. He has no rhonchi. He has no rales.   On supine exam, palpable ICD in left chest wall   Abdominal: Soft. Normal appearance and bowel sounds are normal. He exhibits no distension and no mass. There is no tenderness.   Neurological: He is alert.   Skin: Skin is warm, dry and intact.       Significant Labs:   CMP   Recent Labs   Lab 06/12/19 1756 06/13/19 0427    139   K 4.5 4.5    102   CO2 28 26   * 193*   BUN 17 17   CREATININE 0.8 0.8   CALCIUM 9.6 9.4   PROT 6.8  --    ALBUMIN 3.4*  --    BILITOT 0.8  --    ALKPHOS 88  --    AST 98*  --    ALT 78*  --    ANIONGAP 11 11   ESTGFRAFRICA >60.0 >60.0   EGFRNONAA >60.0 >60.0   , CBC   Recent Labs   Lab 06/12/19 1756 06/13/19 0427   WBC 8.27 8.00   HGB 12.7* 12.0*   HCT 39.0* 36.6*    223    and Troponin   Recent Labs   Lab 06/12/19 1933 06/13/19  0014 06/13/19 0427   TROPONINI 6.660* 7.720* 6.133*       Significant Imaging: EKG: AV dual paced rhythm, BV pacer    Assessment and Plan:     * Elevated troponin  80 yo male with pAF, AFL, ICM, HFrEF(10-15%) , BV ICD, CAD s/p PCI presents with lethargy s/p accidental drug overdose (Temazepam). Found to have troponin's 7.202>> 6.66 >> 7.79 >>6.13 of unclear clinical significance as patient not complaining of  anginal symptoms. EKG with paced rhythm but no obvious ischemic changes. As such recommend continued monitoring in the hospital, with medical therapy ASA & Pradaxa for now. Will decide on further management pending patients clinical course and resolution on encephalopathy.    Reccs  -  ICD device interrogation with no tachyarrhythmias   - Continue medical therapy with ASA 81mg daily & Pradaxa  - No indication for triple anticoagulation therapy at this  time  - Continue to monitor        Accidental drug overdose  --would discontinue medication  --no QT prolongation at the moment  --continue to monitor        VTE Risk Mitigation (From admission, onward)        Ordered     dabigatran etexilate capsule 150 mg  2 times daily      06/12/19 2150     IP VTE HIGH RISK PATIENT  Once      06/12/19 2150     Place EDWIN hose  Until discontinued      06/12/19 2150     Place sequential compression device  Until discontinued      06/12/19 2150          Brooek Ellis DO  Cardiology  [facility]

## 2019-06-13 NOTE — SUBJECTIVE & OBJECTIVE
Past Medical History:   Diagnosis Date    *Atrial fibrillation     *Atrial flutter     Acute exacerbation of CHF (congestive heart failure) 8/2/2013    Anticoagulant long-term use     Anxiety     Arthritis     Atrial flutter     Back pain     Cardiomyopathy     Cataract     Coronary artery disease     Depression     Diabetes mellitus     Heart bloc     Hepatitis B     Hyperlipidemia 4/1/2014    Hypertension     Myocardial infarction     Non-STEMI (non-ST elevated myocardial infarction) 5/26/2013    Nuclear sclerosis - Both Eyes 2/18/2013    Post PTCA 8/7/2012    Presence of biventricular AICD 2/23/2016    Stage 3 chronic kidney disease 12/11/2017    Tobacco dependence     Transaminitis 4/1/2014    Ventricular tachycardia        Past Surgical History:   Procedure Laterality Date    ABLATION N/A 4/8/2013    Performed by [facility] at [facility] CATH LAB    APPENDECTOMY      BIOPSY LIVER AND ULTRASOUND N/A 5/26/2014    [facility]    CARDIAC DEFIBRILLATOR PLACEMENT      CARDIAC DEFIBRILLATOR PLACEMENT      CARDIOVERSION N/A 10/10/2013    Performed by [facility] at [facility] CATH LAB    CARDIOVERSION N/A 8/19/2013    Performed by [facility] at [facility] CATH LAB    COLONOSCOPY      CORONARY ANGIOPLASTY WITH STENT PLACEMENT      FRACTURE SURGERY      L arm    INSERT / REPLACE / REMOVE PACEMAKER  12/15    bi-V upgrade    INSERTION-ICD-BIVENTRICULAR N/A 11/11/2015    Performed by [facility] at [facility] CATH LAB    RADIOFREQUENCY ABLATION      STEROID INJECTION KNEE Bilateral     received about every 4 months    TONSILLECTOMY      TRANSESOPHAGEAL ECHOCARDIOGRAM (DIOGO) N/A 10/10/2013    Performed by [facility]oe[facility] at [facility] CATH LAB    TRANSESOPHAGEAL ECHOCARDIOGRAM (DIOGO) N/A 8/19/2013    Performed by H[facility]oehler MD at [facility] CATH LAB    VASCULAR SURGERY         Review of patient's allergies indicates:  No Known Allergies    No  "current facility-administered medications on file prior to encounter.      Current Outpatient Medications on File Prior to Encounter   Medication Sig    ALPRAZolam (XANAX) 0.5 MG tablet Take 1 tablet (0.5 mg total) by mouth 3 (three) times daily.    amiodarone (PACERONE) 200 MG Tab TAKE 1 TABLET TWICE DAILY    ACCU-CHEK JIE Misc USE AS INSTRUCTED    ACCU-CHEK SMARTVIEW TEST STRIP Strp CHECK BLOOD SUGAR THREE TIMES DAILY    ascorbic acid (VITAMIN C) 500 MG tablet Take 1,500 mg by mouth 2 (two) times daily.     aspirin 81 MG Chew Take 1 tablet (81 mg total) by mouth once daily.    beta carotene-C-E-lutein-min29 5,000-60-30-2 unit-mg-unit-mg Tab Take 1 capsule by mouth once daily.     blood-glucose meter kit Accu check jie meter Use as instructed    carvedilol (COREG) 25 MG tablet Take 1 tablet (25 mg total) by mouth 2 (two) times daily.    ciclopirox (LOPROX) 0.77 % Crea APPLY TO AFFECTED AREAS BILATERAL AXILLA TWICE DAILY UNTIL CLEAR    co-enzyme Q-10 30 mg capsule 800 units daily    collagenase (SANTYL) ointment Apply topically once daily.    dabigatran etexilate (PRADAXA) 150 mg Cap Take 1 capsule (150 mg total) by mouth 2 (two) times daily. "Do NOT break, chew, or open capsules."    ENTRESTO  mg per tablet TAKE 1 TABLET TWICE DAILY    fluticasone (FLONASE) 50 mcg/actuation nasal spray 1 spray (50 mcg total) by Each Nare route once daily.    furosemide (LASIX) 40 MG tablet Take 1 tablet (40 mg total) by mouth 2 (two) times daily.    gabapentin (NEURONTIN) 300 MG capsule TAKE 2 CAPSULES BY MOUTH THREE TIMES DAILY    glucosamine-chondroitin 500-400 mg tablet Take 1 tablet by mouth 2 (two) times daily.    hydrocolloid dressing (AQUACEL EXTRA) 2 X 2 " Bndg Apply 1 Units topically as needed.    insulin (LANTUS SOLOSTAR U-100 INSULIN) glargine 100 units/mL (3mL) SubQ pen Inject 15 Units into the skin every evening.    insulin aspart U-100 (NOVOLOG) 100 unit/mL (3 mL) InPn pen Inject 3 Units " "into the skin 3 (three) times daily.    ketoconazole (NIZORAL) 2 % cream Apply topically once daily.    levothyroxine (SYNTHROID) 75 MCG tablet Take 1 tablet (75 mcg total) by mouth once daily.    magnesium oxide (MAG-OX) 400 mg tablet Take 400 mg by mouth 2 (two) times daily.     melatonin 5 mg Tab Take 1 tablet by mouth every evening.     nitroGLYCERIN (NITROSTAT) 0.4 MG SL tablet Place 1 tablet (0.4 mg total) under the tongue every 5 (five) minutes as needed for Chest pain.    nystatin (MYCOSTATIN) cream Apply topically 2 (two) times daily.    pen needle, diabetic 31 gauge x 1/4" Ndle Use 4 times daily    pva-gentian violet-methyl blue (HYDROFERA BLUE) 2.5 " Bndg Apply 1 Units topically as needed.    ramelteon (ROZEREM) 8 mg tablet Take 1 tablet (8 mg total) by mouth nightly as needed for Insomnia.    RIBOSE ORAL Take 1 tablet by mouth once daily.     senna-docusate 8.6-50 mg (PERICOLACE) 8.6-50 mg per tablet Take 1 tablet by mouth 2 (two) times daily.    spironolactone (ALDACTONE) 25 MG tablet TAKE 1 TABLET ONE TIME DAILY    temazepam (RESTORIL) 15 mg Cap     tizanidine (ZANAFLEX) 2 MG tablet TAKE 1 TABLET EVERY 8 HOURS AS NEEDED FOR MUSCLE PAIN    triamcinolone acetonide 0.025% (KENALOG) 0.025 % cream apply to affected area twice daily as directed    UBIDECARENONE (COQ-10 ORAL) Take 800 mg by mouth once daily. Takes 100mg tablet at lunch, and 600mg at dinner     Family History     Problem Relation (Age of Onset)    Bipolar disorder Son    Bladder Cancer Father    Cancer Father, Paternal Grandmother, Paternal Grandfather, Maternal Uncle    Diabetes Father, Mother    Heart attack Mother    Heart disease Maternal Grandmother    No Known Problems Maternal Grandfather, Daughter, Daughter, Son, Son, Maternal Aunt, Paternal Aunt, Paternal Uncle, Sister, Brother        Tobacco Use    Smoking status: Former Smoker     Last attempt to quit: 1987     Years since quittin.9    Smokeless tobacco: " Never Used    Tobacco comment: 25 years ago   Substance and Sexual Activity    Alcohol use: No    Drug use: No    Sexual activity: Never     Partners: Female     Review of Systems   Constitutional: Negative for chills, fever and unexpected weight change.   HENT: Negative for ear pain and sore throat.    Eyes: Negative for pain and visual disturbance.   Respiratory: Negative for cough and shortness of breath.    Cardiovascular: Positive for leg swelling (chronic). Negative for chest pain and palpitations.   Gastrointestinal: Negative for abdominal pain, diarrhea, nausea and vomiting.   Genitourinary: Negative for dysuria, frequency and urgency.   Musculoskeletal: Positive for gait problem (baseline). Negative for back pain.   Skin: Positive for wound (chronic, LE wounds). Negative for rash.   Neurological: Negative for dizziness, weakness and headaches.   Psychiatric/Behavioral: Positive for confusion and sleep disturbance. Negative for hallucinations and self-injury. The patient is not nervous/anxious.      Objective:     Vital Signs (Most Recent):  Temp: 97.9 °F (36.6 °C) (06/12/19 2300)  Pulse: 61 (06/12/19 2303)  Resp: 18 (06/12/19 2300)  BP: 116/67 (06/12/19 2300)  SpO2: 96 % (06/12/19 2300) Vital Signs (24h Range):  Temp:  [97.4 °F (36.3 °C)-97.9 °F (36.6 °C)] 97.9 °F (36.6 °C)  Pulse:  [60-90] 61  Resp:  [16-18] 18  SpO2:  [94 %-97 %] 96 %  BP: (116-162)/(50-98) 116/67     Weight: 94.4 kg (208 lb 1.8 oz)  Body mass index is 29.86 kg/m².    Physical Exam   Constitutional: He is oriented to person, place, and time. He appears well-developed and well-nourished.   HENT:   Head: Normocephalic and atraumatic.   Eyes: Pupils are equal, round, and reactive to light. Conjunctivae and EOM are normal.   Neck: Normal range of motion. Neck supple.   Cardiovascular: Normal rate, regular rhythm and intact distal pulses.   Diminished heart sounds   Pulmonary/Chest: Effort normal.   Diminished lung sounds   Abdominal:  Soft. Bowel sounds are normal. He exhibits no distension. There is no tenderness.   Musculoskeletal: Normal range of motion. He exhibits tenderness (1+/trace pitting edema to BL feet).   Neurological: He is alert and oriented to person, place, and time.   Lethargic on exam (s/p ramelteon)  No focal dificts, strength intact  Appears mildly confused but Ox4  Intermittently fidgeting with blankets in bed   Skin: Skin is warm and dry.   Chronic appearing venous ulcers to LLE  RLE with leg wrapped.     Psychiatric: He has a normal mood and affect. His speech is normal and behavior is normal. Thought content normal. He expresses impulsivity.   Pleasant, conversant elderly male in [facility]   Nursing note and vitals reviewed.                CRANIAL NERVES     CN III, IV, VI   Pupils are equal, round, and reactive to light.  Extraocular motions are normal.        Significant Labs:   CBC:   Recent Labs   Lab 06/12/19  1756   WBC 8.27   HGB 12.7*   HCT 39.0*        CMP:   Recent Labs   Lab 06/12/19  1756      K 4.5      CO2 28   *   BUN 17   CREATININE 0.8   CALCIUM 9.6   PROT 6.8   ALBUMIN 3.4*   BILITOT 0.8   ALKPHOS 88   AST 98*   ALT 78*   ANIONGAP 11   EGFRNONAA >60.0     Troponin:   Recent Labs   Lab 06/12/19  1756 06/12/19  1933 06/13/19  0014   TROPONINI 7.202* 6.660* 7.720*     Urine Studies:   Recent Labs   Lab 06/12/19  1729   COLORU Yellow   APPEARANCEUA Clear   PHUR 6.0   SPECGRAV 1.010   PROTEINUA Negative   GLUCUA 2+*   KETONESU Negative   BILIRUBINUA Negative   OCCULTUA Negative   NITRITE Negative   LEUKOCYTESUR Negative   WBCUA 1   BACTERIA Rare   SQUAMEPITHEL 0   HYALINECASTS 3*       Significant Imaging: I have reviewed all pertinent imaging results/findings within the past 24 hours.     Ct Head Without Contrast    Result Date: 6/12/2019  EXAMINATION: CT HEAD WITHOUT CONTRAST CLINICAL HISTORY: falls; TECHNIQUE: Low dose axial images were obtained through the head.  Coronal and sagittal  reformations were also performed. Contrast was not administered. COMPARISON: 09/25/2015 FINDINGS: Please note, exam is limited secondary to patient motion. There is generalized cerebral volume loss.  There is hypoattenuation in a periventricular fashion, likely sequela of chronic microvascular ischemic change.  There is no evidence of acute major vascular territory infarct, hemorrhage, or mass.  There is no hydrocephalus.  There are no abnormal extra-axial fluid collections.  The paranasal sinuses and mastoid air cells are clear, and there is no evidence of calvarial fracture.  The visualized soft tissues are remarkable for slight soft tissue induration overlying the left posterior parietal calvarium..     1. Motion limited exam, no convincing acute intracranial abnormalities. 2. Sequela of chronic microvascular ischemic change and senescent change. 3. Mild soft tissue induration overlying the posterior left parietal calvarium.     X-ray Chest Ap Portable    Result Date: 6/12/2019  EXAMINATION: XR CHEST AP PORTABLE CLINICAL HISTORY: Unspecified fall, initial encounter TECHNIQUE: Single frontal view of the chest was performed. COMPARISON: 05/22/2019. FINDINGS: There is a cardiac defibrillator present.  The cardiac silhouette is prominent in size although this is partially related to the magnification on this AP projection.  Atherosclerotic calcification is present within the aortic arch.  Superior mediastinal structures are unremarkable.  Pulmonary vasculature is within normal limits.  The lungs are free of focal consolidations.  There is no evidence for pneumothorax or large pleural effusions.  Bony structures are grossly intact.     Cardiac defibrillator. Mild prominence of the cardiac silhouette which may be related to the magnification on this AP projection. Lungs are clear.

## 2019-06-13 NOTE — ASSESSMENT & PLAN NOTE
ICD (implantable cardioverter-defibrillator), biventricular, in situ    - appears euvolemic  - c/w entresto, coreg, lasix  - Last TTE 5/2019 with EF 10-15%, Grade 2 DD, PASP 44, CVP 8  - strict I/O, daily weights, fluid rest diet

## 2019-06-13 NOTE — ASSESSMENT & PLAN NOTE
Stable, denies symptoms.   - Suspected demand ischemia 2/2 trazodone use/acuity.  Extensive cardiac hx.  - Evaluated by cards in the ED and do not recommend ACS protocol  - No anginal equivalent or EKG changes.  - Trop 7.2-->6.6-->7.7>6.1.  Discussed case with Dr. Carrizales.  Will continue to trend.  - Incidentally, amphetamine+ on UDS.  Denies drug use.  Possible sudafed use?  - monitor on tele, trend trops  - if patient developed CP/SOB, contact cards.

## 2019-06-13 NOTE — ASSESSMENT & PLAN NOTE
--resulting in positive amphetamine on UDS  --would discontinue medication  --no QT prolongation at the moment  --continue to monitor

## 2019-06-13 NOTE — PLAN OF CARE
06/13/19 1431   Discharge Assessment   Assessment Type Discharge Planning Assessment   Confirmed/corrected address and phone number on facesheet? Yes   Assessment information obtained from? Patient   Communicated expected length of stay with patient/caregiver no   Prior to hospitilization cognitive status: Not Oriented to Person   Prior to hospitalization functional status: Partially Dependent   Current cognitive status: Unable to Assess   Current Functional Status: Partially Dependent;Needs Assistance   Facility Arrived From: Home   Lives With spouse   Able to Return to Prior Arrangements other (see comments)   Is patient able to care for self after discharge? No   Who are your caregiver(s) and their phone number(s)?   (Joanne Cortez (Wife) [phone])   Patient's perception of discharge disposition skilled nursing facility   Readmission Within the Last 30 Days no previous admission in last 30 days   Patient currently being followed by outpatient case management? No   Patient currently receives any other outside agency services? No   Equipment Currently Used at Home bedside commode;walker, standard;shower chair;glucometer   Do you have any problems affording any of your prescribed medications? No   Is the patient taking medications as prescribed? yes   Transportation Anticipated family or friend will provide   Does the patient receive services at the [facility]? No   Discharge Plan A Skilled Nursing Facility   Discharge Plan B Home with family;Home Health   DME Needed Upon Discharge  none   Patient/Family in Agreement with Plan yes   Readmission Questionnaire   Have you felt down, depressed, or hopeless? 0

## 2019-06-13 NOTE — ASSESSMENT & PLAN NOTE
Will need assistance from SW/CM for placement as wife can no longer care for patient at home.   - PT/OT consulted

## 2019-06-13 NOTE — CONSULTS
[facility]  Cardiology  Consult Note    Patient Name: Madhav Cortez  MRN: [id number]  Admission Date: 6/12/2019  Hospital Length of Stay: 0 days  Code Status: Prior   Attending Provider: Heather Moore MD   Consulting Provider: Luana Jordan MD  Primary Care Physician: Mirna Reyes MD  Principal Problem:Elevated troponin    Patient information was obtained from patient, spouse/SO, past medical records and ER records.     Inpatient consult to Cardiology  Consult performed by: Luana Jordan MD  Consult ordered by: Naresh Bustillo PA-C  Reason for consult: troponin elevation        Subjective:     Chief Complaint:  fatigue    HPI:   Mr. Cortez is an 79 year old gentleman with a past medical history of pAF, AFL, VT, ICM, LBBB, HFrEF (EF 10-15%) s/p BiV ICD, EULOGIO thrombus, CAD, DM, HTN, and HLD  who was brought to the ED for difficulty in arousing the patient. His wife states that he told her he took 5x50mg pills of trazodone at around 3am. He was difficult to arouse and tore off all the dressings on his right leg that covers his sores. A troponin was ordered and it came back at 7. He denies chest pains, shortness of breath. He was recently admitted from 5/22-5/28 for ADHF. Cardiology was consulted for the elevated troponin.    Past Medical History:   Diagnosis Date    *Atrial fibrillation     *Atrial flutter     Acute exacerbation of CHF (congestive heart failure) 8/2/2013    Anticoagulant long-term use     Anxiety     Arthritis     Atrial flutter     Back pain     Cardiomyopathy     Cataract     Coronary artery disease     Depression     Diabetes mellitus     Heart bloc     Hepatitis B     Hyperlipidemia 4/1/2014    Hypertension     Myocardial infarction     Non-STEMI (non-ST elevated myocardial infarction) 5/26/2013    Nuclear sclerosis - Both Eyes 2/18/2013    Post PTCA 8/7/2012    Presence of biventricular AICD 2/23/2016    Stage 3 chronic kidney disease  12/11/2017    Tobacco dependence     Transaminitis 4/1/2014    Ventricular tachycardia        Past Surgical History:   Procedure Laterality Date    ABLATION N/A 4/8/2013    Performed by [facility]hler [facility] at [facility] CATH LAB    APPENDECTOMY      BIOPSY LIVER AND ULTRASOUND N/A 5/26/2014    Performed by [facility] Diagnostic Provider at [facility] OR [facility]    CARDIAC DEFIBRILLATOR PLACEMENT      CARDIAC DEFIBRILLATOR PLACEMENT      CARDIOVERSION N/A 10/10/2013    Performed by [facility] at [facility] CATH LAB    CARDIOVERSION N/A 8/19/2013    Performed by [facility]oehler [facility] at [facility] CATH LAB    COLONOSCOPY      CORONARY ANGIOPLASTY WITH STENT PLACEMENT      FRACTURE SURGERY      L arm    INSERT / REPLACE / REMOVE PACEMAKER  12/15    bi-V upgrade    INSERTION-ICD-BIVENTRICULAR N/A 11/11/2015    Performed by [facility]oehler [facility] at [facility] CATH LAB    RADIOFREQUENCY ABLATION      STEROID INJECTION KNEE Bilateral     received about every 4 months    TONSILLECTOMY      TRANSESOPHAGEAL ECHOCARDIOGRAM (DIOGO) N/A 10/10/2013    Performed by Humber[facility]oehler [facility] at [facility] CATH LAB    TRANSESOPHAGEAL ECHOCARDIOGRAM (DIOGO) N/A 8/19/2013    Performed by Humberto Koehler MD at [facility] CATH LAB    VASCULAR SURGERY         Review of patient's allergies indicates:  No Known Allergies    No current facility-administered medications on file prior to encounter.      Current Outpatient Medications on File Prior to Encounter   Medication Sig    ACCU-CHEK JIE Misc USE AS INSTRUCTED    ACCU-CHEK SMARTVIEW TEST STRIP Strp CHECK BLOOD SUGAR THREE TIMES DAILY    ALPRAZolam (XANAX) 0.5 MG tablet Take 1 tablet (0.5 mg total) by mouth 3 (three) times daily.    amiodarone (PACERONE) 200 MG Tab TAKE 1 TABLET TWICE DAILY    ascorbic acid (VITAMIN C) 500 MG tablet Take 1,500 mg by mouth 2 (two) times daily.     aspirin 81 MG Chew Take 1 tablet (81 mg total) by mouth once daily.    beta carotene-C-E-lutein-min29 5,000-60-30-2 unit-mg-unit-mg Tab Take 1 capsule  "by mouth once daily.     blood-glucose meter kit Accu check monique meter Use as instructed    carvedilol (COREG) 25 MG tablet Take 1 tablet (25 mg total) by mouth 2 (two) times daily.    ciclopirox (LOPROX) 0.77 % Crea APPLY TO AFFECTED AREAS BILATERAL AXILLA TWICE DAILY UNTIL CLEAR    co-enzyme Q-10 30 mg capsule 800 units daily    collagenase (SANTYL) ointment Apply topically once daily.    dabigatran etexilate (PRADAXA) 150 mg Cap Take 1 capsule (150 mg total) by mouth 2 (two) times daily. "Do NOT break, chew, or open capsules."    ENTRESTO  mg per tablet TAKE 1 TABLET TWICE DAILY    fluticasone (FLONASE) 50 mcg/actuation nasal spray 1 spray (50 mcg total) by Each Nare route once daily.    furosemide (LASIX) 40 MG tablet Take 1 tablet (40 mg total) by mouth 2 (two) times daily.    gabapentin (NEURONTIN) 300 MG capsule TAKE 2 CAPSULES BY MOUTH THREE TIMES DAILY    glucosamine-chondroitin 500-400 mg tablet Take 1 tablet by mouth 2 (two) times daily.    hydrocolloid dressing (AQUACEL EXTRA) 2 X 2 " Bndg Apply 1 Units topically as needed.    insulin (LANTUS SOLOSTAR U-100 INSULIN) glargine 100 units/mL (3mL) SubQ pen Inject 15 Units into the skin every evening.    insulin aspart U-100 (NOVOLOG) 100 unit/mL (3 mL) InPn pen Inject 3 Units into the skin 3 (three) times daily.    ketoconazole (NIZORAL) 2 % cream Apply topically once daily.    levothyroxine (SYNTHROID) 75 MCG tablet Take 1 tablet (75 mcg total) by mouth once daily.    magnesium oxide (MAG-OX) 400 mg tablet Take 400 mg by mouth 2 (two) times daily.     melatonin 5 mg Tab Take 1 tablet by mouth every evening.     nitroGLYCERIN (NITROSTAT) 0.4 MG SL tablet Place 1 tablet (0.4 mg total) under the tongue every 5 (five) minutes as needed for Chest pain.    nystatin (MYCOSTATIN) cream Apply topically 2 (two) times daily.    pen needle, diabetic 31 gauge x 1/4" Ndle Use 4 times daily    pva-gentian violet-methyl blue (HYDROFERA BLUE) 2.5 " "" Bndg Apply 1 Units topically as needed.    ramelteon (ROZEREM) 8 mg tablet Take 1 tablet (8 mg total) by mouth nightly as needed for Insomnia.    RIBOSE ORAL Take 1 tablet by mouth once daily.     senna-docusate 8.6-50 mg (PERICOLACE) 8.6-50 mg per tablet Take 1 tablet by mouth 2 (two) times daily.    spironolactone (ALDACTONE) 25 MG tablet TAKE 1 TABLET ONE TIME DAILY    temazepam (RESTORIL) 15 mg Cap     tizanidine (ZANAFLEX) 2 MG tablet TAKE 1 TABLET EVERY 8 HOURS AS NEEDED FOR MUSCLE PAIN    triamcinolone acetonide 0.025% (KENALOG) 0.025 % cream apply to affected area twice daily as directed    UBIDECARENONE (COQ-10 ORAL) Take 800 mg by mouth once daily. Takes 100mg tablet at lunch, and 600mg at dinner     Family History     Problem Relation (Age of Onset)    Bipolar disorder Son    Bladder Cancer Father    Cancer Father, Paternal Grandmother, Paternal Grandfather, Maternal Uncle    Diabetes Father, Mother    Heart attack Mother    Heart disease Maternal Grandmother    No Known Problems Maternal Grandfather, Daughter, Daughter, Son, Son, Maternal Aunt, Paternal Aunt, Paternal Uncle, Sister, Brother        Tobacco Use    Smoking status: Former Smoker     Last attempt to quit: 1987     Years since quittin.9    Smokeless tobacco: Never Used    Tobacco comment: 25 years ago   Substance and Sexual Activity    Alcohol use: No    Drug use: No    Sexual activity: Never     Partners: Female     Review of Systems   Constitution: Positive for malaise/fatigue. Negative for chills and fever.   HENT: Negative.    Cardiovascular: Negative for chest pain, dyspnea on exertion, irregular heartbeat, leg swelling, orthopnea, palpitations and paroxysmal nocturnal dyspnea.   Respiratory: Negative for shortness of breath and wheezing.    Skin: Negative for color change and rash.   Musculoskeletal: Negative for arthritis, back pain and myalgias.   Gastrointestinal: Negative for abdominal pain, constipation, " diarrhea and nausea.   Neurological: Negative for dizziness, numbness and paresthesias.   Psychiatric/Behavioral: Positive for altered mental status.     Objective:     Vital Signs (Most Recent):  Temp: 97.4 °F (36.3 °C) (06/12/19 1513)  Pulse: 60 (06/12/19 2017)  Resp: 16 (06/12/19 1513)  BP: (!) 122/98 (06/12/19 2017)  SpO2: (!) 94 % (06/12/19 2017) Vital Signs (24h Range):  Temp:  [97.4 °F (36.3 °C)] 97.4 °F (36.3 °C)  Pulse:  [60-90] 60  Resp:  [16] 16  SpO2:  [94 %-97 %] 94 %  BP: (122-162)/(50-98) 122/98     Weight: 93.9 kg (207 lb)  Body mass index is 29.7 kg/m².    SpO2: (!) 94 %  O2 Device (Oxygen Therapy): room air    No intake or output data in the 24 hours ending 06/12/19 2154    Lines/Drains/Airways     Peripheral Intravenous Line                 Peripheral IV - Single Lumen 05/24/19 1125 20 G Right Forearm 19 days         Peripheral IV - Single Lumen 06/12/19 1753 18 G Left Hand less than 1 day                Physical Exam   Constitutional: Vital signs are normal. He appears well-developed and well-nourished. He is cooperative.  Non-toxic appearance. No distress.   HENT:   Head: Normocephalic and atraumatic.   Neck: No hepatojugular reflux and no JVD present. Carotid bruit is not present.   Cardiovascular: Normal rate, regular rhythm, S1 normal, S2 normal and normal heart sounds. Exam reveals no gallop.   No murmur heard.  Pulses:       Dorsalis pedis pulses are 2+ on the right side, and 2+ on the left side.        Posterior tibial pulses are 2+ on the right side, and 2+ on the left side.   No lower extremity edema   Pulmonary/Chest: Effort normal and breath sounds normal. No respiratory distress. He has no decreased breath sounds. He has no wheezes. He has no rhonchi. He has no rales.   On supine exam   Abdominal: Soft. Normal appearance and bowel sounds are normal. He exhibits no distension and no mass. There is tenderness in the right upper quadrant.   Neurological: He is alert.   Skin: Skin is  warm, dry and intact.       Significant Labs:   CMP   Recent Labs   Lab 06/12/19  1756      K 4.5      CO2 28   *   BUN 17   CREATININE 0.8   CALCIUM 9.6   PROT 6.8   ALBUMIN 3.4*   BILITOT 0.8   ALKPHOS 88   AST 98*   ALT 78*   ANIONGAP 11   ESTGFRAFRICA >60.0   EGFRNONAA >60.0   , CBC   Recent Labs   Lab 06/12/19  1756   WBC 8.27   HGB 12.7*   HCT 39.0*      , INR No results for input(s): INR, PROTIME in the last 48 hours. and Troponin   Recent Labs   Lab 06/12/19  1756 06/12/19  1933   TROPONINI 7.202* 6.660*         Significant Imaging:     EKG: ventricularly paced rhythm    2D Echo (05/23/2019):  · Severely decreased left ventricular systolic function. The estimated ejection fraction is 10-15%. Severe global hypokinetic wall motion.  · Grade II (moderate) left ventricular diastolic dysfunction consistent with pseudonormalization. Elevated left atrial pressure.  · Moderate left ventricular enlargement.  · The right ventricle is not well seen.  · Mild to moderate tricuspid regurgitation.  · The estimated PA systolic pressure is 44 mm Hg  · Intermediate central venous pressure (8 mm Hg).  · Pulmonary hypertension present.  · Severe left atrial enlargement.    Assessment and Plan:     * Elevated troponin  --no chest pain or shortness of breath  --likely demand ischemia related to his current illness  --unlikely to be ACS at this point. Would not start ACS protocol    Accidental drug overdose  --resulting in positive amphetamine on UDS  --would discontinue medication  --no QT prolongation at the moment  --continue to monitor        VTE Risk Mitigation (From admission, onward)        Ordered     dabigatran etexilate capsule 150 mg  2 times daily      06/12/19 2150     IP VTE HIGH RISK PATIENT  Once      06/12/19 2150     Place EDWIN hose  Until discontinued      06/12/19 2150     Place sequential compression device  Until discontinued      06/12/19 2150          Thank you for your  consult. I will follow-up with patient. Please contact us if you have any additional questions.    Luana Jordan MD  Cardiology   [facility]

## 2019-06-13 NOTE — PROGRESS NOTES
[facility] Hospital Medicine  Progress Note    Patient Name: Madhav Cortez  MRN: [id number]  Patient Class: OP- Observation   Admission Date: 6/12/2019  Length of Stay: 0 days  Attending Physician: Heather Moore MD  Primary Care Provider: Mirna Reyes MD    [facility] Medicine Team: [facility] Nguyễn Mendoza PA-C    Subjective:     Principal Problem:Elevated troponin      Interval History: Pt sleepy but arousable on exam. Both patient and wife report that he consumed at least 5 (maybe more) sleeping pills without her knowledge. Wife reports he manages his medications however, she will now have to monitor them as well. Wife tearful during visit as she is overwhelmed due to the amount of care he requires.     Review of Systems   Constitutional: Negative for chills, fever and unexpected weight change.   HENT: Negative for ear pain and sore throat.    Eyes: Negative for pain and visual disturbance.   Respiratory: Negative for cough and shortness of breath.    Cardiovascular: Positive for leg swelling (chronic). Negative for chest pain and palpitations.   Gastrointestinal: Negative for abdominal pain, diarrhea, nausea and vomiting.   Genitourinary: Negative for dysuria, frequency and urgency.   Musculoskeletal: Positive for gait problem (baseline). Negative for back pain.   Skin: Positive for wound (chronic, LE wounds). Negative for rash.   Neurological: Negative for dizziness, weakness and headaches.   Psychiatric/Behavioral: Positive for confusion and sleep disturbance. Negative for hallucinations and self-injury. The patient is not nervous/anxious.      Objective:     Vital Signs (Most Recent):  Temp: 98.7 °F (37.1 °C) (06/13/19 0900)  Pulse: 60 (06/13/19 1111)  Resp: 16 (06/13/19 0900)  BP: (!) 96/48 (06/13/19 0900)  SpO2: (!) 92 % (06/13/19 0900) Vital Signs (24h Range):  Temp:  [97.4 °F (36.3 °C)-98.7 °F (37.1 °C)] 98.7 °F (37.1 °C)  Pulse:  [60-90] 60  Resp:  [16-18] 16  SpO2:   [92 %-97 %] 92 %  BP: ()/(47-98) 96/48     Weight: 90.9 kg (200 lb 6.4 oz)  Body mass index is 28.75 kg/m².    Intake/Output Summary (Last 24 hours) at 6/13/2019 1137  Last data filed at 6/13/2019 0600  Gross per 24 hour   Intake 240 ml   Output 525 ml   Net -285 ml      Physical Exam   Constitutional: He is oriented to person, place, and time. He appears well-developed and well-nourished.   HENT:   Head: Normocephalic and atraumatic.   Eyes: Pupils are equal, round, and reactive to light. Conjunctivae and EOM are normal.   Neck: Normal range of motion. Neck supple.   Cardiovascular: Normal rate, regular rhythm and intact distal pulses.   Diminished heart sounds   Pulmonary/Chest: Effort normal.   Diminished lung sounds   Abdominal: Soft. Bowel sounds are normal. He exhibits no distension. There is no tenderness.   Musculoskeletal: Normal range of motion. He exhibits tenderness (1+/trace pitting edema to BL feet).   Neurological: He is alert and oriented to person, place, and time.   Lethargic on exam (s/p ramelteon)   Skin: Skin is warm and dry.   Chronic appearing venous ulcers to lower extremities with multiple bandages in place.     Psychiatric: He has a normal mood and affect. His speech is normal and behavior is normal. Thought content normal. He expresses impulsivity.   sleepy, conversant elderly male in NAD   Nursing note and vitals reviewed.      Significant Labs: All pertinent labs within the past 24 hours have been reviewed.    Significant Imaging: I have reviewed all pertinent imaging results/findings within the past 24 hours.      Overview/Hospital Course:  Madhav Cortez was admitted to hospital medicine for accidental drug overdose and elevated troponin. Cardiology was consulted however believed elevation to be secondary to demand/stress. PT/OT consulted for placement.  Assessment/Plan:      * Elevated troponin  Stable, denies symptoms.   - Suspected demand ischemia 2/2 trazodone use/acuity.   Extensive cardiac hx.  - Evaluated by cards in the ED and do not recommend ACS protocol  - No anginal equivalent or EKG changes.  - Trop 7.2-->6.6-->7.7>6.1.  Discussed case with Dr. Carrizales.  Will continue to trend.  - Incidentally, amphetamine+ on UDS.  Denies drug use.  Possible sudafed use?  - monitor on tele, trend trops  - if patient developed CP/SOB, contact cards.     Accidental drug overdose  Patient with chronic insomnia, unimproved with home trazodone/melatonin.  Ingested x5 of the trazodone 50 mg PO tablets. Not ingested to self harm, per patient.   - likely patient has mild dementia and has moments of clarity.  Will need to confirm with wife.  - No QT prolongation  - will hold for now    Discharge planning issues  Will need assistance from SW/CM for placement as wife can no longer care for patient at home.   - PT/OT consulted    Ulcer of left lower extremity, limited to breakdown of skin  Venous insufficiency of both lower extremities  - wound care consulted    Atrial fibrillation  Current use of long term anticoagulation    - c/w amiodarone, pradaxa  - CHADSVASC 6.  Fall risk and on multiple blood thinners.  Will need to discuss with patient and wife.   - monitor on tele; V-paced rhythm    Coronary artery disease  - c/w ASA, statin  - f/w cardiology  - Last heart cath in 2013 shows two vessel coronary artery disease and Patent LAD and DIagonal stents.  GALLO flow 3 throughout.   - will need outpatient (vs inpatient) stress.  No recent stress.     Type 2 diabetes mellitus with circulatory disorder, with long-term current use of insulin  Check hemoglobin A1c  - reduced home levemir from 15 U to 8 U QHS (previously on 40 U QHS).  Holding 3U aspart with meals  - low dose SSI, ACHS glucose.  Avoid hypoglycemia  - diabetic diet    Chronic systolic heart failure  ICD (implantable cardioverter-defibrillator), biventricular, in situ    - appears euvolemic  - c/w entresto, coreg, lasix  - Last TTE 5/2019 with EF  10-15%, Grade 2 DD, PASP 44, CVP 8  - strict I/O, daily weights, fluid rest diet    Essential hypertension  BP stable  - c/w home meds    VTE Risk Mitigation (From admission, onward)        Ordered     dabigatran etexilate capsule 150 mg  2 times daily      06/12/19 2150     IP VTE HIGH RISK PATIENT  Once      06/12/19 2150     Place EDWIN hose  Until discontinued      06/12/19 2150     Place sequential compression device  Until discontinued      06/12/19 2150              Medically ready for discharge:      Nguyễn Mendoza PA-C  Department of Hospital Medicine   [facility]

## 2019-06-13 NOTE — PLAN OF CARE
Problem: Adult Inpatient Plan of Care  Goal: Plan of Care Review  Outcome: Ongoing (interventions implemented as appropriate)  Pt free of falls/trauma/injuries.  Denies c/o SOB, CP, or discomfort.  Generalized skin remains CDI; however he has multiple areas of concern to bilateral lower leg some redness to buttocks and an area ti posterior upper thigh that wound care was consulted regarding, generalized edema noted.  Pt being diuresed with po lasix; diuresing well.Pt can be anxious at times and received his xanax this am.Blood glucose monitoring continues and s/s when needed. Will continue to monitor.  Pt tolerating plan of care.

## 2019-06-13 NOTE — HPI
"Madhav Cortez is a 79 y.o. M with pmhx HFrEF (10%), HTN, atrial fib on anticoagulation and atrial flutter (s/p cardioversion), HLP, venous insufficiency, chronic insomnia and chronic LE wounds who presents to the ED for evaluation of difficulty arousing patient this AM.  Per chart, "wife is unable to continue care for him at home.  Wife reports patient ingested 5 trazodone 50 mg at 3am. Wife states she had difficulty waking him up and he has been drowsy. Wife reports Dr. Reyes is working on getting patient set up with skilled nursing, as she is unable to care for the patient any longer on her own. " Patient confirms that he took 5 of his trazodone pills because he has been unable to sleep for the last 3 days.  He was able to sleep but recalls that his wife was unable to wake him up this AM.  He denies any SOB, CP, depression, anxiety, intention to harm.  He reports using a walker at baseline. Patient has been falling more, per chart review, 2/2 complications with his knees.  Patient denies any IVDU, amphetamine use, ETOH use.     ED: HDS, no leukocytosis.  Trop 7.2-->6.6.  EKG paced rhythm.  Cards consulted in ED and no concern for ACS.  UDS + for amphetamines, benzos. UA shows 3 hyaline casts. CTH shows "Motion limited exam, no convincing acute intracranial abnormalities".  CXR with no acute abnormality.  "

## 2019-06-13 NOTE — ASSESSMENT & PLAN NOTE
Check hemoglobin A1c  - reduced home levemir from 15 U to 8 U QHS (previously on 40 U QHS).  Holding 3U aspart with meals  - low dose SSI, ACHS glucose.  Avoid hypoglycemia  - diabetic diet

## 2019-06-13 NOTE — SUBJECTIVE & OBJECTIVE
Interval History: Pt sleepy but arousable on exam. Both patient and wife report that he consumed at least 5 (maybe more) sleeping pills without her knowledge. Wife reports he manages his medications however, she will now have to monitor them as well. Wife tearful during visit as she is overwhelmed due to the amount of care he requires.     Review of Systems   Constitutional: Negative for chills, fever and unexpected weight change.   HENT: Negative for ear pain and sore throat.    Eyes: Negative for pain and visual disturbance.   Respiratory: Negative for cough and shortness of breath.    Cardiovascular: Positive for leg swelling (chronic). Negative for chest pain and palpitations.   Gastrointestinal: Negative for abdominal pain, diarrhea, nausea and vomiting.   Genitourinary: Negative for dysuria, frequency and urgency.   Musculoskeletal: Positive for gait problem (baseline). Negative for back pain.   Skin: Positive for wound (chronic, LE wounds). Negative for rash.   Neurological: Negative for dizziness, weakness and headaches.   Psychiatric/Behavioral: Positive for confusion and sleep disturbance. Negative for hallucinations and self-injury. The patient is not nervous/anxious.      Objective:     Vital Signs (Most Recent):  Temp: 98.7 °F (37.1 °C) (06/13/19 0900)  Pulse: 60 (06/13/19 1111)  Resp: 16 (06/13/19 0900)  BP: (!) 96/48 (06/13/19 0900)  SpO2: (!) 92 % (06/13/19 0900) Vital Signs (24h Range):  Temp:  [97.4 °F (36.3 °C)-98.7 °F (37.1 °C)] 98.7 °F (37.1 °C)  Pulse:  [60-90] 60  Resp:  [16-18] 16  SpO2:  [92 %-97 %] 92 %  BP: ()/(47-98) 96/48     Weight: 90.9 kg (200 lb 6.4 oz)  Body mass index is 28.75 kg/m².    Intake/Output Summary (Last 24 hours) at 6/13/2019 1137  Last data filed at 6/13/2019 0600  Gross per 24 hour   Intake 240 ml   Output 525 ml   Net -285 ml      Physical Exam   Constitutional: He is oriented to person, place, and time. He appears well-developed and well-nourished.   HENT:    Head: Normocephalic and atraumatic.   Eyes: Pupils are equal, round, and reactive to light. Conjunctivae and EOM are normal.   Neck: Normal range of motion. Neck supple.   Cardiovascular: Normal rate, regular rhythm and intact distal pulses.   Diminished heart sounds   Pulmonary/Chest: Effort normal.   Diminished lung sounds   Abdominal: Soft. Bowel sounds are normal. He exhibits no distension. There is no tenderness.   Musculoskeletal: Normal range of motion. He exhibits tenderness (1+/trace pitting edema to BL feet).   Neurological: He is alert and oriented to person, place, and time.   Lethargic on exam (s/p ramelteon)   Skin: Skin is warm and dry.   Chronic appearing venous ulcers to lower extremities with multiple bandages in place.     Psychiatric: He has a normal mood and affect. His speech is normal and behavior is normal. Thought content normal. He expresses impulsivity.   sleepy, conversant elderly male in NAD   Nursing note and vitals reviewed.      Significant Labs: All pertinent labs within the past 24 hours have been reviewed.    Significant Imaging: I have reviewed all pertinent imaging results/findings within the past 24 hours.

## 2019-06-13 NOTE — ASSESSMENT & PLAN NOTE
Suspected demand ischemia 2/2 trazodone use/acuity.  Extensive cardiac hx.  - Evaluated by cards in the ED and do not recommend ACS protocol  - No anginal equivalent or EKG changes.  - Trop 7.2-->6.6-->7.7.  Discussed case with Dr. Carrizales.  Will continue to trend.  - Incidentally, amphetamine+ on UDS.  Denies drug use.  Possible sudafed use?  - monitor on tele, trend trops  - if patient developed CP/SOB, contact cards.

## 2019-06-13 NOTE — ASSESSMENT & PLAN NOTE
78 yo male with pAF, AFL, ICM, HFrEF(10-15%) , BV ICD, CAD s/p PCI presents with lethargy s/p accidental drug overdose (Temazepam). Found to have troponin's 7.202>> 6.66 >> 7.79 >>6.13    Reccs  -  ICD device interrogation with no tachyarrhythmias   - Continue medical therapy with ASA 81mg daily  - No indication for triple anticoagulation therapy at this  time  - Continue to monitor

## 2019-06-13 NOTE — ASSESSMENT & PLAN NOTE
--no chest pain or shortness of breath  --likely demand ischemia related to his current illness  --unlikely to be ACS at this point. Would not start ACS protocol

## 2019-06-13 NOTE — PLAN OF CARE
Problem: Adult Inpatient Plan of Care  Goal: Patient-Specific Goal (Individualization)  Outcome: Ongoing (interventions implemented as appropriate)  Pt remain free from falls, injury, trauma. VS stable. No gtts initiated/maintained. Fall precautions reviewed and maintained. Plan to monitor for chest pain, SOB, wound care. POC reviewed with pt. Pt verbalize understanding. Will continue to monitor.

## 2019-06-13 NOTE — SUBJECTIVE & OBJECTIVE
Interval History: No acute events. Denies chest pain, SOB, dizziness, loss of consciousness.    Review of Systems   Constitution: Negative for chills, fever and malaise/fatigue.   HENT: Negative.    Cardiovascular: Negative for chest pain, dyspnea on exertion, irregular heartbeat, leg swelling, orthopnea, palpitations and paroxysmal nocturnal dyspnea.   Respiratory: Negative for shortness of breath and wheezing.    Skin: Negative for color change and rash.   Musculoskeletal: Negative for arthritis, back pain and myalgias.   Gastrointestinal: Negative for abdominal pain, constipation, diarrhea and nausea.   Neurological: Negative for dizziness, numbness and paresthesias.   Psychiatric/Behavioral: Positive for altered mental status.     Objective:     Vital Signs (Most Recent):  Temp: 98.1 °F (36.7 °C) (06/13/19 1220)  Pulse: 60 (06/13/19 1220)  Resp: 20 (06/13/19 1220)  BP: (!) 90/52 (06/13/19 1220)  SpO2: (!) 91 % (06/13/19 1220) Vital Signs (24h Range):  Temp:  [97.4 °F (36.3 °C)-98.7 °F (37.1 °C)] 98.1 °F (36.7 °C)  Pulse:  [60-90] 60  Resp:  [16-20] 20  SpO2:  [91 %-97 %] 91 %  BP: ()/(47-98) 90/52     Weight: 90.9 kg (200 lb 6.4 oz)  Body mass index is 28.75 kg/m².     SpO2: (!) 91 %  O2 Device (Oxygen Therapy): room air      Intake/Output Summary (Last 24 hours) at 6/13/2019 1256  Last data filed at 6/13/2019 0600  Gross per 24 hour   Intake 240 ml   Output 525 ml   Net -285 ml       Lines/Drains/Airways     Peripheral Intravenous Line                 Peripheral IV - Single Lumen 05/24/19 1125 20 G Right Forearm 20 days         Peripheral IV - Single Lumen 06/12/19 1753 18 G Left Hand less than 1 day                Physical Exam   Constitutional: Vital signs are normal. He appears well-developed and well-nourished. He is cooperative.  Non-toxic appearance. No distress.   HENT:   Head: Normocephalic and atraumatic.   Neck: No hepatojugular reflux and no JVD present. Carotid bruit is not present.    Cardiovascular: Normal rate, regular rhythm, S1 normal, S2 normal and normal heart sounds. Exam reveals no gallop.   No murmur heard.  No lower extremity edema   Pulmonary/Chest: Effort normal and breath sounds normal. No respiratory distress. He has no decreased breath sounds. He has no wheezes. He has no rhonchi. He has no rales.   On supine exam, palpable ICD in left chest wall   Abdominal: Soft. Normal appearance and bowel sounds are normal. He exhibits no distension and no mass. There is no tenderness.   Neurological: He is alert.   Skin: Skin is warm, dry and intact.       Significant Labs:   CMP   Recent Labs   Lab 06/12/19  1756 06/13/19 0427    139   K 4.5 4.5    102   CO2 28 26   * 193*   BUN 17 17   CREATININE 0.8 0.8   CALCIUM 9.6 9.4   PROT 6.8  --    ALBUMIN 3.4*  --    BILITOT 0.8  --    ALKPHOS 88  --    AST 98*  --    ALT 78*  --    ANIONGAP 11 11   ESTGFRAFRICA >60.0 >60.0   EGFRNONAA >60.0 >60.0   , CBC   Recent Labs   Lab 06/12/19  1756 06/13/19 0427   WBC 8.27 8.00   HGB 12.7* 12.0*   HCT 39.0* 36.6*    223    and Troponin   Recent Labs   Lab 06/12/19 1933 06/13/19  0014 06/13/19 0427   TROPONINI 6.660* 7.720* 6.133*       Significant Imaging: EKG: AV dual paced rhythm, BV pacer

## 2019-06-13 NOTE — ASSESSMENT & PLAN NOTE
Current use of long term anticoagulation    - c/w amiodarone, pradaxa  - CHADSVASC 6.  Fall risk and on multiple blood thinners.  Will need to discuss with patient and wife.   - monitor on tele; V-paced rhythm

## 2019-06-13 NOTE — PROGRESS NOTES
Iwona MENARD at bedside. Pt requested to leave wounds on left leg alone open to air. Spoke with Iwona MENARD about issue. Iwona MENARD stated to leave alone open to air per pt request and have order wound care consult. Will continue to monitor.

## 2019-06-13 NOTE — H&P
"[facility] Medicine  History & Physical    Patient Name: Madhav Cortez  MRN: [id number]  Admission Date: 6/12/2019  Attending Physician: Heather Moore MD   Primary Care Provider: Mirna Reyes MD    [facility] Medicine Team: [facility] MED E Naresh Butsillo PA-C     Patient information was obtained from patient, past medical records and ER records.     Subjective:     Principal Problem:Elevated troponin    Chief Complaint:   Chief Complaint   Patient presents with    Ingestion     arrived to ED via NO EMS with c/o fatigue after taking 5 tramadol, denies SI, pt states was just trying to get some sleep, aao at this time, difficulty ambulating per pt wife and EMS s/t drowsiness        HPI: Mdahav Cortez is a 79 y.o. M with pmhx HFrEF (10%), HTN, atrial fib on anticoagulation and atrial flutter (s/p cardioversion), HLP, venous insufficiency, chronic insomnia and chronic LE wounds who presents to the ED for evaluation of difficulty arousing patient this AM.  Per chart, "wife is unable to continue care for him at home.  Wife reports patient ingested 5 trazodone 50 mg at 3am. Wife states she had difficulty waking him up and he has been drowsy. Wife reports Dr. Reyes is working on getting patient set up with skilled nursing, as she is unable to care for the patient any longer on her own.  " Patient confirms that he took 5 of his trazodone pills because he has been unable to sleep for the last 3 days.  He was able to sleep but recalls that his wife was unable to wake him up this AM.  He denies any SOB, CP, depression, anxiety, intention to harm.  He reports using a walker at baseline. Patient has been falling more, per chart review, 2/2 complications with his knees.  Patient denies any IVDU, amphetamine use, ETOH use.     ED: HDS, no leukocytosis.  Trop 7.2-->6.6.  EKG paced rhythm.  Cards consulted in ED and no concern for ACS.  UDS + for amphetamines, benzos. UA shows 3 " "hyaline casts. CTH shows "Motion limited exam, no convincing acute intracranial abnormalities".  CXR with no acute abnormality.    Past Medical History:   Diagnosis Date    *Atrial fibrillation     *Atrial flutter     Acute exacerbation of CHF (congestive heart failure) 8/2/2013    Anticoagulant long-term use     Anxiety     Arthritis     Atrial flutter     Back pain     Cardiomyopathy     Cataract     Coronary artery disease     Depression     Diabetes mellitus     Heart bloc     Hepatitis B     Hyperlipidemia 4/1/2014    Hypertension     Myocardial infarction     Non-STEMI (non-ST elevated myocardial infarction) 5/26/2013    Nuclear sclerosis - Both Eyes 2/18/2013    Post PTCA 8/7/2012    Presence of biventricular AICD 2/23/2016    Stage 3 chronic kidney disease 12/11/2017    Tobacco dependence     Transaminitis 4/1/2014    Ventricular tachycardia        Past Surgical History:   Procedure Laterality Date    ABLATION N/A 4/8/2013    Performed by Humberto Koehler [facility] at [facility] CATH LAB    APPENDECTOMY      BIOPSY LIVER AND ULTRASOUND N/A 5/26/2014    Performed by [facility] Diagnostic Provider at [facility] OR [facility] FLR    CARDIAC DEFIBRILLATOR PLACEMENT      CARDIAC DEFIBRILLATOR PLACEMENT      CARDIOVERSION N/A 10/10/2013    Performed by H[facility]to Koehler MD at [facility] CATH LAB    CARDIOVERSION N/A 8/19/2013    Performed by H[facility]to Koehler MD at [facility] CATH LAB    COLONOSCOPY      CORONARY ANGIOPLASTY WITH STENT PLACEMENT      FRACTURE SURGERY      L arm    INSERT / REPLACE / REMOVE PACEMAKER  12/15    bi-V upgrade    INSERTION-ICD-BIVENTRICULAR N/A 11/11/2015    Performed by Humberto Koehler MD at [facility] CATH LAB    RADIOFREQUENCY ABLATION      STEROID INJECTION KNEE Bilateral     received about every 4 months    TONSILLECTOMY      TRANSESOPHAGEAL ECHOCARDIOGRAM (DIOGO) N/A 10/10/2013    Performed by Humberto Koehler MD at [facility] CATH LAB    TRANSESOPHAGEAL ECHOCARDIOGRAM (DIOGO) N/A 8/19/2013    Performed by Humberto" "MD Zakia at [facility] CATH LAB    VASCULAR SURGERY         Review of patient's allergies indicates:  No Known Allergies    No current facility-administered medications on file prior to encounter.      Current Outpatient Medications on File Prior to Encounter   Medication Sig    ALPRAZolam (XANAX) 0.5 MG tablet Take 1 tablet (0.5 mg total) by mouth 3 (three) times daily.    amiodarone (PACERONE) 200 MG Tab TAKE 1 TABLET TWICE DAILY    ACCU-CHEK JIE Misc USE AS INSTRUCTED    ACCU-CHEK SMARTVIEW TEST STRIP Strp CHECK BLOOD SUGAR THREE TIMES DAILY    ascorbic acid (VITAMIN C) 500 MG tablet Take 1,500 mg by mouth 2 (two) times daily.     aspirin 81 MG Chew Take 1 tablet (81 mg total) by mouth once daily.    beta carotene-C-E-lutein-min29 5,000-60-30-2 unit-mg-unit-mg Tab Take 1 capsule by mouth once daily.     blood-glucose meter kit Accu check jie meter Use as instructed    carvedilol (COREG) 25 MG tablet Take 1 tablet (25 mg total) by mouth 2 (two) times daily.    ciclopirox (LOPROX) 0.77 % Crea APPLY TO AFFECTED AREAS BILATERAL AXILLA TWICE DAILY UNTIL CLEAR    co-enzyme Q-10 30 mg capsule 800 units daily    collagenase (SANTYL) ointment Apply topically once daily.    dabigatran etexilate (PRADAXA) 150 mg Cap Take 1 capsule (150 mg total) by mouth 2 (two) times daily. "Do NOT break, chew, or open capsules."    ENTRESTO  mg per tablet TAKE 1 TABLET TWICE DAILY    fluticasone (FLONASE) 50 mcg/actuation nasal spray 1 spray (50 mcg total) by Each Nare route once daily.    furosemide (LASIX) 40 MG tablet Take 1 tablet (40 mg total) by mouth 2 (two) times daily.    gabapentin (NEURONTIN) 300 MG capsule TAKE 2 CAPSULES BY MOUTH THREE TIMES DAILY    glucosamine-chondroitin 500-400 mg tablet Take 1 tablet by mouth 2 (two) times daily.    hydrocolloid dressing (AQUACEL EXTRA) 2 X 2 " Bndg Apply 1 Units topically as needed.    insulin (LANTUS SOLOSTAR U-100 INSULIN) glargine 100 units/mL (3mL) SubQ pen " "Inject 15 Units into the skin every evening.    insulin aspart U-100 (NOVOLOG) 100 unit/mL (3 mL) InPn pen Inject 3 Units into the skin 3 (three) times daily.    ketoconazole (NIZORAL) 2 % cream Apply topically once daily.    levothyroxine (SYNTHROID) 75 MCG tablet Take 1 tablet (75 mcg total) by mouth once daily.    magnesium oxide (MAG-OX) 400 mg tablet Take 400 mg by mouth 2 (two) times daily.     melatonin 5 mg Tab Take 1 tablet by mouth every evening.     nitroGLYCERIN (NITROSTAT) 0.4 MG SL tablet Place 1 tablet (0.4 mg total) under the tongue every 5 (five) minutes as needed for Chest pain.    nystatin (MYCOSTATIN) cream Apply topically 2 (two) times daily.    pen needle, diabetic 31 gauge x 1/4" Ndle Use 4 times daily    pva-gentian violet-methyl blue (HYDROFERA BLUE) 2.5 " Bndg Apply 1 Units topically as needed.    ramelteon (ROZEREM) 8 mg tablet Take 1 tablet (8 mg total) by mouth nightly as needed for Insomnia.    RIBOSE ORAL Take 1 tablet by mouth once daily.     senna-docusate 8.6-50 mg (PERICOLACE) 8.6-50 mg per tablet Take 1 tablet by mouth 2 (two) times daily.    spironolactone (ALDACTONE) 25 MG tablet TAKE 1 TABLET ONE TIME DAILY    temazepam (RESTORIL) 15 mg Cap     tizanidine (ZANAFLEX) 2 MG tablet TAKE 1 TABLET EVERY 8 HOURS AS NEEDED FOR MUSCLE PAIN    triamcinolone acetonide 0.025% (KENALOG) 0.025 % cream apply to affected area twice daily as directed    UBIDECARENONE (COQ-10 ORAL) Take 800 mg by mouth once daily. Takes 100mg tablet at lunch, and 600mg at dinner     Family History     Problem Relation (Age of Onset)    Bipolar disorder Son    Bladder Cancer Father    Cancer Father, Paternal Grandmother, Paternal Grandfather, Maternal Uncle    Diabetes Father, Mother    Heart attack Mother    Heart disease Maternal Grandmother    No Known Problems Maternal Grandfather, Daughter, Daughter, Son, Son, Maternal Aunt, Paternal Aunt, Paternal Uncle, Sister, Brother        Tobacco Use "    Smoking status: Former Smoker     Last attempt to quit: 1987     Years since quittin.9    Smokeless tobacco: Never Used    Tobacco comment: 25 years ago   Substance and Sexual Activity    Alcohol use: No    Drug use: No    Sexual activity: Never     Partners: Female     Review of Systems   Constitutional: Negative for chills, fever and unexpected weight change.   HENT: Negative for ear pain and sore throat.    Eyes: Negative for pain and visual disturbance.   Respiratory: Negative for cough and shortness of breath.    Cardiovascular: Positive for leg swelling (chronic). Negative for chest pain and palpitations.   Gastrointestinal: Negative for abdominal pain, diarrhea, nausea and vomiting.   Genitourinary: Negative for dysuria, frequency and urgency.   Musculoskeletal: Positive for gait problem (baseline). Negative for back pain.   Skin: Positive for wound (chronic, LE wounds). Negative for rash.   Neurological: Negative for dizziness, weakness and headaches.   Psychiatric/Behavioral: Positive for confusion and sleep disturbance. Negative for hallucinations and self-injury. The patient is not nervous/anxious.      Objective:     Vital Signs (Most Recent):  Temp: 97.9 °F (36.6 °C) (19 2300)  Pulse: 61 (19 2303)  Resp: 18 (19 2300)  BP: 116/67 (19 2300)  SpO2: 96 % (19) Vital Signs (24h Range):  Temp:  [97.4 °F (36.3 °C)-97.9 °F (36.6 °C)] 97.9 °F (36.6 °C)  Pulse:  [60-90] 61  Resp:  [16-18] 18  SpO2:  [94 %-97 %] 96 %  BP: (116-162)/(50-98) 116/67     Weight: 94.4 kg (208 lb 1.8 oz)  Body mass index is 29.86 kg/m².    Physical Exam   Constitutional: He is oriented to person, place, and time. He appears well-developed and well-nourished.   HENT:   Head: Normocephalic and atraumatic.   Eyes: Pupils are equal, round, and reactive to light. Conjunctivae and EOM are normal.   Neck: Normal range of motion. Neck supple.   Cardiovascular: Normal rate, regular rhythm and  intact distal pulses.   Diminished heart sounds   Pulmonary/Chest: Effort normal.   Diminished lung sounds   Abdominal: Soft. Bowel sounds are normal. He exhibits no distension. There is no tenderness.   Musculoskeletal: Normal range of motion. He exhibits tenderness (1+/trace pitting edema to BL feet).   Neurological: He is alert and oriented to person, place, and time.   Lethargic on exam (s/p ramelteon)  No focal dificts, strength intact  Appears mildly confused but Ox4  Intermittently fidgeting with blankets in bed   Skin: Skin is warm and dry.   Chronic appearing venous ulcers to LLE  RLE with leg wrapped.     Psychiatric: He has a normal mood and affect. His speech is normal and behavior is normal. Thought content normal. He expresses impulsivity.   Pleasant, conversant elderly male in NAD   Nursing note and vitals reviewed.                CRANIAL NERVES     CN III, IV, VI   Pupils are equal, round, and reactive to light.  Extraocular motions are normal.        Significant Labs:   CBC:   Recent Labs   Lab 06/12/19  1756   WBC 8.27   HGB 12.7*   HCT 39.0*        CMP:   Recent Labs   Lab 06/12/19  1756      K 4.5      CO2 28   *   BUN 17   CREATININE 0.8   CALCIUM 9.6   PROT 6.8   ALBUMIN 3.4*   BILITOT 0.8   ALKPHOS 88   AST 98*   ALT 78*   ANIONGAP 11   EGFRNONAA >60.0     Troponin:   Recent Labs   Lab 06/12/19  1756 06/12/19  1933 06/13/19  0014   TROPONINI 7.202* 6.660* 7.720*     Urine Studies:   Recent Labs   Lab 06/12/19  1729   COLORU Yellow   APPEARANCEUA Clear   PHUR 6.0   SPECGRAV 1.010   PROTEINUA Negative   GLUCUA 2+*   KETONESU Negative   BILIRUBINUA Negative   OCCULTUA Negative   NITRITE Negative   LEUKOCYTESUR Negative   WBCUA 1   BACTERIA Rare   SQUAMEPITHEL 0   HYALINECASTS 3*       Significant Imaging: I have reviewed all pertinent imaging results/findings within the past 24 hours.     Ct Head Without Contrast    Result Date: 6/12/2019  EXAMINATION: CT HEAD WITHOUT  CONTRAST CLINICAL HISTORY: falls; TECHNIQUE: Low dose axial images were obtained through the head.  Coronal and sagittal reformations were also performed. Contrast was not administered. COMPARISON: 09/25/2015 FINDINGS: Please note, exam is limited secondary to patient motion. There is generalized cerebral volume loss.  There is hypoattenuation in a periventricular fashion, likely sequela of chronic microvascular ischemic change.  There is no evidence of acute major vascular territory infarct, hemorrhage, or mass.  There is no hydrocephalus.  There are no abnormal extra-axial fluid collections.  The paranasal sinuses and mastoid air cells are clear, and there is no evidence of calvarial fracture.  The visualized soft tissues are remarkable for slight soft tissue induration overlying the left posterior parietal calvarium..     1. Motion limited exam, no convincing acute intracranial abnormalities. 2. Sequela of chronic microvascular ischemic change and senescent change. 3. Mild soft tissue induration overlying the posterior left parietal calvarium.     X-ray Chest Ap Portable    Result Date: 6/12/2019  EXAMINATION: XR CHEST AP PORTABLE CLINICAL HISTORY: Unspecified fall, initial encounter TECHNIQUE: Single frontal view of the chest was performed. COMPARISON: 05/22/2019. FINDINGS: There is a cardiac defibrillator present.  The cardiac silhouette is prominent in size although this is partially related to the magnification on this AP projection.  Atherosclerotic calcification is present within the aortic arch.  Superior mediastinal structures are unremarkable.  Pulmonary vasculature is within normal limits.  The lungs are free of focal consolidations.  There is no evidence for pneumothorax or large pleural effusions.  Bony structures are grossly intact.     Cardiac defibrillator. Mild prominence of the cardiac silhouette which may be related to the magnification on this AP projection. Lungs are clear.      Assessment/Plan:     * Elevated troponin  Suspected demand ischemia 2/2 trazodone use/acuity.  Extensive cardiac hx.  - Evaluated by cards in the ED and do not recommend ACS protocol  - No anginal equivalent or EKG changes.  - Trop 7.2-->6.6-->7.7.  Discussed case with Dr. Carrizales.  Will continue to trend.  - Incidentally, amphetamine+ on UDS.  Denies drug use.  Possible sudafed use?  - monitor on tele, trend trops  - if patient developed CP/SOB, contact cards.     Accidental drug overdose  Patient with chronic insomnia, unimproved with home trazodone/melatonin.  Ingested x5 of the trazodone 50 mg PO tablets. Not ingested to self harm, per patient.   - likely patient has mild dementia and has moments of clarity.  Will need to confirm with wife.  - No QT prolongation  - will hold for now    Discharge planning issues  Will need assistance from SW/CM for placement as wife can no longer care for patient at home.   - PT/OT consulted    Coronary artery disease  - c/w ASA, statin  - f/w cardiology  - Last heart cath in 2013 shows two vessel coronary artery disease and Patent LAD and DIagonal stents.  GALLO flow 3 throughout.   - will need outpatient (vs inpatient) stress.  No recent stress.     Chronic systolic heart failure  ICD (implantable cardioverter-defibrillator), biventricular, in situ    - appears euvolemic  - c/w entresto, coreg, lasix  - Last TTE 5/2019 with EF 10-15%, Grade 2 DD, PASP 44, CVP 8  - strict I/O, daily weights, fluid rest diet    Atrial fibrillation  Current use of long term anticoagulation    - c/w amiodarone, pradaxa  - CHADSVASC 6.  Fall risk and on multiple blood thinners.  Will need to discuss with patient and wife.   - monitor on tele; V-paced rhythm    Essential hypertension  BP stable  - c/w home meds    Ulcer of left lower extremity, limited to breakdown of skin  Venous insufficiency of both lower extremities  - wound care consulted    Type 2 diabetes mellitus with circulatory disorder,  with long-term current use of insulin  Check hemoglobin A1c  - reduced home levemir from 15 U to 8 U QHS (previously on 40 U QHS).  Holding 3U aspart with meals  - low dose SSI, ACHS glucose.  Avoid hypoglycemia  - diabetic diet    VTE Risk Mitigation (From admission, onward)        Ordered     dabigatran etexilate capsule 150 mg  2 times daily      06/12/19 2150     IP VTE HIGH RISK PATIENT  Once      06/12/19 2150     Place EDWIN hose  Until discontinued      06/12/19 2150     Place sequential compression device  Until discontinued      06/12/19 2150             Naresh Bustillo PA-C  Department of Hospital Medicine   [facility]

## 2019-06-13 NOTE — PLAN OF CARE
06/13/19 1402   Post-Acute Status   Post-Acute Authorization Other   Other Status No Post-Acute Service Needs

## 2019-06-13 NOTE — ASSESSMENT & PLAN NOTE
Patient with chronic insomnia, unimproved with home trazodone/melatonin.  Ingested x5 of the trazodone 50 mg PO tablets. Not ingested to self harm, per patient.   - likely patient has mild dementia and has moments of clarity.  Will need to confirm with wife.  - No QT prolongation  - will hold for now

## 2019-06-13 NOTE — HPI
Mr. Cortez is an 79 year old gentleman with a past medical history of pAF, AFL, VT, ICM, LBBB, HFrEF (EF 10-15%) s/p BiV ICD, EULOGIO thrombus, CAD, DM, HTN, and HLD who was brought to the ED for difficulty in arousing the patient. His wife states that he told her he took 5x50mg pills of temazepam at around 3am. He was difficult to arouse and tore off all the dressings on his right leg that covers his sores. A troponin was ordered and it came back at 7. He denies chest pains, shortness of breath. He was recently admitted from 5/22-5/28 for ADHF. Cardiology was consulted for the elevated troponin.

## 2019-06-13 NOTE — PROGRESS NOTES
Cardiac device interrogation and/or reprogramming completed by industry representative, Tianna Sim; please refer to report located in the Cards Procedure tab.

## 2019-06-13 NOTE — PROGRESS NOTES
Consult received per RN for redness to sacrum and bilateral lower leg wounds. Pt was admitted on 6/12/19 c/o fatigue after taking 5 tramadol. Chart reflects pt has a PMHx of diabetes, CAD, Afib, CHF, cardiomyopathy, HDL, MI, anxiety, Hep B, CKD and chronic insomnia. Also of note, pt follow Radha Szymanski NP with outpatient wound care for treatment of ulcerations to his bilateral lower legs related to venous insufficiency.     Pt lying in bed awake and alert receiving bed bath and linen change. Pt's wife at bedside reports that medi honey has been used to pt's lower leg wounds.    Upon assessment of bilateral knees (present upon admission): abrasions with brown red intact scabs without any drainage noted. Multiple scattered areas noted to both knees. Per pt's wife pt feel at home.    To Right Achilles:(present upon admission) Tan and brown dry scab without any drainage noted.    To Left Anterior Lower Leg (present upon admission) : ulceration with red and pink granular tissue with maceration noted around the edges of th matthias wound.    To Left Schaefer (present upon admission) : ulceration with red an pink granular tissue without any drainage noted. Multiple dry scabs scattered around matthias wound.    To Left Medial Lower Leg (present upon admission)  : ulcerations with dry pink and tan tissue without any drainage noted. Superior ulcer measures 2.5 cm (L) x 3 cm (W) with inferior ulcer measuring  3 cm (L) x 3 cm (W).    To Left Great Toe (present upon admission) : ulceration with dry pink and red tissue without any drainage noted.    To Right Foot 1st Met Head  (present upon admission): dry intact tan and yellow scab without any drainage noted.    To Left Buttocks (present upon admission): area of shearing linear in apperance with pink, red dry granular tissue with minimal slough. Pt's wife stated that this sore was due to the pt's fall. Reinforced border foam covering area.    To Sacrum(present upon admission): area of  purple and pink discoloration with blanchable redness to matthias area. Area concerning for a SDTI. Unable to determine how much tissue is involved at this time. Reinforced border foam covering area.    (see assessment and photos below)    To pt's bilateral knee, bilateral lower legs and foot wounds: cleansed areas with sterile normal saline and applied medihoney with border foams to cover.     Will order venelex for pt's sacrum and left buttocks.    Recommendations:  -To Bilateral Knees, Left Shin, Left anterior lower leg, left medial leg wounds, right achilles and right foot wound: nursing to cleanse with sterile normal saline and apply medihoney daily with a border foam to cover.Medihoney promotes the removal of necrotic tissue and advance a wound toward healing.  -Nursing to apply Venelex ointment  2 x daily and PRN to sacrum and left buttocks wound. Nursing may apply a border foam to protect these areas. Venelex ointment contains Balsam [place] and Castor oil which is used for the topical management of pressure sores. Balsam [place] increases blood flow to a wound area and also fights bacteria. Castor oil prevents the skin cells from breaking down, which aids in wound healing.  -Nursing to maintain all pressure prevention interventions to include:EHOB waffle overlay,  heel protectors, urinary diversion device (condom catheter/Pur-wick, catheter), fecal management system(if needed for stools),turn q 2 hours, pillows/wedge for repositioning, blue pad to wick away moisture, HOB no higher than 30 degrees/knee gatch up unless otherwise indicated,  barrier cream/paste as needed for moisture control, and adequate calories.     Discussed plan of care with pt, pt's wife at beside, pt's nurse at bedside, and with Dr Moore via secure chat.     06/13/19 1012        Wound 06/13/19 Abrasion(s) Knee   Date First Assessed: 06/13/19   Pre-existing: Yes  Primary Wound Type: Abrasion(s)  Side: Left  Location: Knee   Wound Image    Wound  WDL ex   Dressing Appearance Open to air;No dressing   Drainage Amount None   Drainage Characteristics/Odor No odor   Appearance Red;Tan;Other (see comments)  (scab)   Periwound Area Intact;Satellite lesion   Wound Edges Other (see comments)  (intact dry scab )   Wound Length (cm) 1 cm   Wound Width (cm) 1 cm   Wound Depth (cm) 0.1 cm   Wound Volume (cm^3) 0.1 cm^3   Wound Surface Area (cm^2) 1 cm^2   Care Cleansed with:;Sterile normal saline;Applied:;Skin Barrier   Dressing Applied;Honey;Foam   Periwound Care Skin barrier film applied   Dressing Change Due 06/14/19        Wound 06/13/19 Abrasion(s) Knee   Date First Assessed: 06/13/19   Pre-existing: Yes  Primary Wound Type: Abrasion(s)  Side: Right  Location: Knee   Wound Image    Wound WDL ex   Dressing Appearance Open to air;No dressing   Drainage Amount None   Drainage Characteristics/Odor No odor   Appearance Red;Tan  (tan; red scab)   Tissue loss description Not applicable   Periwound Area Intact;Satellite lesion   Wound Edges Open   Wound Length (cm) 1 cm   Wound Width (cm) 1 cm   Wound Depth (cm) 0.1 cm   Wound Volume (cm^3) 0.1 cm^3   Wound Surface Area (cm^2) 1 cm^2   Care Cleansed with:;Sterile normal saline;Applied:;Skin Barrier   Dressing Applied;Changed;Honey;Foam   Periwound Care Skin barrier film applied   Dressing Change Due 06/14/19        Wound 06/13/19 Ulceration Achilles   Date First Assessed: 06/13/19   Pre-existing: Yes  Primary Wound Type: Ulceration  Side: Right  Location: Achilles   Wound Image    Wound WDL ex   Dressing Appearance Open to air   Drainage Amount None   Drainage Characteristics/Odor No odor   Appearance Pink;Slough;Dry   Tissue loss description Full thickness   Wound Edges Open   Wound Length (cm) 1.5 cm   Wound Width (cm) 1 cm   Wound Depth (cm) 0.3 cm   Wound Volume (cm^3) 0.45 cm^3   Wound Surface Area (cm^2) 1.5 cm^2   Care Cleansed with:;Sterile normal saline;Applied:;Skin Barrier   Dressing Applied;Honey;Foam    Periwound Care Skin barrier film applied   Dressing Change Due 06/14/19        Wound 06/13/19 Ulceration anterior Leg   Date First Assessed: 06/13/19   Pre-existing: Yes  Primary Wound Type: Ulceration  Side: Left  Orientation: anterior  Location: Leg   Wound Image    Wound WDL ex   Dressing Appearance Intact   Drainage Amount Scant   Drainage Characteristics/Odor Serosanguineous   Appearance Pink;Red;Slough;Granulating   Tissue loss description Partial thickness   Periwound Area Intact;Macerated;Moist   Wound Edges Open   Wound Length (cm) 1 cm   Wound Width (cm) 1 cm   Wound Depth (cm) 0.2 cm   Wound Volume (cm^3) 0.2 cm^3   Wound Surface Area (cm^2) 1 cm^2   Care Cleansed with:;Sterile normal saline;Applied:;Skin Barrier   Dressing Applied;Honey;Foam   Periwound Care Skin barrier film applied   Dressing Change Due 06/13/19        Wound 06/13/19 Ulceration Shin   Date First Assessed: 06/13/19   Pre-existing: Yes  Primary Wound Type: Ulceration  Side: Left  Location: Schaefer   Wound Image    Wound WDL ex   Dressing Appearance Open to air;No dressing   Drainage Amount None   Drainage Characteristics/Odor No odor   Appearance Pink;Red;Tan;Dry   Periwound Area Satellite lesion   Wound Edges Open   Wound Length (cm) 1 cm   Wound Width (cm) 1 cm   Wound Depth (cm) 0.1 cm   Wound Volume (cm^3) 0.1 cm^3   Wound Surface Area (cm^2) 1 cm^2   Care Cleansed with:;Sterile normal saline;Applied:;Skin Barrier   Dressing Applied;Honey;Foam   Periwound Care Skin barrier film applied   Dressing Change Due 06/14/19        Wound 06/13/19 Ulceration medial Leg   Date First Assessed: 06/13/19   Pre-existing: Yes  Primary Wound Type: Ulceration  Side: Left  Orientation: medial  Location: Leg   Wound Image    Wound WDL ex   Dressing Appearance Open to air;No dressing   Drainage Amount None   Drainage Characteristics/Odor No odor   Appearance Pink;Tan;Dry   Tissue loss description Partial thickness   Periwound Area Intact;Dry;Satellite  lesion  (2.5x3 sat wound size)   Wound Edges Open   Wound Length (cm) 3 cm   Wound Width (cm) 3 cm   Wound Depth (cm) 0.1 cm   Wound Volume (cm^3) 0.9 cm^3   Wound Surface Area (cm^2) 9 cm^2   Care Cleansed with:;Sterile normal saline;Applied:;Skin Barrier   Dressing Applied;Honey;Foam   Periwound Care Skin barrier film applied   Dressing Change Due 06/14/19        Wound 06/13/19 Ulceration plantar Toe, first   Date First Assessed: 06/13/19   Pre-existing: Yes  Primary Wound Type: Ulceration  Side: Left  Orientation: plantar  Location: Toe, first   Wound Image    Wound WDL ex   Dressing Appearance Open to air   Drainage Amount None   Drainage Characteristics/Odor No odor   Periwound Area Intact   Wound Edges Open   Wound Length (cm) 2 cm   Wound Width (cm) 2 cm   Wound Depth (cm) 0.1 cm   Wound Volume (cm^3) 0.4 cm^3   Wound Surface Area (cm^2) 4 cm^2   Care Cleansed with:;Sterile normal saline;Applied:;Skin Barrier   Dressing Applied;Honey;Foam   Periwound Care Skin barrier film applied   Dressing Change Due 06/14/19        Wound 06/13/19 Ulceration Foot   Date First Assessed: 06/13/19   Pre-existing: Yes  Primary Wound Type: Ulceration  Side: Right  Location: (c) Foot   Wound Image    Wound WDL ex   Dressing Appearance Open to air   Drainage Amount None   Drainage Characteristics/Odor No odor   Appearance Tan;Slough;Dry   Wound Edges Open   Wound Length (cm) 1 cm   Wound Width (cm) 1 cm   Wound Depth (cm) 0.1 cm   Wound Volume (cm^3) 0.1 cm^3   Wound Surface Area (cm^2) 1 cm^2   Care Cleansed with:;Sterile normal saline;Applied:;Skin Barrier   Dressing Applied;Honey;Foam   Periwound Care Skin barrier film applied   Dressing Change Due 06/14/19        Wound 06/13/19 Shearing Buttocks   Date First Assessed: 06/13/19   Pre-existing: Yes  Primary Wound Type: Shearing  Side: Left  Location: Buttocks   Wound Image    Wound WDL ex   Dressing Appearance Dry;Intact;Clean   Drainage Amount None   Appearance  Pink;Red;Tan;Other (see comments)  (scabbed)   Tissue loss description Partial thickness   Periwound Area Intact   Wound Edges Open   Wound Length (cm) 0.5 cm   Wound Width (cm) 6 cm   Wound Depth (cm) 0.1 cm   Wound Volume (cm^3) 0.3 cm^3   Wound Surface Area (cm^2) 3 cm^2   Dressing Reinforced;Foam   Dressing Change Due 06/13/19        Pressure Injury 06/13/19 Sacral spine Suspected DTPI   Date First Assessed: 06/13/19   Pressure Injury Present on Admission: yes  Location: Sacral spine  Staging: Suspected DTPI   Wound Image    Staging Suspected DTPI   Dressing Appearance Dry;Intact;Clean   Drainage Amount None   Drainage Characteristics/Odor No odor   Appearance Pink;Purple   Periwound Area Intact   Wound Edges Undefined   Wound Length (cm) 1 cm   Wound Width (cm) 1 cm   Wound Surface Area (cm^2) 1 cm^2   Dressing Reinforced;Foam   Dressing Change Due 06/14/19

## 2019-06-14 PROBLEM — I21.4 NSTEMI (NON-ST ELEVATED MYOCARDIAL INFARCTION): Status: ACTIVE | Noted: 2019-06-12

## 2019-06-14 LAB
ANION GAP SERPL CALC-SCNC: 8 MMOL/L (ref 8–16)
BASOPHILS # BLD AUTO: 0.03 K/UL (ref 0–0.2)
BASOPHILS NFR BLD: 0.4 % (ref 0–1.9)
BUN SERPL-MCNC: 17 MG/DL (ref 8–23)
CALCIUM SERPL-MCNC: 9.3 MG/DL (ref 8.7–10.5)
CHLORIDE SERPL-SCNC: 100 MMOL/L (ref 95–110)
CO2 SERPL-SCNC: 33 MMOL/L (ref 23–29)
CREAT SERPL-MCNC: 0.9 MG/DL (ref 0.5–1.4)
DIFFERENTIAL METHOD: ABNORMAL
EOSINOPHIL # BLD AUTO: 0.1 K/UL (ref 0–0.5)
EOSINOPHIL NFR BLD: 1.2 % (ref 0–8)
ERYTHROCYTE [DISTWIDTH] IN BLOOD BY AUTOMATED COUNT: 15.4 % (ref 11.5–14.5)
EST. GFR  (AFRICAN AMERICAN): >60 ML/MIN/1.73 M^2
EST. GFR  (NON AFRICAN AMERICAN): >60 ML/MIN/1.73 M^2
GLUCOSE SERPL-MCNC: 130 MG/DL (ref 70–110)
HCT VFR BLD AUTO: 39.4 % (ref 40–54)
HGB BLD-MCNC: 12.9 G/DL (ref 14–18)
IMM GRANULOCYTES # BLD AUTO: 0.03 K/UL (ref 0–0.04)
IMM GRANULOCYTES NFR BLD AUTO: 0.4 % (ref 0–0.5)
LYMPHOCYTES # BLD AUTO: 1.4 K/UL (ref 1–4.8)
LYMPHOCYTES NFR BLD: 17.1 % (ref 18–48)
MCH RBC QN AUTO: 30.4 PG (ref 27–31)
MCHC RBC AUTO-ENTMCNC: 32.7 G/DL (ref 32–36)
MCV RBC AUTO: 93 FL (ref 82–98)
MONOCYTES # BLD AUTO: 1 K/UL (ref 0.3–1)
MONOCYTES NFR BLD: 11.5 % (ref 4–15)
NEUTROPHILS # BLD AUTO: 5.9 K/UL (ref 1.8–7.7)
NEUTROPHILS NFR BLD: 69.4 % (ref 38–73)
NRBC BLD-RTO: 0 /100 WBC
PLATELET # BLD AUTO: 229 K/UL (ref 150–350)
PMV BLD AUTO: 11.5 FL (ref 9.2–12.9)
POCT GLUCOSE: 157 MG/DL (ref 70–110)
POCT GLUCOSE: 162 MG/DL (ref 70–110)
POCT GLUCOSE: 177 MG/DL (ref 70–110)
POCT GLUCOSE: 184 MG/DL (ref 70–110)
POTASSIUM SERPL-SCNC: 3.8 MMOL/L (ref 3.5–5.1)
RBC # BLD AUTO: 4.24 M/UL (ref 4.6–6.2)
SODIUM SERPL-SCNC: 141 MMOL/L (ref 136–145)
T4 FREE SERPL-MCNC: 1.01 NG/DL (ref 0.71–1.51)
TROPONIN I SERPL DL<=0.01 NG/ML-MCNC: 3.24 NG/ML (ref 0–0.03)
TSH SERPL DL<=0.005 MIU/L-ACNC: 6.52 UIU/ML (ref 0.4–4)
WBC # BLD AUTO: 8.44 K/UL (ref 3.9–12.7)

## 2019-06-14 PROCEDURE — G0378 HOSPITAL OBSERVATION PER HR: HCPCS | Mod: HCNC

## 2019-06-14 PROCEDURE — 99213 PR OFFICE/OUTPT VISIT, EST, LEVL III, 20-29 MIN: ICD-10-PCS | Mod: HCNC,,, | Performed by: INTERNAL MEDICINE

## 2019-06-14 PROCEDURE — 99213 OFFICE O/P EST LOW 20 MIN: CPT | Mod: HCNC,,, | Performed by: INTERNAL MEDICINE

## 2019-06-14 PROCEDURE — 99226 PR SUBSEQUENT OBSERVATION CARE,LEVEL III: ICD-10-PCS | Mod: HCNC,,, | Performed by: PHYSICIAN ASSISTANT

## 2019-06-14 PROCEDURE — 84484 ASSAY OF TROPONIN QUANT: CPT | Mod: HCNC

## 2019-06-14 PROCEDURE — 97116 GAIT TRAINING THERAPY: CPT | Mod: HCNC

## 2019-06-14 PROCEDURE — 84439 ASSAY OF FREE THYROXINE: CPT | Mod: HCNC

## 2019-06-14 PROCEDURE — 97165 OT EVAL LOW COMPLEX 30 MIN: CPT | Mod: HCNC

## 2019-06-14 PROCEDURE — 30200315 PPD INTRADERMAL TEST REV CODE 302: Mod: HCNC | Performed by: INTERNAL MEDICINE

## 2019-06-14 PROCEDURE — 85025 COMPLETE CBC W/AUTO DIFF WBC: CPT | Mod: HCNC

## 2019-06-14 PROCEDURE — 86580 TB INTRADERMAL TEST: CPT | Mod: HCNC | Performed by: INTERNAL MEDICINE

## 2019-06-14 PROCEDURE — 99226 PR SUBSEQUENT OBSERVATION CARE,LEVEL III: CPT | Mod: HCNC,,, | Performed by: PHYSICIAN ASSISTANT

## 2019-06-14 PROCEDURE — 84443 ASSAY THYROID STIM HORMONE: CPT | Mod: HCNC

## 2019-06-14 PROCEDURE — 25000003 PHARM REV CODE 250: Mod: HCNC | Performed by: PHYSICIAN ASSISTANT

## 2019-06-14 PROCEDURE — 80048 BASIC METABOLIC PNL TOTAL CA: CPT | Mod: HCNC

## 2019-06-14 PROCEDURE — 25000003 PHARM REV CODE 250: Mod: HCNC | Performed by: INTERNAL MEDICINE

## 2019-06-14 PROCEDURE — 36415 COLL VENOUS BLD VENIPUNCTURE: CPT | Mod: HCNC

## 2019-06-14 PROCEDURE — 97162 PT EVAL MOD COMPLEX 30 MIN: CPT | Mod: HCNC

## 2019-06-14 RX ORDER — SODIUM CHLORIDE 0.9 % (FLUSH) 0.9 %
10 SYRINGE (ML) INJECTION
Status: DISCONTINUED | OUTPATIENT
Start: 2019-06-14 | End: 2019-06-18 | Stop reason: HOSPADM

## 2019-06-14 RX ADMIN — TUBERCULIN PURIFIED PROTEIN DERIVATIVE 5 UNITS: 5 INJECTION, SOLUTION INTRADERMAL at 02:06

## 2019-06-14 RX ADMIN — FUROSEMIDE 40 MG: 40 TABLET ORAL at 08:06

## 2019-06-14 RX ADMIN — GABAPENTIN 600 MG: 300 CAPSULE ORAL at 08:06

## 2019-06-14 RX ADMIN — DABIGATRAN ETEXILATE MESYLATE 150 MG: 150 CAPSULE ORAL at 08:06

## 2019-06-14 RX ADMIN — INSULIN DETEMIR 10 UNITS: 100 INJECTION, SOLUTION SUBCUTANEOUS at 10:06

## 2019-06-14 RX ADMIN — INSULIN DETEMIR 10 UNITS: 100 INJECTION, SOLUTION SUBCUTANEOUS at 08:06

## 2019-06-14 RX ADMIN — DABIGATRAN ETEXILATE MESYLATE 150 MG: 150 CAPSULE ORAL at 09:06

## 2019-06-14 RX ADMIN — CASTOR OIL AND BALSAM, PERU: 788; 87 OINTMENT TOPICAL at 10:06

## 2019-06-14 RX ADMIN — AMIODARONE HYDROCHLORIDE 200 MG: 200 TABLET ORAL at 08:06

## 2019-06-14 RX ADMIN — INSULIN ASPART 5 UNITS: 100 INJECTION, SOLUTION INTRAVENOUS; SUBCUTANEOUS at 01:06

## 2019-06-14 RX ADMIN — OXYCODONE HYDROCHLORIDE AND ACETAMINOPHEN 1500 MG: 500 TABLET ORAL at 08:06

## 2019-06-14 RX ADMIN — CASTOR OIL AND BALSAM, PERU: 788; 87 OINTMENT TOPICAL at 09:06

## 2019-06-14 RX ADMIN — LEVOTHYROXINE SODIUM 75 MCG: 25 TABLET ORAL at 05:06

## 2019-06-14 RX ADMIN — CARVEDILOL 25 MG: 25 TABLET, FILM COATED ORAL at 08:06

## 2019-06-14 RX ADMIN — SPIRONOLACTONE 25 MG: 25 TABLET, FILM COATED ORAL at 08:06

## 2019-06-14 RX ADMIN — OXYCODONE HYDROCHLORIDE AND ACETAMINOPHEN 1500 MG: 500 TABLET ORAL at 09:06

## 2019-06-14 RX ADMIN — AMIODARONE HYDROCHLORIDE 200 MG: 200 TABLET ORAL at 09:06

## 2019-06-14 RX ADMIN — ASPIRIN 81 MG CHEWABLE TABLET 81 MG: 81 TABLET CHEWABLE at 08:06

## 2019-06-14 RX ADMIN — GABAPENTIN 600 MG: 300 CAPSULE ORAL at 02:06

## 2019-06-14 RX ADMIN — INSULIN ASPART 5 UNITS: 100 INJECTION, SOLUTION INTRAVENOUS; SUBCUTANEOUS at 08:06

## 2019-06-14 RX ADMIN — INSULIN ASPART 5 UNITS: 100 INJECTION, SOLUTION INTRAVENOUS; SUBCUTANEOUS at 05:06

## 2019-06-14 RX ADMIN — GABAPENTIN 600 MG: 300 CAPSULE ORAL at 09:06

## 2019-06-14 RX ADMIN — SENNOSIDES AND DOCUSATE SODIUM 1 TABLET: 8.6; 5 TABLET ORAL at 08:06

## 2019-06-14 NOTE — NURSING
"Patient not on tell since about 5 pm. He refused to be on tele stating " that damn box is too heavy, take it off!". Will continue to monitor.  "

## 2019-06-14 NOTE — PROGRESS NOTES
"[facility] Hospital Medicine  Progress Note    Patient Name: Madhav Cortez  MRN: [id number]  Patient Class: OP- Observation   Admission Date: 6/12/2019  Length of Stay: 0 days  Attending Physician: Heather Moore MD  Primary Care Provider: Mirna Reyes MD    [facility] Medicine Team: [facility] [place] [facility]OSP MED E Nguyễn Mendoza PA-C    Subjective:     Principal Problem:NSTEMI (non-ST elevated myocardial infarction)      HPI:  Madhav Cortez is a 79 y.o. M with pmhx HFrEF (10%), HTN, atrial fib on anticoagulation and atrial flutter (s/p cardioversion), HLP, venous insufficiency, chronic insomnia and chronic LE wounds who presents to the ED for evaluation of difficulty arousing patient this AM.  Per chart, "wife is unable to continue care for him at home.  Wife reports patient ingested 5 trazodone 50 mg at 3am. Wife states she had difficulty waking him up and he has been drowsy. Wife reports Dr. Reyes is working on getting patient set up with skilled nursing, as she is unable to care for the patient any longer on her own.  " Patient confirms that he took 5 of his trazodone pills because he has been unable to sleep for the last 3 days.  He was able to sleep but recalls that his wife was unable to wake him up this AM.  He denies any SOB, CP, depression, anxiety, intention to harm.  He reports using a walker at baseline. Patient has been falling more, per chart review, 2/2 complications with his knees.  Patient denies any IVDU, amphetamine use, ETOH use.     ED: HDS, no leukocytosis.  Trop 7.2-->6.6.  EKG paced rhythm.  Cards consulted in ED and no concern for ACS.  UDS + for amphetamines, benzos. UA shows 3 hyaline casts. CTH shows "Motion limited exam, no convincing acute intracranial abnormalities".  CXR with no acute abnormality.    Overview/Hospital Course:  Madhav Cortez was admitted to hospital medicine for accidental drug overdose and elevated troponin. Cardiology was consulted " however believed elevation to be secondary to demand/stress, likely NSTEMI (type II). PT/OT recommending SNF.     Interval History: This patient has significantly improved since his initial presentation. He has no complaints at this time. Updated to plan of care and is agreeable.     Review of Systems   Constitutional: Negative for chills, fever and unexpected weight change.   HENT: Negative for ear pain and sore throat.    Eyes: Negative for pain and visual disturbance.   Respiratory: Negative for cough and shortness of breath.    Cardiovascular: Positive for leg swelling (chronic). Negative for chest pain and palpitations.   Gastrointestinal: Negative for abdominal pain, diarrhea, nausea and vomiting.   Genitourinary: Negative for dysuria, frequency and urgency.   Musculoskeletal: Positive for gait problem (baseline). Negative for back pain.   Skin: Positive for wound (chronic, LE wounds). Negative for rash.   Neurological: Negative for dizziness, weakness and headaches.   Psychiatric/Behavioral: Positive for confusion and sleep disturbance. Negative for hallucinations and self-injury. The patient is not nervous/anxious.      Objective:     Vital Signs (Most Recent):  Temp: 97.7 °F (36.5 °C) (06/14/19 1525)  Pulse: 60 (06/14/19 1600)  Resp: 20 (06/14/19 1525)  BP: (!) 87/52 (06/14/19 1525)  SpO2: 97 % (06/14/19 1525) Vital Signs (24h Range):  Temp:  [97 °F (36.1 °C)-98.4 °F (36.9 °C)] 97.7 °F (36.5 °C)  Pulse:  [60-62] 60  Resp:  [16-20] 20  SpO2:  [95 %-98 %] 97 %  BP: ()/(51-62) 87/52     Weight: 95.1 kg (209 lb 10.5 oz)(heel protectors included for wound care)  Body mass index is 30.08 kg/m².    Intake/Output Summary (Last 24 hours) at 6/14/2019 1730  Last data filed at 6/14/2019 1300  Gross per 24 hour   Intake 930 ml   Output 2500 ml   Net -1570 ml      Physical Exam   Constitutional: He is oriented to person, place, and time. He appears well-developed and well-nourished.   HENT:   Head: Normocephalic  and atraumatic.   Eyes: Pupils are equal, round, and reactive to light. Conjunctivae and EOM are normal.   Neck: Normal range of motion. Neck supple.   Cardiovascular: Normal rate, regular rhythm and intact distal pulses.   Diminished heart sounds   Pulmonary/Chest: Effort normal.   Diminished lung sounds   Abdominal: Soft. Bowel sounds are normal. He exhibits no distension. There is no tenderness.   Musculoskeletal: Normal range of motion. He exhibits tenderness (1+/trace pitting edema to BL feet).   Neurological: He is alert and oriented to person, place, and time.   Skin: Skin is warm and dry.   Chronic appearing venous ulcers to lower extremities with multiple bandages in place.     Psychiatric: He has a normal mood and affect. His speech is normal and behavior is normal. Thought content normal. He expresses impulsivity.   sleepy, conversant elderly male in NAD   Nursing note and vitals reviewed.      Significant Labs: All pertinent labs within the past 24 hours have been reviewed.    Significant Imaging: I have reviewed all pertinent imaging results/findings within the past 24 hours.      Assessment/Plan:      * NSTEMI (non-ST elevated myocardial infarction)  Stable, denies symptoms.   - Cardiology following, appreciate recs  -  ICD device interrogation with no tachyarrhythmias   - Continue medical therapy with ASA 81mg daily & Pradaxa  - No indication for triple anticoagulation therapy at this  time  - Suspected demand ischemia 2/2 trazodone use/acuity with associated hypotension.  Extensive cardiac hx.  - Evaluated by cards in the ED and do not recommend ACS protocol  - No anginal equivalent or EKG changes.  - Trop 7.2-->6.6-->7.7>6.1>3.2.    - Incidentally, amphetamine+ on UDS.  Denies drug use.  - monitor on tele, trend trops  - if patient developed CP/SOB, contact cards.     Accidental drug overdose  Patient with chronic insomnia, unimproved with home trazodone/melatonin.  Ingested x5 of the trazodone 50  mg PO tablets. Not ingested to self harm, per patient.   - likely patient has mild dementia and has moments of clarity.  Will need to confirm with wife.  - No QT prolongation  - will hold for now    Discharge planning issues  Will need assistance from SW/CM for placement as wife can no longer care for patient at home.   - PT/OT consulted    Ulcer of left lower extremity, limited to breakdown of skin  Venous insufficiency of both lower extremities  - wound care consulted    Atrial fibrillation  Current use of long term anticoagulation    - c/w amiodarone, pradaxa  - CHADSVASC 6.  Fall risk and on multiple blood thinners.  Will need to discuss with patient and wife.   - monitor on tele; V-paced rhythm    Coronary artery disease  - c/w ASA, statin  - f/w cardiology  - Last heart cath in 2013 shows two vessel coronary artery disease and Patent LAD and DIagonal stents.  GALLO flow 3 throughout.   - will need outpatient (vs inpatient) stress.  No recent stress.     Type 2 diabetes mellitus with circulatory disorder, with long-term current use of insulin  Check hemoglobin A1c  - reduced home levemir from 15 U to 8 U QHS (previously on 40 U QHS).  Holding 3U aspart with meals  - low dose SSI, ACHS glucose.  Avoid hypoglycemia  - diabetic diet    Chronic systolic heart failure  ICD (implantable cardioverter-defibrillator), biventricular, in situ    - appears euvolemic  - c/w entresto, coreg, lasix  - Last TTE 5/2019 with EF 10-15%, Grade 2 DD, PASP 44, CVP 8  - strict I/O, daily weights, fluid rest diet    Essential hypertension  BP stable  - c/w home meds      VTE Risk Mitigation (From admission, onward)        Ordered     IP VTE HIGH RISK PATIENT  Once      06/14/19 0322     Reason for no Mechanical VTE Prophylaxis  Once      06/14/19 0322     dabigatran etexilate capsule 150 mg  2 times daily      06/12/19 2150     Place EDWIN hose  Until discontinued      06/12/19 2150     Place sequential compression device  Until  discontinued      06/12/19 2150                Nguyễn Mendoza PA-C  Department of Hospital Medicine   [facility]

## 2019-06-14 NOTE — PLAN OF CARE
Problem: Adult Inpatient Plan of Care  Goal: Plan of Care Review  Outcome: Ongoing (interventions implemented as appropriate)  Pt remain free from falls, injury, trauma. VS stable. No gtts initiated/maintained. Fall precautions reviewed and maintained. Plan to trend troponins, dual AC therapy, wound care. POC reviewed with pt and family. Pt and family verbalize understanding. Will continue to monitor.

## 2019-06-14 NOTE — ASSESSMENT & PLAN NOTE
Stable, denies symptoms.   - Cardiology following, appreciate recs  -  ICD device interrogation with no tachyarrhythmias   - Continue medical therapy with ASA 81mg daily & Pradaxa  - No indication for triple anticoagulation therapy at this  time  - Suspected demand ischemia 2/2 trazodone use/acuity with associated hypotension.  Extensive cardiac hx.  - Evaluated by cards in the ED and do not recommend ACS protocol  - No anginal equivalent or EKG changes.  - Trop 7.2-->6.6-->7.7>6.1>3.2.    - Incidentally, amphetamine+ on UDS.  Denies drug use.  - monitor on tele, trend trops  - if patient developed CP/SOB, contact cards.

## 2019-06-14 NOTE — PT/OT/SLP EVAL
"Physical Therapy Evaluation    Patient Name:  Madhav Cortez   MRN:  [id number]    Recommendations:     Discharge Recommendations:  nursing facility, skilled   Discharge Equipment Recommendations: none   Barriers to discharge: Decreased caregiver support    Assessment:     Madhav Cortez is a 79 y.o. male admitted with a medical diagnosis of NSTEMI (non-ST elevated myocardial infarction).  He presents with the following impairments/functional limitations:  weakness, impaired endurance, impaired self care skills, impaired functional mobilty, impaired balance, gait instability, decreased lower extremity function, pain, decreased safety awareness. Pt tolerated activity with severe deconditioning required mod-max assist for all mobility. Pt experienced near fall with rapid fatigue of B LE while ambulating, was lowered to sitting in chair with total assist. Pt with very poor insight into deficits, demo's lack of safety awareness, thinking he can perform mobility without assistance. Pt was provided with max education regarding current functional status and appropriate measures to maintain safety. Upon discharge, pt would benefit from continued skilled therapy intervention at skilled nursing facility to progress toward more independent mobility. At this time, pt would continue to benefit from acute skilled therapy intervention to address deficits and progress toward prior level of function.       Rehab Prognosis: Good; patient would benefit from acute skilled PT services to address these deficits and reach maximum level of function.    Recent Surgery: * No surgery found *      Plan:     During this hospitalization, patient to be seen 4 x/week to address the identified rehab impairments via gait training, therapeutic activities, therapeutic exercises, neuromuscular re-education and progress toward the following goals:    · Plan of Care Expires:  07/14/19    Subjective     Chief Complaint: Pt states "I am weak because I " "haven't been out of bed in 3 days!"   Patient/Family Comments/goals: to get better and return home   Pain/Comfort:  · Pain Rating 1: (pt reports pain in B knees, did not quantify )    Patients cultural, spiritual, Restorationist conflicts given the current situation: no    Living Environment:  Pt lives with spouse in a [facility] with 3 AUBREE with B HR.   Prior to admission, patients level of function: Pt ambulated with a rollator with supervision, required assistance with dressing and bathing. Pt has had 3 falls in the past 6 months, attributed to knees giving out. Pt has severe arthritis in bilateral knees, reports he cannot get knee replacements because of his heart deficits.  Equipment used at home: bedside commode, rollator, shower chair.  DME owned (not currently used): none.  Upon discharge, patient will have assistance from spouse.    Objective:     Communicated with RN prior to session.  Patient found up in chair with telemetry  upon PT entry to room.    General Precautions: Standard, fall   Orthopedic Precautions:N/A   Braces: N/A     Exams:  · Cognitive Exam:  Patient is AAOx4, followed all commands, communicates clearly and fluently  · Gross Motor Coordination:  WFL  · RLE ROM: WFL  · RLE Strength: WFL  · LLE ROM: WFL  · LLE Strength: WFL    Functional Mobility:  · Transfers:     · Sit to Stand:  maximal assistance with no AD, pt required extensive cuing, followed by hand over hand placement for pt to place B hands on arm rests instead of pull up on rollator.   · Gait: Pt took 8 very small steps forward with moderate assistance with use of rollator. Pt with crouched gait, flexed posture, flexed B knees, very small step size, unable to clear feet from floor. Pt then demo'd increasing weakness in B LE, began to sit. Pt required maximum assistance to remain standing while chair was brought behind him, was lowered to sitting in chair with total assistance.       Therapeutic Activities and Exercises:   Pt educated on " "role of PT/POC. Pt verbalized understanding.   Pt encouraged to only perform OOB mobility with assistance from nursing/therapy. Pt frustrated, stating "I can get up myself, with that thing (pointing to rollator). PT educated pt on safety awareness and that pt had required significant assistance for all mobility. Pt still resistive, reports "I can do it by myself, watch." Pt proceeded to attempt to stand ~15 attempts pushing through arm rests. Pt unable to come to standing without assistance. Pt named multiple excuses for failed attempts. Reinforcement provided to call nursing to assist with all OOB/OOchair activity.   Pt educated on seated exercises to perform 20x, 3x/day. Exercises included bilateral LAQ, marching, ankle DF/PF, LE abduction/adduction. Pt agreeable.         AM-PAC 6 CLICK MOBILITY  Total Score:12     Patient left up in chair with all lines intact, call button in reach, RN  notified and OT  present.    GOALS:   Multidisciplinary Problems     Physical Therapy Goals        Problem: Physical Therapy Goal    Goal Priority Disciplines Outcome Goal Variances Interventions   Physical Therapy Goal     PT, PT/OT Ongoing (interventions implemented as appropriate)     Description:  Goals to be met by: 2019     Patient will increase functional independence with mobility by performin. Supine to sit with Stand-by Assistance  2. Sit to stand transfer with Contact Guard Assistance  3. Bed to chair transfer with Contact Guard Assistance using Rolling Walker  4. Gait  x 20 feet with Contact Guard Assistance using Rolling Walker.   5. Ascend/descend 3 stair with bilateral Handrails Minimal Assistance using LRAD                      History:     Past Medical History:   Diagnosis Date    *Atrial fibrillation     *Atrial flutter     Acute exacerbation of CHF (congestive heart failure) 2013    Anticoagulant long-term use     Anxiety     Arthritis     Atrial flutter     Back pain     Cardiomyopathy  "    Cataract     Coronary artery disease     Depression     Diabetes mellitus     Heart bloc     Hepatitis B     Hyperlipidemia 4/1/2014    Hypertension     Myocardial infarction     Non-STEMI (non-ST elevated myocardial infarction) 5/26/2013    Nuclear sclerosis - Both Eyes 2/18/2013    Post PTCA 8/7/2012    Presence of biventricular AICD 2/23/2016    Stage 3 chronic kidney disease 12/11/2017    Tobacco dependence     Transaminitis 4/1/2014    Ventricular tachycardia        Past Surgical History:   Procedure Laterality Date    ABLATION N/A 4/8/2013    Per[facility] CATH LAB    APPENDECTOMY      BIOPSY LIVER AND ULTRASOUND N/A 5/26/2014    Performed by [facility] OR 2ND FLR    CARDIAC DEFIBRILLATOR PLACEMENT      CARDIAC DEFIBRILLATOR PLACEMENT      CARDIOVERSION N/A 10/10/2013    Performed by [facility] at [facility] CATH LAB    CARDIOVERSION N/A 8/19/2013    Performed by [facility] at [facility] CATH LAB    COLONOSCOPY      CORONARY ANGIOPLASTY WITH STENT PLACEMENT      FRACTURE SURGERY      L arm    INSERT / REPLACE / REMOVE PACEMAKER  12/15    bi-V upgrade    INSERTION-ICD-BIVENTRICULAR N/A 11/11/2015    Performed by [facility] at [facility] CATH LAB    RADIOFREQUENCY ABLATION      STEROID INJECTION KNEE Bilateral     received about every 4 months    TONSILLECTOMY      TRANSESOPHAGEAL ECHOCARDIOGRAM (DIOGO) N/A 10/10/2013    Performed by [facility] at [facility] CATH LAB    TRANSESOPHAGEAL ECHOCARDIOGRAM (DIOGO) N/A 8/19/2013    Performed by [facility] at [facility] CATH LAB    VASCULAR SURGERY         Time Tracking:     PT Received On: 06/14/19  PT Start Time: 1120     PT Stop Time: 1140  PT Total Time (min): 20 min     Billable Minutes: Evaluation 10 mins  and Gait Training 10 mins       Marlee Sarah, PT  06/14/2019

## 2019-06-14 NOTE — PT/OT/SLP EVAL
Occupational Therapy   Evaluation    Name: Madhav Cortez  MRN: [id number]  Admitting Diagnosis:  NSTEMI (non-ST elevated myocardial infarction)      Recommendations:     Discharge Recommendations: nursing facility, skilled  Discharge Equipment Recommendations:  none  Barriers to discharge:  Decreased caregiver support    Assessment:     Madhav Cortez is a 79 y.o. male with a medical diagnosis of NSTEMI (non-ST elevated myocardial infarction). Pt. currently demonstrates decreased (I) with ADLs, functional mobility & t/fs as well as decreased overall strength, ROM, endurance and balance. Pt with increased difficulty going from sit>stand due to bone-on-bone at the knee joints and only able to take ~ 8 small shuffling steps before having to be lowered to chair due to LEs giving out. Pt demonstrates poor insight of deficits thinking he is able to stand and walk w/o assistance while he would be considered a high fall risk at this time. Pt would benefit from skilled OT services as well as SNF placement to address these deficits and to facilitate improving (I) with daily tasks. Performance deficits affecting function: weakness, impaired self care skills, impaired balance, decreased coordination, decreased safety awareness, impaired functional mobilty, impaired endurance, gait instability, decreased lower extremity function.      Rehab Prognosis: Good; patient would benefit from acute skilled OT services to address these deficits and reach maximum level of function.       Plan:     Patient to be seen 3 x/week to address the above listed problems via self-care/home management, therapeutic activities, therapeutic exercises  · Plan of Care Expires: 07/14/19  · Plan of Care Reviewed with: patient, spouse    Subjective     Chief Complaint: LE weakness  Patient/Family Comments/goals: To be (I) again.    Occupational Profile:  Living Environment: Pt lives with spouse in a [facility] with 3 AUBREE with B HR.   Prior to admission,  patients level of function: Pt ambulated with a rollator with supervision, required assistance with LB dressing and bathing. Pt has had 3 falls in the past 6 months, attributed to knees giving out. Pt has severe arthritis in bilateral knees, reports he cannot get knee replacements because of his heart deficits. Wife reports having trouble caring for him. Equipment used at home: bedside commode, rollator, shower chair.  DME owned (not currently used): none.  Upon discharge, patient will have assistance from spouse.    Pain/Comfort:  · Pain Rating 1: 0/10    Patients cultural, spiritual, Nondenominational conflicts given the current situation:      Objective:     Communicated with: rn prior to session.  Patient found up in chair with   upon OT entry to room.    General Precautions: Standard, fall   Orthopedic Precautions:    Braces:       Occupational Performance:    Bed Mobility:    · Up in chair.    Functional Mobility/Transfers:  · Patient completed Sit <> Stand Transfer with maximal assistance  with  rolling walker   · Functional Mobility: Pt took 8 small steps with rollator/Mod A before knees gave out and had to be lowered to chair with total A.    Activities of Daily Living:  · Grooming: supervision seated  · Upper Body Dressing: supervision  · Lower Body Dressing: minimum assistance can reach feet but with increased effort.    Cognitive/Visual Perceptual:  Cognitive/Psychosocial Skills:     -       Oriented to: Person, Place, Time and Situation   -       Safety awareness/insight to disability: impaired     Physical Exam:  BUE AROM/MMT: WNL  Sitting balance = Good  Standing balance = Fair(-)    AMPAC 6 Click ADL:  AMPAC Total Score: 19    Treatment & Education:  UE ROM/MMT  Bed mobility training / assessment  Functional mobility assessment  Education on sit<>stand technique --- hand placement  Sit/standing balance assessment  Educated on importance of sitting OOB in bedside chair to promote increased strength,  endurance & breathing.  Discussed OT POC / Post-acute plan  Education:    Patient left up in chair with all lines intact and call button in reach    GOALS:   Multidisciplinary Problems     Occupational Therapy Goals        Problem: Occupational Therapy Goal    Goal Priority Disciplines Outcome Interventions   Occupational Therapy Goal     OT, PT/OT Ongoing (interventions implemented as appropriate)    Description:  Goals to be met by: 6/21/19     Patient will increase functional independence with ADLs by performing:    Grooming while standing at sink with Stand-by Assistance.  Toileting from toilet with Minimal Assistance for hygiene and clothing management.   Supine to sit with Supervision.  Toilet transfer to toilet with Contact Guard Assistance.                      History:     Past Medical History:   Diagnosis Date    *Atrial fibrillation     *Atrial flutter     Acute exacerbation of CHF (congestive heart failure) 8/2/2013    Anticoagulant long-term use     Anxiety     Arthritis     Atrial flutter     Back pain     Cardiomyopathy     Cataract     Coronary artery disease     Depression     Diabetes mellitus     Heart bloc     Hepatitis B     Hyperlipidemia 4/1/2014    Hypertension     Myocardial infarction     Non-STEMI (non-ST elevated myocardial infarction) 5/26/2013    Nuclear sclerosis - Both Eyes 2/18/2013    Post PTCA 8/7/2012    Presence of biventricular AICD 2/23/2016    Stage 3 chronic kidney disease 12/11/2017    Tobacco dependence     Transaminitis 4/1/2014    Ventricular tachycardia        Past Surgical History:   Procedure Laterality Date    ABLATION N/A 4/8/2013    Performed by [facility] CATH LAB    APPENDECTOMY      BIOPSY LIVER AND ULTRASOUND N/A 5/26/2014    [facility]    CARDIAC DEFIBRILLATOR PLACEMENT      CARDIAC DEFIBRILLATOR PLACEMENT      CARDIOVERSION N/A 10/10/2013    Performed by [facility]  [facility] at [facility] CATH LAB    CARDIOVERSION N/A 8/19/2013    Performed by [facility] at [facility] CATH LAB    COLONOSCOPY      CORONARY ANGIOPLASTY WITH STENT PLACEMENT      FRACTURE SURGERY      L arm    INSERT / REPLACE / REMOVE PACEMAKER  12/15    bi-V upgrade    INSERTION-ICD-BIVENTRICULAR N/A 11/11/2015    Per[facility] CATH LAB    RADIOFREQUENCY ABLATION      STEROID INJECTION KNEE Bilateral     received about every 4 months    TONSILLECTOMY      TRANSESOPHAGEAL ECHOCARDIOGRAM (DIOGO) N/A 10/10/2013    Performed by [facility] at [facility] CATH LAB    TRANSESOPHAGEAL ECHOCARDIOGRAM (DIOGO) N/A 8/19/2013    Performed by [facility] at [facility] CATH LAB    VASCULAR SURGERY         Time Tracking:     OT Date of Treatment: 06/14/19  OT Start Time: 1122  OT Stop Time: 1142  OT Total Time (min): 20 min    Billable Minutes:Evaluation 20    JOSHUA Soriano  6/14/2019

## 2019-06-14 NOTE — PLAN OF CARE
06/14/19 0928   Post-Acute Status   Post-Acute Authorization Placement   Post-Acute Placement Status Referrals Sent

## 2019-06-14 NOTE — PROGRESS NOTES
[facility]  Cardiology  Progress Note    Patient Name: Madhav Cortez  MRN: [id number]  Admission Date: 6/12/2019  Hospital Length of Stay: 0 days  Code Status: Full Code   Attending Physician: Heather Moore MD   Primary Care Physician: Mirna Reyes MD  Expected Discharge Date: 6/18/2019  Principal Problem:NSTEMI (non-ST elevated myocardial infarction)    Subjective:     Hospital Course:   No notes on file    Interval History: No acute events overnight. Troponin's are down trending. Patient denies chest pain, SOB, dizziness, palpitations.    Review of Systems   Constitution: Negative for decreased appetite and fever.   HENT: Negative for congestion and sore throat.    Eyes: Negative for photophobia and visual disturbance.   Cardiovascular: Negative for chest pain, cyanosis, leg swelling, palpitations and syncope.   Respiratory: Negative for cough and shortness of breath.    Gastrointestinal: Negative for abdominal pain, nausea and vomiting.   Neurological: Negative for light-headedness and weakness.   Psychiatric/Behavioral: Negative for altered mental status.     Objective:     Vital Signs (Most Recent):  Temp: 98.4 °F (36.9 °C) (06/14/19 0741)  Pulse: 60 (06/14/19 0741)  Resp: 16 (06/14/19 0741)  BP: (!) 96/51 (06/14/19 0741)  SpO2: 96 % (06/14/19 0741) Vital Signs (24h Range):  Temp:  [97 °F (36.1 °C)-98.4 °F (36.9 °C)] 98.4 °F (36.9 °C)  Pulse:  [59-62] 60  Resp:  [16-20] 16  SpO2:  [91 %-97 %] 96 %  BP: ()/(51-63) 96/51     Weight: 95.1 kg (209 lb 10.5 oz)(heel protectors included for wound care)  Body mass index is 30.08 kg/m².     SpO2: 96 %  O2 Device (Oxygen Therapy): room air      Intake/Output Summary (Last 24 hours) at 6/14/2019 1036  Last data filed at 6/14/2019 0427  Gross per 24 hour   Intake 540 ml   Output 2100 ml   Net -1560 ml       Lines/Drains/Airways     Peripheral Intravenous Line                 Peripheral IV - Single Lumen 05/24/19 1125 20 G Right Forearm  20 days         Peripheral IV - Single Lumen 06/12/19 1753 18 G Left Hand 1 day                Physical Exam   Constitutional: Vital signs are normal. He appears well-developed and well-nourished. He is cooperative.  Non-toxic appearance. No distress.   HENT:   Head: Normocephalic and atraumatic.   Eyes: Right eye exhibits no discharge. No scleral icterus.   Neck: No hepatojugular reflux and no JVD present. Carotid bruit is not present.   Cardiovascular: Normal rate, regular rhythm, S1 normal, S2 normal and normal heart sounds. Exam reveals no gallop.   No murmur heard.  No lower extremity edema   Pulmonary/Chest: Effort normal and breath sounds normal. No respiratory distress. He has no decreased breath sounds. He has no wheezes. He has no rhonchi.   On supine exam, palpable ICD in left chest wall   Abdominal: Soft. Normal appearance and bowel sounds are normal. He exhibits no distension. There is no tenderness.   Musculoskeletal: He exhibits no tenderness.   Neurological: He is alert.   Skin: Skin is warm, dry and intact. He is not diaphoretic. No erythema.       Significant Labs:   CMP   Recent Labs   Lab 06/12/19 1756 06/13/19 0427 06/14/19  0352    139 141   K 4.5 4.5 3.8    102 100   CO2 28 26 33*   * 193* 130*   BUN 17 17 17   CREATININE 0.8 0.8 0.9   CALCIUM 9.6 9.4 9.3   PROT 6.8  --   --    ALBUMIN 3.4*  --   --    BILITOT 0.8  --   --    ALKPHOS 88  --   --    AST 98*  --   --    ALT 78*  --   --    ANIONGAP 11 11 8   ESTGFRAFRICA >60.0 >60.0 >60.0   EGFRNONAA >60.0 >60.0 >60.0    and CBC   Recent Labs   Lab 06/12/19 1756 06/13/19 0427 06/14/19  0352   WBC 8.27 8.00 8.44   HGB 12.7* 12.0* 12.9*   HCT 39.0* 36.6* 39.4*    223 229       Significant Imaging: n/a    Assessment and Plan:     * NSTEMI (non-ST elevated myocardial infarction)  78 yo male with pAF, AFL, ICM, HFrEF(10-15%) , BV ICD, CAD s/p PCI presents with lethargy s/p accidental drug overdose (Temazepam). Found to  have troponin's 7.202>> 6.66 >> 7.79 >>6.13 of unclear clinical significance as patient not complaining of anginal symptoms. EKG with paced rhythm but no obvious ischemic changes.     Reccs  - ICD device interrogation with no tachyarrhythmias   - Continue medical therapy with ASA 81mg daily & Pradaxa  - No indication for triple anticoagulation therapy at this  Time  - Please arrange for outpatient follow up with cardiologist Dr. Kira Palm MD    Accidental drug overdose  --would discontinue medication  --no QT prolongation at the moment  --continue to monitor    Thanks for your consult. We will sign off now. Please call us if you have any questions or concerns.    VTE Risk Mitigation (From admission, onward)        Ordered     IP VTE HIGH RISK PATIENT  Once      06/14/19 0322     Reason for no Mechanical VTE Prophylaxis  Once      06/14/19 0322     dabigatran etexilate capsule 150 mg  2 times daily      06/12/19 2150     Place EDWIN hose  Until discontinued      06/12/19 2150     Place sequential compression device  Until discontinued      06/12/19 2150          Brooke Ellis DO  Cardiology  [facility]

## 2019-06-14 NOTE — SUBJECTIVE & OBJECTIVE
Interval History: This patient has significantly improved since his initial presentation. He has no complaints at this time. Updated to plan of care and is agreeable.     Review of Systems   Constitutional: Negative for chills, fever and unexpected weight change.   HENT: Negative for ear pain and sore throat.    Eyes: Negative for pain and visual disturbance.   Respiratory: Negative for cough and shortness of breath.    Cardiovascular: Positive for leg swelling (chronic). Negative for chest pain and palpitations.   Gastrointestinal: Negative for abdominal pain, diarrhea, nausea and vomiting.   Genitourinary: Negative for dysuria, frequency and urgency.   Musculoskeletal: Positive for gait problem (baseline). Negative for back pain.   Skin: Positive for wound (chronic, LE wounds). Negative for rash.   Neurological: Negative for dizziness, weakness and headaches.   Psychiatric/Behavioral: Positive for confusion and sleep disturbance. Negative for hallucinations and self-injury. The patient is not nervous/anxious.      Objective:     Vital Signs (Most Recent):  Temp: 97.7 °F (36.5 °C) (06/14/19 1525)  Pulse: 60 (06/14/19 1600)  Resp: 20 (06/14/19 1525)  BP: (!) 87/52 (06/14/19 1525)  SpO2: 97 % (06/14/19 1525) Vital Signs (24h Range):  Temp:  [97 °F (36.1 °C)-98.4 °F (36.9 °C)] 97.7 °F (36.5 °C)  Pulse:  [60-62] 60  Resp:  [16-20] 20  SpO2:  [95 %-98 %] 97 %  BP: ()/(51-62) 87/52     Weight: 95.1 kg (209 lb 10.5 oz)(heel protectors included for wound care)  Body mass index is 30.08 kg/m².    Intake/Output Summary (Last 24 hours) at 6/14/2019 1730  Last data filed at 6/14/2019 1300  Gross per 24 hour   Intake 930 ml   Output 2500 ml   Net -1570 ml      Physical Exam   Constitutional: He is oriented to person, place, and time. He appears well-developed and well-nourished.   HENT:   Head: Normocephalic and atraumatic.   Eyes: Pupils are equal, round, and reactive to light. Conjunctivae and EOM are normal.   Neck:  Normal range of motion. Neck supple.   Cardiovascular: Normal rate, regular rhythm and intact distal pulses.   Diminished heart sounds   Pulmonary/Chest: Effort normal.   Diminished lung sounds   Abdominal: Soft. Bowel sounds are normal. He exhibits no distension. There is no tenderness.   Musculoskeletal: Normal range of motion. He exhibits tenderness (1+/trace pitting edema to BL feet).   Neurological: He is alert and oriented to person, place, and time.   Skin: Skin is warm and dry.   Chronic appearing venous ulcers to lower extremities with multiple bandages in place.     Psychiatric: He has a normal mood and affect. His speech is normal and behavior is normal. Thought content normal. He expresses impulsivity.   sleepy, conversant elderly male in NAD   Nursing note and vitals reviewed.      Significant Labs: All pertinent labs within the past 24 hours have been reviewed.    Significant Imaging: I have reviewed all pertinent imaging results/findings within the past 24 hours.

## 2019-06-14 NOTE — ASSESSMENT & PLAN NOTE
78 yo male with pAF, AFL, ICM, HFrEF(10-15%) , BV ICD, CAD s/p PCI presents with lethargy s/p accidental drug overdose (Temazepam). Found to have troponin's 7.202>> 6.66 >> 7.79 >>6.13 of unclear clinical significance as patient not complaining of anginal symptoms. EKG with paced rhythm but no obvious ischemic changes.     Reccs  - ICD device interrogation with no tachyarrhythmias   - Continue medical therapy with ASA 81mg daily & Pradaxa  - No indication for triple anticoagulation therapy at this  Time  - Please arrange for outpatient follow up with cardiologist Dr. Kira Palm MD

## 2019-06-14 NOTE — PLAN OF CARE
Problem: Adult Inpatient Plan of Care  Goal: Plan of Care Review  Patient remains free from falls and injuries through out shift. Patient AAO and VSS. CBG ac/hs. Scheduled insulin given. Patient denies chest pain and SOB. Plan to trend troponins, dual AC therapy, wound care. TB skin test ordered.  Plan to transfer to SNF. Plan of care reviewed with patient. Patient verbalizes understanding of plan.  Will continue to monitor.

## 2019-06-14 NOTE — PLAN OF CARE
Problem: Occupational Therapy Goal  Goal: Occupational Therapy Goal  Goals to be met by: 6/21/19     Patient will increase functional independence with ADLs by performing:    Grooming while standing at sink with Stand-by Assistance.  Toileting from toilet with Minimal Assistance for hygiene and clothing management.   Supine to sit with Supervision.  Toilet transfer to toilet with Contact Guard Assistance.    Outcome: Ongoing (interventions implemented as appropriate)  Evaluation completed and POC established.    JOSHUA Weiss

## 2019-06-14 NOTE — PLAN OF CARE
Problem: Physical Therapy Goal  Goal: Physical Therapy Goal  Goals to be met by: 2019     Patient will increase functional independence with mobility by performin. Supine to sit with Stand-by Assistance  2. Sit to stand transfer with Contact Guard Assistance  3. Bed to chair transfer with Contact Guard Assistance using Rolling Walker  4. Gait  x 20 feet with Contact Guard Assistance using Rolling Walker.   5. Ascend/descend 3 stair with bilateral Handrails Minimal Assistance using LRAD    Outcome: Ongoing (interventions implemented as appropriate)  Pt evaluated and appropriate goals established.

## 2019-06-14 NOTE — SUBJECTIVE & OBJECTIVE
Interval History: No acute events overnight. Troponin's are down trending. Patient denies chest pain, SOB, dizziness, palpitations.    Review of Systems   Constitution: Negative for decreased appetite and fever.   HENT: Negative for congestion and sore throat.    Eyes: Negative for photophobia and visual disturbance.   Cardiovascular: Negative for chest pain, cyanosis, leg swelling, palpitations and syncope.   Respiratory: Negative for cough and shortness of breath.    Gastrointestinal: Negative for abdominal pain, nausea and vomiting.   Neurological: Negative for light-headedness and weakness.   Psychiatric/Behavioral: Negative for altered mental status.     Objective:     Vital Signs (Most Recent):  Temp: 98.4 °F (36.9 °C) (06/14/19 0741)  Pulse: 60 (06/14/19 0741)  Resp: 16 (06/14/19 0741)  BP: (!) 96/51 (06/14/19 0741)  SpO2: 96 % (06/14/19 0741) Vital Signs (24h Range):  Temp:  [97 °F (36.1 °C)-98.4 °F (36.9 °C)] 98.4 °F (36.9 °C)  Pulse:  [59-62] 60  Resp:  [16-20] 16  SpO2:  [91 %-97 %] 96 %  BP: ()/(51-63) 96/51     Weight: 95.1 kg (209 lb 10.5 oz)(heel protectors included for wound care)  Body mass index is 30.08 kg/m².     SpO2: 96 %  O2 Device (Oxygen Therapy): room air      Intake/Output Summary (Last 24 hours) at 6/14/2019 1036  Last data filed at 6/14/2019 0427  Gross per 24 hour   Intake 540 ml   Output 2100 ml   Net -1560 ml       Lines/Drains/Airways     Peripheral Intravenous Line                 Peripheral IV - Single Lumen 05/24/19 1125 20 G Right Forearm 20 days         Peripheral IV - Single Lumen 06/12/19 1753 18 G Left Hand 1 day                Physical Exam   Constitutional: Vital signs are normal. He appears well-developed and well-nourished. He is cooperative.  Non-toxic appearance. No distress.   HENT:   Head: Normocephalic and atraumatic.   Eyes: Right eye exhibits no discharge. No scleral icterus.   Neck: No hepatojugular reflux and no JVD present. Carotid bruit is not present.    Cardiovascular: Normal rate, regular rhythm, S1 normal, S2 normal and normal heart sounds. Exam reveals no gallop.   No murmur heard.  No lower extremity edema   Pulmonary/Chest: Effort normal and breath sounds normal. No respiratory distress. He has no decreased breath sounds. He has no wheezes. He has no rhonchi.   On supine exam, palpable ICD in left chest wall   Abdominal: Soft. Normal appearance and bowel sounds are normal. He exhibits no distension. There is no tenderness.   Musculoskeletal: He exhibits no tenderness.   Neurological: He is alert.   Skin: Skin is warm, dry and intact. He is not diaphoretic. No erythema.       Significant Labs:   CMP   Recent Labs   Lab 06/12/19 1756 06/13/19 0427 06/14/19  0352    139 141   K 4.5 4.5 3.8    102 100   CO2 28 26 33*   * 193* 130*   BUN 17 17 17   CREATININE 0.8 0.8 0.9   CALCIUM 9.6 9.4 9.3   PROT 6.8  --   --    ALBUMIN 3.4*  --   --    BILITOT 0.8  --   --    ALKPHOS 88  --   --    AST 98*  --   --    ALT 78*  --   --    ANIONGAP 11 11 8   ESTGFRAFRICA >60.0 >60.0 >60.0   EGFRNONAA >60.0 >60.0 >60.0    and CBC   Recent Labs   Lab 06/12/19 1756 06/13/19 0427 06/14/19  0352   WBC 8.27 8.00 8.44   HGB 12.7* 12.0* 12.9*   HCT 39.0* 36.6* 39.4*    223 229       Significant Imaging: n/a

## 2019-06-15 LAB
ANION GAP SERPL CALC-SCNC: 12 MMOL/L (ref 8–16)
BASOPHILS # BLD AUTO: 0.01 K/UL (ref 0–0.2)
BASOPHILS NFR BLD: 0.1 % (ref 0–1.9)
BUN SERPL-MCNC: 24 MG/DL (ref 8–23)
CALCIUM SERPL-MCNC: 9.4 MG/DL (ref 8.7–10.5)
CHLORIDE SERPL-SCNC: 98 MMOL/L (ref 95–110)
CO2 SERPL-SCNC: 29 MMOL/L (ref 23–29)
CREAT SERPL-MCNC: 0.9 MG/DL (ref 0.5–1.4)
DIFFERENTIAL METHOD: ABNORMAL
EOSINOPHIL # BLD AUTO: 0.1 K/UL (ref 0–0.5)
EOSINOPHIL NFR BLD: 0.9 % (ref 0–8)
ERYTHROCYTE [DISTWIDTH] IN BLOOD BY AUTOMATED COUNT: 15.2 % (ref 11.5–14.5)
EST. GFR  (AFRICAN AMERICAN): >60 ML/MIN/1.73 M^2
EST. GFR  (NON AFRICAN AMERICAN): >60 ML/MIN/1.73 M^2
GLUCOSE SERPL-MCNC: 174 MG/DL (ref 70–110)
HCT VFR BLD AUTO: 37.1 % (ref 40–54)
HGB BLD-MCNC: 12.3 G/DL (ref 14–18)
IMM GRANULOCYTES # BLD AUTO: 0.04 K/UL (ref 0–0.04)
IMM GRANULOCYTES NFR BLD AUTO: 0.4 % (ref 0–0.5)
LYMPHOCYTES # BLD AUTO: 1.4 K/UL (ref 1–4.8)
LYMPHOCYTES NFR BLD: 15.9 % (ref 18–48)
MCH RBC QN AUTO: 31.1 PG (ref 27–31)
MCHC RBC AUTO-ENTMCNC: 33.2 G/DL (ref 32–36)
MCV RBC AUTO: 94 FL (ref 82–98)
MONOCYTES # BLD AUTO: 1.1 K/UL (ref 0.3–1)
MONOCYTES NFR BLD: 12.7 % (ref 4–15)
NEUTROPHILS # BLD AUTO: 6.2 K/UL (ref 1.8–7.7)
NEUTROPHILS NFR BLD: 70 % (ref 38–73)
NRBC BLD-RTO: 0 /100 WBC
PLATELET # BLD AUTO: 216 K/UL (ref 150–350)
PMV BLD AUTO: 11.3 FL (ref 9.2–12.9)
POCT GLUCOSE: 126 MG/DL (ref 70–110)
POCT GLUCOSE: 147 MG/DL (ref 70–110)
POCT GLUCOSE: 196 MG/DL (ref 70–110)
POCT GLUCOSE: 278 MG/DL (ref 70–110)
POTASSIUM SERPL-SCNC: 3.7 MMOL/L (ref 3.5–5.1)
RBC # BLD AUTO: 3.95 M/UL (ref 4.6–6.2)
SODIUM SERPL-SCNC: 139 MMOL/L (ref 136–145)
WBC # BLD AUTO: 8.92 K/UL (ref 3.9–12.7)

## 2019-06-15 PROCEDURE — 36415 COLL VENOUS BLD VENIPUNCTURE: CPT | Mod: HCNC

## 2019-06-15 PROCEDURE — 99226 PR SUBSEQUENT OBSERVATION CARE,LEVEL III: CPT | Mod: HCNC,,, | Performed by: PHYSICIAN ASSISTANT

## 2019-06-15 PROCEDURE — 85025 COMPLETE CBC W/AUTO DIFF WBC: CPT | Mod: HCNC

## 2019-06-15 PROCEDURE — 80048 BASIC METABOLIC PNL TOTAL CA: CPT | Mod: HCNC

## 2019-06-15 PROCEDURE — 25000003 PHARM REV CODE 250: Mod: HCNC | Performed by: PHYSICIAN ASSISTANT

## 2019-06-15 PROCEDURE — 99226 PR SUBSEQUENT OBSERVATION CARE,LEVEL III: ICD-10-PCS | Mod: HCNC,,, | Performed by: PHYSICIAN ASSISTANT

## 2019-06-15 PROCEDURE — G0378 HOSPITAL OBSERVATION PER HR: HCPCS | Mod: HCNC

## 2019-06-15 RX ADMIN — SPIRONOLACTONE 25 MG: 25 TABLET, FILM COATED ORAL at 08:06

## 2019-06-15 RX ADMIN — ASPIRIN 81 MG CHEWABLE TABLET 81 MG: 81 TABLET CHEWABLE at 08:06

## 2019-06-15 RX ADMIN — AMIODARONE HYDROCHLORIDE 200 MG: 200 TABLET ORAL at 08:06

## 2019-06-15 RX ADMIN — DABIGATRAN ETEXILATE MESYLATE 150 MG: 150 CAPSULE ORAL at 08:06

## 2019-06-15 RX ADMIN — OXYCODONE HYDROCHLORIDE AND ACETAMINOPHEN 1500 MG: 500 TABLET ORAL at 08:06

## 2019-06-15 RX ADMIN — GABAPENTIN 600 MG: 300 CAPSULE ORAL at 08:06

## 2019-06-15 RX ADMIN — CASTOR OIL AND BALSAM, PERU: 788; 87 OINTMENT TOPICAL at 09:06

## 2019-06-15 RX ADMIN — INSULIN ASPART 3 UNITS: 100 INJECTION, SOLUTION INTRAVENOUS; SUBCUTANEOUS at 04:06

## 2019-06-15 RX ADMIN — INSULIN ASPART 5 UNITS: 100 INJECTION, SOLUTION INTRAVENOUS; SUBCUTANEOUS at 08:06

## 2019-06-15 RX ADMIN — INSULIN DETEMIR 10 UNITS: 100 INJECTION, SOLUTION SUBCUTANEOUS at 08:06

## 2019-06-15 RX ADMIN — CASTOR OIL AND BALSAM, PERU: 788; 87 OINTMENT TOPICAL at 08:06

## 2019-06-15 RX ADMIN — LEVOTHYROXINE SODIUM 75 MCG: 25 TABLET ORAL at 06:06

## 2019-06-15 RX ADMIN — INSULIN DETEMIR 10 UNITS: 100 INJECTION, SOLUTION SUBCUTANEOUS at 09:06

## 2019-06-15 RX ADMIN — INSULIN ASPART 5 UNITS: 100 INJECTION, SOLUTION INTRAVENOUS; SUBCUTANEOUS at 11:06

## 2019-06-15 RX ADMIN — INSULIN ASPART 5 UNITS: 100 INJECTION, SOLUTION INTRAVENOUS; SUBCUTANEOUS at 04:06

## 2019-06-15 RX ADMIN — GABAPENTIN 600 MG: 300 CAPSULE ORAL at 02:06

## 2019-06-15 NOTE — SUBJECTIVE & OBJECTIVE
Interval History: Pt ornery this morning as he has multiple complaints regarding nursing and lab scheduling. Discussed with nursing.     Review of Systems   Constitutional: Negative for chills, fever and unexpected weight change.   HENT: Negative for ear pain and sore throat.    Eyes: Negative for pain and visual disturbance.   Respiratory: Negative for cough and shortness of breath.    Cardiovascular: Positive for leg swelling (chronic). Negative for chest pain and palpitations.   Gastrointestinal: Negative for abdominal pain, diarrhea, nausea and vomiting.   Genitourinary: Negative for dysuria, frequency and urgency.   Musculoskeletal: Positive for gait problem (baseline). Negative for back pain.   Skin: Positive for wound (chronic, LE wounds). Negative for rash.   Neurological: Negative for dizziness, weakness and headaches.   Psychiatric/Behavioral: Positive for sleep disturbance. Negative for confusion, hallucinations and self-injury. The patient is not nervous/anxious.      Objective:     Vital Signs (Most Recent):  Temp: 97.7 °F (36.5 °C) (06/15/19 0743)  Pulse: 60 (06/15/19 0743)  Resp: 18 (06/15/19 0743)  BP: (!) 98/52 (06/15/19 0743)  SpO2: (!) 93 % (06/15/19 0743) Vital Signs (24h Range):  Temp:  [97.4 °F (36.3 °C)-98.6 °F (37 °C)] 97.7 °F (36.5 °C)  Pulse:  [60-61] 60  Resp:  [16-20] 18  SpO2:  [93 %-98 %] 93 %  BP: ()/(51-66) 98/52     Weight: 95.1 kg (209 lb 10.5 oz)(heel protectors included for wound care)  Body mass index is 30.08 kg/m².    Intake/Output Summary (Last 24 hours) at 6/15/2019 1037  Last data filed at 6/15/2019 0600  Gross per 24 hour   Intake 990 ml   Output 390 ml   Net 600 ml      Physical Exam   Constitutional: He is oriented to person, place, and time. He appears well-developed and well-nourished.   HENT:   Head: Normocephalic and atraumatic.   Eyes: Pupils are equal, round, and reactive to light. Conjunctivae and EOM are normal.   Neck: Normal range of motion. Neck supple.    Cardiovascular: Normal rate, regular rhythm and intact distal pulses.   Diminished heart sounds   Pulmonary/Chest: Effort normal.   Diminished lung sounds   Abdominal: Soft. Bowel sounds are normal. He exhibits no distension. There is no tenderness.   Musculoskeletal: Normal range of motion. He exhibits tenderness (1+/trace pitting edema to BL feet).   Neurological: He is alert and oriented to person, place, and time.   Skin: Skin is warm and dry.   Chronic appearing venous ulcers to lower extremities with multiple bandages in place.     Psychiatric: His speech is normal. Thought content normal. His affect is angry. He is agitated and aggressive. He does not express impulsivity.   sleepy, conversant elderly male in NAD   Nursing note and vitals reviewed.      Significant Labs: All pertinent labs within the past 24 hours have been reviewed.    Significant Imaging: I have reviewed all pertinent imaging results/findings within the past 24 hours.

## 2019-06-15 NOTE — ASSESSMENT & PLAN NOTE
Stable, denies symptoms. Troponin trending down.  - Cardiology following, appreciate recs  -  ICD device interrogation with no tachyarrhythmias   - Continue medical therapy with ASA 81mg daily & Pradaxa  - No indication for triple anticoagulation therapy at this  time  - Suspected demand ischemia 2/2 trazodone use/acuity with associated hypotension.  Extensive cardiac hx.  - Evaluated by cards in the ED and do not recommend ACS protocol  - No anginal equivalent or EKG changes.  - Trop 7.2-->6.6-->7.7>6.1>3.2.    - Incidentally, amphetamine+ on UDS.  Denies drug use.  - monitor on tele, trend trops  - if patient developed CP/SOB, contact cards.

## 2019-06-15 NOTE — PROGRESS NOTES
"[facility] Hospital Medicine  Progress Note    Patient Name: Madhav Cortez  MRN: [id number]  Patient Class: OP- Observation   Admission Date: 6/12/2019  Length of Stay: 0 days  Attending Physician: Heather Moore MD  Primary Care Provider: Mirna Reyes MD    [facility] Medicine Team: [facility] MED E Nguyễn Mendoza PA-C    Subjective:     Principal Problem:NSTEMI (non-ST elevated myocardial infarction)      HPI:  Madhav Cortez is a 79 y.o. M with pmhx HFrEF (10%), HTN, atrial fib on anticoagulation and atrial flutter (s/p cardioversion), HLP, venous insufficiency, chronic insomnia and chronic LE wounds who presents to the ED for evaluation of difficulty arousing patient this AM.  Per chart, "wife is unable to continue care for him at home.  Wife reports patient ingested 5 trazodone 50 mg at 3am. Wife states she had difficulty waking him up and he has been drowsy. Wife reports Dr. Reyes is working on getting patient set up with skilled nursing, as she is unable to care for the patient any longer on her own.  " Patient confirms that he took 5 of his trazodone pills because he has been unable to sleep for the last 3 days.  He was able to sleep but recalls that his wife was unable to wake him up this AM.  He denies any SOB, CP, depression, anxiety, intention to harm.  He reports using a walker at baseline. Patient has been falling more, per chart review, 2/2 complications with his knees.  Patient denies any IVDU, amphetamine use, ETOH use.     ED: HDS, no leukocytosis.  Trop 7.2-->6.6.  EKG paced rhythm.  Cards consulted in ED and no concern for ACS.  UDS + for amphetamines, benzos. UA shows 3 hyaline casts. CTH shows "Motion limited exam, no convincing acute intracranial abnormalities".  CXR with no acute abnormality.    Overview/Hospital Course:  Madhav Cortez was admitted to hospital medicine for accidental drug overdose and elevated troponin. Cardiology was consulted " however believed elevation to be secondary to demand/stress, likely NSTEMI (type II). PT/OT recommending SNF, awaiting placement.    Interval History: Pt keren this morning as he has multiple complaints regarding nursing and lab scheduling. Discussed with nursing.     Review of Systems   Constitutional: Negative for chills, fever and unexpected weight change.   HENT: Negative for ear pain and sore throat.    Eyes: Negative for pain and visual disturbance.   Respiratory: Negative for cough and shortness of breath.    Cardiovascular: Positive for leg swelling (chronic). Negative for chest pain and palpitations.   Gastrointestinal: Negative for abdominal pain, diarrhea, nausea and vomiting.   Genitourinary: Negative for dysuria, frequency and urgency.   Musculoskeletal: Positive for gait problem (baseline). Negative for back pain.   Skin: Positive for wound (chronic, LE wounds). Negative for rash.   Neurological: Negative for dizziness, weakness and headaches.   Psychiatric/Behavioral: Positive for sleep disturbance. Negative for confusion, hallucinations and self-injury. The patient is not nervous/anxious.      Objective:     Vital Signs (Most Recent):  Temp: 97.7 °F (36.5 °C) (06/15/19 0743)  Pulse: 60 (06/15/19 0743)  Resp: 18 (06/15/19 0743)  BP: (!) 98/52 (06/15/19 0743)  SpO2: (!) 93 % (06/15/19 0743) Vital Signs (24h Range):  Temp:  [97.4 °F (36.3 °C)-98.6 °F (37 °C)] 97.7 °F (36.5 °C)  Pulse:  [60-61] 60  Resp:  [16-20] 18  SpO2:  [93 %-98 %] 93 %  BP: ()/(51-66) 98/52     Weight: 95.1 kg (209 lb 10.5 oz)(heel protectors included for wound care)  Body mass index is 30.08 kg/m².    Intake/Output Summary (Last 24 hours) at 6/15/2019 1037  Last data filed at 6/15/2019 0600  Gross per 24 hour   Intake 990 ml   Output 390 ml   Net 600 ml      Physical Exam   Constitutional: He is oriented to person, place, and time. He appears well-developed and well-nourished.   HENT:   Head: Normocephalic and atraumatic.    Eyes: Pupils are equal, round, and reactive to light. Conjunctivae and EOM are normal.   Neck: Normal range of motion. Neck supple.   Cardiovascular: Normal rate, regular rhythm and intact distal pulses.   Diminished heart sounds   Pulmonary/Chest: Effort normal.   Diminished lung sounds   Abdominal: Soft. Bowel sounds are normal. He exhibits no distension. There is no tenderness.   Musculoskeletal: Normal range of motion. He exhibits tenderness (1+/trace pitting edema to BL feet).   Neurological: He is alert and oriented to person, place, and time.   Skin: Skin is warm and dry.   Chronic appearing venous ulcers to lower extremities with multiple bandages in place.     Psychiatric: His speech is normal. Thought content normal. His affect is angry. He is agitated and aggressive. He does not express impulsivity.   sleepy, conversant elderly male in NAD   Nursing note and vitals reviewed.      Significant Labs: All pertinent labs within the past 24 hours have been reviewed.    Significant Imaging: I have reviewed all pertinent imaging results/findings within the past 24 hours.      Assessment/Plan:      * NSTEMI (non-ST elevated myocardial infarction)  Stable, denies symptoms. Troponin trending down.  - Cardiology following, appreciate recs  -  ICD device interrogation with no tachyarrhythmias   - Continue medical therapy with ASA 81mg daily & Pradaxa  - No indication for triple anticoagulation therapy at this  time  - Suspected demand ischemia 2/2 trazodone use/acuity with associated hypotension.  Extensive cardiac hx.  - Evaluated by cards in the ED and do not recommend ACS protocol  - No anginal equivalent or EKG changes.  - Trop 7.2-->6.6-->7.7>6.1>3.2.    - Incidentally, amphetamine+ on UDS.  Denies drug use.  - monitor on tele, trend trops  - if patient developed CP/SOB, contact cards.     Accidental drug overdose  Patient with chronic insomnia, unimproved with home trazodone/melatonin.  Ingested x5 of the  trazodone 50 mg PO tablets. Not ingested to self harm, per patient.   - likely patient has mild dementia and has moments of clarity.  Will need to confirm with wife.  - No QT prolongation  - will hold for now    Discharge planning issues  Will need assistance from SW/CM for placement as wife can no longer care for patient at home.   - PT/OT consulted    Ulcer of left lower extremity, limited to breakdown of skin  Venous insufficiency of both lower extremities  - wound care consulted    Atrial fibrillation  Current use of long term anticoagulation    - c/w amiodarone, pradaxa  - CHADSVASC 6.  Fall risk and on multiple blood thinners.  Will need to discuss with patient and wife.   - monitor on tele; V-paced rhythm    Coronary artery disease  - c/w ASA, statin  - f/w cardiology  - Last heart cath in 2013 shows two vessel coronary artery disease and Patent LAD and DIagonal stents.  GALLO flow 3 throughout.   - will need outpatient (vs inpatient) stress.  No recent stress.     Type 2 diabetes mellitus with circulatory disorder, with long-term current use of insulin  Check hemoglobin A1c  - reduced home levemir from 15 U to 8 U QHS (previously on 40 U QHS).  Holding 3U aspart with meals  - low dose SSI, ACHS glucose.  Avoid hypoglycemia  - diabetic diet    Chronic systolic heart failure  ICD (implantable cardioverter-defibrillator), biventricular, in situ    - appears euvolemic  - c/w entresto, coreg, lasix  - Last TTE 5/2019 with EF 10-15%, Grade 2 DD, PASP 44, CVP 8  - strict I/O, daily weights, fluid rest diet    Essential hypertension  BP stable  - c/w home meds    VTE Risk Mitigation (From admission, onward)        Ordered     IP VTE HIGH RISK PATIENT  Once      06/14/19 0322     Reason for no Mechanical VTE Prophylaxis  Once      06/14/19 0322     dabigatran etexilate capsule 150 mg  2 times daily      06/12/19 2150     Place EDWIN hose  Until discontinued      06/12/19 2150     Place sequential compression device   Until discontinued      06/12/19 2038                Nguyễn Mendoza PA-C  Department of Hospital Medicine   [facility]

## 2019-06-16 LAB
POCT GLUCOSE: 131 MG/DL (ref 70–110)
POCT GLUCOSE: 147 MG/DL (ref 70–110)
POCT GLUCOSE: 167 MG/DL (ref 70–110)
POCT GLUCOSE: 179 MG/DL (ref 70–110)

## 2019-06-16 PROCEDURE — 99225 PR SUBSEQUENT OBSERVATION CARE,LEVEL II: CPT | Mod: HCNC,,, | Performed by: PHYSICIAN ASSISTANT

## 2019-06-16 PROCEDURE — S5571 INSULIN DISPOS PEN 3 ML: HCPCS | Mod: HCNC | Performed by: PHYSICIAN ASSISTANT

## 2019-06-16 PROCEDURE — 25000003 PHARM REV CODE 250: Mod: HCNC | Performed by: PHYSICIAN ASSISTANT

## 2019-06-16 PROCEDURE — G0378 HOSPITAL OBSERVATION PER HR: HCPCS | Mod: HCNC

## 2019-06-16 PROCEDURE — 99225 PR SUBSEQUENT OBSERVATION CARE,LEVEL II: ICD-10-PCS | Mod: HCNC,,, | Performed by: PHYSICIAN ASSISTANT

## 2019-06-16 PROCEDURE — 63600175 PHARM REV CODE 636 W HCPCS: Mod: HCNC | Performed by: PHYSICIAN ASSISTANT

## 2019-06-16 RX ADMIN — DABIGATRAN ETEXILATE MESYLATE 150 MG: 150 CAPSULE ORAL at 09:06

## 2019-06-16 RX ADMIN — GABAPENTIN 600 MG: 300 CAPSULE ORAL at 08:06

## 2019-06-16 RX ADMIN — OXYCODONE HYDROCHLORIDE AND ACETAMINOPHEN 1500 MG: 500 TABLET ORAL at 08:06

## 2019-06-16 RX ADMIN — INSULIN DETEMIR 10 UNITS: 100 INJECTION, SOLUTION SUBCUTANEOUS at 08:06

## 2019-06-16 RX ADMIN — AMIODARONE HYDROCHLORIDE 200 MG: 200 TABLET ORAL at 08:06

## 2019-06-16 RX ADMIN — INSULIN ASPART 5 UNITS: 100 INJECTION, SOLUTION INTRAVENOUS; SUBCUTANEOUS at 04:06

## 2019-06-16 RX ADMIN — SPIRONOLACTONE 25 MG: 25 TABLET, FILM COATED ORAL at 08:06

## 2019-06-16 RX ADMIN — GABAPENTIN 600 MG: 300 CAPSULE ORAL at 09:06

## 2019-06-16 RX ADMIN — INSULIN ASPART 5 UNITS: 100 INJECTION, SOLUTION INTRAVENOUS; SUBCUTANEOUS at 12:06

## 2019-06-16 RX ADMIN — AMIODARONE HYDROCHLORIDE 200 MG: 200 TABLET ORAL at 09:06

## 2019-06-16 RX ADMIN — DABIGATRAN ETEXILATE MESYLATE 150 MG: 150 CAPSULE ORAL at 08:06

## 2019-06-16 RX ADMIN — INSULIN DETEMIR 10 UNITS: 100 INJECTION, SOLUTION SUBCUTANEOUS at 09:06

## 2019-06-16 RX ADMIN — CASTOR OIL AND BALSAM, PERU: 788; 87 OINTMENT TOPICAL at 08:06

## 2019-06-16 RX ADMIN — GABAPENTIN 600 MG: 300 CAPSULE ORAL at 03:06

## 2019-06-16 RX ADMIN — ASPIRIN 81 MG CHEWABLE TABLET 81 MG: 81 TABLET CHEWABLE at 08:06

## 2019-06-16 RX ADMIN — LEVOTHYROXINE SODIUM 75 MCG: 25 TABLET ORAL at 08:06

## 2019-06-16 RX ADMIN — INSULIN ASPART 5 UNITS: 100 INJECTION, SOLUTION INTRAVENOUS; SUBCUTANEOUS at 08:06

## 2019-06-16 RX ADMIN — SENNOSIDES AND DOCUSATE SODIUM 1 TABLET: 8.6; 5 TABLET ORAL at 08:06

## 2019-06-16 RX ADMIN — OXYCODONE HYDROCHLORIDE AND ACETAMINOPHEN 1500 MG: 500 TABLET ORAL at 09:06

## 2019-06-16 NOTE — PROGRESS NOTES
TB skin test to RFA read per orders.  Area of 1.1cm x 0.9cm redness noted around site, with slight induration.  MARI Mendoza PA-C notified; stated she will be by shortly to assess site.  Will continue to monitor.     Per nadiya Adamson to wait until 72 hour jt for read.  Will continue to monitor.

## 2019-06-16 NOTE — PLAN OF CARE
Problem: Adult Inpatient Plan of Care  Goal: Plan of Care Review  Outcome: Ongoing (interventions implemented as appropriate)    No acute events throughout day. See vital signs and assessments in flowsheets. See below for updates on today's progress.     Pulmonary: sats WNL RA    Cardiovascular: pt refused telemetry; BP stable; afebrile     Neurological: AAO x4    Gastrointestinal: no BM; diabetic diet     Genitourinary: voids per urinal     Endocrine: BG WNL    Skin/Bath: wound care per order            Patient progressing towards goals as tolerated, plan of care communicated and reviewed with Madhav Cortez and family. All concerns addressed. Will continue to monitor.

## 2019-06-16 NOTE — SUBJECTIVE & OBJECTIVE
Interval History: No complaints, awaiting placement.    Review of Systems   Constitutional: Negative for chills, fever and unexpected weight change.   HENT: Negative for ear pain and sore throat.    Eyes: Negative for pain and visual disturbance.   Respiratory: Negative for cough and shortness of breath.    Cardiovascular: Positive for leg swelling (chronic). Negative for chest pain and palpitations.   Gastrointestinal: Negative for abdominal pain, diarrhea, nausea and vomiting.   Genitourinary: Negative for dysuria, frequency and urgency.   Musculoskeletal: Positive for gait problem (baseline). Negative for back pain.   Skin: Positive for wound (chronic, LE wounds). Negative for rash.   Neurological: Negative for dizziness, weakness and headaches.   Psychiatric/Behavioral: Positive for sleep disturbance. Negative for confusion, hallucinations and self-injury. The patient is not nervous/anxious.      Objective:     Vital Signs (Most Recent):  Temp: 97.6 °F (36.4 °C) (06/16/19 0748)  Pulse: 60 (06/16/19 0748)  Resp: 16 (06/16/19 0748)  BP: 125/60 (06/16/19 0748)  SpO2: 96 % (06/16/19 0748) Vital Signs (24h Range):  Temp:  [96.8 °F (36 °C)-98 °F (36.7 °C)] 97.6 °F (36.4 °C)  Pulse:  [60-61] 60  Resp:  [14-18] 16  SpO2:  [96 %-98 %] 96 %  BP: (101-125)/(55-61) 125/60     Weight: 94.8 kg (209 lb)(Pt stated weight did not want to get up)  Body mass index is 29.99 kg/m².    Intake/Output Summary (Last 24 hours) at 6/16/2019 1019  Last data filed at 6/16/2019 0858  Gross per 24 hour   Intake 2840 ml   Output 1650 ml   Net 1190 ml      Physical Exam   Constitutional: He is oriented to person, place, and time. He appears well-developed and well-nourished.   HENT:   Head: Normocephalic and atraumatic.   Eyes: Pupils are equal, round, and reactive to light. Conjunctivae and EOM are normal.   Neck: Normal range of motion. Neck supple.   Cardiovascular: Normal rate, regular rhythm and intact distal pulses.   Diminished heart  sounds   Pulmonary/Chest: Effort normal.   Diminished lung sounds   Abdominal: Soft. Bowel sounds are normal. He exhibits no distension. There is no tenderness.   Musculoskeletal: Normal range of motion. He exhibits tenderness (1+/trace pitting edema to BL feet).   Neurological: He is alert and oriented to person, place, and time.   Skin: Skin is warm and dry.   Chronic appearing venous ulcers to lower extremities with multiple bandages in place.     Psychiatric: His speech is normal. Thought content normal. His affect is not angry. He is agitated and aggressive. He does not express impulsivity.   sleepy, conversant elderly male in NAD   Nursing note and vitals reviewed.      Significant Labs: All pertinent labs within the past 24 hours have been reviewed.    Significant Imaging: I have reviewed all pertinent imaging results/findings within the past 24 hours.

## 2019-06-16 NOTE — PROGRESS NOTES
"[facility] Hospital Medicine  Progress Note    Patient Name: Madhav Cortez  MRN: [id number]  Patient Class: OP- Observation   Admission Date: 6/12/2019  Length of Stay: 0 days  Attending Physician: Heather Moore MD  Primary Care Provider: Mirna Reyes MD    [facility] Medicine Team: [facility] E Nguyễn Mendoza PA-C    Subjective:     Principal Problem:NSTEMI (non-ST elevated myocardial infarction)      HPI:  Madhav Cortez is a 79 y.o. M with pmhx HFrEF (10%), HTN, atrial fib on anticoagulation and atrial flutter (s/p cardioversion), HLP, venous insufficiency, chronic insomnia and chronic LE wounds who presents to the ED for evaluation of difficulty arousing patient this AM.  Per chart, "wife is unable to continue care for him at home.  Wife reports patient ingested 5 trazodone 50 mg at 3am. Wife states she had difficulty waking him up and he has been drowsy. Wife reports Dr. Reyes is working on getting patient set up with skilled nursing, as she is unable to care for the patient any longer on her own.  " Patient confirms that he took 5 of his trazodone pills because he has been unable to sleep for the last 3 days.  He was able to sleep but recalls that his wife was unable to wake him up this AM.  He denies any SOB, CP, depression, anxiety, intention to harm.  He reports using a walker at baseline. Patient has been falling more, per chart review, 2/2 complications with his knees.  Patient denies any IVDU, amphetamine use, ETOH use.     ED: HDS, no leukocytosis.  Trop 7.2-->6.6.  EKG paced rhythm.  Cards consulted in ED and no concern for ACS.  UDS + for amphetamines, benzos. UA shows 3 hyaline casts. CTH shows "Motion limited exam, no convincing acute intracranial abnormalities".  CXR with no acute abnormality.    Overview/Hospital Course:  Madhav Cortez was admitted to hospital medicine for accidental drug overdose and elevated troponin. Cardiology was consulted " however believed elevation to be secondary to demand/stress, likely NSTEMI (type II). PT/OT recommending SNF, awaiting placement.    Interval History: No complaints, awaiting placement.    Review of Systems   Constitutional: Negative for chills, fever and unexpected weight change.   HENT: Negative for ear pain and sore throat.    Eyes: Negative for pain and visual disturbance.   Respiratory: Negative for cough and shortness of breath.    Cardiovascular: Positive for leg swelling (chronic). Negative for chest pain and palpitations.   Gastrointestinal: Negative for abdominal pain, diarrhea, nausea and vomiting.   Genitourinary: Negative for dysuria, frequency and urgency.   Musculoskeletal: Positive for gait problem (baseline). Negative for back pain.   Skin: Positive for wound (chronic, LE wounds). Negative for rash.   Neurological: Negative for dizziness, weakness and headaches.   Psychiatric/Behavioral: Positive for sleep disturbance. Negative for confusion, hallucinations and self-injury. The patient is not nervous/anxious.      Objective:     Vital Signs (Most Recent):  Temp: 97.6 °F (36.4 °C) (06/16/19 0748)  Pulse: 60 (06/16/19 0748)  Resp: 16 (06/16/19 0748)  BP: 125/60 (06/16/19 0748)  SpO2: 96 % (06/16/19 0748) Vital Signs (24h Range):  Temp:  [96.8 °F (36 °C)-98 °F (36.7 °C)] 97.6 °F (36.4 °C)  Pulse:  [60-61] 60  Resp:  [14-18] 16  SpO2:  [96 %-98 %] 96 %  BP: (101-125)/(55-61) 125/60     Weight: 94.8 kg (209 lb)(Pt stated weight did not want to get up)  Body mass index is 29.99 kg/m².    Intake/Output Summary (Last 24 hours) at 6/16/2019 1019  Last data filed at 6/16/2019 0858  Gross per 24 hour   Intake 2840 ml   Output 1650 ml   Net 1190 ml      Physical Exam   Constitutional: He is oriented to person, place, and time. He appears well-developed and well-nourished.   HENT:   Head: Normocephalic and atraumatic.   Eyes: Pupils are equal, round, and reactive to light. Conjunctivae and EOM are normal.    Neck: Normal range of motion. Neck supple.   Cardiovascular: Normal rate, regular rhythm and intact distal pulses.   Diminished heart sounds   Pulmonary/Chest: Effort normal.   Diminished lung sounds   Abdominal: Soft. Bowel sounds are normal. He exhibits no distension. There is no tenderness.   Musculoskeletal: Normal range of motion. He exhibits tenderness (1+/trace pitting edema to BL feet).   Neurological: He is alert and oriented to person, place, and time.   Skin: Skin is warm and dry.   Chronic appearing venous ulcers to lower extremities with multiple bandages in place.     Psychiatric: His speech is normal. Thought content normal. His affect is not angry. He is agitated and aggressive. He does not express impulsivity.   sleepy, conversant elderly male in NAD   Nursing note and vitals reviewed.      Significant Labs: All pertinent labs within the past 24 hours have been reviewed.    Significant Imaging: I have reviewed all pertinent imaging results/findings within the past 24 hours.      Assessment/Plan:      * NSTEMI (non-ST elevated myocardial infarction)  Stable, denies symptoms.   - Cardiology following, appreciate recs  -  ICD device interrogation with no tachyarrhythmias   - Continue medical therapy with ASA 81mg daily & Pradaxa  - No indication for triple anticoagulation therapy at this  time  - Suspected demand ischemia 2/2 trazodone use/acuity with associated hypotension.  Extensive cardiac hx.  - Evaluated by cards in the ED and do not recommend ACS protocol  - No anginal equivalent or EKG changes.  - Trop 7.2-->6.6-->7.7>6.1>3.2.    - Incidentally, amphetamine+ on UDS.  Denies drug use.  - monitor on tele, trend trops  - if patient developed CP/SOB, contact cards.     Accidental drug overdose  Patient with chronic insomnia, unimproved with home trazodone/melatonin.  Ingested x5 of the trazodone 50 mg PO tablets. Not ingested to self harm, per patient.   - likely patient has mild dementia and has  moments of clarity.  Will need to confirm with wife.  - No QT prolongation  - will hold for now    Discharge planning issues  Will need assistance from SW/CM for placement as wife can no longer care for patient at home.   - PT/OT consulted    Ulcer of left lower extremity, limited to breakdown of skin  Venous insufficiency of both lower extremities  - wound care consulted    Atrial fibrillation  Current use of long term anticoagulation    - c/w amiodarone, pradaxa  - CHADSVASC 6.  Fall risk and on multiple blood thinners.  Will need to discuss with patient and wife.   - monitor on tele; V-paced rhythm    Coronary artery disease  - c/w ASA, statin  - f/w cardiology  - Last heart cath in 2013 shows two vessel coronary artery disease and Patent LAD and DIagonal stents.  GALLO flow 3 throughout.   - will need outpatient (vs inpatient) stress.  No recent stress.     Type 2 diabetes mellitus with circulatory disorder, with long-term current use of insulin  Check hemoglobin A1c  - reduced home levemir from 15 U to 8 U QHS (previously on 40 U QHS).  Holding 3U aspart with meals  - low dose SSI, ACHS glucose.  Avoid hypoglycemia  - diabetic diet    Chronic systolic heart failure  ICD (implantable cardioverter-defibrillator), biventricular, in situ    - appears euvolemic  - c/w entresto, coreg, lasix  - Last TTE 5/2019 with EF 10-15%, Grade 2 DD, PASP 44, CVP 8  - strict I/O, daily weights, fluid rest diet    Essential hypertension  BP stable  - c/w home meds    VTE Risk Mitigation (From admission, onward)        Ordered     IP VTE HIGH RISK PATIENT  Once      06/14/19 0322     Reason for no Mechanical VTE Prophylaxis  Once      06/14/19 0322     dabigatran etexilate capsule 150 mg  2 times daily      06/12/19 2150     Place EDWIN hose  Until discontinued      06/12/19 2150     Place sequential compression device  Until discontinued      06/12/19 2150                SRIRAM ClaytonC  Department of Hospital Medicine    [facility]

## 2019-06-16 NOTE — PLAN OF CARE
Problem: Adult Inpatient Plan of Care  Goal: Plan of Care Review  Outcome: Revised  Pt free of falls/trauma/injuries.  Denies c/o SOB, CP, or discomfort.  Generalized skin remains CDI; trace edema noted to BLEs.  Dressings to BLEs remain CDI.  Pt refusing TEDs.  Pt being diuresed with home dose of Aldactone PO daily; diuresing well.   Wt  trending down.  Blood glucose managed with supplemental insulin.  PT/OT following; plan for D/C to SNF this week.  Wife at the bedside.  Pt and family tolerating plan of care.

## 2019-06-17 LAB
POCT GLUCOSE: 160 MG/DL (ref 70–110)
POCT GLUCOSE: 179 MG/DL (ref 70–110)
POCT GLUCOSE: 252 MG/DL (ref 70–110)
TB INDURATION 48 - 72 HR READ: 0 MM

## 2019-06-17 PROCEDURE — 25000003 PHARM REV CODE 250: Mod: HCNC | Performed by: PHYSICIAN ASSISTANT

## 2019-06-17 PROCEDURE — 94761 N-INVAS EAR/PLS OXIMETRY MLT: CPT | Mod: HCNC

## 2019-06-17 PROCEDURE — 97535 SELF CARE MNGMENT TRAINING: CPT | Mod: HCNC

## 2019-06-17 PROCEDURE — 99226 PR SUBSEQUENT OBSERVATION CARE,LEVEL III: CPT | Mod: HCNC,,, | Performed by: PHYSICIAN ASSISTANT

## 2019-06-17 PROCEDURE — G0378 HOSPITAL OBSERVATION PER HR: HCPCS | Mod: HCNC

## 2019-06-17 PROCEDURE — 99226 PR SUBSEQUENT OBSERVATION CARE,LEVEL III: ICD-10-PCS | Mod: HCNC,,, | Performed by: PHYSICIAN ASSISTANT

## 2019-06-17 PROCEDURE — 97116 GAIT TRAINING THERAPY: CPT | Mod: HCNC

## 2019-06-17 RX ORDER — RAMELTEON 8 MG/1
8 TABLET ORAL NIGHTLY PRN
Status: DISCONTINUED | OUTPATIENT
Start: 2019-06-17 | End: 2019-06-18 | Stop reason: HOSPADM

## 2019-06-17 RX ADMIN — OXYCODONE HYDROCHLORIDE AND ACETAMINOPHEN 1500 MG: 500 TABLET ORAL at 09:06

## 2019-06-17 RX ADMIN — SENNOSIDES AND DOCUSATE SODIUM 1 TABLET: 8.6; 5 TABLET ORAL at 09:06

## 2019-06-17 RX ADMIN — GABAPENTIN 600 MG: 300 CAPSULE ORAL at 03:06

## 2019-06-17 RX ADMIN — SPIRONOLACTONE 25 MG: 25 TABLET, FILM COATED ORAL at 09:06

## 2019-06-17 RX ADMIN — AMIODARONE HYDROCHLORIDE 200 MG: 200 TABLET ORAL at 09:06

## 2019-06-17 RX ADMIN — GABAPENTIN 600 MG: 300 CAPSULE ORAL at 09:06

## 2019-06-17 RX ADMIN — ASPIRIN 81 MG CHEWABLE TABLET 81 MG: 81 TABLET CHEWABLE at 09:06

## 2019-06-17 RX ADMIN — CASTOR OIL AND BALSAM, PERU: 788; 87 OINTMENT TOPICAL at 09:06

## 2019-06-17 RX ADMIN — INSULIN DETEMIR 10 UNITS: 100 INJECTION, SOLUTION SUBCUTANEOUS at 09:06

## 2019-06-17 RX ADMIN — INSULIN ASPART 5 UNITS: 100 INJECTION, SOLUTION INTRAVENOUS; SUBCUTANEOUS at 11:06

## 2019-06-17 RX ADMIN — INSULIN ASPART 3 UNITS: 100 INJECTION, SOLUTION INTRAVENOUS; SUBCUTANEOUS at 11:06

## 2019-06-17 RX ADMIN — RAMELTEON 8 MG: 8 TABLET, FILM COATED ORAL at 09:06

## 2019-06-17 RX ADMIN — DABIGATRAN ETEXILATE MESYLATE 150 MG: 150 CAPSULE ORAL at 09:06

## 2019-06-17 RX ADMIN — LEVOTHYROXINE SODIUM 75 MCG: 25 TABLET ORAL at 09:06

## 2019-06-17 RX ADMIN — INSULIN ASPART 5 UNITS: 100 INJECTION, SOLUTION INTRAVENOUS; SUBCUTANEOUS at 09:06

## 2019-06-17 RX ADMIN — RAMELTEON 8 MG: 8 TABLET, FILM COATED ORAL at 02:06

## 2019-06-17 RX ADMIN — INSULIN ASPART 5 UNITS: 100 INJECTION, SOLUTION INTRAVENOUS; SUBCUTANEOUS at 05:06

## 2019-06-17 NOTE — PLAN OF CARE
Patient resting comfortably throughout shift. Vitals stable. Refusing telemetry, team aware. Bilateral leg ulcer dressings changed. Plan for discharge to SNF tomorrow. Plan of care reviewed with pt and wife at bedside. All questions and concerns addressed. Will continue to monitor.

## 2019-06-17 NOTE — SUBJECTIVE & OBJECTIVE
Interval History: Awaiting placement. Anticipate discharg    Review of Systems   Constitutional: Negative for chills, fever and unexpected weight change.   HENT: Negative for ear pain and sore throat.    Eyes: Negative for pain and visual disturbance.   Respiratory: Negative for cough and shortness of breath.    Cardiovascular: Positive for leg swelling (chronic). Negative for chest pain and palpitations.   Gastrointestinal: Negative for abdominal pain, diarrhea, nausea and vomiting.   Genitourinary: Negative for dysuria, frequency and urgency.   Musculoskeletal: Positive for gait problem (baseline). Negative for back pain.   Skin: Positive for wound (chronic, LE wounds). Negative for rash.   Neurological: Negative for dizziness, weakness and headaches.   Psychiatric/Behavioral: Positive for sleep disturbance. Negative for confusion, hallucinations and self-injury. The patient is not nervous/anxious.      Objective:     Vital Signs (Most Recent):  Temp: 98.3 °F (36.8 °C) (06/17/19 1138)  Pulse: 63 (06/17/19 1138)  Resp: 18 (06/17/19 1138)  BP: (!) 120/59 (06/17/19 1138)  SpO2: 95 % (06/17/19 1138) Vital Signs (24h Range):  Temp:  [97.8 °F (36.6 °C)-98.8 °F (37.1 °C)] 98.3 °F (36.8 °C)  Pulse:  [60-63] 63  Resp:  [14-18] 18  SpO2:  [93 %-98 %] 95 %  BP: (113-128)/(57-62) 120/59     Weight: 93 kg (205 lb 0.4 oz)  Body mass index is 29.42 kg/m².    Intake/Output Summary (Last 24 hours) at 6/17/2019 1252  Last data filed at 6/17/2019 1100  Gross per 24 hour   Intake 957 ml   Output 1150 ml   Net -193 ml      Physical Exam   Constitutional: He is oriented to person, place, and time. He appears well-developed and well-nourished.   HENT:   Head: Normocephalic and atraumatic.   Eyes: Pupils are equal, round, and reactive to light. Conjunctivae and EOM are normal.   Neck: Normal range of motion. Neck supple.   Cardiovascular: Normal rate, regular rhythm and intact distal pulses.   Diminished heart sounds   Pulmonary/Chest:  Effort normal.   Diminished lung sounds   Abdominal: Soft. Bowel sounds are normal. He exhibits no distension. There is no tenderness.   Musculoskeletal: Normal range of motion. He exhibits tenderness (1+/trace pitting edema to BL feet).   Neurological: He is alert and oriented to person, place, and time.   Skin: Skin is warm and dry.   Chronic appearing venous ulcers to lower extremities with multiple bandages in place.     Psychiatric: His speech is normal. Thought content normal. His affect is not angry. He is not agitated and not aggressive. He does not express impulsivity.   sleepy, conversant elderly male in NAD   Nursing note and vitals reviewed.      Significant Labs: All pertinent labs within the past 24 hours have been reviewed.    Significant Imaging: I have reviewed all pertinent imaging results/findings within the past 24 hours.

## 2019-06-17 NOTE — PLAN OF CARE
Problem: Physical Therapy Goal  Goal: Physical Therapy Goal  Goals to be met by: 2019     Patient will increase functional independence with mobility by performin. Supine to sit with Stand-by Assistance  2. Sit to stand transfer with Contact Guard Assistance  3. Bed to chair transfer with Contact Guard Assistance using Rolling Walker  4. Gait  x 20 feet with Contact Guard Assistance using Rolling Walker.   5. Ascend/descend 3 stair with bilateral Handrails Minimal Assistance using LRAD     Outcome: Ongoing (interventions implemented as appropriate)  Treatment completed and no goals met. Goals appropriate

## 2019-06-17 NOTE — PT/OT/SLP PROGRESS
Occupational Therapy   Treatment    Name: Madhav Cortez  MRN: [id number]  Admitting Diagnosis:  NSTEMI (non-ST elevated myocardial infarction)       Recommendations:     Discharge Recommendations: nursing facility, skilled  Discharge Equipment Recommendations:  none  Barriers to discharge:  Decreased caregiver support    Assessment:     Madhav Cortez is a 79 y.o. male with a medical diagnosis of NSTEMI (non-ST elevated myocardial infarction).  He presents with decreased  Safety  Awareness affecting ADL (I). Performance deficits affecting function are weakness, impaired self care skills, impaired balance, decreased safety awareness, impaired functional mobilty, impaired endurance, gait instability, impaired cardiopulmonary response to activity.     Rehab Prognosis:  Good; patient would benefit from acute skilled OT services to address these deficits and reach maximum level of function.       Plan:     Patient to be seen 3 x/week to address the above listed problems via self-care/home management, therapeutic activities, therapeutic exercises  · Plan of Care Expires: 07/14/19  · Plan of Care Reviewed with: patient, spouse    Subjective     Pain/Comfort:  · Pain Rating 1: 0/10  · Pain Rating Post-Intervention 1: 0/10    Objective:     Communicated with: RN prior to session.  Patient found seated EOB with wife with (IV heplock) upon OT entry to room.    General Precautions: Standard, fall   Orthopedic Precautions:    Braces:       Occupational Performance:     Bed Mobility:    · NT     Functional Mobility/Transfers:  · Patient completed Sit <> Stand Transfer with minimum assistance  with  4 wheeled walker   · Patient completed Bed <> Chair Transfer using Step Transfer technique with minimum assistance with 4 wheeled walker  · Patient completed Toilet Transfer Step Transfer and sit<>stand  technique with minimum assistance with  4 wheeled walker  · Functional Mobility: CGA to/from bathroom using  rollator    Activities of Daily Living:  · Grooming: contact guard assistance standing at sink  · Upper Body Dressing: independence    · Lower Body Dressing: minimum assistance donning slippers  · Toileting: moderate assistance for pericare in standing (pt able to clean front in sitting)      [facility] 6 Click ADL: 19    Treatment & Education:  Pt ed on OT POC  Pt ed throughout session on proper hand placement on walker and walker management during toileting, toilet transfers and during standing self-care at sink  Pt completed B UE AROM ex's x 10 reps and 3 sets; pt ed to complete 2-3x daily    Patient left up in chair with all lines intact, call button in reach, RN notified and spouse presentEducation:      GOALS:   Multidisciplinary Problems     Occupational Therapy Goals        Problem: Occupational Therapy Goal    Goal Priority Disciplines Outcome Interventions   Occupational Therapy Goal     OT, PT/OT Ongoing (interventions implemented as appropriate)    Description:  Goals to be met by: 6/21/19     Patient will increase functional independence with ADLs by performing:    Grooming while standing at sink with Stand-by Assistance.  Toileting from toilet with Minimal Assistance for hygiene and clothing management.   Supine to sit with Supervision.  Toilet transfer to toilet with Contact Guard Assistance.                      Time Tracking:     OT Date of Treatment: 06/17/19  OT Start Time: 0824  OT Stop Time: 0847  OT Total Time (min): 23 min    Billable Minutes:Self Care/Home Management 23    JOSHUA Torres  6/17/2019

## 2019-06-17 NOTE — PLAN OF CARE
WILLOW spoke with Millie from [facility]. Patient accepted by facility for SNF care. Millie to reach out to Patient's wife Joanne to sign paper work and [employer] Authorization submitted. Updated Patient's wife who is expecting call from facility. Will continue to monitor.

## 2019-06-17 NOTE — PLAN OF CARE
06/17/19 0917   Post-Acute Status   Post-Acute Authorization Placement   Post-Acute Placement Status Referrals Sent

## 2019-06-17 NOTE — PT/OT/SLP PROGRESS
Physical Therapy Treatment    Patient Name:  Madhav Cortez   MRN:  [id number]    Recommendations:     Discharge Recommendations:  nursing facility, skilled   Discharge Equipment Recommendations: none   Barriers to discharge: Decreased caregiver support (wife with decreased ability to physically assist)    Assessment:     Madhav Cortez is a 79 y.o. male admitted with a medical diagnosis of NSTEMI (non-ST elevated myocardial infarction).  He presents with the following impairments/functional limitations:  impaired endurance, weakness, impaired joint extensibility, impaired skin, impaired self care skills, decreased safety awareness. Pt with improved balance, increased gait stability using a rollator, and increased activity tolerance this session. Pt able to ambulate to bathroom using a rollator, use the commode, and perform ADLs at the sink. Pt with no LOBs or SOB. Pt continues to have decreased insight into impairments and is impulsive at times. Pt would benefit from a SNF upon d/c due to wife decreased ability to assist pt at home.     Rehab Prognosis: Good; patient would benefit from acute skilled PT services to address these deficits and reach maximum level of function.    Recent Surgery: * No surgery found *      Plan:     During this hospitalization, patient to be seen 4 x/week to address the identified rehab impairments via gait training, therapeutic activities, therapeutic exercises, neuromuscular re-education and progress toward the following goals:    · Plan of Care Expires:  07/12/19    Subjective     Chief Complaint: none reported   Patient/Family Comments/goals: to get better   Pain/Comfort:  · Pain Rating 1: 0/10  · Pain Rating Post-Intervention 1: 0/10      Objective:     Communicated with RN prior to session and wife present in room.  Patient found sitting EOB with wife with (pt unwilling to wear telemetry (RN aware)) upon PT entry to room.     General Precautions: Standard, fall   Orthopedic  Precautions:N/A   Braces: N/A     Functional Mobility:  · Bed Mobility: NT 2nd to pt found sitting EOB  · Transfers:     · Sit to Stand:  minimum assistance with rollator x 2 trials (EOB, toilet)   · Gait: 15ft with SBA to CGA using rollator; chair follow present for safety (pt reports that knees buckle at home when he falls); pt demo'd decreased tiff, decreased step length, and forward flexed posture; no LOB, no SOB      AM-PAC 6 CLICK MOBILITY  Turning over in bed (including adjusting bedclothes, sheets and blankets)?: 3  Sitting down on and standing up from a chair with arms (e.g., wheelchair, bedside commode, etc.): 3  Moving from lying on back to sitting on the side of the bed?: 3  Moving to and from a bed to a chair (including a wheelchair)?: 3  Need to walk in hospital room?: 3  Climbing 3-5 steps with a railing?: 1  Basic Mobility Total Score: 16       Therapeutic Activities and Exercises:  Educated pt on PT role/POC  Educated pt on importance of OOB activity   Pt verbalized understanding    Educated pt to perform B LE exercises in chair 10 reps/3x per day re: ankle pumps, LAQs, marching     T/f to chair to increase tolerance to OOB activity and to create optimal positioning for lung expansion     Patient left up in chair with all lines intact, call button in reach, RN notified and wife present..    GOALS:   Multidisciplinary Problems     Physical Therapy Goals        Problem: Physical Therapy Goal    Goal Priority Disciplines Outcome Goal Variances Interventions   Physical Therapy Goal     PT, PT/OT Ongoing (interventions implemented as appropriate)     Description:  Goals to be met by: 2019     Patient will increase functional independence with mobility by performin. Supine to sit with Stand-by Assistance  2. Sit to stand transfer with Contact Guard Assistance  3. Bed to chair transfer with Contact Guard Assistance using Rolling Walker  4. Gait  x 20 feet with Contact Guard Assistance using  Rolling Walker.   5. Ascend/descend 3 stair with bilateral Handrails Minimal Assistance using LRAD                      Time Tracking:     PT Received On: 06/17/19  PT Start Time: 0827     PT Stop Time: 0851  PT Total Time (min): 24 min     Billable Minutes: Gait Training 23    Treatment Type: Treatment  PT/PTA: PT           Guillermina Mcmahan PT, DPT  6/17/2019  [phone]

## 2019-06-17 NOTE — PLAN OF CARE
"Pt was declined by [facility] due to "not able to meet needs, [facility] declined Pt due to "complicated wound care," Sw left message for [facility] for family's preference, Sw to follow. [facility] called in and faxed pasrr, following. Sw did send more SNF referrals to [facility], [facility] and [facility].   "

## 2019-06-17 NOTE — PLAN OF CARE
Problem: Occupational Therapy Goal  Goal: Occupational Therapy Goal  Goals to be met by: 6/21/19     Patient will increase functional independence with ADLs by performing:    Grooming while standing at sink with Stand-by Assistance.  Toileting from toilet with Minimal Assistance for hygiene and clothing management.   Supine to sit with Supervision.  Toilet transfer to toilet with Contact Guard Assistance.     Outcome: Ongoing (interventions implemented as appropriate)  The above goals remain appropriate. JOSHUA Torres  6/17/2019

## 2019-06-17 NOTE — PROGRESS NOTES
"[facility] Hospital Medicine  Progress Note    Patient Name: Madhav Cortez  MRN: [id number]  Patient Class: OP- Observation   Admission Date: 6/12/2019  Length of Stay: 0 days  Attending Physician: Heather Moore MD  Primary Care Provider: Mirna Reyes MD    [facility] Medicine Team: [facility] E Nguyễn Mendoza PA-C    Subjective:     Principal Problem:NSTEMI (non-ST elevated myocardial infarction)      HPI:  Mahdav Cortez is a 79 y.o. M with pmhx HFrEF (10%), HTN, atrial fib on anticoagulation and atrial flutter (s/p cardioversion), HLP, venous insufficiency, chronic insomnia and chronic LE wounds who presents to the ED for evaluation of difficulty arousing patient this AM.  Per chart, "wife is unable to continue care for him at home.  Wife reports patient ingested 5 trazodone 50 mg at 3am. Wife states she had difficulty waking him up and he has been drowsy. Wife reports Dr. Reyes is working on getting patient set up with skilled nursing, as she is unable to care for the patient any longer on her own.  " Patient confirms that he took 5 of his trazodone pills because he has been unable to sleep for the last 3 days.  He was able to sleep but recalls that his wife was unable to wake him up this AM.  He denies any SOB, CP, depression, anxiety, intention to harm.  He reports using a walker at baseline. Patient has been falling more, per chart review, 2/2 complications with his knees.  Patient denies any IVDU, amphetamine use, ETOH use.     ED: HDS, no leukocytosis.  Trop 7.2-->6.6.  EKG paced rhythm.  Cards consulted in ED and no concern for ACS.  UDS + for amphetamines, benzos. UA shows 3 hyaline casts. CTH shows "Motion limited exam, no convincing acute intracranial abnormalities".  CXR with no acute abnormality.    Overview/Hospital Course:  Madhav Cortez was admitted to hospital medicine for accidental drug overdose and elevated troponin. Cardiology was consulted " however believed elevation to be secondary to demand/stress, likely NSTEMI (type II). PT/OT recommending SNF, awaiting placement.    Interval History: Awaiting placement. Anticipate discharg    Review of Systems   Constitutional: Negative for chills, fever and unexpected weight change.   HENT: Negative for ear pain and sore throat.    Eyes: Negative for pain and visual disturbance.   Respiratory: Negative for cough and shortness of breath.    Cardiovascular: Positive for leg swelling (chronic). Negative for chest pain and palpitations.   Gastrointestinal: Negative for abdominal pain, diarrhea, nausea and vomiting.   Genitourinary: Negative for dysuria, frequency and urgency.   Musculoskeletal: Positive for gait problem (baseline). Negative for back pain.   Skin: Positive for wound (chronic, LE wounds). Negative for rash.   Neurological: Negative for dizziness, weakness and headaches.   Psychiatric/Behavioral: Positive for sleep disturbance. Negative for confusion, hallucinations and self-injury. The patient is not nervous/anxious.      Objective:     Vital Signs (Most Recent):  Temp: 98.3 °F (36.8 °C) (06/17/19 1138)  Pulse: 63 (06/17/19 1138)  Resp: 18 (06/17/19 1138)  BP: (!) 120/59 (06/17/19 1138)  SpO2: 95 % (06/17/19 1138) Vital Signs (24h Range):  Temp:  [97.8 °F (36.6 °C)-98.8 °F (37.1 °C)] 98.3 °F (36.8 °C)  Pulse:  [60-63] 63  Resp:  [14-18] 18  SpO2:  [93 %-98 %] 95 %  BP: (113-128)/(57-62) 120/59     Weight: 93 kg (205 lb 0.4 oz)  Body mass index is 29.42 kg/m².    Intake/Output Summary (Last 24 hours) at 6/17/2019 1252  Last data filed at 6/17/2019 1100  Gross per 24 hour   Intake 957 ml   Output 1150 ml   Net -193 ml      Physical Exam   Constitutional: He is oriented to person, place, and time. He appears well-developed and well-nourished.   HENT:   Head: Normocephalic and atraumatic.   Eyes: Pupils are equal, round, and reactive to light. Conjunctivae and EOM are normal.   Neck: Normal range of  motion. Neck supple.   Cardiovascular: Normal rate, regular rhythm and intact distal pulses.   Diminished heart sounds   Pulmonary/Chest: Effort normal.   Diminished lung sounds   Abdominal: Soft. Bowel sounds are normal. He exhibits no distension. There is no tenderness.   Musculoskeletal: Normal range of motion. He exhibits tenderness (1+/trace pitting edema to BL feet).   Neurological: He is alert and oriented to person, place, and time.   Skin: Skin is warm and dry.   Chronic appearing venous ulcers to lower extremities with multiple bandages in place.     Psychiatric: His speech is normal. Thought content normal. His affect is not angry. He is not agitated and not aggressive. He does not express impulsivity.   sleepy, conversant elderly male in NAD   Nursing note and vitals reviewed.      Significant Labs: All pertinent labs within the past 24 hours have been reviewed.    Significant Imaging: I have reviewed all pertinent imaging results/findings within the past 24 hours.      Assessment/Plan:      * NSTEMI (non-ST elevated myocardial infarction)  Stable, denies symptoms.   - Cardiology following, appreciate recs  -  ICD device interrogation with no tachyarrhythmias   - Continue medical therapy with ASA 81mg daily & Pradaxa  - No indication for triple anticoagulation therapy at this  time  - Suspected demand ischemia 2/2 trazodone use/acuity with associated hypotension.  Extensive cardiac hx.  - Evaluated by cards in the ED and do not recommend ACS protocol  - No anginal equivalent or EKG changes.  - Trop 7.2-->6.6-->7.7>6.1>3.2.    - Incidentally, amphetamine+ on UDS.  Denies drug use.  - monitor on tele, trend trops  - if patient developed CP/SOB, contact cards.     Accidental drug overdose  Patient with chronic insomnia, unimproved with home trazodone/melatonin.  Ingested x5 of the trazodone 50 mg PO tablets. Not ingested to self harm, per patient.   - likely patient has mild dementia and has moments of  clarity.  Will need to confirm with wife.  - No QT prolongation  - will hold for now    Discharge planning issues  Will need assistance from SW/CM for placement as wife can no longer care for patient at home.   - PT/OT consulted    Ulcer of left lower extremity, limited to breakdown of skin  Venous insufficiency of both lower extremities  - wound care consulted    Atrial fibrillation  Current use of long term anticoagulation    - c/w amiodarone, pradaxa  - CHADSVASC 6.  Fall risk and on multiple blood thinners.  Will need to discuss with patient and wife.   - monitor on tele; V-paced rhythm    Coronary artery disease  - c/w ASA, statin  - f/w cardiology  - Last heart cath in 2013 shows two vessel coronary artery disease and Patent LAD and DIagonal stents.  GALLO flow 3 throughout.   - will need outpatient (vs inpatient) stress.  No recent stress.     Type 2 diabetes mellitus with circulatory disorder, with long-term current use of insulin  Check hemoglobin A1c  - reduced home levemir from 15 U to 8 U QHS (previously on 40 U QHS).  Holding 3U aspart with meals  - low dose SSI, ACHS glucose.  Avoid hypoglycemia  - diabetic diet    Chronic systolic heart failure  ICD (implantable cardioverter-defibrillator), biventricular, in situ    - appears euvolemic  - c/w entresto, coreg, lasix  - Last TTE 5/2019 with EF 10-15%, Grade 2 DD, PASP 44, CVP 8  - strict I/O, daily weights, fluid rest diet    Essential hypertension  BP stable  - c/w home meds      VTE Risk Mitigation (From admission, onward)        Ordered     IP VTE HIGH RISK PATIENT  Once      06/14/19 0322     Reason for no Mechanical VTE Prophylaxis  Once      06/14/19 0322     dabigatran etexilate capsule 150 mg  2 times daily      06/12/19 2150     Place EDWIN hose  Until discontinued      06/12/19 2150     Place sequential compression device  Until discontinued      06/12/19 2150                Nguyễn Mendoza PA-C  Department of Hospital Medicine   [facility]

## 2019-06-18 VITALS
WEIGHT: 199.44 LBS | OXYGEN SATURATION: 97 % | DIASTOLIC BLOOD PRESSURE: 61 MMHG | RESPIRATION RATE: 18 BRPM | TEMPERATURE: 99 F | BODY MASS INDEX: 28.55 KG/M2 | HEIGHT: 70 IN | HEART RATE: 60 BPM | SYSTOLIC BLOOD PRESSURE: 134 MMHG

## 2019-06-18 PROCEDURE — G0378 HOSPITAL OBSERVATION PER HR: HCPCS | Mod: HCNC

## 2019-06-18 PROCEDURE — 99217 PR OBSERVATION CARE DISCHARGE: CPT | Mod: HCNC,,, | Performed by: PHYSICIAN ASSISTANT

## 2019-06-18 PROCEDURE — 25000003 PHARM REV CODE 250: Mod: HCNC | Performed by: PHYSICIAN ASSISTANT

## 2019-06-18 PROCEDURE — 99217 PR OBSERVATION CARE DISCHARGE: ICD-10-PCS | Mod: HCNC,,, | Performed by: PHYSICIAN ASSISTANT

## 2019-06-18 RX ORDER — POTASSIUM CHLORIDE 20 MEQ/1
20 TABLET, EXTENDED RELEASE ORAL DAILY
Qty: 30 TABLET | Refills: 11 | Status: SHIPPED | OUTPATIENT
Start: 2019-06-19 | End: 2020-06-18

## 2019-06-18 RX ORDER — POTASSIUM CHLORIDE 20 MEQ/1
40 TABLET, EXTENDED RELEASE ORAL DAILY
Status: DISCONTINUED | OUTPATIENT
Start: 2019-06-18 | End: 2019-06-18 | Stop reason: HOSPADM

## 2019-06-18 RX ORDER — FUROSEMIDE 40 MG/1
40 TABLET ORAL 2 TIMES DAILY
Status: DISCONTINUED | OUTPATIENT
Start: 2019-06-18 | End: 2019-06-18

## 2019-06-18 RX ORDER — FUROSEMIDE 40 MG/1
40 TABLET ORAL 2 TIMES DAILY
Status: DISCONTINUED | OUTPATIENT
Start: 2019-06-18 | End: 2019-06-18 | Stop reason: HOSPADM

## 2019-06-18 RX ORDER — RAMELTEON 8 MG/1
8 TABLET ORAL NIGHTLY PRN
Qty: 30 TABLET | Refills: 3 | Status: SHIPPED | OUTPATIENT
Start: 2019-06-18 | End: 2019-07-15 | Stop reason: SDUPTHER

## 2019-06-18 RX ORDER — RAMELTEON 8 MG/1
8 TABLET ORAL NIGHTLY PRN
Qty: 30 TABLET | Refills: 3 | Status: SHIPPED | OUTPATIENT
Start: 2019-06-18 | End: 2019-06-18 | Stop reason: SDUPTHER

## 2019-06-18 RX ADMIN — SPIRONOLACTONE 25 MG: 25 TABLET, FILM COATED ORAL at 09:06

## 2019-06-18 RX ADMIN — ASPIRIN 81 MG CHEWABLE TABLET 81 MG: 81 TABLET CHEWABLE at 09:06

## 2019-06-18 RX ADMIN — CASTOR OIL AND BALSAM, PERU: 788; 87 OINTMENT TOPICAL at 01:06

## 2019-06-18 RX ADMIN — LEVOTHYROXINE SODIUM 75 MCG: 25 TABLET ORAL at 05:06

## 2019-06-18 RX ADMIN — DABIGATRAN ETEXILATE MESYLATE 150 MG: 150 CAPSULE ORAL at 09:06

## 2019-06-18 RX ADMIN — INSULIN ASPART 5 UNITS: 100 INJECTION, SOLUTION INTRAVENOUS; SUBCUTANEOUS at 09:06

## 2019-06-18 RX ADMIN — INSULIN ASPART 5 UNITS: 100 INJECTION, SOLUTION INTRAVENOUS; SUBCUTANEOUS at 12:06

## 2019-06-18 RX ADMIN — GABAPENTIN 600 MG: 300 CAPSULE ORAL at 02:06

## 2019-06-18 RX ADMIN — POTASSIUM CHLORIDE 40 MEQ: 1500 TABLET, EXTENDED RELEASE ORAL at 01:06

## 2019-06-18 RX ADMIN — FUROSEMIDE 40 MG: 40 TABLET ORAL at 12:06

## 2019-06-18 RX ADMIN — AMIODARONE HYDROCHLORIDE 200 MG: 200 TABLET ORAL at 09:06

## 2019-06-18 RX ADMIN — GABAPENTIN 600 MG: 300 CAPSULE ORAL at 09:06

## 2019-06-18 RX ADMIN — INSULIN DETEMIR 10 UNITS: 100 INJECTION, SOLUTION SUBCUTANEOUS at 09:06

## 2019-06-18 RX ADMIN — OXYCODONE HYDROCHLORIDE AND ACETAMINOPHEN 1500 MG: 500 TABLET ORAL at 09:06

## 2019-06-18 NOTE — PLAN OF CARE
WILLOW spoke with Patient's wife to discuss patient's acceptance to [facility] and [facility]. Patient met with Millie at [facility] on 6/17/19 since patient's first acceptance was to that facility. Wife would like to Tour facility before making final decision regarding placement. Will continue to monitor.

## 2019-06-18 NOTE — ASSESSMENT & PLAN NOTE
Stable, denies symptoms. Has refused telemetry and labs throughout admission.   - Cardiology consulted and followed  -  ICD device interrogation with no tachyarrhythmias   - Continue medical therapy with ASA 81mg daily & Pradaxa  - No indication for triple anticoagulation therapy at this  time  - Suspected demand ischemia 2/2 trazodone use/acuity with associated hypotension.  Extensive cardiac hx. Evaluated by cards in the ED and did not recommend ACS protocol  - No anginal equivalent or EKG changes.  - Trop 7.2-->6.6-->7.7>6.1>3.2.    - Incidentally, amphetamine+ on UDS.  Denies drug use.

## 2019-06-18 NOTE — PLAN OF CARE
[facility]                                      Department of Hospital Medicine                                      [address] [place] [address]                                      [phone] after hours  [phone] fax                                         NURSING HOME ORDERS     06/18/2019     Admit to Nursing Home:    Skilled Bed                            Diagnoses:         Active Hospital Problems     Diagnosis   POA    *NSTEMI (non-ST elevated myocardial infarction) [I21.4]   Yes       Priority: 1 - High    Accidental drug overdose [T50.901A]   Yes       Priority: 2     Suspected deep tissue injury [R68.89]   Yes    Ulcers of both lower legs with fat layer exposed [L97.912, L97.922]   Yes    Discharge planning issues [Z02.9]   Not Applicable    Venous insufficiency of both lower extremities [I87.2]   Yes    Ulcer of left lower extremity, limited to breakdown of skin [L97.921]   Yes    Current use of long term anticoagulation [Z79.01]   Not Applicable    Atrial fibrillation [I48.91]   Yes    Coronary artery disease [I25.10]   Yes    Type 2 diabetes mellitus with circulatory disorder, with long-term current use of insulin [E11.59, Z79.4]   Not Applicable    ICD (implantable cardioverter-defibrillator), biventricular, in situ [Z95.810]   Yes       Dual chamber ICD 2/22/12       Essential hypertension [I10]   Yes    Chronic systolic heart failure [I50.22]   Yes       Resolved Hospital Problems   No resolved problems to display.         Patient is homebound due to:  NSTEMI (non-ST elevated myocardial infarction)     Allergies:Review of patient's allergies indicates:  No Known Allergies     Vitals:       Every shift (Skilled Nursing patients)     Diet: cardiac diet      Acitivities:      - Up in a chair each morning as tolerated   - Ambulate with assistance to bathroom   - Scheduled walks once each shift  (every 8 hours)   - May ambulate independently   - May use walker, cane, or self-propelled wheelchair     LABS:  Per facility protocol     Nursing Precautions:     - Fall precautions per nursing home protocol     CONSULTS:                Physical Therapy to evaluate and treat                 Occupational Therapy to evaluate and treat                 Speech Therapy  to evaluate and treat                 Nutrition to evaluate and recommend diet                 Psychiatry to evaluate and follow patients for delirium     MISCELLANEOUS CARE:       WOUND CARE:  Wound spray or saline for wound cleaning with all dressing changes.    All wounds to be measured with first dressing changes and every week.     Recommendations:     To Bilateral Knees, Left Shin, Left anterior lower leg, left medial leg wounds, right achilles and right foot wound:     --- nursing to cleanse with sterile normal saline and apply medihoney daily with a border foam to cover.     ---- apply Venelex ointment  2 x daily and PRN to sacrum and left buttocks wound. Nursing may apply a border foam to protect these areas.      --- nursing to maintain all pressure prevention interventions to include:EHOB waffle overlay,  heel protectors, urinary diversion device (condom catheter/Pur-wick, catheter), fecal management system(if needed for stools), turn q 2 hours, pillows/wedge for repositioning, blue pad to wick away moisture, HOB no higher than 30 degrees/knee gatch up unless otherwise indicated, barrier cream/paste as needed for moisture control, and adequate calories.      Heart Failure Instructions:      SN to instruct on the following:               Instruct on the definition of CHF.              Instruct on the signs/sympoms of CHF to be reported.              Instruct on and monitor daily weights.              Instruct on factors that cause exacerbation.              Instruct on action, dose, schedule, and side effects of medications.               Instruct on diet as prescribed.              Instruct on activity allowed.              Instruct on life-style modifications for life long management of CHF              SN to assess compliance with daily weights, diet, medications, fluid retention,                          safety precautions, activities permitted and life-style modifications.              Additional 1-2 SN visits per week as needed for signs and symptoms                           of CHF exacerbation.     For Weight Gain > 2-3 lbs in 1 day or 4-6 lbs over 1 week:     Increase furosemide (LASIX) 40 MG tablet dose to furosemide (LASIX) 40 MG tablet for 5 days temporarily     Increase potassium chloride SA CR tablet 20 mEq  (oral potassium supplement) to potassium chloride SA CR tablet 40 mEq   for 5 days temporarily     Obtain BMP lab test in 3 days      If weight does not decrease by 3 lbs after 5 days of increased diuretic usage:              Notify PCP                                                                                                                      DIABETES CARE:         Check blood sugar:                 Fingerstick blood sugar a.m and p.m.              Fingerstick blood sugar AC and HS              Fingerstick blood sugar every 6 hours if unable to eat                 Report CBG < 60 or > 400 to physician.                                           Insulin Sliding Scale                 Glucose                      Novolog Insulin Subcutaneous               0 - 60                          Orange juice or glucose tablet, hold insulin                                       No insulin              201-250                       2 units              251-300                       4 units              301-350                       6 units              351-400                       8 units              >400                            10 units then call physician         Medications: Discontinue all previous medication orders, if  "any. See new list below.     DiegoStanley   Home Medication Instructions HEIDI:[id number]    Printed on:06/18/19 9748   Medication Information              ACCU-CHEK SMARTVIEW TEST STRIP Strp  CHECK BLOOD SUGAR THREE TIMES DAILY             amiodarone (PACERONE) 200 MG Tab  TAKE 1 TABLET TWICE DAILY BY MOUTH             ascorbic acid (VITAMIN C) 500 MG tablet  Take 1,500 mg by mouth 2 (two) times daily.              aspirin 81 MG Chew  Take 1 tablet (81 mg total) by mouth once daily.             beta carotene-C-E-lutein-min29 5,000-60-30-2 unit-mg-unit-mg Tab  Take 1 capsule by mouth once daily.                           carvedilol (COREG) 25 MG tablet  Take 1 tablet (25 mg total) by mouth 2 (two) times daily.             dabigatran etexilate (PRADAXA) 150 mg Cap  Take 1 capsule (150 mg total) by mouth 2 (two) times daily. "Do NOT break, chew, or open capsules."             ENTRESTO  mg per tablet  TAKE 1 TABLET TWICE DAILY  BY MOUTH             fluticasone (FLONASE) 50 mcg/actuation nasal spray  1 spray (50 mcg total) by Each Nare route once daily.             furosemide (LASIX) 40 MG tablet  Take 1 tablet (40 mg total) by mouth 2 (two) times daily.             gabapentin (NEURONTIN) 300 MG capsule  TAKE 2 CAPSULES BY MOUTH THREE TIMES DAILY             glucosamine-chondroitin 500-400 mg tablet  Take 1 tablet by mouth 2 (two) times daily  BY MOUTH.             insulin (LANTUS SOLOSTAR U-100 INSULIN) glargine 100 units/mL (3mL) SubQ pen  Inject units into the skin every twice daily, per sliding scale.             levothyroxine (SYNTHROID) 75 MCG tablet  Take 1 tablet (75 mcg total) by mouth once daily.             magnesium oxide (MAG-OX) 400 mg tablet  Take 400 mg by mouth 2 (two) times daily.              melatonin 5 mg Tab  Take 1 tablet by mouth every evening.              nitroGLYCERIN (NITROSTAT) 0.4 MG SL tablet  Place 1 tablet (0.4 mg total) under the tongue every 5 (five) minutes as needed for Chest " pain.                        potassium chloride SA (K-DUR,KLOR-CON) 20 MEQ tablet  Take 1 tablet (20 mEq total) by mouth once daily.             ramelteon (ROZEREM) 8 mg tablet  Take 1 tablet (8 mg total) by mouth nightly as needed for Insomnia.             RIBOSE ORAL  Take 1 tablet by mouth once daily.              senna-docusate 8.6-50 mg (PERICOLACE) 8.6-50 mg per tablet  Take 1 tablet by mouth 2 (two) times daily.             spironolactone (ALDACTONE) 25 MG tablet  TAKE 1 TABLET ONE TIME DAILY  BY MOUTH             UBIDECARENONE (COQ-10 ORAL)  Take 800 mg by mouth once daily. Takes 100mg tablet at lunch, and 600mg at dinner                       _________________________________  Nguyễn Mendoza PA-C  06/18/2019

## 2019-06-18 NOTE — PLAN OF CARE
Pt is AAOx3.Pt needs stand by assistance only. CBG monitored AC HS.  SS coverage given when appropriate.NAD noted.Pt denies pian and/or discomfort at this time.Standard precautions maintained.  Pt turns independently, pt is aware of bony area and pressure reduction positions V/S stable, within normal limits.Pt remains injury and fall free, non skid footwear donned, call light within reach, personal items within reach, bed in low/locked position, pt able to voice needs all needs voiced have been met at this time.

## 2019-06-18 NOTE — ASSESSMENT & PLAN NOTE
Patient with chronic insomnia, unimproved with home trazodone/melatonin.  Ingested x5 of the trazodone 50 mg PO tablets. Not ingested to self harm, per patient.   - responded well to ramelteon tablet 8 mg in house  - advised against use

## 2019-06-18 NOTE — PLAN OF CARE
Pt to d/c to [facility], spouse to transport Pt herself and IM team aware. Nurse to call report to [facility] at , 11a, spouse to transport Pt to facility, nurse aware.

## 2019-06-18 NOTE — PLAN OF CARE
06/18/19 1034   Post-Acute Status   Post-Acute Authorization Placement   Post-Acute Placement Status Awaiting Orders for SNF

## 2019-06-18 NOTE — PLAN OF CARE
06/18/19 1034   Post-Acute Status   Post-Acute Authorization Placement   Post-Acute Placement Status Awaiting Orders for SNF     Pt's has the auth and paperwork is signed, Sw uploaded 142 and is awaiting SNF orders to d/c, Sw to follow. Orders uploaded to facility, Sw to follow with room, spouse to transport pt to facility.

## 2019-06-18 NOTE — DISCHARGE SUMMARY
"[facility] Hospital Medicine  Discharge Summary      Patient Name: Madhav Cortez  MRN: [id number]  Admission Date: 6/12/2019  Hospital Length of Stay: 0 days  Discharge Date and Time:  06/18/2019 2:26 PM  Attending Physician: Lenora Calderón MD   Discharging Provider: Nguyễn Mendoza PA-C  Primary Care Provider: Mirna Reyes MD  Hospital Medicine Team: [facility] HOSP MED E Nguyễn Mendoza PA-C    HPI:   Madhav Cortez is a 79 y.o. M with pmhx HFrEF (10%), HTN, atrial fib on anticoagulation and atrial flutter (s/p cardioversion), HLP, venous insufficiency, chronic insomnia and chronic LE wounds who presents to the ED for evaluation of difficulty arousing patient this AM.  Per chart, "wife is unable to continue care for him at home.  Wife reports patient ingested 5 trazodone 50 mg at 3am. Wife states she had difficulty waking him up and he has been drowsy. Wife reports Dr. Reyes is working on getting patient set up with skilled nursing, as she is unable to care for the patient any longer on her own.  " Patient confirms that he took 5 of his trazodone pills because he has been unable to sleep for the last 3 days.  He was able to sleep but recalls that his wife was unable to wake him up this AM.  He denies any SOB, CP, depression, anxiety, intention to harm.  He reports using a walker at baseline. Patient has been falling more, per chart review, 2/2 complications with his knees.  Patient denies any IVDU, amphetamine use, ETOH use.     ED: HDS, no leukocytosis.  Trop 7.2-->6.6.  EKG paced rhythm.  Cards consulted in ED and no concern for ACS.  UDS + for amphetamines, benzos. UA shows 3 hyaline casts. CTH shows "Motion limited exam, no convincing acute intracranial abnormalities".  CXR with no acute abnormality.    * No surgery found *      Hospital Course:   Madhav Cortez was admitted to hospital medicine for accidental drug overdose and elevated troponin. Cardiology was consulted " however believed elevation to be secondary to demand/stress, likely NSTEMI (type II). No further intervention. Education provided to patient and family regarding safe medication practices and administration.  PT/OT recommended SNF, patient discharged in stable condition.      Consults:   Consults (From admission, onward)        Status Ordering Provider     Inpatient consult to Cardiology  Once     Provider:  (Not yet assigned)    Completed BILL KHAN     Inpatient consult to SNF  Once     Provider:  (Not yet assigned)    Acknowledged KENYATTA FATIMA     Inpatient consult to Social Work  Once     Provider:  (Not yet assigned)    Acknowledged KENYATTA FATIMA          * NSTEMI (non-ST elevated myocardial infarction)  Stable, denies symptoms. Has refused telemetry and labs throughout admission.   - Cardiology consulted and followed  -  ICD device interrogation with no tachyarrhythmias   - Continue medical therapy with ASA 81mg daily & Pradaxa  - No indication for triple anticoagulation therapy at this  time  - Suspected demand ischemia 2/2 trazodone use/acuity with associated hypotension.  Extensive cardiac hx. Evaluated by cards in the ED and did not recommend ACS protocol  - No anginal equivalent or EKG changes.  - Trop 7.2-->6.6-->7.7>6.1>3.2.    - Incidentally, amphetamine+ on UDS.  Denies drug use.     Accidental drug overdose  Patient with chronic insomnia, unimproved with home trazodone/melatonin.  Ingested x5 of the trazodone 50 mg PO tablets. Not ingested to self harm, per patient.   - responded well to ramelteon tablet 8 mg in house  - advised against use     Final Active Diagnoses:    Diagnosis Date Noted POA    PRINCIPAL PROBLEM:  NSTEMI (non-ST elevated myocardial infarction) [I21.4] 06/12/2019 Yes    Accidental drug overdose [T50.901A] 06/12/2019 Yes    Suspected deep tissue injury [R68.89] 06/13/2019 Yes    Ulcers of both lower legs with fat layer exposed [L97.912, L97.922] 06/13/2019  Yes    Discharge planning issues [Z02.9] 06/12/2019 Not Applicable    Venous insufficiency of both lower extremities [I87.2] 04/24/2019 Yes    Ulcer of left lower extremity, limited to breakdown of skin [L97.921] 03/27/2019 Yes    Current use of long term anticoagulation [Z79.01] 04/03/2017 Not Applicable    Atrial fibrillation [I48.91] 08/15/2013 Yes    Coronary artery disease [I25.10] 05/27/2013 Yes    Type 2 diabetes mellitus with circulatory disorder, with long-term current use of insulin [E11.59, Z79.4] 08/07/2012 Not Applicable    ICD (implantable cardioverter-defibrillator), biventricular, in situ [Z95.810] 08/07/2012 Yes    Essential hypertension [I10] 08/07/2012 Yes    Chronic systolic heart failure [I50.22] 08/07/2012 Yes      Problems Resolved During this Admission:       Discharged Condition: good    Disposition: Skilled Nursing Facility    Follow Up:  Follow-up Information     [facility].    Specialties:  Nursing Home Agency, SNF Agency  Contact information:  [address] [place] [id number]  [phone]             Yonas Dunne MD.    Specialties:  Family Medicine, Sports Medicine  Why:  Appt scheduled for 6/28/19 @ 3441  Contact information:  [address]  [facility] [address]  [phone]             Andrei Kaminski Jr, MD.    Specialties:  Transplant, Cardiology  Why:  Clinic nurse to call patient to schedule a sooner follow-up. Current appointment is 7/22/19 @ 130PM. [facility] medicine would like patient seen in 2 weeks  Contact information:  [address]  [facility] [address]  [phone]                 Patient Instructions:      Diet Cardiac     Activity as tolerated       Significant Diagnostic Studies: Labs: All labs within the past 24 hours have been reviewed    Pending Diagnostic Studies:     None         Medications:  Reconciled Home Medications:      Medication List      START taking these medications    potassium chloride SA 20 MEQ  "tablet  Commonly known as:  K-DUR,KLOR-CON  Take 1 tablet (20 mEq total) by mouth once daily.  Start taking on:  6/19/2019     ramelteon 8 mg tablet  Commonly known as:  ROZEREM  Take 1 tablet (8 mg total) by mouth nightly as needed for Insomnia.        CHANGE how you take these medications    COQ-10 ORAL  Take 800 mg by mouth once daily. Takes 100mg tablet at lunch, and 600mg at dinner  What changed:  Another medication with the same name was removed. Continue taking this medication, and follow the directions you see here.        CONTINUE taking these medications    ACCU-CHEK SMARTVIEW TEST STRIP Strp  Generic drug:  blood sugar diagnostic  CHECK BLOOD SUGAR THREE TIMES DAILY     amiodarone 200 MG Tab  Commonly known as:  PACERONE  TAKE 1 TABLET TWICE DAILY     aspirin 81 MG Chew  Take 1 tablet (81 mg total) by mouth once daily.     beta carotene-C-E-lutein-min29 5,000-60-30-2 unit-mg-unit-mg Tab  Take 1 capsule by mouth once daily.     * blood-glucose meter kit  Accu check monique meter Use as instructed     * ACCU-CHEK MONIQUE Misc  Generic drug:  blood-glucose meter  USE AS INSTRUCTED     carvedilol 25 MG tablet  Commonly known as:  COREG  Take 1 tablet (25 mg total) by mouth 2 (two) times daily.     dabigatran etexilate 150 mg Cap  Commonly known as:  PRADAXA  Take 1 capsule (150 mg total) by mouth 2 (two) times daily. "Do NOT break, chew, or open capsules."     ENTRESTO  mg per tablet  Generic drug:  sacubitril-valsartan  TAKE 1 TABLET TWICE DAILY     fluticasone propionate 50 mcg/actuation nasal spray  Commonly known as:  FLONASE  1 spray (50 mcg total) by Each Nare route once daily.     furosemide 40 MG tablet  Commonly known as:  LASIX  Take 1 tablet (40 mg total) by mouth 2 (two) times daily.     gabapentin 300 MG capsule  Commonly known as:  NEURONTIN  TAKE 2 CAPSULES BY MOUTH THREE TIMES DAILY     glucosamine-chondroitin 500-400 mg tablet  Take 1 tablet by mouth 2 (two) times daily.     LANTUS SOLOSTAR " "U-100 INSULIN glargine 100 units/mL (3mL) SubQ pen  Generic drug:  insulin  Inject 15 Units into the skin every evening.     levothyroxine 75 MCG tablet  Commonly known as:  SYNTHROID  Take 1 tablet (75 mcg total) by mouth once daily.     magnesium oxide 400 mg (241.3 mg magnesium) tablet  Commonly known as:  MAG-OX  Take 400 mg by mouth 2 (two) times daily.     melatonin 5 mg Tab  Take 1 tablet by mouth every evening.     nitroGLYCERIN 0.4 MG SL tablet  Commonly known as:  NITROSTAT  Place 1 tablet (0.4 mg total) under the tongue every 5 (five) minutes as needed for Chest pain.     pen needle, diabetic 31 gauge x 1/4" Ndle  Use 4 times daily     RIBOSE ORAL  Take 1 tablet by mouth once daily.     senna-docusate 8.6-50 mg 8.6-50 mg per tablet  Commonly known as:  PERICOLACE  Take 1 tablet by mouth 2 (two) times daily.     spironolactone 25 MG tablet  Commonly known as:  ALDACTONE  TAKE 1 TABLET ONE TIME DAILY     VITAMIN C 500 MG tablet  Generic drug:  ascorbic acid (vitamin C)  Take 1,500 mg by mouth 2 (two) times daily.         * This list has 2 medication(s) that are the same as other medications prescribed for you. Read the directions carefully, and ask your doctor or other care provider to review them with you.            STOP taking these medications    ALPRAZolam 0.5 MG tablet  Commonly known as:  XANAX     nystatin cream  Commonly known as:  MYCOSTATIN            Indwelling Lines/Drains at time of discharge:   Lines/Drains/Airways     Pressure Ulcer                 Pressure Injury 06/13/19 Sacral spine Suspected DTPI 5 days                Time spent on the discharge of patient: 30 minutes  Patient was seen and examined on the date of discharge and determined to be suitable for discharge.         Nguyễn Mendoza PA-C  Department of Hospital Medicine  [facility]  "

## 2019-06-19 ENCOUNTER — TELEPHONE (OUTPATIENT)
Dept: TRANSPLANT | Facility: CLINIC | Age: 79
End: 2019-06-19

## 2019-06-19 NOTE — PLAN OF CARE
Patient discharged to Skilled nursing facility at [facility]     06/19/19 0832   Final Note   Assessment Type Final Discharge Note   Anticipated Discharge Disposition SNF   What phone number can be called within the next 1-3 days to see how you are doing after discharge?   ([phone])   Hospital Follow Up  Appt(s) scheduled? Yes   Discharge plans and expectations educations in teach back method with documentation complete? Yes   Right Care Referral Info   Post Acute Recommendation SNF / Sub-Acute Rehab   Referral Type Skilled Nursing Facility   Facility Name [facility]

## 2019-06-19 NOTE — PT/OT/SLP DISCHARGE
Occupational Therapy Discharge Summary    Madhav Cortez  MRN: [id number]   Principal Problem: NSTEMI (non-ST elevated myocardial infarction)      Patient Discharged from acute Occupational Therapy on 6/18/19.      Assessment:      Patient was discharged unexpectedly.  Information required to complete an accurate discharge summary is unknown.  Refer to therapy initial evaluation and last progress note for initial and most recent functional status and goal achievement.  Recommendations made may be found in medical record.    Objective:     GOALS:   Multidisciplinary Problems     Occupational Therapy Goals     Not on file          Multidisciplinary Problems (Resolved)        Problem: Occupational Therapy Goal    Goal Priority Disciplines Outcome Interventions   Occupational Therapy Goal   (Resolved)     OT, PT/OT Outcome(s) achieved    Description:  Goals to be met by: 6/21/19     Patient will increase functional independence with ADLs by performing:    Grooming while standing at sink with Stand-by Assistance.  Toileting from toilet with Minimal Assistance for hygiene and clothing management.   Supine to sit with Supervision.  Toilet transfer to toilet with Contact Guard Assistance.                      Reasons for Discontinuation of Therapy Services  Transfer to alternate level of care.      Plan:     Patient Discharged to: Skilled Nursing Facility    JOSHUA Torres  6/19/2019

## 2019-07-08 ENCOUNTER — TELEPHONE (OUTPATIENT)
Dept: INTERNAL MEDICINE | Facility: CLINIC | Age: 79
End: 2019-07-08

## 2019-07-08 NOTE — TELEPHONE ENCOUNTER
----- Message from Esthela Greco MA sent at 7/8/2019  9:28 AM CDT -----  Contact: Wife, Joanne call  [phone]      ----- Message -----  From: Tori Tovar  Sent: 7/8/2019   8:20 AM  To: Eric PETERSON Staff    Patient do not want to see anyone but you for his Hospital discharge appointment. Please call them.

## 2019-07-15 ENCOUNTER — OFFICE VISIT (OUTPATIENT)
Dept: INTERNAL MEDICINE | Facility: CLINIC | Age: 79
End: 2019-07-15
Payer: MEDICARE

## 2019-07-15 ENCOUNTER — TELEPHONE (OUTPATIENT)
Dept: INTERNAL MEDICINE | Facility: CLINIC | Age: 79
End: 2019-07-15

## 2019-07-15 VITALS — WEIGHT: 204.13 LBS | HEIGHT: 70 IN | BODY MASS INDEX: 29.22 KG/M2

## 2019-07-15 DIAGNOSIS — G47.00 INSOMNIA, UNSPECIFIED TYPE: Primary | ICD-10-CM

## 2019-07-15 DIAGNOSIS — I87.2 VENOUS INSUFFICIENCY OF BOTH LOWER EXTREMITIES: ICD-10-CM

## 2019-07-15 DIAGNOSIS — I73.9 PERIPHERAL VASCULAR DISEASE: ICD-10-CM

## 2019-07-15 DIAGNOSIS — L97.521 SKIN ULCER OF TOE OF LEFT FOOT, LIMITED TO BREAKDOWN OF SKIN: ICD-10-CM

## 2019-07-15 PROCEDURE — 1100F PR PT FALLS ASSESS DOC 2+ FALLS/FALL W/INJURY/YR: ICD-10-PCS | Mod: HCNC,CPTII,GC,S$GLB | Performed by: STUDENT IN AN ORGANIZED HEALTH CARE EDUCATION/TRAINING PROGRAM

## 2019-07-15 PROCEDURE — 99999 PR PBB SHADOW E&M-EST. PATIENT-LVL III: CPT | Mod: PBBFAC,HCNC,GC, | Performed by: STUDENT IN AN ORGANIZED HEALTH CARE EDUCATION/TRAINING PROGRAM

## 2019-07-15 PROCEDURE — 1100F PTFALLS ASSESS-DOCD GE2>/YR: CPT | Mod: HCNC,CPTII,GC,S$GLB | Performed by: STUDENT IN AN ORGANIZED HEALTH CARE EDUCATION/TRAINING PROGRAM

## 2019-07-15 PROCEDURE — 99214 PR OFFICE/OUTPT VISIT, EST, LEVL IV, 30-39 MIN: ICD-10-PCS | Mod: HCNC,GC,S$GLB, | Performed by: STUDENT IN AN ORGANIZED HEALTH CARE EDUCATION/TRAINING PROGRAM

## 2019-07-15 PROCEDURE — 99214 OFFICE O/P EST MOD 30 MIN: CPT | Mod: HCNC,GC,S$GLB, | Performed by: STUDENT IN AN ORGANIZED HEALTH CARE EDUCATION/TRAINING PROGRAM

## 2019-07-15 PROCEDURE — 3288F FALL RISK ASSESSMENT DOCD: CPT | Mod: HCNC,CPTII,GC,S$GLB | Performed by: STUDENT IN AN ORGANIZED HEALTH CARE EDUCATION/TRAINING PROGRAM

## 2019-07-15 PROCEDURE — 3288F PR FALLS RISK ASSESSMENT DOCUMENTED: ICD-10-PCS | Mod: HCNC,CPTII,GC,S$GLB | Performed by: STUDENT IN AN ORGANIZED HEALTH CARE EDUCATION/TRAINING PROGRAM

## 2019-07-15 PROCEDURE — 99999 PR PBB SHADOW E&M-EST. PATIENT-LVL III: ICD-10-PCS | Mod: PBBFAC,HCNC,GC, | Performed by: STUDENT IN AN ORGANIZED HEALTH CARE EDUCATION/TRAINING PROGRAM

## 2019-07-15 RX ORDER — RAMELTEON 8 MG/1
8 TABLET ORAL NIGHTLY PRN
Qty: 30 TABLET | Refills: 3 | Status: SHIPPED | OUTPATIENT
Start: 2019-07-15 | End: 2019-07-18 | Stop reason: SDUPTHER

## 2019-07-15 RX ORDER — RAMELTEON 8 MG/1
8 TABLET ORAL NIGHTLY PRN
Qty: 30 TABLET | Refills: 3 | Status: SHIPPED | OUTPATIENT
Start: 2019-07-15 | End: 2019-07-15

## 2019-07-15 NOTE — TELEPHONE ENCOUNTER
----- Message from Zahra Ennis PharmD sent at 7/15/2019  2:22 PM CDT -----  Good afternoon. We have just received a prescription for Madhav Cortez ([id number]) for Rozerem.  Unfortunately, the insurance does not cover the Rozerem. They prefer Belsomra. If you feel that this change is appropriate for the patient, please send a prescription for the Belsomra to the primary care pharmacy.  If you have any questions, give us a call at [phone].    Thanks,  Zahra Ennis, DotD

## 2019-07-15 NOTE — PROGRESS NOTES
Subjective:      Patient ID: Madhav Cortez is a 79 y.o. male.    Chief Complaint: Follow-up (hospital)    79 years old male patient with medical history of DM-2, HTN, CAD, CKD-3, HLP, Afib, HFrEF(10%), venous insufficiency, chronic leg wounds.  Came to day for hospital follow up, he was admitted on 6/12 with AMS due to trazodone overdose, he took 5 bills of 20 mg due to difficulty falling sleep, found to have elevated troponin at ED cardiology consulted, no concern for ACS,patient switched from trazodone to ramelteon and has no issues with sleeping since,  patient was discharged to SNF, during his stay at SNF had wound care taking care of his lower extremities wound which was healed prior to going home, after her went home 2 weeks ago, wound care com to the house once and were unable to visit the patient with the regular 3 times week visit due to weather issue, his wound re opened with bluster and weeping, wife has been cleaning the wound daily. He also reported hitting his leg and developed a small lateral pig toe left leg wound.Patient denied chest pain, shortness of breath, tingling, numbness,fever and chills.           Review of Systems   Constitutional: Negative for activity change, chills and fever.   HENT: Negative for congestion.    Eyes: Negative for visual disturbance.   Respiratory: Negative for shortness of breath and wheezing.    Cardiovascular: Negative for chest pain and palpitations.   Gastrointestinal: Negative for abdominal pain.   Genitourinary: Negative for dysuria and enuresis.   Musculoskeletal: Negative for arthralgias and back pain.        Left leg wound pain    Skin: Positive for color change and wound.   Neurological: Negative for dizziness and numbness.   Psychiatric/Behavioral: Negative for agitation and confusion.     Objective:   There were no vitals filed for this visit.  Physical Exam   Constitutional: He is oriented to person, place, and time. He appears well-developed and  well-nourished. No distress.   HENT:   Head: Normocephalic and atraumatic.   Eyes: Pupils are equal, round, and reactive to light.   Neck: No JVD present.   Cardiovascular: Normal rate. An irregularly irregular rhythm present.   Pulmonary/Chest: Effort normal and breath sounds normal. No respiratory distress.   Abdominal: He exhibits no distension.   Musculoskeletal: He exhibits edema (+1 LE LEFT).   Protective Sensation :  Right: INTACT  Left: INTACT    Visual Inspection:  SEE the pictures     Pedal Pulses:   Right:Diminished  Left: Diminished    Posterior tibialis:   Right:Diminished  Left:Diminished     Neurological: He is alert and oriented to person, place, and time.   Skin: Skin is warm. He is not diaphoretic.   Left lower extremities wounds (pictuers) attached    Left leg wormer than right  No pus , has watery discharge   Bilateral LE dark discoloration   Left big toe wound with whitish base, no discharge    Psychiatric: He has a normal mood and affect. His behavior is normal.   Vitals reviewed.            Assessment and Plan :      1. Insomnia, unspecified type    2. Venous insufficiency of both lower extremities    3. Skin ulcer of toe of left foot, limited to breakdown of skin    4. Peripheral vascular disease        Madhav was seen today for follow-up.    Diagnoses and all orders for this visit:    Insomnia, unspecified type  -     ramelteon (ROZEREM) 8 mg tablet; Take 1 tablet (8 mg total) by mouth nightly as needed for Insomnia.    Venous insufficiency of both lower extremities  Skin ulcer of toe of left foot, limited to breakdown of skin  Peripheral vascular disease  Poor lower extremitas circulation   No significant signes of infection   - Contacted wound care clinch to [place] patient earlier appointment.  - Patient will contact wound care to make sure that they will come by the house tomorrow.  - Leg was wrapped in the clinic and should be followed up by wound care              Elizabeth Rg  Internal  Medicine, PGY 3  [phone]

## 2019-07-16 ENCOUNTER — TELEPHONE (OUTPATIENT)
Dept: INTERNAL MEDICINE | Facility: CLINIC | Age: 79
End: 2019-07-16

## 2019-07-16 DIAGNOSIS — E13.9 DIABETES MELLITUS DUE TO ABNORMAL INSULIN: ICD-10-CM

## 2019-07-16 DIAGNOSIS — I10 ESSENTIAL HYPERTENSION: ICD-10-CM

## 2019-07-16 DIAGNOSIS — I25.5 CARDIOMYOPATHY, ISCHEMIC: ICD-10-CM

## 2019-07-16 DIAGNOSIS — I25.10 CORONARY ARTERY DISEASE INVOLVING NATIVE CORONARY ARTERY OF NATIVE HEART WITHOUT ANGINA PECTORIS: ICD-10-CM

## 2019-07-16 DIAGNOSIS — R74.01 TRANSAMINITIS: ICD-10-CM

## 2019-07-16 RX ORDER — BLOOD SUGAR DIAGNOSTIC
STRIP MISCELLANEOUS
Qty: 300 STRIP | Refills: 4 | Status: SHIPPED | OUTPATIENT
Start: 2019-07-16 | End: 2019-11-01 | Stop reason: SDUPTHER

## 2019-07-16 RX ORDER — SACUBITRIL AND VALSARTAN 97; 103 MG/1; MG/1
TABLET, FILM COATED ORAL
Qty: 180 TABLET | Refills: 1 | Status: SHIPPED | OUTPATIENT
Start: 2019-07-16 | End: 2020-01-28

## 2019-07-17 ENCOUNTER — TELEPHONE (OUTPATIENT)
Dept: WOUND CARE | Facility: CLINIC | Age: 79
End: 2019-07-17

## 2019-07-17 NOTE — TELEPHONE ENCOUNTER
----- Message from Travis Cheung sent at 7/17/2019 12:43 PM CDT -----  Contact: Home Health Care nurse  Needs Advice    Reason for call: The caller needs some home health care instructions for the Pt's wound and would like a call back.        Communication Preference:[phone] Radha    Additional Information:

## 2019-07-17 NOTE — TELEPHONE ENCOUNTER
----- Message from Lashaun Armijo sent at 7/17/2019 12:16 PM CDT -----  Contact: Radha [phone] Anup Garcia called stated pt has wound on leg and requesting order to treat.  Please

## 2019-07-18 ENCOUNTER — TELEPHONE (OUTPATIENT)
Dept: INTERNAL MEDICINE | Facility: CLINIC | Age: 79
End: 2019-07-18

## 2019-07-18 DIAGNOSIS — G47.00 INSOMNIA, UNSPECIFIED TYPE: ICD-10-CM

## 2019-07-18 RX ORDER — RAMELTEON 8 MG/1
8 TABLET ORAL NIGHTLY PRN
Qty: 30 TABLET | Refills: 3 | Status: SHIPPED | OUTPATIENT
Start: 2019-07-18 | End: 2019-10-23

## 2019-07-18 NOTE — TELEPHONE ENCOUNTER
Spoke with the pt, he stated he has a wound on his left leg that is draining. [facility] won't touch it without talking to his PCP first. His home health nurse is Radha and can be reached at [phone]. He was in the nursing home and it healed, but once he got home blisters formed and now it's draining.Jie Greco

## 2019-07-18 NOTE — TELEPHONE ENCOUNTER
Called [facility] and they are closed for the day, will try back tomorrow.Jie Greco

## 2019-07-18 NOTE — TELEPHONE ENCOUNTER
----- Message from Jalyn Kitchen sent at 7/18/2019  3:44 PM CDT -----  Contact: [phone]  Patient requesting a call from the office in regards to wound on left leg . Please call and advise, Thanks

## 2019-07-18 NOTE — TELEPHONE ENCOUNTER
----- Message from Thiago Rosenthal sent at 7/18/2019  9:54 AM CDT -----  Contact: Pt [phone]  Telephone consult    He needs to speak to you about Home Health care. He said they won't treat his wounds until they here from you and they told him to call you.    Thank you

## 2019-07-18 NOTE — TELEPHONE ENCOUNTER
Spoke to pt he is just following up on the home health for his wound care  He says its been a week since it's been treated  Advised pt that we are working on getting in touch with Radha with HH at [facility]

## 2019-07-19 NOTE — TELEPHONE ENCOUNTER
Spoke with Yani () pt was seen by  nurse on Monday and Dr. Mena Tuesday and nurse saw him on Wednesday requested updated wound care orders from our office. [facility] is waiting for orders for medication and supplies to use to care for the wound. The order just needs to state Eval and Treat [phone]. Nurse going to pts house tomorrw to check the pts wound and take pics for wound care specialist and new wound care starts Monday.Jie Greco LPN

## 2019-07-22 ENCOUNTER — INITIAL CONSULT (OUTPATIENT)
Dept: TRANSPLANT | Facility: CLINIC | Age: 79
End: 2019-07-22
Attending: INTERNAL MEDICINE
Payer: MEDICARE

## 2019-07-22 ENCOUNTER — LAB VISIT (OUTPATIENT)
Dept: LAB | Facility: HOSPITAL | Age: 79
End: 2019-07-22
Attending: INTERNAL MEDICINE
Payer: MEDICARE

## 2019-07-22 VITALS
DIASTOLIC BLOOD PRESSURE: 57 MMHG | HEIGHT: 70 IN | HEART RATE: 60 BPM | BODY MASS INDEX: 30.49 KG/M2 | WEIGHT: 212.94 LBS | SYSTOLIC BLOOD PRESSURE: 107 MMHG

## 2019-07-22 DIAGNOSIS — Z79.899 LONG TERM CURRENT USE OF AMIODARONE: ICD-10-CM

## 2019-07-22 DIAGNOSIS — I50.22 CHRONIC SYSTOLIC HEART FAILURE: ICD-10-CM

## 2019-07-22 DIAGNOSIS — I25.10 CORONARY ARTERY DISEASE INVOLVING NATIVE CORONARY ARTERY OF NATIVE HEART WITHOUT ANGINA PECTORIS: ICD-10-CM

## 2019-07-22 DIAGNOSIS — I73.9 PERIPHERAL VASCULAR DISEASE: ICD-10-CM

## 2019-07-22 DIAGNOSIS — I25.5 ISCHEMIC CARDIOMYOPATHY: ICD-10-CM

## 2019-07-22 DIAGNOSIS — I48.0 PAROXYSMAL ATRIAL FIBRILLATION: ICD-10-CM

## 2019-07-22 DIAGNOSIS — I87.2 VENOUS INSUFFICIENCY OF BOTH LOWER EXTREMITIES: ICD-10-CM

## 2019-07-22 DIAGNOSIS — Z95.810 ICD (IMPLANTABLE CARDIOVERTER-DEFIBRILLATOR), BIVENTRICULAR, IN SITU: ICD-10-CM

## 2019-07-22 DIAGNOSIS — E11.43 DIABETIC AUTONOMIC NEUROPATHY ASSOCIATED WITH TYPE 2 DIABETES MELLITUS: ICD-10-CM

## 2019-07-22 DIAGNOSIS — Z79.01 CURRENT USE OF LONG TERM ANTICOAGULATION: ICD-10-CM

## 2019-07-22 DIAGNOSIS — E11.51 TYPE 2 DIABETES MELLITUS WITH DIABETIC PERIPHERAL ANGIOPATHY WITHOUT GANGRENE, WITH LONG-TERM CURRENT USE OF INSULIN: ICD-10-CM

## 2019-07-22 DIAGNOSIS — I70.0 ATHEROSCLEROSIS OF AORTIC ARCH: ICD-10-CM

## 2019-07-22 DIAGNOSIS — Z79.4 TYPE 2 DIABETES MELLITUS WITH DIABETIC PERIPHERAL ANGIOPATHY WITHOUT GANGRENE, WITH LONG-TERM CURRENT USE OF INSULIN: ICD-10-CM

## 2019-07-22 DIAGNOSIS — Z79.4 DIABETES MELLITUS DUE TO UNDERLYING CONDITION WITH DIABETIC POLYNEUROPATHY, WITH LONG-TERM CURRENT USE OF INSULIN: ICD-10-CM

## 2019-07-22 DIAGNOSIS — I50.42 CHRONIC COMBINED SYSTOLIC AND DIASTOLIC HEART FAILURE: Primary | ICD-10-CM

## 2019-07-22 DIAGNOSIS — N18.30 STAGE 3 CHRONIC KIDNEY DISEASE: ICD-10-CM

## 2019-07-22 DIAGNOSIS — E08.42 DIABETES MELLITUS DUE TO UNDERLYING CONDITION WITH DIABETIC POLYNEUROPATHY, WITH LONG-TERM CURRENT USE OF INSULIN: ICD-10-CM

## 2019-07-22 PROBLEM — I50.23 ACUTE ON CHRONIC SYSTOLIC HEART FAILURE: Status: RESOLVED | Noted: 2019-05-22 | Resolved: 2019-07-22

## 2019-07-22 LAB
ANION GAP SERPL CALC-SCNC: 7 MMOL/L (ref 8–16)
BNP SERPL-MCNC: 838 PG/ML (ref 0–99)
BUN SERPL-MCNC: 8 MG/DL (ref 8–23)
CALCIUM SERPL-MCNC: 8.9 MG/DL (ref 8.7–10.5)
CHLORIDE SERPL-SCNC: 104 MMOL/L (ref 95–110)
CO2 SERPL-SCNC: 30 MMOL/L (ref 23–29)
CREAT SERPL-MCNC: 0.7 MG/DL (ref 0.5–1.4)
EST. GFR  (AFRICAN AMERICAN): >60 ML/MIN/1.73 M^2
EST. GFR  (NON AFRICAN AMERICAN): >60 ML/MIN/1.73 M^2
GLUCOSE SERPL-MCNC: 121 MG/DL (ref 70–110)
MAGNESIUM SERPL-MCNC: 1.9 MG/DL (ref 1.6–2.6)
POTASSIUM SERPL-SCNC: 4.6 MMOL/L (ref 3.5–5.1)
SODIUM SERPL-SCNC: 141 MMOL/L (ref 136–145)

## 2019-07-22 PROCEDURE — 80048 BASIC METABOLIC PNL TOTAL CA: CPT | Mod: HCNC

## 2019-07-22 PROCEDURE — 99499 UNLISTED E&M SERVICE: CPT | Mod: HCNC,S$GLB,, | Performed by: INTERNAL MEDICINE

## 2019-07-22 PROCEDURE — 3074F SYST BP LT 130 MM HG: CPT | Mod: HCNC,CPTII,S$GLB, | Performed by: INTERNAL MEDICINE

## 2019-07-22 PROCEDURE — 83880 ASSAY OF NATRIURETIC PEPTIDE: CPT | Mod: HCNC

## 2019-07-22 PROCEDURE — 3078F PR MOST RECENT DIASTOLIC BLOOD PRESSURE < 80 MM HG: ICD-10-PCS | Mod: HCNC,CPTII,S$GLB, | Performed by: INTERNAL MEDICINE

## 2019-07-22 PROCEDURE — 3078F DIAST BP <80 MM HG: CPT | Mod: HCNC,CPTII,S$GLB, | Performed by: INTERNAL MEDICINE

## 2019-07-22 PROCEDURE — 1101F PT FALLS ASSESS-DOCD LE1/YR: CPT | Mod: HCNC,CPTII,S$GLB, | Performed by: INTERNAL MEDICINE

## 2019-07-22 PROCEDURE — 99204 PR OFFICE/OUTPT VISIT, NEW, LEVL IV, 45-59 MIN: ICD-10-PCS | Mod: HCNC,S$GLB,, | Performed by: INTERNAL MEDICINE

## 2019-07-22 PROCEDURE — 1101F PR PT FALLS ASSESS DOC 0-1 FALLS W/OUT INJ PAST YR: ICD-10-PCS | Mod: HCNC,CPTII,S$GLB, | Performed by: INTERNAL MEDICINE

## 2019-07-22 PROCEDURE — 3074F PR MOST RECENT SYSTOLIC BLOOD PRESSURE < 130 MM HG: ICD-10-PCS | Mod: HCNC,CPTII,S$GLB, | Performed by: INTERNAL MEDICINE

## 2019-07-22 PROCEDURE — 99204 OFFICE O/P NEW MOD 45 MIN: CPT | Mod: HCNC,S$GLB,, | Performed by: INTERNAL MEDICINE

## 2019-07-22 PROCEDURE — 83735 ASSAY OF MAGNESIUM: CPT | Mod: HCNC

## 2019-07-22 PROCEDURE — 99499 RISK ADDL DX/OHS AUDIT: ICD-10-PCS | Mod: HCNC,S$GLB,, | Performed by: INTERNAL MEDICINE

## 2019-07-22 PROCEDURE — 99999 PR PBB SHADOW E&M-EST. PATIENT-LVL III: CPT | Mod: PBBFAC,HCNC,, | Performed by: INTERNAL MEDICINE

## 2019-07-22 PROCEDURE — 99999 PR PBB SHADOW E&M-EST. PATIENT-LVL III: ICD-10-PCS | Mod: PBBFAC,HCNC,, | Performed by: INTERNAL MEDICINE

## 2019-07-22 PROCEDURE — 36415 COLL VENOUS BLD VENIPUNCTURE: CPT | Mod: HCNC

## 2019-07-22 RX ORDER — BUMETANIDE 1 MG/1
1 TABLET ORAL 2 TIMES DAILY
Qty: 60 TABLET | Refills: 1 | Status: SHIPPED | OUTPATIENT
Start: 2019-07-22 | End: 2019-11-29

## 2019-07-22 NOTE — PATIENT INSTRUCTIONS
Stop furosemide  Take bumetanide 1 mg when wakes up and another after lunch  Try to weigh every day and keep written log for me

## 2019-07-24 ENCOUNTER — OFFICE VISIT (OUTPATIENT)
Dept: WOUND CARE | Facility: CLINIC | Age: 79
End: 2019-07-24
Payer: MEDICARE

## 2019-07-24 VITALS
WEIGHT: 207.88 LBS | SYSTOLIC BLOOD PRESSURE: 93 MMHG | TEMPERATURE: 97 F | DIASTOLIC BLOOD PRESSURE: 53 MMHG | HEIGHT: 70 IN | BODY MASS INDEX: 29.76 KG/M2 | HEART RATE: 60 BPM

## 2019-07-24 DIAGNOSIS — L97.521 SKIN ULCER OF TOE OF LEFT FOOT, LIMITED TO BREAKDOWN OF SKIN: ICD-10-CM

## 2019-07-24 DIAGNOSIS — L97.921 ULCER OF LEFT LOWER EXTREMITY, LIMITED TO BREAKDOWN OF SKIN: Primary | ICD-10-CM

## 2019-07-24 DIAGNOSIS — L97.311 SKIN ULCER OF RIGHT ANKLE, LIMITED TO BREAKDOWN OF SKIN: ICD-10-CM

## 2019-07-24 PROBLEM — R68.89 SUSPECTED DEEP TISSUE INJURY: Status: RESOLVED | Noted: 2019-06-13 | Resolved: 2019-07-24

## 2019-07-24 PROBLEM — L97.912 ULCERS OF BOTH LOWER LEGS WITH FAT LAYER EXPOSED: Status: RESOLVED | Noted: 2019-06-13 | Resolved: 2019-07-24

## 2019-07-24 PROBLEM — L97.922 ULCERS OF BOTH LOWER LEGS WITH FAT LAYER EXPOSED: Status: RESOLVED | Noted: 2019-06-13 | Resolved: 2019-07-24

## 2019-07-24 PROCEDURE — 1101F PT FALLS ASSESS-DOCD LE1/YR: CPT | Mod: HCNC,CPTII,S$GLB, | Performed by: NURSE PRACTITIONER

## 2019-07-24 PROCEDURE — 3078F PR MOST RECENT DIASTOLIC BLOOD PRESSURE < 80 MM HG: ICD-10-PCS | Mod: HCNC,CPTII,S$GLB, | Performed by: NURSE PRACTITIONER

## 2019-07-24 PROCEDURE — 3078F DIAST BP <80 MM HG: CPT | Mod: HCNC,CPTII,S$GLB, | Performed by: NURSE PRACTITIONER

## 2019-07-24 PROCEDURE — 99999 PR PBB SHADOW E&M-EST. PATIENT-LVL V: CPT | Mod: PBBFAC,HCNC,, | Performed by: NURSE PRACTITIONER

## 2019-07-24 PROCEDURE — 3074F PR MOST RECENT SYSTOLIC BLOOD PRESSURE < 130 MM HG: ICD-10-PCS | Mod: HCNC,CPTII,S$GLB, | Performed by: NURSE PRACTITIONER

## 2019-07-24 PROCEDURE — 99212 OFFICE O/P EST SF 10 MIN: CPT | Mod: HCNC,S$GLB,, | Performed by: NURSE PRACTITIONER

## 2019-07-24 PROCEDURE — 1101F PR PT FALLS ASSESS DOC 0-1 FALLS W/OUT INJ PAST YR: ICD-10-PCS | Mod: HCNC,CPTII,S$GLB, | Performed by: NURSE PRACTITIONER

## 2019-07-24 PROCEDURE — 3074F SYST BP LT 130 MM HG: CPT | Mod: HCNC,CPTII,S$GLB, | Performed by: NURSE PRACTITIONER

## 2019-07-24 PROCEDURE — 99212 PR OFFICE/OUTPT VISIT, EST, LEVL II, 10-19 MIN: ICD-10-PCS | Mod: HCNC,S$GLB,, | Performed by: NURSE PRACTITIONER

## 2019-07-24 PROCEDURE — 99999 PR PBB SHADOW E&M-EST. PATIENT-LVL V: ICD-10-PCS | Mod: PBBFAC,HCNC,, | Performed by: NURSE PRACTITIONER

## 2019-07-24 NOTE — PROGRESS NOTES
Subjective:       Patient ID: Madhav Cortez is a 79 y.o. male.    Chief Complaint: Wound Check    HPI     This patient is seen today for reevaluation of wounds to the right foot and left lower leg.  These wounds began as blisters that erupted. He has used betadine and topical antibiotic ointment on the wounds and they were not healing. He was hospitalized from 5/22-5/28/19 for worsening SOB.  While hospitalized he developed new ulcers to the right heel and left foot.  He was discharged yesterday. His medical history is significant for CHF, CAD, atrial flutter, type II diabetes, hyperlipidemia, diabetic neuropathy, transaminitis, anxiety, depression, aortic arch atherosclerosis, insomnia, hypothyroidism and renal insufficiency. He was last seen in this clinic at the end of May 2019 and then was transferred to [facility].  He is now back at his home and [facility] is seeing him three times weekly.  He is afebrile. He denies increased redness, swelling or purulent drainage.  He does not complain of pain.  Review of Systems   Constitutional: Negative for chills, diaphoresis and fever.   HENT: Positive for hearing loss and postnasal drip. Negative for rhinorrhea, sinus pressure, sneezing, sore throat, tinnitus and trouble swallowing.    Eyes: Negative for visual disturbance.   Respiratory: Positive for shortness of breath. Negative for apnea, cough and wheezing.    Cardiovascular: Positive for leg swelling. Negative for chest pain and palpitations.   Gastrointestinal: Negative for constipation, diarrhea, nausea and vomiting.   Genitourinary: Negative for difficulty urinating, dysuria, frequency and hematuria.   Musculoskeletal: Negative for arthralgias, back pain and joint swelling.   Skin: Positive for wound.   Neurological: Negative for dizziness, weakness, light-headedness and headaches.   Hematological: Bruises/bleeds easily.   Psychiatric/Behavioral: Positive for dysphoric mood and sleep  disturbance. Negative for confusion and decreased concentration. The patient is not nervous/anxious.        Objective:      Physical Exam   Constitutional: He is oriented to person, place, and time. He appears well-developed and well-nourished. No distress.   HENT:   Head: Normocephalic and atraumatic.   Musculoskeletal: Normal range of motion. He exhibits no edema or tenderness.        Legs:       Feet:    Neurological: He is alert and oriented to person, place, and time.   Skin: Skin is warm and dry. No rash noted. He is not diaphoretic. No erythema.   Psychiatric: He has a normal mood and affect. His behavior is normal. Judgment and thought content normal.   Nursing note and vitals reviewed.      ..  Hemoglobin A1C   Date Value Ref Range Status   06/13/2019 8.0 (H) 4.0 - 5.6 % Final     Comment:     ADA Screening Guidelines:  5.7-6.4%  Consistent with prediabetes  >or=6.5%  Consistent with diabetes  High levels of fetal hemoglobin interfere with the HbA1C  assay. Heterozygous hemoglobin variants (HbS, HgC, etc)do  not significantly interfere with this assay.   However, presence of multiple variants may affect accuracy.     03/27/2019 8.3 (H) 4.0 - 5.6 % Final     Comment:     ADA Screening Guidelines:  5.7-6.4%  Consistent with prediabetes  >or=6.5%  Consistent with diabetes  High levels of fetal hemoglobin interfere with the HbA1C  assay. Heterozygous hemoglobin variants (HbS, HgC, etc)do  not significantly interfere with this assay.   However, presence of multiple variants may affect accuracy.     01/28/2019 8.3 (H) 4.0 - 5.6 % Final     Comment:     ADA Screening Guidelines:  5.7-6.4%  Consistent with prediabetes  >or=6.5%  Consistent with diabetes  High levels of fetal hemoglobin interfere with the HbA1C  assay. Heterozygous hemoglobin variants (HbS, HgC, etc)do  not significantly interfere with this assay.   However, presence of multiple variants may affect accuracy.       ..  Lab Results   Component Value  Date    ALBUMIN 3.4 (L) 06/12/2019    A vascular lab study performed 3/27/19y revealed:    The right JOSSY was 1.98 and the left was 1.65. The presence of artifactually elevated pressure in the arteries of the lower extremity renders all pressure measurement unreliable due to suspected medial calcinosis. Bilateral PVR waveforms suggest minimal PAOD.    Assessment:       1. Ulcer of left lower extremity, limited to breakdown of skin    2. Skin ulcer of toe of left foot, limited to breakdown of skin    3. Skin ulcer of right ankle, limited to breakdown of skin               Plan:              Cleanse wounds with wound cleanser.  Apply medihoney gel and hydrofiber to left shin wounds.    Apply hydrofiber to left great toe wound.  Place cotton in between toes left foot.  Apply mepilex lite foam to remaining wounds bilateral lower legs.  Cover leg wounds with gauze and secure with roll gauze.  Tubigrip to secure bandages.  Cover left great toe wound with gauze and secure with coban.  Change dressings three times weekly.  Return to clinic in one month.  [facility] notified of orders via Epic fax.  Skilled nurse visit-3 x weekly    Right medial foot    Right medial leg    Left great toe    Left medial leg    Left shin

## 2019-07-27 RX ORDER — INSULIN GLARGINE 100 [IU]/ML
INJECTION, SOLUTION SUBCUTANEOUS
Qty: 45 ML | Refills: 4 | Status: SHIPPED | OUTPATIENT
Start: 2019-07-27

## 2019-07-29 PROCEDURE — G0179 MD RECERTIFICATION HHA PT: HCPCS | Mod: ,,, | Performed by: INTERNAL MEDICINE

## 2019-07-29 PROCEDURE — G0179 PR HOME HEALTH MD RECERTIFICATION: ICD-10-PCS | Mod: ,,, | Performed by: INTERNAL MEDICINE

## 2019-07-29 NOTE — PROGRESS NOTES
"Subjective:     Patient ID:  Madhav Cortez is a 79 y.o. male who presents for evaluation of Consult (CHF consult referred by Dr. Koehler)    HPI:  80 yo WM referred by Dr. Humberto Koehler for evaluation of CHF.  He has CHF, AF, CKD, CAD, HTN, DM, PAD, ulcers of both legs/feet, venous insuff.  He is on GDMT including Entresto and carvedilol.  He does not find that he responds after lasix.      Currently c/o of edema, orthopnea, MCBRIDE, debility, neuropathy.  No angina.    Review of Systems   Constitution: Positive for malaise/fatigue and weight gain. Negative for chills, fever, night sweats and weight loss.   Cardiovascular: Positive for dyspnea on exertion, leg swelling and orthopnea. Negative for chest pain, irregular heartbeat, near-syncope, palpitations, paroxysmal nocturnal dyspnea and syncope.   Respiratory: Positive for cough. Negative for sputum production and wheezing.    Hematologic/Lymphatic: Bruises/bleeds easily.   Musculoskeletal: Positive for arthritis, joint pain (ayanna knees) and stiffness.   Gastrointestinal: Positive for constipation. Negative for hematochezia and melena.   Genitourinary: Positive for nocturia. Negative for hematuria.   Neurological: Positive for numbness, paresthesias and sensory change. Negative for brief paralysis, focal weakness, seizures and weakness.   Psychiatric/Behavioral: The patient has insomnia and is nervous/anxious.      Objective:   Physical Exam   Constitutional: No distress.   BP (!) 107/57 (BP Location: Left arm, Patient Position: Sitting, BP Method: Small (Automatic))   Pulse 60   Ht 5' 10" (1.778 m)   Wt 96.6 kg (212 lb 15.4 oz)   BMI 30.56 kg/m²   Elderly appearing WM in NAD   HENT:   Head: Normocephalic and atraumatic.   Eyes: Conjunctivae are normal. Right eye exhibits no discharge. Left eye exhibits no discharge. No scleral icterus.   Neck: JVD (12-14 cm) present. No tracheal deviation present. No thyromegaly present.   Cardiovascular: Normal rate, regular " rhythm and normal heart sounds. Exam reveals no gallop.   No murmur heard.  Pulmonary/Chest: Effort normal and breath sounds normal.   Abdominal: Soft. Bowel sounds are normal. He exhibits distension (12-14 cm). He exhibits no mass. There is no tenderness. There is no rebound and no guarding.   Musculoskeletal: He exhibits edema (stasis changes and ulceration of both legs and feet; mild pitting but so tendier perhaps more as I did not want to squeeze too hard). He exhibits no tenderness.   Neurological: He is alert.   Skin: Skin is warm and dry. He is not diaphoretic.   Psychiatric: He has a normal mood and affect. His behavior is normal. Judgment and thought content normal.      Lab Results   Component Value Date     (H) 07/22/2019     (H) 06/12/2019    BNP 1,978 (H) 05/22/2019     Lab Results   Component Value Date     07/22/2019     06/15/2019    K 4.6 07/22/2019    K 3.7 06/15/2019     (H) 07/22/2019     (H) 06/15/2019    BUN 8 07/22/2019    BUN 24 (H) 06/15/2019    CREATININE 0.7 07/22/2019    CREATININE 0.9 06/15/2019      HGBA1C 8.0 (H) 06/13/2019    AST 98 (H) 06/12/2019    ALT 78 (H) 06/12/2019    ALBUMIN 3.4 (L) 06/12/2019    PROT 6.8 06/12/2019    BILITOT 0.8 06/12/2019    WBC 8.92 06/15/2019    HGB 12.3 (L) 06/15/2019    HCT 37.1 (L) 06/15/2019     06/15/2019    TSH 6.518 (H) BUT NORMAL FREE T4 06/14/2019    CHOL 149 04/12/2018    HDL 36 (L) 04/12/2018    LDLCALC 86.2 04/12/2018    TRIG 134 04/12/2018    FREET4 1.01 06/14/2019 5/23/2019 ECHO Conclusion   · Severely decreased left ventricular systolic function. The estimated ejection fraction is 10-15%. Severe global hypokinetic wall motion.  · Grade II (moderate) left ventricular diastolic dysfunction consistent with pseudonormalization. Elevated left atrial pressure.  · Moderate left ventricular enlargement.  · The right ventricle is not well seen.  · Mild to moderate tricuspid regurgitation.  · The  estimated PA systolic pressure is 44 mm Hg  · Intermediate central venous pressure (8 mm Hg).  · Pulmonary hypertension present.  · Severe left atrial enlargement.        6/13/2019 EKG AV pacing with CRT device    Assessment:     1. Chronic combined systolic and diastolic heart failure    2. Ischemic cardiomyopathy    3. Coronary artery disease involving native coronary artery of native heart without angina pectoris    4. Peripheral vascular disease    5. Diabetes mellitus due to underlying condition with diabetic polyneuropathy, with long-term current use of insulin    6. Type 2 diabetes mellitus with diabetic peripheral angiopathy without gangrene, with long-term current use of insulin    7. Diabetic autonomic neuropathy associated with type 2 diabetes mellitus    8. ICD (implantable cardioverter-defibrillator), biventricular, in situ    9. Venous insufficiency of both lower extremities    10. Stage 3 chronic kidney disease    11. Long term current use of amiodarone    12. Current use of long term anticoagulation    13. Paroxysmal atrial fibrillation    14. Atherosclerosis of aortic arch      Plan:   Stop furosemide  Take bumetanide 1 mg when wakes up and another after lunch; obtain BMP 7/31 with phone review  Enroll in CHF clinic  Try to weigh every day and keep written log for me  RTC 2 months  Amiodarone per Dr. Koehler

## 2019-07-31 ENCOUNTER — TELEPHONE (OUTPATIENT)
Dept: TRANSPLANT | Facility: CLINIC | Age: 79
End: 2019-07-31

## 2019-07-31 ENCOUNTER — LAB VISIT (OUTPATIENT)
Dept: LAB | Facility: HOSPITAL | Age: 79
End: 2019-07-31
Attending: INTERNAL MEDICINE
Payer: MEDICARE

## 2019-07-31 DIAGNOSIS — I50.22 CHRONIC SYSTOLIC HEART FAILURE: Primary | ICD-10-CM

## 2019-07-31 DIAGNOSIS — I50.42 CHRONIC COMBINED SYSTOLIC AND DIASTOLIC HEART FAILURE: ICD-10-CM

## 2019-07-31 LAB
ANION GAP SERPL CALC-SCNC: 11 MMOL/L (ref 8–16)
BUN SERPL-MCNC: 20 MG/DL (ref 8–23)
CALCIUM SERPL-MCNC: 9.7 MG/DL (ref 8.7–10.5)
CHLORIDE SERPL-SCNC: 99 MMOL/L (ref 95–110)
CO2 SERPL-SCNC: 30 MMOL/L (ref 23–29)
CREAT SERPL-MCNC: 0.9 MG/DL (ref 0.5–1.4)
EST. GFR  (AFRICAN AMERICAN): >60 ML/MIN/1.73 M^2
EST. GFR  (NON AFRICAN AMERICAN): >60 ML/MIN/1.73 M^2
GLUCOSE SERPL-MCNC: 150 MG/DL (ref 70–110)
POTASSIUM SERPL-SCNC: 4 MMOL/L (ref 3.5–5.1)
SODIUM SERPL-SCNC: 140 MMOL/L (ref 136–145)

## 2019-07-31 PROCEDURE — 80048 BASIC METABOLIC PNL TOTAL CA: CPT | Mod: HCNC

## 2019-07-31 PROCEDURE — 36415 COLL VENOUS BLD VENIPUNCTURE: CPT | Mod: HCNC

## 2019-07-31 NOTE — TELEPHONE ENCOUNTER
2:00  pm  F/U on today's nurse lab phone review  BMP stable:  Cr:  0.9   K+:   4.0  See Dr. Kaminski's 7/22/19 clinic note Lasix stopped and Bumex started  Called and spoke with pt at this time. Pt told me he is no longer taking Furosemide and is now taking Bumex 1 mg in the am and 1 mg right after lunch. Pt reports a 2 pds wt loss, no swelling anywhere, no sob and feeling ok.    Asked pt to report s/s of fluid and reviewed all of these with him.   Sent message to [facility] appt coordinator to schedule pt for f/u in 2 months with Dr. Kaminski and I have already entered lab orders.

## 2019-08-01 DIAGNOSIS — I50.42 CHRONIC COMBINED SYSTOLIC AND DIASTOLIC HEART FAILURE: Primary | ICD-10-CM

## 2019-08-05 DIAGNOSIS — I25.10 CORONARY ARTERY DISEASE INVOLVING NATIVE CORONARY ARTERY OF NATIVE HEART WITHOUT ANGINA PECTORIS: ICD-10-CM

## 2019-08-05 DIAGNOSIS — I21.4 NON-STEMI (NON-ST ELEVATED MYOCARDIAL INFARCTION): ICD-10-CM

## 2019-08-05 DIAGNOSIS — R74.01 TRANSAMINITIS: ICD-10-CM

## 2019-08-05 DIAGNOSIS — Z45.02 IMPLANTABLE CARDIOVERTER-DEFIBRILLATOR DISCHARGE: ICD-10-CM

## 2019-08-05 DIAGNOSIS — E78.5 HYPERLIPIDEMIA, UNSPECIFIED HYPERLIPIDEMIA TYPE: ICD-10-CM

## 2019-08-05 DIAGNOSIS — E08.42 DIABETES MELLITUS DUE TO UNDERLYING CONDITION WITH DIABETIC POLYNEUROPATHY, WITH LONG-TERM CURRENT USE OF INSULIN: ICD-10-CM

## 2019-08-05 DIAGNOSIS — I49.01 VENTRICULAR FIBRILLATION: ICD-10-CM

## 2019-08-05 DIAGNOSIS — Z79.4 DIABETES MELLITUS DUE TO UNDERLYING CONDITION WITH DIABETIC POLYNEUROPATHY, WITH LONG-TERM CURRENT USE OF INSULIN: ICD-10-CM

## 2019-08-05 DIAGNOSIS — Z98.61 POST PTCA: ICD-10-CM

## 2019-08-05 DIAGNOSIS — I25.5 CARDIOMYOPATHY, ISCHEMIC: ICD-10-CM

## 2019-08-05 DIAGNOSIS — I50.43 ACUTE ON CHRONIC SYSTOLIC AND DIASTOLIC HEART FAILURE, NYHA CLASS 4: ICD-10-CM

## 2019-08-05 DIAGNOSIS — I10 ESSENTIAL HYPERTENSION: ICD-10-CM

## 2019-08-05 DIAGNOSIS — I50.22 CHRONIC SYSTOLIC HEART FAILURE: ICD-10-CM

## 2019-08-05 DIAGNOSIS — Z95.810 AUTOMATIC IMPLANTABLE CARDIOVERTER-DEFIBRILLATOR IN SITU: ICD-10-CM

## 2019-08-05 RX ORDER — DABIGATRAN ETEXILATE 150 MG/1
150 CAPSULE ORAL 2 TIMES DAILY
Qty: 180 CAPSULE | Refills: 3 | Status: SHIPPED | OUTPATIENT
Start: 2019-08-05 | End: 2020-07-15

## 2019-08-06 ENCOUNTER — TELEPHONE (OUTPATIENT)
Dept: INTERNAL MEDICINE | Facility: CLINIC | Age: 79
End: 2019-08-06

## 2019-08-06 NOTE — TELEPHONE ENCOUNTER
----- Message from Esthela Greco MA sent at 8/6/2019  4:34 PM CDT -----  Contact: self/[phone]      ----- Message -----  From: Amy Dang  Sent: 8/6/2019   4:27 PM  To: Eric Ruiz    .1 Patient would like to get medical advice.  Symptoms (please be specific): Patient stated that he cut his hand with a saw 2-3 days ago, and he have a wound that it may be infected, and he is having soar throat.    How long has patient had these symptoms: 2-3 days ago  Pharmacy name and phone#: [facility]   Any drug allergies: onfile  Comments: Patient would like to get medical advice from Dr Reyes nurse. Thank you

## 2019-08-06 NOTE — TELEPHONE ENCOUNTER
Spoke with the pt and he stated he cut his left hand on shelving in the refrigerator has a wound it is now infected. He stated [facility] won';t come out an address the wound because they don't have orders for it. Pt is requesting an antibiotic his sore throat as well. He has been battling a sore throat for about 2 days now. He has not taken anything for it.    [facility]  [address] [facility] [facility] [address]  Phone: [phone] Fax: [phone]      Jie Greco LPN

## 2019-08-07 ENCOUNTER — OFFICE VISIT (OUTPATIENT)
Dept: INTERNAL MEDICINE | Facility: CLINIC | Age: 79
End: 2019-08-07
Payer: MEDICARE

## 2019-08-07 VITALS
HEIGHT: 70 IN | TEMPERATURE: 98 F | HEART RATE: 60 BPM | DIASTOLIC BLOOD PRESSURE: 60 MMHG | WEIGHT: 201.19 LBS | OXYGEN SATURATION: 99 % | SYSTOLIC BLOOD PRESSURE: 92 MMHG | BODY MASS INDEX: 28.8 KG/M2

## 2019-08-07 DIAGNOSIS — E78.00 PURE HYPERCHOLESTEROLEMIA: ICD-10-CM

## 2019-08-07 DIAGNOSIS — L03.114 CELLULITIS OF LEFT HAND: Primary | ICD-10-CM

## 2019-08-07 DIAGNOSIS — I25.10 CORONARY ARTERY DISEASE INVOLVING NATIVE CORONARY ARTERY OF NATIVE HEART WITHOUT ANGINA PECTORIS: ICD-10-CM

## 2019-08-07 DIAGNOSIS — I50.42 CHRONIC COMBINED SYSTOLIC AND DIASTOLIC HEART FAILURE: ICD-10-CM

## 2019-08-07 DIAGNOSIS — I48.0 PAROXYSMAL ATRIAL FIBRILLATION: ICD-10-CM

## 2019-08-07 DIAGNOSIS — I10 ESSENTIAL HYPERTENSION: ICD-10-CM

## 2019-08-07 PROCEDURE — 3078F DIAST BP <80 MM HG: CPT | Mod: HCNC,CPTII,S$GLB, | Performed by: INTERNAL MEDICINE

## 2019-08-07 PROCEDURE — 3074F PR MOST RECENT SYSTOLIC BLOOD PRESSURE < 130 MM HG: ICD-10-PCS | Mod: HCNC,CPTII,S$GLB, | Performed by: INTERNAL MEDICINE

## 2019-08-07 PROCEDURE — 3074F SYST BP LT 130 MM HG: CPT | Mod: HCNC,CPTII,S$GLB, | Performed by: INTERNAL MEDICINE

## 2019-08-07 PROCEDURE — 3078F PR MOST RECENT DIASTOLIC BLOOD PRESSURE < 80 MM HG: ICD-10-PCS | Mod: HCNC,CPTII,S$GLB, | Performed by: INTERNAL MEDICINE

## 2019-08-07 PROCEDURE — 99214 PR OFFICE/OUTPT VISIT, EST, LEVL IV, 30-39 MIN: ICD-10-PCS | Mod: HCNC,S$GLB,, | Performed by: INTERNAL MEDICINE

## 2019-08-07 PROCEDURE — 1101F PT FALLS ASSESS-DOCD LE1/YR: CPT | Mod: HCNC,CPTII,S$GLB, | Performed by: INTERNAL MEDICINE

## 2019-08-07 PROCEDURE — 99214 OFFICE O/P EST MOD 30 MIN: CPT | Mod: HCNC,S$GLB,, | Performed by: INTERNAL MEDICINE

## 2019-08-07 PROCEDURE — 1101F PR PT FALLS ASSESS DOC 0-1 FALLS W/OUT INJ PAST YR: ICD-10-PCS | Mod: HCNC,CPTII,S$GLB, | Performed by: INTERNAL MEDICINE

## 2019-08-07 PROCEDURE — 99999 PR PBB SHADOW E&M-EST. PATIENT-LVL IV: ICD-10-PCS | Mod: PBBFAC,HCNC,, | Performed by: INTERNAL MEDICINE

## 2019-08-07 PROCEDURE — 99999 PR PBB SHADOW E&M-EST. PATIENT-LVL IV: CPT | Mod: PBBFAC,HCNC,, | Performed by: INTERNAL MEDICINE

## 2019-08-07 RX ORDER — AMOXICILLIN AND CLAVULANATE POTASSIUM 875; 125 MG/1; MG/1
1 TABLET, FILM COATED ORAL 2 TIMES DAILY
Qty: 14 TABLET | Refills: 0 | Status: SHIPPED | OUTPATIENT
Start: 2019-08-07 | End: 2019-08-14

## 2019-08-07 NOTE — PROGRESS NOTES
CHIEF COMPLAINT: Wound on hand      HISTORY OF PRESENT ILLNESS: This is a 79-year-old man who presents with this wife and daughter for follow up of above.    10 days ago, he scraped his left hand on a refrigerator shelf.  The left hand is swollen and a little red. No drainage.    He fell a week ago and scraped his left elbow.  HE was trying to get out of his recliner without putting the leg of the recliner down. He fell to the floor and scraped his left elbow.  THis is the first fall since he gout out of the hospital.    HE was admitted 6/12/19 to 6/18/19 due to accidental over dose and elevated troponin.  HE went to skilled nursing for 19 days.  HE did physical and occupational therapy. His wounds healed. When he got home the wound on the left leg opened up again after he scratched it. He is seeing Nivia melgar in wound care. [employer] is helping with his wounds.     He is walking better. He is not doing his exercises at home.         No chest pain or palpitations. He continues to take bumex 1 mg once daily, pradaxa 150 mg twice daily, spironolactone 25 mg 1/2 tablet daily, carvediolol 25 mg bid and Entresto 97/103 twice daily for his hypertension and CHF. He is also on amiodarone 200 mg twice daily for his atrial flutter. Aspirin 81 mg daily.       His back is doing well. He is doing stretching at home which helps.  HE takes one tizanidine in the evening.       Sleep continues to be problematic. HE continues to take ramelteon 8 mg  tablets at bedtime, trazodone at bedtime,  melatonin 5 mg at bedtime. No anxiety or depression now. He is sleeping during the day and not at night.       He currently takes Lantus at night - approximately 40 units once daily.  He denies any polydipsia, polyuria, hypoglycemia. HE checks his blood sugars 2-3 times a day.       He has hyperlipidemia, currently off Lipitor 20 mg daily      His liver enzymes have been mildly elevated. Dr Palm stopped fenofibrate due to the  "elevated liver enzymes. He had a liver biopsy and the elevated liver enzymes are NOT due to amiodarone. No further work up is needed at this time.       Periperhal neuropathy controlled with gabapentin 300 mg 2 tablets 2 times daily. Shooting pain has resolved with increasing dose of gabapentin.     He is  taking levothyroxine 75 mcg daily for his hypothyroidism           PAST MEDICAL HISTORY:    1. Hypertension, coronary artery disease, status post stenting.    2. Ischemic cardiomyopathy.    3. Atrial flutter.    4. Diabetes mellitus.    5. ICD placed 02/22/2012.    6. Hyperlipidemia.    7. Osteoarthritis of the knees.  8. Atrial fibrillation  9. Transaminitis      PAST SURGICAL HISTORY: Appendectomy in 2003.        SOCIAL HISTORY: He is a former smoker, quit in 1987. Does not drink alcohol.    .       PHYSICAL EXAMINATION:    BP 92/60 (BP Location: Right arm, Patient Position: Sitting, BP Method: Large (Manual))   Pulse 60   Temp 98.1 °F (36.7 °C) (Oral)   Ht 5' 10" (1.778 m)   Wt 91.3 kg (201 lb 2.7 oz)   SpO2 99%   BMI 28.86 kg/m²      GENERAL: He is alert, oriented, no apparent distress. Affect within normal limits.    . Oropharynx is clear.    NECK: Supple.   RESPIRATORY: Effort normal. Lungs are clear to auscultation.    HEART: Regular rate and rhythm without murmurs, gallops or rubs. No lower extremity edema. .  Wound with dry base on dorsat aspect of left hand. No drainage. Some surrounding erythema and warmth.  Excoriation of the left upper forearm. No ertyhema or warmty          ASSESSMENT AND PLAN:.   1. Early cellulitis of the left hand - augmentin 875 mg twice daily for 7 days.  Put topical antibiotic ointment on wound daily until healed.   2.  CHF - stable  4. Coronary artery disease - stable - to follow up with Cardiology.    5. Atrial flutter - s/p cardioversion - stable    6. 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 Watch blood sugars.   7. History of " anemia on last blood work, stable    8. Hyperlipidemia. At goal    9  Transaminitis -better, watch close  10. Diabetic neuropathy -stable. gabapentin 300 mg 2 capsules three times daily    11. Anxiety and depression - stable  12. Aortic arch atherosclerosis - modifying risk factors  13. Insomnia - stable  14. Hypothyroidism - on supplement  15.   I will see him back in 3 weeks, sooner if problems arise.

## 2019-08-14 ENCOUNTER — LAB VISIT (OUTPATIENT)
Dept: LAB | Facility: HOSPITAL | Age: 79
End: 2019-08-14
Attending: INTERNAL MEDICINE
Payer: MEDICARE

## 2019-08-14 DIAGNOSIS — I50.42 CHRONIC COMBINED SYSTOLIC AND DIASTOLIC HEART FAILURE: ICD-10-CM

## 2019-08-14 LAB
ANION GAP SERPL CALC-SCNC: 8 MMOL/L (ref 8–16)
BNP SERPL-MCNC: 258 PG/ML (ref 0–99)
BUN SERPL-MCNC: 12 MG/DL (ref 8–23)
CALCIUM SERPL-MCNC: 8.9 MG/DL (ref 8.7–10.5)
CHLORIDE SERPL-SCNC: 101 MMOL/L (ref 95–110)
CO2 SERPL-SCNC: 30 MMOL/L (ref 23–29)
CREAT SERPL-MCNC: 0.8 MG/DL (ref 0.5–1.4)
EST. GFR  (AFRICAN AMERICAN): >60 ML/MIN/1.73 M^2
EST. GFR  (NON AFRICAN AMERICAN): >60 ML/MIN/1.73 M^2
GLUCOSE SERPL-MCNC: 150 MG/DL (ref 70–110)
POTASSIUM SERPL-SCNC: 3.8 MMOL/L (ref 3.5–5.1)
SODIUM SERPL-SCNC: 139 MMOL/L (ref 136–145)

## 2019-08-14 PROCEDURE — 80048 BASIC METABOLIC PNL TOTAL CA: CPT | Mod: HCNC

## 2019-08-14 PROCEDURE — 83880 ASSAY OF NATRIURETIC PEPTIDE: CPT | Mod: HCNC

## 2019-08-14 PROCEDURE — 36415 COLL VENOUS BLD VENIPUNCTURE: CPT | Mod: HCNC

## 2019-08-15 ENCOUNTER — EXTERNAL HOME HEALTH (OUTPATIENT)
Dept: HOME HEALTH SERVICES | Facility: HOSPITAL | Age: 79
End: 2019-08-15
Payer: MEDICARE

## 2019-08-28 ENCOUNTER — OFFICE VISIT (OUTPATIENT)
Dept: WOUND CARE | Facility: CLINIC | Age: 79
End: 2019-08-28
Payer: MEDICARE

## 2019-08-28 VITALS
HEART RATE: 61 BPM | TEMPERATURE: 98 F | BODY MASS INDEX: 29.49 KG/M2 | SYSTOLIC BLOOD PRESSURE: 100 MMHG | HEIGHT: 70 IN | WEIGHT: 206 LBS | DIASTOLIC BLOOD PRESSURE: 61 MMHG

## 2019-08-28 DIAGNOSIS — L97.521 SKIN ULCER OF TOE OF LEFT FOOT, LIMITED TO BREAKDOWN OF SKIN: ICD-10-CM

## 2019-08-28 DIAGNOSIS — L97.311 SKIN ULCER OF RIGHT ANKLE, LIMITED TO BREAKDOWN OF SKIN: Primary | ICD-10-CM

## 2019-08-28 DIAGNOSIS — L97.512 SKIN ULCER OF RIGHT FOOT WITH FAT LAYER EXPOSED: ICD-10-CM

## 2019-08-28 DIAGNOSIS — L97.921 ULCER OF LEFT LOWER EXTREMITY, LIMITED TO BREAKDOWN OF SKIN: ICD-10-CM

## 2019-08-28 PROCEDURE — 99212 PR OFFICE/OUTPT VISIT, EST, LEVL II, 10-19 MIN: ICD-10-PCS | Mod: HCNC,S$GLB,, | Performed by: NURSE PRACTITIONER

## 2019-08-28 PROCEDURE — 1101F PR PT FALLS ASSESS DOC 0-1 FALLS W/OUT INJ PAST YR: ICD-10-PCS | Mod: HCNC,CPTII,S$GLB, | Performed by: NURSE PRACTITIONER

## 2019-08-28 PROCEDURE — 99212 OFFICE O/P EST SF 10 MIN: CPT | Mod: HCNC,S$GLB,, | Performed by: NURSE PRACTITIONER

## 2019-08-28 PROCEDURE — 1101F PT FALLS ASSESS-DOCD LE1/YR: CPT | Mod: HCNC,CPTII,S$GLB, | Performed by: NURSE PRACTITIONER

## 2019-08-28 PROCEDURE — 3074F SYST BP LT 130 MM HG: CPT | Mod: HCNC,CPTII,S$GLB, | Performed by: NURSE PRACTITIONER

## 2019-08-28 PROCEDURE — 3074F PR MOST RECENT SYSTOLIC BLOOD PRESSURE < 130 MM HG: ICD-10-PCS | Mod: HCNC,CPTII,S$GLB, | Performed by: NURSE PRACTITIONER

## 2019-08-28 PROCEDURE — 3078F PR MOST RECENT DIASTOLIC BLOOD PRESSURE < 80 MM HG: ICD-10-PCS | Mod: HCNC,CPTII,S$GLB, | Performed by: NURSE PRACTITIONER

## 2019-08-28 PROCEDURE — 3078F DIAST BP <80 MM HG: CPT | Mod: HCNC,CPTII,S$GLB, | Performed by: NURSE PRACTITIONER

## 2019-08-28 PROCEDURE — 99999 PR PBB SHADOW E&M-EST. PATIENT-LVL V: CPT | Mod: PBBFAC,HCNC,, | Performed by: NURSE PRACTITIONER

## 2019-08-28 PROCEDURE — 99999 PR PBB SHADOW E&M-EST. PATIENT-LVL V: ICD-10-PCS | Mod: PBBFAC,HCNC,, | Performed by: NURSE PRACTITIONER

## 2019-08-28 NOTE — PROGRESS NOTES
Subjective:       Patient ID: Madhav Cortez is a 79 y.o. male.    Chief Complaint: Wound Check    HPI     This patient is seen today for reevaluation of wounds to the right foot and left lower leg.  These wounds began as blisters that erupted. He has used betadine and topical antibiotic ointment on the wounds and they were not healing. He was hospitalized from 5/22-5/28/19 for worsening SOB.  While hospitalized he developed new ulcers to the right heel and left foot.  He was discharged yesterday. His medical history is significant for CHF, CAD, atrial flutter, type II diabetes, hyperlipidemia, diabetic neuropathy, transaminitis, anxiety, depression, aortic arch atherosclerosis, insomnia, hypothyroidism and renal insufficiency. He is using medihoney gel and hydrofiber on the wounds.  The right foot wounds are healing and the left leg wounds are healing as evidenced by wound contracture and epithelialization. [facility] is seeing him three times weekly.  He is afebrile. He denies increased redness, swelling or purulent drainage.  He does not complain of pain.  Review of Systems   Constitutional: Negative for chills, diaphoresis and fever.   HENT: Positive for hearing loss and postnasal drip. Negative for rhinorrhea, sinus pressure, sneezing, sore throat, tinnitus and trouble swallowing.    Eyes: Negative for visual disturbance.   Respiratory: Positive for shortness of breath. Negative for apnea, cough and wheezing.    Cardiovascular: Positive for leg swelling. Negative for chest pain and palpitations.   Gastrointestinal: Negative for constipation, diarrhea, nausea and vomiting.   Genitourinary: Negative for difficulty urinating, dysuria, frequency and hematuria.   Musculoskeletal: Negative for arthralgias, back pain and joint swelling.   Skin: Positive for wound.   Neurological: Negative for dizziness, weakness, light-headedness and headaches.   Hematological: Bruises/bleeds easily.   Psychiatric/Behavioral:  Positive for dysphoric mood and sleep disturbance. Negative for confusion and decreased concentration. The patient is not nervous/anxious.        Objective:      Physical Exam   Constitutional: He is oriented to person, place, and time. He appears well-developed and well-nourished. No distress.   HENT:   Head: Normocephalic and atraumatic.   Musculoskeletal: Normal range of motion. He exhibits no edema or tenderness.        Legs:       Feet:    Neurological: He is alert and oriented to person, place, and time.   Skin: Skin is warm and dry. No rash noted. He is not diaphoretic. No erythema.   Psychiatric: He has a normal mood and affect. His behavior is normal. Judgment and thought content normal.   Nursing note and vitals reviewed.      ..  Hemoglobin A1C   Date Value Ref Range Status   06/13/2019 8.0 (H) 4.0 - 5.6 % Final     Comment:     ADA Screening Guidelines:  5.7-6.4%  Consistent with prediabetes  >or=6.5%  Consistent with diabetes  High levels of fetal hemoglobin interfere with the HbA1C  assay. Heterozygous hemoglobin variants (HbS, HgC, etc)do  not significantly interfere with this assay.   However, presence of multiple variants may affect accuracy.     03/27/2019 8.3 (H) 4.0 - 5.6 % Final     Comment:     ADA Screening Guidelines:  5.7-6.4%  Consistent with prediabetes  >or=6.5%  Consistent with diabetes  High levels of fetal hemoglobin interfere with the HbA1C  assay. Heterozygous hemoglobin variants (HbS, HgC, etc)do  not significantly interfere with this assay.   However, presence of multiple variants may affect accuracy.     01/28/2019 8.3 (H) 4.0 - 5.6 % Final     Comment:     ADA Screening Guidelines:  5.7-6.4%  Consistent with prediabetes  >or=6.5%  Consistent with diabetes  High levels of fetal hemoglobin interfere with the HbA1C  assay. Heterozygous hemoglobin variants (HbS, HgC, etc)do  not significantly interfere with this assay.   However, presence of multiple variants may affect accuracy.        ..  Lab Results   Component Value Date    ALBUMIN 3.4 (L) 06/12/2019    A vascular lab study performed 3/27/19y revealed:    The right JOSSY was 1.98 and the left was 1.65. The presence of artifactually elevated pressure in the arteries of the lower extremity renders all pressure measurement unreliable due to suspected medial calcinosis. Bilateral PVR waveforms suggest minimal PAOD.    Assessment:       1. Skin ulcer of right ankle, limited to breakdown of skin    2. Skin ulcer of right foot with fat layer exposed    3. Ulcer of left lower extremity, limited to breakdown of skin    4. Skin ulcer of toe of left foot, limited to breakdown of skin               Plan:              Cleanse wounds with wound cleanser.  Apply mepilex lite foam to left leg ulcers, cover with gauze and secure with roll gauze.  Tubigrip to bilateral lower legs.  Change dressings three times weekly.  Return to clinic in one month or prn if healed.  [facility] notified of orders via Epic fax.  Skilled nurse visit-3 x weekly    Right medial foot and leg      Left great toe      Left medial leg      Left shin

## 2019-09-03 ENCOUNTER — TELEPHONE (OUTPATIENT)
Dept: ELECTROPHYSIOLOGY | Facility: CLINIC | Age: 79
End: 2019-09-03

## 2019-09-03 DIAGNOSIS — Z79.899 LONG TERM CURRENT USE OF AMIODARONE: Primary | ICD-10-CM

## 2019-09-04 NOTE — PROGRESS NOTES
Mr. Cortez is a patient of Dr. Koehler and was last seen in clinic 1/16/19.    Subjective:   Patient ID:  Madhav Cortez is a 79 y.o. male who presents for follow-up of pacemaker check.    HPI:    Mr. Cortez is a 79 y.o. male with pAF, AFL, VT, ICM, LBBB, HFrEF (EF 10-15%) s/p BiV ICD, EULOGIO thrombus, CAD, DM, HTN, and HLD here for follow up.      Background:  H/o inappropriate shock for atrial tachycardia/flutter 2/12.   H/o RFA of cavo-tricuspid isthmus 5/11.   Developed recurrent atrial flutter and atrial fibrillation.   EPS/RFA 4/8/13 micro-reentry near prior isthmus RFA, underwent successful RFA. Also had atrial fibrillation during procedure.   Had an episode of syncope, device interrogation revealed VF, with appropriate ICD shock. Was admitted, blood work c/w NSTEMI (troponin up to 7). LHC revealed stable CAD. Coreg increased to 25 mg twice daily.   Developed persistent atrial fibrillation, symptomatic. Placed on Pradaxa.   DIOGO revealed EULOGIO thrombus. Continued for extended period on Pradaxa, underwent DIOGO/DCCV 10/11/13.  Has h/o elevated liver enzymes, for which he underwent liver biopsy. Not thought to be related to amiodarone.  Given worsening CHF, upgraded to CRT-D 11/11/2015.      Update (09/05/2019):    Today Mr. Cortez is doing well from an arrhythmia and device perspective. He denies chest pain with exertion or at rest, palpitations, SOB, MCBRIDE, dizziness, or syncope.    Device Interrogation (9/5/2019) reveals an intrinsic sinus bradycardia with stable lead and device function. AMS episodes max 44 seconds all consistent with RA lead noise (Hx of RA lead noise). No arrhythmias or treated episodes were noted. He  paces 100% in the RA and 100% in the BiV. AF burden 0%. No ventricular arrhythmia. Estimated battery longevity 2.2 years.   RA lead noise reproducible with isometrics.     He is currently on amiodarone 200 mg daily. LFTs are stable AST 61 ALT 85 (9/5/19). TSH is elevated 6.518 (6/4/19) on  levothyroxine, normal free T4. Last PFT on 1/22/2019 showed a DLCO of 63 which was stable. He has regular annual eye examinations.   He is currently taking pradaxa 150mg BID for stroke prophylaxis and denies significant bleeding episodes. He is currently being treated with carvedilol 25 mg BID for HR control. Cr 0.8 (8/14/19).     I have personally reviewed the patient's EKG today, which shows APVP 60 bpm. NY interval is 178. [id number]. QTc is 532    Recent Cardiac Tests:  2D Echo (5/23/19):  · Severely decreased left ventricular systolic function. The estimated ejection fraction is 10-15%. Severe global hypokinetic wall motion.  · Grade II (moderate) left ventricular diastolic dysfunction consistent with pseudonormalization. Elevated left atrial pressure.  · Moderate left ventricular enlargement.  · The right ventricle is not well seen.  · Mild to moderate tricuspid regurgitation.  · The estimated PA systolic pressure is 44 mm Hg  · Intermediate central venous pressure (8 mm Hg).  · Pulmonary hypertension present.  · Severe left atrial enlargement.    Past Medical History:   Diagnosis Date    *Atrial fibrillation     *Atrial flutter     Acute exacerbation of CHF (congestive heart failure) 8/2/2013    Anticoagulant long-term use     Anxiety     Arthritis     Atrial flutter     Back pain     Cardiomyopathy     Cataract     Coronary artery disease     Depression     Diabetes mellitus     Heart bloc     Hepatitis B     Hyperlipidemia 4/1/2014    Hypertension     Myocardial infarction     Non-STEMI (non-ST elevated myocardial infarction) 5/26/2013    Nuclear sclerosis - Both Eyes 2/18/2013    Post PTCA 8/7/2012    Presence of biventricular AICD 2/23/2016    Stage 3 chronic kidney disease 12/11/2017    Tobacco dependence     quit 1987    Transaminitis 4/1/2014    Ventricular tachycardia      Past Surgical History:   Procedure Laterality Date    ABLATION N/A 4/8/2013    Performed by Humberto Koehler,  "MD at [facility] CATH LAB    APPENDECTOMY      BIOPSY LIVER AND ULTRASOUND N/A 5/26/2014    Performed by [facility] OR [facility] FLR    CARDIAC DEFIBRILLATOR PLACEMENT      CARDIAC DEFIBRILLATOR PLACEMENT      CARDIOVERSION N/A 10/10/2013    Performed by Humberto Koehler MD at [facility] CATH LAB    CARDIOVERSION N/A 8/19/2013    Performed by Humberto Koehler MD at [facility] CATH LAB    COLONOSCOPY      CORONARY ANGIOPLASTY WITH STENT PLACEMENT      FRACTURE SURGERY      L arm    INSERT / REPLACE / REMOVE PACEMAKER  12/15    bi-V upgrade    INSERTION-ICD-BIVENTRICULAR N/A 11/11/2015    Performed by Humberto Koehler MD at [facility] CATH LAB    RADIOFREQUENCY ABLATION      STEROID INJECTION KNEE Bilateral     received about every 4 months    TONSILLECTOMY      TRANSESOPHAGEAL ECHOCARDIOGRAM (DIOGO) N/A 10/10/2013    Performed by Humberto Koehler MD at [facility] CATH LAB    TRANSESOPHAGEAL ECHOCARDIOGRAM (DIOGO) N/A 8/19/2013    Performed by Humberto Koehler MD at [facility] CATH LAB    VASCULAR SURGERY         Current Outpatient Medications   Medication Sig    ACCU-CHEK JIE Misc USE AS INSTRUCTED    ACCU-CHEK SMARTVIEW TEST STRIP Strp CHECK BLOOD SUGAR THREE TIMES DAILY    amiodarone (PACERONE) 200 MG Tab TAKE 1 TABLET TWICE DAILY    ascorbic acid (VITAMIN C) 500 MG tablet Take 1,500 mg by mouth 2 (two) times daily.     aspirin 81 MG Chew Take 1 tablet (81 mg total) by mouth once daily.    blood-glucose meter kit Accu check jie meter Use as instructed    bumetanide (BUMEX) 1 MG tablet Take 1 tablet (1 mg total) by mouth 2 (two) times daily. Take once early morning and once after lunch    carvedilol (COREG) 25 MG tablet Take 1 tablet (25 mg total) by mouth 2 (two) times daily.    dabigatran etexilate (PRADAXA) 150 mg Cap Take 1 capsule (150 mg total) by mouth 2 (two) times daily. "Do NOT break, chew, or open capsules."    ENTRESTO  mg per tablet TAKE 1 TABLET TWICE DAILY    gabapentin (NEURONTIN) 300 MG capsule TAKE 2 " "CAPSULES BY MOUTH THREE TIMES DAILY    glucosamine-chondroitin 500-400 mg tablet Take 1 tablet by mouth 2 (two) times daily.    LANTUS SOLOSTAR U-100 INSULIN glargine 100 units/mL (3mL) SubQ pen INJECT 40 UNITS INTO THE SKIN EVERY EVENING.    levothyroxine (SYNTHROID) 75 MCG tablet Take 1 tablet (75 mcg total) by mouth once daily.    pen needle, diabetic 31 gauge x 1/4" Ndle Use 4 times daily    potassium chloride SA (K-DUR,KLOR-CON) 20 MEQ tablet Take 1 tablet (20 mEq total) by mouth once daily.    ramelteon (ROZEREM) 8 mg tablet Take 1 tablet (8 mg total) by mouth nightly as needed for Insomnia.    senna-docusate 8.6-50 mg (PERICOLACE) 8.6-50 mg per tablet Take 1 tablet by mouth 2 (two) times daily.    spironolactone (ALDACTONE) 25 MG tablet TAKE 1 TABLET ONE TIME DAILY    UBIDECARENONE (COQ-10 ORAL) Take 800 mg by mouth once daily. Takes 100mg tablet at lunch, and 600mg at dinner    nitroGLYCERIN (NITROSTAT) 0.4 MG SL tablet Place 1 tablet (0.4 mg total) under the tongue every 5 (five) minutes as needed for Chest pain.    RIBOSE ORAL Take 1 tablet by mouth once daily.      No current facility-administered medications for this visit.        Review of Systems   Constitution: Negative for chills, fever and malaise/fatigue.   HENT: Negative for congestion and nosebleeds.    Eyes: Negative for blurred vision.   Cardiovascular: Negative for chest pain, dyspnea on exertion, irregular heartbeat, leg swelling, near-syncope, orthopnea, palpitations, paroxysmal nocturnal dyspnea and syncope.   Respiratory: Negative for cough, hemoptysis, shortness of breath, sleep disturbances due to breathing and wheezing.    Endocrine: Negative for polyphagia.   Hematologic/Lymphatic: Negative for bleeding problem. Bruises/bleeds easily.   Skin: Negative for itching and rash.   Musculoskeletal: Negative for back pain, joint pain, muscle cramps and muscle weakness.   Gastrointestinal: Negative for bloating, abdominal pain and " "hematemesis.   Genitourinary: Negative for hematuria.   Neurological: Negative for dizziness, focal weakness, headaches, light-headedness, loss of balance, numbness, paresthesias and weakness.   Psychiatric/Behavioral: Negative for altered mental status. The patient is not nervous/anxious.        Objective:          BP (!) 108/55   Pulse 60   Ht 5' 10" (1.778 m)   Wt 93.5 kg (206 lb 2.1 oz)   BMI 29.58 kg/m²     Physical Exam   Constitutional: He is oriented to person, place, and time. He appears well-developed and well-nourished. He does not appear ill. No distress.   HENT:   Head: Normocephalic and atraumatic.   Eyes: Pupils are equal, round, and reactive to light. Conjunctivae, EOM and lids are normal.   Neck: Normal range of motion. Neck supple.   Cardiovascular: Normal rate, regular rhythm, normal heart sounds, intact distal pulses and normal pulses.  No extrasystoles are present. PMI is not displaced.   No murmur heard.  Pulses:       Radial pulses are 2+ on the right side, and 2+ on the left side.   Pulmonary/Chest: Effort normal and breath sounds normal. No accessory muscle usage. No respiratory distress. He has no decreased breath sounds. He has no wheezes. He has no rhonchi. He has no rales. He exhibits no tenderness.   Left chest wall device site in good condition.    Abdominal: Soft. Bowel sounds are normal. He exhibits no distension. There is no tenderness. There is no guarding.   Musculoskeletal: Normal range of motion. He exhibits no edema.   Neurological: He is alert and oriented to person, place, and time. He has normal strength. He is not disoriented. No sensory deficit. Coordination and gait normal.   Skin: Skin is warm and dry. No bruising noted. He is not diaphoretic. No erythema.   Psychiatric: He has a normal mood and affect. His speech is normal and behavior is normal. Judgment and thought content normal.   Nursing note and vitals reviewed.      Lab Results   Component Value Date    NA " 139 08/14/2019    K 3.8 08/14/2019    MG 1.9 07/22/2019    BUN 12 08/14/2019    CREATININE 0.8 08/14/2019    ALT 85 (H) 09/05/2019    AST 61 (H) 09/05/2019    HGB 12.3 (L) 06/15/2019    HCT 37.1 (L) 06/15/2019    HCT 39 04/24/2019    TSH 6.518 (H) 06/14/2019    LDLCALC 86.2 04/12/2018     Recent Labs   Lab 05/22/19  0910   INR 1.3 H       Assessment:     1. Ischemic cardiomyopathy    2. ICD (implantable cardioverter-defibrillator), biventricular, in situ    3. Paroxysmal atrial fibrillation    4. Atrial flutter, unspecified type    5. Hx of amiodarone therapy    6. Essential hypertension    7. Elevated TSH    8. Chronic combined systolic and diastolic heart failure    9. Type 2 diabetes mellitus with diabetic peripheral angiopathy without gangrene, with long-term current use of insulin    10. Coronary artery disease involving native coronary artery of native heart without angina pectoris    11. Stage 3 chronic kidney disease    12. Obesity, unspecified classification, unspecified obesity type, unspecified whether serious comorbidity present      Plan:     In summary, Mr. Cortez is a 79 y.o. male with pAF, AFL, VT, ICM, LBBB, HFrEF (EF 10-15%) s/p BiV ICD, EULOGIO thrombus, CAD, DM, HTN, and HLD here for follow up of pacemaker.   Mr. Cortez is doing well from a device perspective with stable lead and device function. He biventricular paces 100%. No atrial or ventricular arrhythmias. He is on amiodarone for rhythm control and carvedilol 25mg BID for HR control (AF/AFL and VT). LFTs, thyroid OK. Last PFT on 1/22/2019 showed a DLCO of 63 which was stable. On pradaxa for CVA prophylaxis. Cr 0.8 (8/14/19).    Continue current medication regimen and device settings.   Follow up in device clinic as scheduled.   Follow up in EP clinic in 6 months with TSH, sooner as needed.      *A copy of this note has been sent to Dr. Koehler*    ------------------------------------------------------------------    ANGELIQUE Smith,  FNP-C  [facility]

## 2019-09-05 ENCOUNTER — CLINICAL SUPPORT (OUTPATIENT)
Dept: CARDIOLOGY | Facility: HOSPITAL | Age: 79
End: 2019-09-05
Attending: STUDENT IN AN ORGANIZED HEALTH CARE EDUCATION/TRAINING PROGRAM
Payer: MEDICARE

## 2019-09-05 ENCOUNTER — OFFICE VISIT (OUTPATIENT)
Dept: ELECTROPHYSIOLOGY | Facility: CLINIC | Age: 79
End: 2019-09-05
Attending: STUDENT IN AN ORGANIZED HEALTH CARE EDUCATION/TRAINING PROGRAM
Payer: MEDICARE

## 2019-09-05 ENCOUNTER — HOSPITAL ENCOUNTER (OUTPATIENT)
Dept: CARDIOLOGY | Facility: CLINIC | Age: 79
Discharge: HOME | End: 2019-09-05
Payer: MEDICARE

## 2019-09-05 VITALS
WEIGHT: 206.13 LBS | HEART RATE: 60 BPM | HEIGHT: 70 IN | BODY MASS INDEX: 29.51 KG/M2 | DIASTOLIC BLOOD PRESSURE: 55 MMHG | SYSTOLIC BLOOD PRESSURE: 108 MMHG

## 2019-09-05 DIAGNOSIS — E66.9 OBESITY, UNSPECIFIED CLASSIFICATION, UNSPECIFIED OBESITY TYPE, UNSPECIFIED WHETHER SERIOUS COMORBIDITY PRESENT: ICD-10-CM

## 2019-09-05 DIAGNOSIS — Z95.810 ICD (IMPLANTABLE CARDIOVERTER-DEFIBRILLATOR), BIVENTRICULAR, IN SITU: ICD-10-CM

## 2019-09-05 DIAGNOSIS — I48.92 ATRIAL FLUTTER, UNSPECIFIED TYPE: ICD-10-CM

## 2019-09-05 DIAGNOSIS — R79.89 ELEVATED TSH: ICD-10-CM

## 2019-09-05 DIAGNOSIS — I50.42 CHRONIC COMBINED SYSTOLIC AND DIASTOLIC HEART FAILURE: ICD-10-CM

## 2019-09-05 DIAGNOSIS — Z95.810 CARDIAC DEFIBRILLATOR IN PLACE: ICD-10-CM

## 2019-09-05 DIAGNOSIS — E11.51 TYPE 2 DIABETES MELLITUS WITH DIABETIC PERIPHERAL ANGIOPATHY WITHOUT GANGRENE, WITH LONG-TERM CURRENT USE OF INSULIN: ICD-10-CM

## 2019-09-05 DIAGNOSIS — I48.0 PAROXYSMAL ATRIAL FIBRILLATION: ICD-10-CM

## 2019-09-05 DIAGNOSIS — I25.5 ISCHEMIC CARDIOMYOPATHY: Primary | ICD-10-CM

## 2019-09-05 DIAGNOSIS — Z92.29 HX OF AMIODARONE THERAPY: ICD-10-CM

## 2019-09-05 DIAGNOSIS — Z95.810 CARDIAC DEFIBRILLATOR IN SITU: ICD-10-CM

## 2019-09-05 DIAGNOSIS — I10 ESSENTIAL HYPERTENSION: ICD-10-CM

## 2019-09-05 DIAGNOSIS — N18.30 STAGE 3 CHRONIC KIDNEY DISEASE: ICD-10-CM

## 2019-09-05 DIAGNOSIS — I25.10 CORONARY ARTERY DISEASE INVOLVING NATIVE CORONARY ARTERY OF NATIVE HEART WITHOUT ANGINA PECTORIS: ICD-10-CM

## 2019-09-05 DIAGNOSIS — Z79.4 TYPE 2 DIABETES MELLITUS WITH DIABETIC PERIPHERAL ANGIOPATHY WITHOUT GANGRENE, WITH LONG-TERM CURRENT USE OF INSULIN: ICD-10-CM

## 2019-09-05 PROCEDURE — 93010 RHYTHM STRIP: ICD-10-PCS | Mod: HCNC,S$GLB,, | Performed by: INTERNAL MEDICINE

## 2019-09-05 PROCEDURE — 1101F PT FALLS ASSESS-DOCD LE1/YR: CPT | Mod: HCNC,CPTII,S$GLB, | Performed by: NURSE PRACTITIONER

## 2019-09-05 PROCEDURE — 99214 PR OFFICE/OUTPT VISIT, EST, LEVL IV, 30-39 MIN: ICD-10-PCS | Mod: HCNC,S$GLB,, | Performed by: NURSE PRACTITIONER

## 2019-09-05 PROCEDURE — 3074F SYST BP LT 130 MM HG: CPT | Mod: HCNC,CPTII,S$GLB, | Performed by: NURSE PRACTITIONER

## 2019-09-05 PROCEDURE — 93010 ELECTROCARDIOGRAM REPORT: CPT | Mod: HCNC,S$GLB,, | Performed by: INTERNAL MEDICINE

## 2019-09-05 PROCEDURE — 3078F DIAST BP <80 MM HG: CPT | Mod: HCNC,CPTII,S$GLB, | Performed by: NURSE PRACTITIONER

## 2019-09-05 PROCEDURE — 99999 PR PBB SHADOW E&M-EST. PATIENT-LVL IV: CPT | Mod: PBBFAC,HCNC,, | Performed by: NURSE PRACTITIONER

## 2019-09-05 PROCEDURE — 99499 RISK ADDL DX/OHS AUDIT: ICD-10-PCS | Mod: HCNC,S$GLB,, | Performed by: NURSE PRACTITIONER

## 2019-09-05 PROCEDURE — 3074F PR MOST RECENT SYSTOLIC BLOOD PRESSURE < 130 MM HG: ICD-10-PCS | Mod: HCNC,CPTII,S$GLB, | Performed by: NURSE PRACTITIONER

## 2019-09-05 PROCEDURE — 99214 OFFICE O/P EST MOD 30 MIN: CPT | Mod: HCNC,S$GLB,, | Performed by: NURSE PRACTITIONER

## 2019-09-05 PROCEDURE — 99999 PR PBB SHADOW E&M-EST. PATIENT-LVL IV: ICD-10-PCS | Mod: PBBFAC,HCNC,, | Performed by: NURSE PRACTITIONER

## 2019-09-05 PROCEDURE — 1101F PR PT FALLS ASSESS DOC 0-1 FALLS W/OUT INJ PAST YR: ICD-10-PCS | Mod: HCNC,CPTII,S$GLB, | Performed by: NURSE PRACTITIONER

## 2019-09-05 PROCEDURE — 3078F PR MOST RECENT DIASTOLIC BLOOD PRESSURE < 80 MM HG: ICD-10-PCS | Mod: HCNC,CPTII,S$GLB, | Performed by: NURSE PRACTITIONER

## 2019-09-05 PROCEDURE — 93005 ELECTROCARDIOGRAM TRACING: CPT | Mod: HCNC,S$GLB,, | Performed by: INTERNAL MEDICINE

## 2019-09-05 PROCEDURE — 93284 PRGRMG EVAL IMPLANTABLE DFB: CPT | Mod: HCNC

## 2019-09-05 PROCEDURE — 99499 UNLISTED E&M SERVICE: CPT | Mod: HCNC,S$GLB,, | Performed by: NURSE PRACTITIONER

## 2019-09-05 PROCEDURE — 93005 RHYTHM STRIP: ICD-10-PCS | Mod: HCNC,S$GLB,, | Performed by: INTERNAL MEDICINE

## 2019-09-05 NOTE — LETTER
September 5, 2019      Kat Phelps MD  [address]  [facility] [address]           [facility] - Arrhythmia  [address]  [facility] [address]  Phone: [phone]  Fax: [phone]          Patient: Madhav Cortez   MR Number: [id number]   YOB: 1940   Date of Visit: 9/5/2019       Dear Dr. Kat Phelps:    Thank you for referring Madhav Cortez to me for evaluation. Attached you will find relevant portions of my assessment and plan of care.    If you have questions, please do not hesitate to call me. I look forward to following Madhav Cortez along with you.    Sincerely,    Zahra Austin NP    Enclosure  CC:  No Recipients    If you would like to receive this communication electronically, please contact [email or web address] or [phone] to request more information on [facility] DEPT. Link access.    For providers and/or their staff who would like to refer a patient to [facility], please contact us through our one-stop-shop provider referral line, [facility], at [phone].    If you feel you have received this communication in error or would no longer like to receive these types of communications, please e-mail [email or web address]

## 2019-09-10 DIAGNOSIS — I50.22 CHRONIC SYSTOLIC HEART FAILURE: ICD-10-CM

## 2019-09-10 RX ORDER — SPIRONOLACTONE 25 MG/1
TABLET ORAL
Qty: 90 TABLET | Refills: 3 | Status: SHIPPED | OUTPATIENT
Start: 2019-09-10 | End: 2020-07-21 | Stop reason: SDUPTHER

## 2019-09-17 ENCOUNTER — OFFICE VISIT (OUTPATIENT)
Dept: TRANSPLANT | Facility: CLINIC | Age: 79
End: 2019-09-17
Attending: INTERNAL MEDICINE
Payer: MEDICARE

## 2019-09-17 VITALS
SYSTOLIC BLOOD PRESSURE: 108 MMHG | HEIGHT: 70 IN | DIASTOLIC BLOOD PRESSURE: 55 MMHG | WEIGHT: 209.69 LBS | BODY MASS INDEX: 30.02 KG/M2 | HEART RATE: 60 BPM

## 2019-09-17 DIAGNOSIS — Z79.899 LONG TERM CURRENT USE OF AMIODARONE: ICD-10-CM

## 2019-09-17 DIAGNOSIS — Z79.4 TYPE 2 DIABETES MELLITUS WITH DIABETIC PERIPHERAL ANGIOPATHY WITHOUT GANGRENE, WITH LONG-TERM CURRENT USE OF INSULIN: ICD-10-CM

## 2019-09-17 DIAGNOSIS — I48.0 PAROXYSMAL ATRIAL FIBRILLATION: ICD-10-CM

## 2019-09-17 DIAGNOSIS — I50.42 CHRONIC COMBINED SYSTOLIC AND DIASTOLIC HEART FAILURE: Primary | ICD-10-CM

## 2019-09-17 DIAGNOSIS — E08.42 DIABETES MELLITUS DUE TO UNDERLYING CONDITION WITH DIABETIC POLYNEUROPATHY, WITH LONG-TERM CURRENT USE OF INSULIN: ICD-10-CM

## 2019-09-17 DIAGNOSIS — Z79.4 DIABETES MELLITUS DUE TO UNDERLYING CONDITION WITH DIABETIC POLYNEUROPATHY, WITH LONG-TERM CURRENT USE OF INSULIN: ICD-10-CM

## 2019-09-17 DIAGNOSIS — N18.30 STAGE 3 CHRONIC KIDNEY DISEASE: ICD-10-CM

## 2019-09-17 DIAGNOSIS — Z79.01 CURRENT USE OF LONG TERM ANTICOAGULATION: ICD-10-CM

## 2019-09-17 DIAGNOSIS — E11.51 TYPE 2 DIABETES MELLITUS WITH DIABETIC PERIPHERAL ANGIOPATHY WITHOUT GANGRENE, WITH LONG-TERM CURRENT USE OF INSULIN: ICD-10-CM

## 2019-09-17 DIAGNOSIS — I25.5 ISCHEMIC CARDIOMYOPATHY: ICD-10-CM

## 2019-09-17 PROCEDURE — 99213 OFFICE O/P EST LOW 20 MIN: CPT | Mod: HCNC,S$GLB,, | Performed by: INTERNAL MEDICINE

## 2019-09-17 PROCEDURE — 3074F PR MOST RECENT SYSTOLIC BLOOD PRESSURE < 130 MM HG: ICD-10-PCS | Mod: HCNC,CPTII,S$GLB, | Performed by: INTERNAL MEDICINE

## 2019-09-17 PROCEDURE — 1100F PTFALLS ASSESS-DOCD GE2>/YR: CPT | Mod: HCNC,CPTII,S$GLB, | Performed by: INTERNAL MEDICINE

## 2019-09-17 PROCEDURE — 3078F PR MOST RECENT DIASTOLIC BLOOD PRESSURE < 80 MM HG: ICD-10-PCS | Mod: HCNC,CPTII,S$GLB, | Performed by: INTERNAL MEDICINE

## 2019-09-17 PROCEDURE — 99999 PR PBB SHADOW E&M-EST. PATIENT-LVL III: ICD-10-PCS | Mod: PBBFAC,HCNC,, | Performed by: INTERNAL MEDICINE

## 2019-09-17 PROCEDURE — 3078F DIAST BP <80 MM HG: CPT | Mod: HCNC,CPTII,S$GLB, | Performed by: INTERNAL MEDICINE

## 2019-09-17 PROCEDURE — 3288F PR FALLS RISK ASSESSMENT DOCUMENTED: ICD-10-PCS | Mod: HCNC,CPTII,S$GLB, | Performed by: INTERNAL MEDICINE

## 2019-09-17 PROCEDURE — 99999 PR PBB SHADOW E&M-EST. PATIENT-LVL III: CPT | Mod: PBBFAC,HCNC,, | Performed by: INTERNAL MEDICINE

## 2019-09-17 PROCEDURE — 1100F PR PT FALLS ASSESS DOC 2+ FALLS/FALL W/INJURY/YR: ICD-10-PCS | Mod: HCNC,CPTII,S$GLB, | Performed by: INTERNAL MEDICINE

## 2019-09-17 PROCEDURE — 3288F FALL RISK ASSESSMENT DOCD: CPT | Mod: HCNC,CPTII,S$GLB, | Performed by: INTERNAL MEDICINE

## 2019-09-17 PROCEDURE — 99213 PR OFFICE/OUTPT VISIT, EST, LEVL III, 20-29 MIN: ICD-10-PCS | Mod: HCNC,S$GLB,, | Performed by: INTERNAL MEDICINE

## 2019-09-17 PROCEDURE — 3074F SYST BP LT 130 MM HG: CPT | Mod: HCNC,CPTII,S$GLB, | Performed by: INTERNAL MEDICINE

## 2019-09-17 RX ORDER — TEMAZEPAM 15 MG/1
15 CAPSULE ORAL
COMMUNITY
Start: 2019-08-26 | End: 2020-02-12

## 2019-09-17 RX ORDER — METOPROLOL SUCCINATE 200 MG/1
200 TABLET, EXTENDED RELEASE ORAL DAILY
Qty: 90 TABLET | Refills: 3 | Status: SHIPPED | OUTPATIENT
Start: 2019-09-17

## 2019-09-17 NOTE — PATIENT INSTRUCTIONS
WHEN YOU GET THE METOPROLOL IN THE MAIL THEN STOP THE CARVEDILOL   METOPROLOL IS DOSE ONCE A DAY  TRY TO TAKE BUMETANIDE WHEN YOU GET UP AND 6 HRS LATER

## 2019-09-17 NOTE — PROGRESS NOTES
"Subjective:     Patient ID:  Madhav Cortez is a 79 y.o. male who presents for evaluation of Congestive Heart Failure    HPI:  78 yo WM referred by Dr. Humberto Koehler for evaluation of CHF.  He has CHF, AF, CKD, CAD, HTN, DM, PAD, ulcers of both legs/feet, venous insuff.  He is on GDMT including Entresto and carvedilol.  At initial visit in July 22, 2019 he reported not responding to furosemide so changed him to bumetanide 1 mg when wakes up and another after lunch.  He comes today for visit.  He reports that he cut bumex to once a day as he was urinating so much.  He also reports that if he is going out he won't take it at all on those days.  He reports on this breating is better, no PND, no angina.    ROS no changes except improvement in CHF symptoms    Objective:   Physical Exam   Constitutional: No distress.   BP (!) 108/55 (BP Location: Right arm, Patient Position: Sitting, BP Method: Large (Automatic))   Pulse 60   Ht 5' 10" (1.778 m)   Wt 95.1 kg (209 lb 10.5 oz)   BMI 30.08 kg/m²   Last visit wt 96.6 kg (212 lb 15.4 oz)   Elderly appearing WM in NAD   HENT:   Head: Normocephalic and atraumatic.   Eyes: Conjunctivae are normal. Right eye exhibits no discharge. Left eye exhibits no discharge. No scleral icterus.   Neck: No JVD (not visible sitting today) present. No thyromegaly present.   Cardiovascular: Normal rate, regular rhythm and normal heart sounds. Exam reveals no gallop.   No murmur heard.  Pulmonary/Chest: Effort normal and breath sounds normal.   Abdominal: Soft. Bowel sounds are normal. He exhibits no distension (12 cm) and no mass. There is no tenderness.   Musculoskeletal: He exhibits edema (2+ edema). He exhibits no tenderness.   Skin: Skin is warm and dry. He is not diaphoretic.      LABS SINCE July 2019 VISIT  Lab Results   Component Value Date     (H) 08/14/2019     08/14/2019    K 3.8 08/14/2019     08/14/2019    CO2 30 (H) 08/14/2019    BUN 12 08/14/2019    " CREATININE 0.8 08/14/2019     (H) 08/14/2019    AST 61 (H) 09/05/2019    ALT 85 (H) 09/05/2019    ALBUMIN 3.5 09/05/2019    PROT 6.8 09/05/2019    BILITOT 0.5 09/05/2019     Assessment:     1. Chronic combined systolic and diastolic heart failure    2. Stage 3 chronic kidney disease    3. Ischemic cardiomyopathy    4. Paroxysmal atrial fibrillation    5. Long term current use of amiodarone    6. Current use of long term anticoagulation    7. Type 2 diabetes mellitus with diabetic peripheral angiopathy without gangrene, with long-term current use of insulin    8. Diabetes mellitus due to underlying condition with diabetic polyneuropathy, with long-term current use of insulin      Plan:   Today had liver panel but no other lab--will add BMP and BNP--THESE WERE NOT ABLE TO BE ADDED BUT NOT HAVING HIM COME BACK SINCE AUGUST LAB AVAILABLE--will obtain in 6 weeks  I will change carvedilol to metoprolol succinate 200 mg qd to lessen risk of hypotension  RTC 3 months with BMP and BNP  I asked him to try and take bumex when he arises and 6 hrs later to see if this will help edema

## 2019-09-24 ENCOUNTER — TELEPHONE (OUTPATIENT)
Dept: INTERNAL MEDICINE | Facility: CLINIC | Age: 79
End: 2019-09-24

## 2019-09-24 RX ORDER — GABAPENTIN 300 MG/1
600 CAPSULE ORAL 3 TIMES DAILY
Qty: 540 CAPSULE | Refills: 3 | Status: SHIPPED | OUTPATIENT
Start: 2019-09-24

## 2019-09-24 RX ORDER — DOXYCYCLINE HYCLATE 100 MG
100 TABLET ORAL 2 TIMES DAILY
Qty: 20 TABLET | Refills: 0 | Status: SHIPPED | OUTPATIENT
Start: 2019-09-24 | End: 2019-10-04

## 2019-09-24 NOTE — TELEPHONE ENCOUNTER
Saw pt with wife. He has had a cough for the last 3 weeks. Cough has improved but still coughing up green/gray mucous about 10 times a day. No fever, chills, nausea, vomiting, shortness of breath.     Lungs clear to ausculatation    Suggested that he take mucinex twice daily and doxycycline 100 mg twice daily - rx sent to pharmacy

## 2019-09-24 NOTE — TELEPHONE ENCOUNTER
----- Message from Sydney Mcintosh MA sent at 9/24/2019 11:12 AM CDT -----      ----- Message -----  From: Jocy Issa  Sent: 9/24/2019  10:19 AM CDT  To: Eric PETERSON Staff    Patient states that he is coming in with his wife for a 1 pm appointment and would like something for hs cold. I told him there was no appointment available.

## 2019-10-01 ENCOUNTER — CLINICAL SUPPORT (OUTPATIENT)
Dept: CARDIOLOGY | Facility: HOSPITAL | Age: 79
End: 2019-10-01
Payer: MEDICARE

## 2019-10-01 ENCOUNTER — IMMUNIZATION (OUTPATIENT)
Dept: PHARMACY | Facility: CLINIC | Age: 79
End: 2019-10-01
Payer: MEDICARE

## 2019-10-01 ENCOUNTER — OFFICE VISIT (OUTPATIENT)
Dept: INTERNAL MEDICINE | Facility: CLINIC | Age: 79
End: 2019-10-01
Payer: MEDICARE

## 2019-10-01 VITALS
WEIGHT: 208.31 LBS | DIASTOLIC BLOOD PRESSURE: 60 MMHG | OXYGEN SATURATION: 96 % | HEIGHT: 70 IN | SYSTOLIC BLOOD PRESSURE: 110 MMHG | HEART RATE: 60 BPM | BODY MASS INDEX: 29.82 KG/M2 | TEMPERATURE: 98 F

## 2019-10-01 DIAGNOSIS — I25.10 CORONARY ARTERY DISEASE INVOLVING NATIVE CORONARY ARTERY OF NATIVE HEART WITHOUT ANGINA PECTORIS: ICD-10-CM

## 2019-10-01 DIAGNOSIS — I10 ESSENTIAL HYPERTENSION: ICD-10-CM

## 2019-10-01 DIAGNOSIS — E11.51 TYPE 2 DIABETES MELLITUS WITH DIABETIC PERIPHERAL ANGIOPATHY WITHOUT GANGRENE, WITH LONG-TERM CURRENT USE OF INSULIN: ICD-10-CM

## 2019-10-01 DIAGNOSIS — I48.0 PAROXYSMAL ATRIAL FIBRILLATION: Primary | ICD-10-CM

## 2019-10-01 DIAGNOSIS — Z79.4 TYPE 2 DIABETES MELLITUS WITH DIABETIC PERIPHERAL ANGIOPATHY WITHOUT GANGRENE, WITH LONG-TERM CURRENT USE OF INSULIN: ICD-10-CM

## 2019-10-01 PROCEDURE — 3078F DIAST BP <80 MM HG: CPT | Mod: HCNC,CPTII,S$GLB, | Performed by: INTERNAL MEDICINE

## 2019-10-01 PROCEDURE — 99214 OFFICE O/P EST MOD 30 MIN: CPT | Mod: HCNC,S$GLB,, | Performed by: INTERNAL MEDICINE

## 2019-10-01 PROCEDURE — 1100F PTFALLS ASSESS-DOCD GE2>/YR: CPT | Mod: HCNC,CPTII,S$GLB, | Performed by: INTERNAL MEDICINE

## 2019-10-01 PROCEDURE — 1100F PR PT FALLS ASSESS DOC 2+ FALLS/FALL W/INJURY/YR: ICD-10-PCS | Mod: HCNC,CPTII,S$GLB, | Performed by: INTERNAL MEDICINE

## 2019-10-01 PROCEDURE — 93295 CARDIAC DEVICE CHECK - REMOTE: ICD-10-PCS | Mod: ,,, | Performed by: INTERNAL MEDICINE

## 2019-10-01 PROCEDURE — 93296 REM INTERROG EVL PM/IDS: CPT | Performed by: INTERNAL MEDICINE

## 2019-10-01 PROCEDURE — 3074F PR MOST RECENT SYSTOLIC BLOOD PRESSURE < 130 MM HG: ICD-10-PCS | Mod: HCNC,CPTII,S$GLB, | Performed by: INTERNAL MEDICINE

## 2019-10-01 PROCEDURE — 99999 PR PBB SHADOW E&M-EST. PATIENT-LVL IV: CPT | Mod: PBBFAC,HCNC,, | Performed by: INTERNAL MEDICINE

## 2019-10-01 PROCEDURE — 93295 DEV INTERROG REMOTE 1/2/MLT: CPT | Mod: ,,, | Performed by: INTERNAL MEDICINE

## 2019-10-01 PROCEDURE — 99999 PR PBB SHADOW E&M-EST. PATIENT-LVL IV: ICD-10-PCS | Mod: PBBFAC,HCNC,, | Performed by: INTERNAL MEDICINE

## 2019-10-01 PROCEDURE — 99214 PR OFFICE/OUTPT VISIT, EST, LEVL IV, 30-39 MIN: ICD-10-PCS | Mod: HCNC,S$GLB,, | Performed by: INTERNAL MEDICINE

## 2019-10-01 PROCEDURE — 3078F PR MOST RECENT DIASTOLIC BLOOD PRESSURE < 80 MM HG: ICD-10-PCS | Mod: HCNC,CPTII,S$GLB, | Performed by: INTERNAL MEDICINE

## 2019-10-01 PROCEDURE — 3288F FALL RISK ASSESSMENT DOCD: CPT | Mod: HCNC,CPTII,S$GLB, | Performed by: INTERNAL MEDICINE

## 2019-10-01 PROCEDURE — 3288F PR FALLS RISK ASSESSMENT DOCUMENTED: ICD-10-PCS | Mod: HCNC,CPTII,S$GLB, | Performed by: INTERNAL MEDICINE

## 2019-10-01 PROCEDURE — 3074F SYST BP LT 130 MM HG: CPT | Mod: HCNC,CPTII,S$GLB, | Performed by: INTERNAL MEDICINE

## 2019-10-01 RX ORDER — TRAZODONE HYDROCHLORIDE 50 MG/1
50 TABLET ORAL NIGHTLY
COMMUNITY
End: 2019-10-09 | Stop reason: SDUPTHER

## 2019-10-01 NOTE — PROGRESS NOTES
CHIEF COMPLAINT: Follow up of multiple issues      HISTORY OF PRESENT ILLNESS: This is a 79-year-old man who presents with this wife for follow up of above    Cough is improving on doxycycline 100 mg twice daily.  It is almost resolved.     Left hand wound has healed.    He now has a wound on the left lower leg. He had an open wound on the l eft leg that was draining clear fluid a week ago.  He has the leg wrapped.  No leg pain. No fever or chills.    He is walking fine at home. He occasionally uses a walker. No falls.         No chest pain or palpitations. He continues to take bumex 1 mg once daily, pradaxa 150 mg twice daily, spironolactone 25 mg 1/2 tablet daily, carvediolol 25 mg bid and Entresto 97/103 twice daily for his hypertension and CHF. He is also on amiodarone 200 mg twice daily for his atrial flutter. Aspirin 81 mg daily.       His back is doing well. He is doing stretching at home which helps.  HE takes one tizanidine in the evening.       Sleep continues to be problematic. HE continues to take ramelteon 8 mg  tablets at bedtime, trazodone at bedtime,  melatonin 5 mg at bedtime. No anxiety or depression now. He is sleeping during the day and not at night.       He currently takes Lantus at night - approximately 40 units once daily.  He denies any polydipsia, polyuria, hypoglycemia. HE checks his blood sugars 2-3 times a day.       He has hyperlipidemia, currently off Lipitor 20 mg daily      His liver enzymes have been mildly elevated. Dr Palm stopped fenofibrate due to the elevated liver enzymes. He had a liver biopsy and the elevated liver enzymes are NOT due to amiodarone. No further work up is needed at this time.       Periperhal neuropathy controlled with gabapentin 300 mg 2 tablets 2 times daily. Shooting pain has resolved with increasing dose of gabapentin.     He is  taking levothyroxine 75 mcg daily for his hypothyroidism           PAST MEDICAL HISTORY:    1. Hypertension, coronary artery  disease, status post stenting.    2. Ischemic cardiomyopathy.    3. Atrial flutter.    4. Diabetes mellitus.    5. ICD placed 02/22/2012.    6. Hyperlipidemia.    7. Osteoarthritis of the knees.  8. Atrial fibrillation  9. Transaminitis      PAST SURGICAL HISTORY: Appendectomy in 2003.        SOCIAL HISTORY: He is a former smoker, quit in 1987. Does not drink alcohol.    .       PHYSICAL EXAMINATION:     GENERAL: He is alert, oriented, no apparent distress. Affect within normal limits.    . Oropharynx is clear.    NECK: Supple.   RESPIRATORY: Effort normal. Lungs are clear to auscultation.    HEART: Regular rate and rhythm without murmurs, gallops or rubs. No lower extremity edema. .  Wound with dry base on dorsat aspect of left hand. No drainage. Some surrounding erythema and warmth.  Excoriation of the left upper forearm. No ertyhema or warmty          ASSESSMENT AND PLAN:.   1. CHF - stable  4. Coronary artery disease - stable - to follow up with Cardiology.    5. Atrial flutter - s/p cardioversion - stable    6. 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 Watch blood sugars.   7. History of anemia on last blood work, stable    8. Hyperlipidemia. At goal    9  Transaminitis -better, watch closely  10. Diabetic neuropathy -stable. gabapentin 300 mg 2 capsules three times daily    11. Anxiety and depression - stable  12. Aortic arch atherosclerosis - modifying risk factors  13. Insomnia - stable  14. Hypothyroidism - on supplement  15.   I will see him back in 3 weeks, sooner if problems arise.

## 2019-10-09 RX ORDER — TRAZODONE HYDROCHLORIDE 50 MG/1
50 TABLET ORAL NIGHTLY
Qty: 90 TABLET | Refills: 1 | Status: SHIPPED | OUTPATIENT
Start: 2019-10-09 | End: 2020-03-03

## 2019-10-09 NOTE — TELEPHONE ENCOUNTER
----- Message from Herminio Elizabeth sent at 10/9/2019  3:16 PM CDT -----  Contact: [facility] [phone]  RX request - refill or new RX.  Is this a refill or new RX:  Refill  RX name and strength: traZODone (DESYREL) 50 MG tablet     Is this a 30 day or 90 day RX:  90 Day supply   Pharmacy name and phone #[facility] - [place], [place] - [address] [phone] (Phone) [phone] (Fax    Please call an advise  Thank you

## 2019-10-14 ENCOUNTER — TELEPHONE (OUTPATIENT)
Dept: INTERNAL MEDICINE | Facility: CLINIC | Age: 79
End: 2019-10-14

## 2019-10-14 DIAGNOSIS — R26.9 GAIT ABNORMALITY: ICD-10-CM

## 2019-10-14 DIAGNOSIS — S81.802A WOUND OF LEFT LOWER EXTREMITY, INITIAL ENCOUNTER: Primary | ICD-10-CM

## 2019-10-14 NOTE — TELEPHONE ENCOUNTER
Pt states that he has a few blisters on his left leg that need caring for. The blisters have not burst. Home health orders are needed for them to come out and do wound care. Please advise

## 2019-10-14 NOTE — TELEPHONE ENCOUNTER
----- Message from Ana Reji sent at 10/14/2019  8:16 AM CDT -----  Contact: Patient [phone]  Patient states that a call is needed to [facility] because the patient has blister on his left leg. Orders are needed so someone can come to his home, to care to the blister.     Please call and advice.      Thank You

## 2019-10-15 ENCOUNTER — TELEPHONE (OUTPATIENT)
Dept: INTERNAL MEDICINE | Facility: CLINIC | Age: 79
End: 2019-10-15

## 2019-10-15 NOTE — TELEPHONE ENCOUNTER
----- Message from Laquita Mesa sent at 10/15/2019  9:48 AM CDT -----  Contact: Clive  [phone]                    fax # [phone]  Patient has blisters on both legs and called to request that you send an order to [facility] to visit patient to take care of this. He has been waiting to hear from them for several days. Clive told him to have you fax an order to them at [phone] and they would pay him a visit.   Clive's office # [phone]

## 2019-10-15 NOTE — TELEPHONE ENCOUNTER
Informed pt that HH orders were faxed to [facility]. Pt stated that he has received a call from [facility] and that they will visit him on 10/16/19. Pt is satisfied with [facility]

## 2019-10-16 PROCEDURE — G0180 PR HOME HEALTH MD CERTIFICATION: ICD-10-PCS | Mod: ,,, | Performed by: INTERNAL MEDICINE

## 2019-10-16 PROCEDURE — G0180 MD CERTIFICATION HHA PATIENT: HCPCS | Mod: ,,, | Performed by: INTERNAL MEDICINE

## 2019-10-23 ENCOUNTER — TELEPHONE (OUTPATIENT)
Dept: INTERNAL MEDICINE | Facility: CLINIC | Age: 79
End: 2019-10-23

## 2019-10-23 NOTE — TELEPHONE ENCOUNTER
----- Message from Malaika Daily MA sent at 10/23/2019  3:22 PM CDT -----    ----- Message -----  From: XIANG Moreno  Sent: 10/23/2019  12:07 PM CDT  To: Zahra Ennis PharmD, [facility] Staff    I would refer that to patient's PCP.  Patient needs to follow up with his PCP and re-assess if he needs to be on this medication  Thank you    ----- Message -----  From: Zahra Ennis PharmD  Sent: 7/15/2019   2:22 PM CDT  To: XIANG Moreno, [facility] Staff    Good afternoon. We have just received a prescription for Madhav Cortez ([id number]) for Rozerem.  Unfortunately, the insurance does not cover the Rozerem. They prefer Belsomra. If you feel that this change is appropriate for the patient, please send a prescription for the Belsomra to the primary care pharmacy.  If you have any questions, give us a call at [phone].    Thanks,  Zahra Ennis PharmD

## 2019-11-01 DIAGNOSIS — E13.9 DIABETES MELLITUS DUE TO ABNORMAL INSULIN: ICD-10-CM

## 2019-11-04 ENCOUNTER — TELEPHONE (OUTPATIENT)
Dept: INTERNAL MEDICINE | Facility: CLINIC | Age: 79
End: 2019-11-04

## 2019-11-04 NOTE — TELEPHONE ENCOUNTER
----- Message from Abril Blankenship sent at 11/4/2019  8:40 AM CST -----  Contact: self/[phone]  Pt is calling. [facility] has been sending over a request for a new meter for the pt for ACCU CHEK MILENA PLUS METER along with test strips and soft click lancets. They no longer make the one he orginally used.  Pt states that he has not taken his blood sugar for 3 weeks now. Pt states that they have been sending over fax since he first requested it. Please advise,        Thank You

## 2019-11-05 ENCOUNTER — CLINICAL SUPPORT (OUTPATIENT)
Dept: CARDIOLOGY | Facility: HOSPITAL | Age: 79
End: 2019-11-05
Attending: INTERNAL MEDICINE
Payer: MEDICARE

## 2019-11-05 ENCOUNTER — TELEPHONE (OUTPATIENT)
Dept: INTERNAL MEDICINE | Facility: CLINIC | Age: 79
End: 2019-11-05

## 2019-11-05 DIAGNOSIS — Z95.810 ICD (IMPLANTABLE CARDIOVERTER-DEFIBRILLATOR) IN PLACE: ICD-10-CM

## 2019-11-05 DIAGNOSIS — I25.5 ISCHEMIC CARDIOMYOPATHY: ICD-10-CM

## 2019-11-05 PROCEDURE — 93296 REM INTERROG EVL PM/IDS: CPT | Mod: HCNC

## 2019-11-05 NOTE — TELEPHONE ENCOUNTER
----- Message from Ana Mendoza sent at 11/5/2019  9:15 AM CST -----  [facility] [phone]  Saw him on Saturday and Monday. Both days he took an anti depression pill. His speech was slurred. She understanding what he was saying. She has seen him for would care. His wounds are healing fine. His wife is in the hospital so he stopped taking some of mediation. He has two no blisters with no skin on it, but no infection. She clean him with salin, and put dressing on the blisters.    Please call and advise.    Thank You

## 2019-11-06 NOTE — TELEPHONE ENCOUNTER
Called pt and confirmed an 11:30 am appt for him with Jaqueline Conrad per Dr. Reyes's request. He expressed understanding.

## 2019-11-07 ENCOUNTER — TELEPHONE (OUTPATIENT)
Dept: INTERNAL MEDICINE | Facility: CLINIC | Age: 79
End: 2019-11-07

## 2019-11-07 ENCOUNTER — OFFICE VISIT (OUTPATIENT)
Dept: INTERNAL MEDICINE | Facility: CLINIC | Age: 79
End: 2019-11-07
Payer: MEDICARE

## 2019-11-07 VITALS
HEIGHT: 70 IN | BODY MASS INDEX: 29.89 KG/M2 | DIASTOLIC BLOOD PRESSURE: 60 MMHG | OXYGEN SATURATION: 99 % | HEART RATE: 60 BPM | SYSTOLIC BLOOD PRESSURE: 102 MMHG

## 2019-11-07 DIAGNOSIS — G47.00 INSOMNIA, UNSPECIFIED TYPE: ICD-10-CM

## 2019-11-07 DIAGNOSIS — E11.69 TYPE 2 DIABETES MELLITUS WITH OTHER SPECIFIED COMPLICATION, WITH LONG-TERM CURRENT USE OF INSULIN: ICD-10-CM

## 2019-11-07 DIAGNOSIS — S81.802A WOUND OF LEFT LOWER EXTREMITY, INITIAL ENCOUNTER: Primary | ICD-10-CM

## 2019-11-07 DIAGNOSIS — F41.9 ANXIETY: ICD-10-CM

## 2019-11-07 DIAGNOSIS — Z79.4 TYPE 2 DIABETES MELLITUS WITH OTHER SPECIFIED COMPLICATION, WITH LONG-TERM CURRENT USE OF INSULIN: ICD-10-CM

## 2019-11-07 PROCEDURE — 3078F DIAST BP <80 MM HG: CPT | Mod: HCNC,CPTII,S$GLB, | Performed by: PHYSICIAN ASSISTANT

## 2019-11-07 PROCEDURE — 99215 PR OFFICE/OUTPT VISIT, EST, LEVL V, 40-54 MIN: ICD-10-PCS | Mod: HCNC,S$GLB,, | Performed by: PHYSICIAN ASSISTANT

## 2019-11-07 PROCEDURE — 3074F SYST BP LT 130 MM HG: CPT | Mod: HCNC,CPTII,S$GLB, | Performed by: PHYSICIAN ASSISTANT

## 2019-11-07 PROCEDURE — 99999 PR PBB SHADOW E&M-EST. PATIENT-LVL V: CPT | Mod: PBBFAC,HCNC,, | Performed by: PHYSICIAN ASSISTANT

## 2019-11-07 PROCEDURE — 1101F PR PT FALLS ASSESS DOC 0-1 FALLS W/OUT INJ PAST YR: ICD-10-PCS | Mod: HCNC,CPTII,S$GLB, | Performed by: PHYSICIAN ASSISTANT

## 2019-11-07 PROCEDURE — 3078F PR MOST RECENT DIASTOLIC BLOOD PRESSURE < 80 MM HG: ICD-10-PCS | Mod: HCNC,CPTII,S$GLB, | Performed by: PHYSICIAN ASSISTANT

## 2019-11-07 PROCEDURE — 1101F PT FALLS ASSESS-DOCD LE1/YR: CPT | Mod: HCNC,CPTII,S$GLB, | Performed by: PHYSICIAN ASSISTANT

## 2019-11-07 PROCEDURE — 99215 OFFICE O/P EST HI 40 MIN: CPT | Mod: HCNC,S$GLB,, | Performed by: PHYSICIAN ASSISTANT

## 2019-11-07 PROCEDURE — 99999 PR PBB SHADOW E&M-EST. PATIENT-LVL V: ICD-10-PCS | Mod: PBBFAC,HCNC,, | Performed by: PHYSICIAN ASSISTANT

## 2019-11-07 PROCEDURE — 3074F PR MOST RECENT SYSTOLIC BLOOD PRESSURE < 130 MM HG: ICD-10-PCS | Mod: HCNC,CPTII,S$GLB, | Performed by: PHYSICIAN ASSISTANT

## 2019-11-07 NOTE — PROGRESS NOTES
"Subjective:       Patient ID: Madhav Cortez is a 79 y.o. male.        Chief Complaint: Follow-up    Madhav Cortez is an established patient of Mirna Reyes MD here today for urgent care visit.    Wound left lower leg - he has wound care coming tomorrow, previously had a blister that now has popped and is draining a little bit of blood and yellow fluid, chronic issues with this leg, no fever or chills     CHF/HTN/atrial flutter - bumex 1 mg once daily, pradaxa 150 mg BID, spironolactone 25 mg daily, carvedilol 25 mg BID, entresto twice daily, amiodarone 200 mg BID, aspirin 81 mg daily     Insomnia - continues to be an issue, taking melatonin 10 mg, trazodone 50 mg, med card also lists temazepam 15 mg but he is unsure if he is taking this, often sleeps during the day and not at night    Anxiety - this has been more of a problem lately, his wife has a bottle of "anxiety pills" but unsure of name of medication, evidently she gave him several of these pills over the weekend and it made him quite "out of it"      DM - checks blood sugar 2-3 times daily but cannot really recall readings, takes varying amounts of lantus, anywhere from none to 40-60 U, asks if he should be on novolog, asks how much insulin to take, discussed that we need a log of readings to make recommendations, no hypoglycemia       Lipids - no longer on lipitor      H/o elevated liver enzymes on fenofibrate - discontinued by Dr. Palm      Periperhal neuropathy - taking gabapentin 300 mg 2 tablets 2 times daily     Hypothyroidism - taking synthroid 75 mcg daily    ICD placed 2/22/2012           Review of Systems   Constitutional: Negative for appetite change, chills, fatigue and fever.   HENT: Negative for congestion and sore throat.    Eyes: Negative for visual disturbance.   Respiratory: Negative for cough, chest tightness and shortness of breath.    Cardiovascular: Negative for chest pain, palpitations and leg swelling.   Gastrointestinal: " Negative for abdominal pain, blood in stool, constipation, diarrhea, nausea and vomiting.   Genitourinary: Negative for dysuria, frequency, hematuria and urgency.   Musculoskeletal: Negative for arthralgias and back pain.   Skin: Positive for wound. Negative for rash.   Neurological: Negative for dizziness, syncope, weakness and headaches.   Psychiatric/Behavioral: Positive for sleep disturbance. Negative for dysphoric mood and suicidal ideas. The patient is nervous/anxious.        Objective:      Physical Exam   Constitutional: He appears well-developed and well-nourished.   HENT:   Head: Normocephalic.   Right Ear: External ear normal.   Left Ear: External ear normal.   Mouth/Throat: Oropharynx is clear and moist.   Eyes: Pupils are equal, round, and reactive to light.   Cardiovascular: Normal rate, regular rhythm and normal heart sounds. Exam reveals no gallop and no friction rub.   No murmur heard.  Pulmonary/Chest: Effort normal and breath sounds normal. No respiratory distress.   Abdominal: Soft. There is no tenderness. There is no CVA tenderness.   Musculoskeletal: He exhibits no edema.   Neurological: He is alert.   Skin: Skin is warm and dry.        Psychiatric: He has a normal mood and affect.   Nursing note and vitals reviewed.      Assessment:       1. Wound of left lower extremity, initial encounter    2. Anxiety    3. Insomnia, unspecified type    4. Type 2 diabetes mellitus with other specified complication, with long-term current use of insulin        Plan:       Madhav was seen today for follow-up.    Diagnoses and all orders for this visit:    Wound of left lower extremity, initial encounter - wound care is coming tomorrow - wound was dressed and wrapped in clinic today    Anxiety - his wife will call me with the name of the medication she gave him over the weekend    Insomnia, unspecified type - continues to be a problem    Type 2 diabetes mellitus with other specified complication, with long-term  "current use of insulin - write down blood sugar readings and also dose of lantus that he takes each day x 2 weeks    F/u with Dr. Reyes in 2 weeks.  >45 minutes spent with patient today.  Will discuss meds with Dr. Reyes.    Addendum - his wife called back and xanax is the "anxiety pill" she was giving him over the weekend that made him "out of it."  He requests something to use prn anxiety.    Pt has been given instructions populated from [employer] database and has verbalized understanding of the after visit summary and information contained wherein.    Follow up with a primary care provider. May go to ER for acute shortness of breath, lightheadedness, fever, or any other emergent complaints or changes in condition.    "This note will be shared with the patient"    Future Appointments   Date Time Provider Department Center   11/22/2019  4:00 PM Mirna Reyes MD [facility]   12/30/2019 10:00 AM HOME MONITOR DEVICE CHECK, [facility]   1/14/2020  1:30 PM Mirna Reyes MD [facility]   2/4/2020  8:15 AM HOME MONITOR DEVICE CHECK, [facility]               "

## 2019-11-07 NOTE — TELEPHONE ENCOUNTER
----- Message from Thiago Rosenthal sent at 11/7/2019 12:55 PM CST -----  Contact: Spouse/ Joanne [phone]  She called to give you the name of the medication the patient is taking. It is ALPRAZolam (XANAX) 0.5 MG tablet because she didn't know it at the time of his visit    She asked that you call her back to talk about it.    Thank you

## 2019-11-07 NOTE — TELEPHONE ENCOUNTER
Called and spoke with his wife, Joanne, per his request.  She was giving him xanax over the weekend for anxiety.     He is also taking both trazodone and restoril at night - she questions if he is supposed to be taking both or just one?    Please review and let me know Dr. Reyes - thanks!

## 2019-11-07 NOTE — PATIENT INSTRUCTIONS
"Write down blood sugar readings twice daily  Write down how much lantus you are taking daily    Call with name of "anxiety medicine"      "

## 2019-11-11 NOTE — TELEPHONE ENCOUNTER
Please call to inform patient that Dr. Reyes reviewed and he should just be taking the trazodone for sleep at night.    He has an appointment her upcoming 11/22 and can discuss meds with her more at that time.

## 2019-11-12 ENCOUNTER — TELEPHONE (OUTPATIENT)
Dept: INTERNAL MEDICINE | Facility: CLINIC | Age: 79
End: 2019-11-12

## 2019-11-12 DIAGNOSIS — E13.9 DIABETES MELLITUS DUE TO ABNORMAL INSULIN: ICD-10-CM

## 2019-11-12 RX ORDER — INSULIN PUMP SYRINGE, 3 ML
EACH MISCELLANEOUS
Qty: 1 EACH | Refills: 0 | Status: SHIPPED | OUTPATIENT
Start: 2019-11-12

## 2019-11-12 NOTE — TELEPHONE ENCOUNTER
----- Message from Lucien Carbajal sent at 11/12/2019  8:22 AM CST -----  Contact: [facility] [phone] (Phone)   Diabetic or Medical Supplies.  What supplies are needed: Meter, lancets, test strips,   What is the brand name of the supplies:  AUCC-CHEK Barb Plus  Is this a refill or new prescription:  New  Who prescribed the supplies:  [facility]  Pharmacy or company name, phone # and location: [facility] - [address] [phone] (Phone)   Comments:

## 2019-11-21 ENCOUNTER — EXTERNAL HOME HEALTH (OUTPATIENT)
Dept: HOME HEALTH SERVICES | Facility: HOSPITAL | Age: 79
End: 2019-11-21
Payer: MEDICARE

## 2019-11-22 ENCOUNTER — OFFICE VISIT (OUTPATIENT)
Dept: INTERNAL MEDICINE | Facility: CLINIC | Age: 79
End: 2019-11-22
Payer: MEDICARE

## 2019-11-22 VITALS
TEMPERATURE: 98 F | SYSTOLIC BLOOD PRESSURE: 130 MMHG | DIASTOLIC BLOOD PRESSURE: 70 MMHG | HEIGHT: 70 IN | HEART RATE: 59 BPM | OXYGEN SATURATION: 99 % | BODY MASS INDEX: 28.81 KG/M2 | WEIGHT: 201.25 LBS

## 2019-11-22 DIAGNOSIS — G47.00 INSOMNIA, UNSPECIFIED TYPE: ICD-10-CM

## 2019-11-22 DIAGNOSIS — M17.0 PRIMARY OSTEOARTHRITIS OF BOTH KNEES: ICD-10-CM

## 2019-11-22 DIAGNOSIS — I25.10 CORONARY ARTERY DISEASE INVOLVING NATIVE CORONARY ARTERY OF NATIVE HEART WITHOUT ANGINA PECTORIS: ICD-10-CM

## 2019-11-22 DIAGNOSIS — E78.00 PURE HYPERCHOLESTEROLEMIA: Primary | ICD-10-CM

## 2019-11-22 PROCEDURE — 99999 PR PBB SHADOW E&M-EST. PATIENT-LVL III: ICD-10-PCS | Mod: PBBFAC,HCNC,, | Performed by: INTERNAL MEDICINE

## 2019-11-22 PROCEDURE — 99214 PR OFFICE/OUTPT VISIT, EST, LEVL IV, 30-39 MIN: ICD-10-PCS | Mod: HCNC,S$GLB,, | Performed by: INTERNAL MEDICINE

## 2019-11-22 PROCEDURE — 1126F PR PAIN SEVERITY QUANTIFIED, NO PAIN PRESENT: ICD-10-PCS | Mod: HCNC,S$GLB,, | Performed by: INTERNAL MEDICINE

## 2019-11-22 PROCEDURE — 3075F PR MOST RECENT SYSTOLIC BLOOD PRESS GE 130-139MM HG: ICD-10-PCS | Mod: HCNC,CPTII,S$GLB, | Performed by: INTERNAL MEDICINE

## 2019-11-22 PROCEDURE — 3078F DIAST BP <80 MM HG: CPT | Mod: HCNC,CPTII,S$GLB, | Performed by: INTERNAL MEDICINE

## 2019-11-22 PROCEDURE — 1159F PR MEDICATION LIST DOCUMENTED IN MEDICAL RECORD: ICD-10-PCS | Mod: HCNC,S$GLB,, | Performed by: INTERNAL MEDICINE

## 2019-11-22 PROCEDURE — 1101F PR PT FALLS ASSESS DOC 0-1 FALLS W/OUT INJ PAST YR: ICD-10-PCS | Mod: HCNC,CPTII,S$GLB, | Performed by: INTERNAL MEDICINE

## 2019-11-22 PROCEDURE — 1159F MED LIST DOCD IN RCRD: CPT | Mod: HCNC,S$GLB,, | Performed by: INTERNAL MEDICINE

## 2019-11-22 PROCEDURE — 1126F AMNT PAIN NOTED NONE PRSNT: CPT | Mod: HCNC,S$GLB,, | Performed by: INTERNAL MEDICINE

## 2019-11-22 PROCEDURE — 99499 UNLISTED E&M SERVICE: CPT | Mod: HCNC,S$GLB,, | Performed by: INTERNAL MEDICINE

## 2019-11-22 PROCEDURE — 99214 OFFICE O/P EST MOD 30 MIN: CPT | Mod: HCNC,S$GLB,, | Performed by: INTERNAL MEDICINE

## 2019-11-22 PROCEDURE — 99999 PR PBB SHADOW E&M-EST. PATIENT-LVL III: CPT | Mod: PBBFAC,HCNC,, | Performed by: INTERNAL MEDICINE

## 2019-11-22 PROCEDURE — 1101F PT FALLS ASSESS-DOCD LE1/YR: CPT | Mod: HCNC,CPTII,S$GLB, | Performed by: INTERNAL MEDICINE

## 2019-11-22 PROCEDURE — 3075F SYST BP GE 130 - 139MM HG: CPT | Mod: HCNC,CPTII,S$GLB, | Performed by: INTERNAL MEDICINE

## 2019-11-22 PROCEDURE — 99499 RISK ADDL DX/OHS AUDIT: ICD-10-PCS | Mod: HCNC,S$GLB,, | Performed by: INTERNAL MEDICINE

## 2019-11-22 PROCEDURE — 3078F PR MOST RECENT DIASTOLIC BLOOD PRESSURE < 80 MM HG: ICD-10-PCS | Mod: HCNC,CPTII,S$GLB, | Performed by: INTERNAL MEDICINE

## 2019-11-22 RX ORDER — MIRTAZAPINE 15 MG/1
15 TABLET, FILM COATED ORAL NIGHTLY
Qty: 30 TABLET | Refills: 11 | Status: SHIPPED | OUTPATIENT
Start: 2019-11-22 | End: 2020-11-21

## 2019-11-22 NOTE — PROGRESS NOTES
CHIEF COMPLAINT: Follow up of multiple issues      HISTORY OF PRESENT ILLNESS: This is a 79-year-old man who presents with this wife for follow up of above    He did not bring his medicine bottles with him today.      He takes one sleeping pill at night - should be trazodone. He also takes melatonin 5 mg 2 at bedtime. He takes xanax as needed - 2-3 times a week. Memory is not as good as it used to be.     He denies any falls recently. He walks with the walker sometimes.     He has difficulty sleeping.  He states he does not sleep very much. No anxiety or depression now. He is sleeping during the day and not at night.      He now has a wounds on both of his lower legs - home health comes twice a week to help with the wounds.           No chest pain or palpitations. He continues to take bumex 1 mg once daily, pradaxa 150 mg twice daily, spironolactone 25 mg 1/2 tablet daily, metoprolol succinate 200 mg once daily, and Entresto 97/103 twice daily for his hypertension and CHF. He is also on amiodarone 200 mg twice daily for his atrial flutter. Aspirin 81 mg daily.       His back is doing well. He is doing stretching at home which helps.  HE takes one tizanidine in the evening.          He currently takes Lantus at night - approximately 19-40 units in the evening and 0-28 units in the monring.  He generally takes 40 units at night.  He denies any polydipsia, polyuria, hypoglycemia. HE checks his blood sugars 2-3 times a day.       He has hyperlipidemia, currently off Lipitor 20 mg daily      His liver enzymes have been mildly elevated. Dr Palm stopped fenofibrate due to the elevated liver enzymes. He had a liver biopsy and the elevated liver enzymes are NOT due to amiodarone. No further work up is needed at this time.       Periperhal neuropathy controlled with gabapentin 300 mg 2 tablets 2 times daily. Shooting pain has resolved with increasing dose of gabapentin.     He is taking levothyroxine 75 mcg daily for his  "hypothyroidism           PAST MEDICAL HISTORY:    1. Hypertension, coronary artery disease, status post stenting.    2. Ischemic cardiomyopathy.    3. Atrial flutter.    4. Diabetes mellitus.    5. ICD placed 02/22/2012.    6. Hyperlipidemia.    7. Osteoarthritis of the knees.  8. Atrial fibrillation  9. Transaminitis      PAST SURGICAL HISTORY: Appendectomy in 2003.        SOCIAL HISTORY: He is a former smoker, quit in 1987. Does not drink alcohol.    .       PHYSICAL EXAMINATION:    /70 (BP Location: Left arm, Patient Position: Sitting, BP Method: Large (Manual))   Pulse (!) 59   Temp 98.3 °F (36.8 °C) (Oral)   Ht 5' 10" (1.778 m)   Wt 91.3 kg (201 lb 4.5 oz)   SpO2 99%   BMI 28.88 kg/m²      GENERAL: He is alert, oriented, no apparent distress. Affect within normal limits.    . Oropharynx is clear.    NECK: Supple.   RESPIRATORY: Effort normal. Lungs are clear to auscultation.    HEART: Regular rate and rhythm without murmurs, gallops or rubs. No lower extremity edema. .  Wound on on lower extremitiy without erythema or warmth.         ASSESSMENT AND PLAN:.   1. CHF - stable  4. Coronary artery disease - stable - to follow up with Cardiology.    5. Atrial flutter - s/p cardioversion - stable    6. 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 Watch blood sugars.   7. History of anemia on last blood work, stable    8. Hyperlipidemia. At goal    9  Transaminitis -better, watch closely  10. Diabetic neuropathy -stable. gabapentin 300 mg 2 capsules three times daily    11. Anxiety and depression - stable  12. Aortic arch atherosclerosis - modifying risk factors  13. Insomnia - remeron 15 mg at bedtime.   14. Hypothyroidism - on supplement  15.   I will see him back in 3 weeks, sooner if problems arise.   "

## 2019-11-29 DIAGNOSIS — I50.42 CHRONIC COMBINED SYSTOLIC AND DIASTOLIC HEART FAILURE: ICD-10-CM

## 2019-11-30 RX ORDER — BUMETANIDE 1 MG/1
1 TABLET ORAL 2 TIMES DAILY
Qty: 180 TABLET | Refills: 3 | Status: SHIPPED | OUTPATIENT
Start: 2019-11-30

## 2019-12-15 PROCEDURE — G0179 MD RECERTIFICATION HHA PT: HCPCS | Mod: ,,, | Performed by: INTERNAL MEDICINE

## 2019-12-15 PROCEDURE — G0179 PR HOME HEALTH MD RECERTIFICATION: ICD-10-PCS | Mod: ,,, | Performed by: INTERNAL MEDICINE

## 2019-12-17 ENCOUNTER — PES CALL (OUTPATIENT)
Dept: ADMINISTRATIVE | Facility: CLINIC | Age: 79
End: 2019-12-17

## 2019-12-23 DIAGNOSIS — E13.9 DIABETES MELLITUS DUE TO ABNORMAL INSULIN: ICD-10-CM

## 2019-12-23 RX ORDER — PEN NEEDLE, DIABETIC 29 G X1/2"
NEEDLE, DISPOSABLE MISCELLANEOUS
Qty: 200 EACH | Refills: 3 | Status: SHIPPED | OUTPATIENT
Start: 2019-12-23

## 2019-12-23 NOTE — PROGRESS NOTES
Refill Authorization Note     is requesting a refill authorization.    Brief assessment and rationale for refill: APPROVE: prr           Medication Therapy Plan: approve 12 more     Medication reconciliation completed: No                         Comments:   Last A1C: 6 months   Lab Results   Component Value Date    HGBA1C 8.0 (H) 06/13/2019    HGBA1C 8.3 (H) 03/27/2019    HGBA1C 8.3 (H) 01/28/2019    No results found for: LABA1C     Last Creatinine: 12 months   Lab Results   Component Value Date    CREATININE 0.8 08/14/2019    CREATININE 0.9 07/31/2019    CREATININE 0.7 07/22/2019         Digital Medicine Diabetes Data: values will display if enrolled   Last 6 Patient Entered Readings                                          Most Recent A1c:      There is no flowsheet data to display.        - History of MI               Appointments     Date Provider   Last Visit   11/22/2019 Mirna Reyes MD   Next Visit   1/14/2020 Mirna Reyes MD

## 2019-12-23 NOTE — TELEPHONE ENCOUNTER
"----- Message from Nikki Campuzano sent at 12/23/2019 11:06 AM CST -----  Contact: [phone]  Type: Rx    Name of medication(s): pen needle, diabetic 31 gauge x 1/4" Ndle    Is this a refill? New rx? refill    Who prescribed medication? Dr. Reyes    Pharmacy Name, Phone, & Location: [facility] - [facility] [address]     Comments:  Please advise, thank you.    "

## 2019-12-30 ENCOUNTER — CLINICAL SUPPORT (OUTPATIENT)
Dept: CARDIOLOGY | Facility: HOSPITAL | Age: 79
End: 2019-12-30
Attending: INTERNAL MEDICINE
Payer: MEDICARE

## 2019-12-30 DIAGNOSIS — Z95.810 ICD (IMPLANTABLE CARDIOVERTER-DEFIBRILLATOR) IN PLACE: ICD-10-CM

## 2019-12-30 DIAGNOSIS — I25.5 ISCHEMIC CARDIOMYOPATHY: ICD-10-CM

## 2019-12-30 PROCEDURE — 93295 DEV INTERROG REMOTE 1/2/MLT: CPT | Mod: ,,, | Performed by: INTERNAL MEDICINE

## 2019-12-30 PROCEDURE — 93296 REM INTERROG EVL PM/IDS: CPT | Mod: HCNC | Performed by: INTERNAL MEDICINE

## 2019-12-30 PROCEDURE — 93295 CARDIAC DEVICE CHECK - REMOTE: ICD-10-PCS | Mod: ,,, | Performed by: INTERNAL MEDICINE

## 2019-12-31 ENCOUNTER — EXTERNAL HOME HEALTH (OUTPATIENT)
Dept: HOME HEALTH SERVICES | Facility: HOSPITAL | Age: 79
End: 2019-12-31
Payer: MEDICARE

## 2019-12-31 ENCOUNTER — TELEPHONE (OUTPATIENT)
Dept: INTERNAL MEDICINE | Facility: CLINIC | Age: 79
End: 2019-12-31

## 2019-12-31 NOTE — TELEPHONE ENCOUNTER
----- Message from Anna Marie Xiong sent at 12/31/2019 12:03 PM CST -----  Contact: [facility] Yonas [phone]  Yonas is requesting a call from the office in regards to medications. Yonas has a few concerns.  Please advise, thanks

## 2019-12-31 NOTE — TELEPHONE ENCOUNTER
Yonas states that pt is not taking his medications correctly. He is not taking his lasix at all, is taking all his pain meds and sleeping meds together along with melatonin, taking old pills and is taking a vitamin therapy that no one knows about. Pt's wife states that he won't let her help him and sometimes he wanders out the door at night. Yonas is concerned for the pt's safety and that he may fall when wandering out the door at night. Please advise

## 2020-01-02 ENCOUNTER — TELEPHONE (OUTPATIENT)
Dept: INTERNAL MEDICINE | Facility: CLINIC | Age: 80
End: 2020-01-02

## 2020-01-02 DIAGNOSIS — E13.9 DIABETES MELLITUS DUE TO ABNORMAL INSULIN: Primary | ICD-10-CM

## 2020-01-02 NOTE — TELEPHONE ENCOUNTER
----- Message from Esthela Greco MA sent at 1/2/2020 10:11 AM CST -----  Contact: [facility] [phone]      ----- Message -----  From: Anna Marie Xiong  Sent: 1/2/2020  10:09 AM CST  To: Eric PETERSON Staff    Hugo is calling to inform MD of patients blood pressure. Patients blood pressure is elevated over 600. Nurse is in patients home.  Please advise, thanks

## 2020-01-02 NOTE — TELEPHONE ENCOUNTER
"Spoke to  nurse, Yonas, reported BG this a.m. Read "HI", Glucometer limit is 600 mg/dL. Per Nurse pt. Took an additional 16 units of Lantus. 40 units ordered q p.m. Per nurse, pt non-compliant with medications, taking meds that have been d/c'd. Unable to reconcile meds, pt requesting to be discharged from home health, feels they are trying to kill him.   Phoned pt, ate 2 jelly donuts, denies increased thirst, urination, HA or tiredness. Stated he's "fine". Per pt on S.S. Not see on current med list. Pt noted he does this all time, eats something he's not supposed to have and takes extra insulin. Reported checked BG after additional Lantus given, BS now 85-90.   "

## 2020-01-03 ENCOUNTER — TELEPHONE (OUTPATIENT)
Dept: INTERNAL MEDICINE | Facility: CLINIC | Age: 80
End: 2020-01-03

## 2020-01-03 NOTE — TELEPHONE ENCOUNTER
----- Message from Jocy Issa sent at 1/3/2020  8:06 AM CST -----  Contact: self/ [phone]  Patient would like another home health agency, please call to advise.

## 2020-01-03 NOTE — TELEPHONE ENCOUNTER
According to pt wife, pt put the home health nurse out. Pt is taking his medication wrong, the nurse wanted to correct the problem however the pt started screaming at nurse and told nurse to get out. FYI. Pt wife states that the pt would like to use another company.

## 2020-01-07 ENCOUNTER — TELEPHONE (OUTPATIENT)
Dept: HOME HEALTH SERVICES | Facility: HOSPITAL | Age: 80
End: 2020-01-07

## 2020-01-07 ENCOUNTER — TELEPHONE (OUTPATIENT)
Dept: INTERNAL MEDICINE | Facility: CLINIC | Age: 80
End: 2020-01-07

## 2020-01-07 NOTE — TELEPHONE ENCOUNTER
Please let him know that I need to see him first to discuss his behavior with home health nurse before ordering home health again

## 2020-01-07 NOTE — TELEPHONE ENCOUNTER
----- Message from Esthela Greco MA sent at 1/7/2020  3:08 PM CST -----  Contact: [phone]      ----- Message -----  From: Anna Marie Xiong  Sent: 1/7/2020  10:51 AM CST  To: Eric PETERSON Staff    Patient would like to get medical advice.  Symptoms (please be specific):  Blisters on his legs   How long has patient had these symptoms:    Pharmacy name and phone # (copy/paste from chart):    Would the patient rather a call back or a response via [facility]?:  Call back  Comments:  Wants to speak to someone in the office. Please advise, thanks

## 2020-01-07 NOTE — TELEPHONE ENCOUNTER
Spoke with pt, he called stating that he needs a new HH company. he says he has blisters on his feet and legs that need attention.  [facility] is not coming back out to see pt since him and nurse had a problem. Please advise     (I did see previous note but pt insist I send another request for HH so orders can be placed)
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
## 2020-01-13 ENCOUNTER — TELEPHONE (OUTPATIENT)
Dept: INTERNAL MEDICINE | Facility: CLINIC | Age: 80
End: 2020-01-13

## 2020-01-14 ENCOUNTER — OFFICE VISIT (OUTPATIENT)
Dept: INTERNAL MEDICINE | Facility: CLINIC | Age: 80
End: 2020-01-14
Payer: MEDICARE

## 2020-01-14 VITALS
WEIGHT: 196.19 LBS | HEIGHT: 70 IN | BODY MASS INDEX: 28.09 KG/M2 | TEMPERATURE: 98 F | SYSTOLIC BLOOD PRESSURE: 80 MMHG | SYSTOLIC BLOOD PRESSURE: 100 MMHG | HEART RATE: 60 BPM | BODY MASS INDEX: 28.09 KG/M2 | DIASTOLIC BLOOD PRESSURE: 60 MMHG | HEIGHT: 70 IN | HEART RATE: 60 BPM | WEIGHT: 196.19 LBS | OXYGEN SATURATION: 97 % | DIASTOLIC BLOOD PRESSURE: 50 MMHG

## 2020-01-14 DIAGNOSIS — I87.2 VENOUS INSUFFICIENCY OF BOTH LOWER EXTREMITIES: ICD-10-CM

## 2020-01-14 DIAGNOSIS — E11.21 TYPE 2 DIABETES MELLITUS WITH DIABETIC NEPHROPATHY, WITH LONG-TERM CURRENT USE OF INSULIN: ICD-10-CM

## 2020-01-14 DIAGNOSIS — R26.9 ABNORMALITY OF GAIT AND MOBILITY: ICD-10-CM

## 2020-01-14 DIAGNOSIS — E53.8 VITAMIN B12 DEFICIENCY: ICD-10-CM

## 2020-01-14 DIAGNOSIS — N18.30 STAGE 3 CHRONIC KIDNEY DISEASE: ICD-10-CM

## 2020-01-14 DIAGNOSIS — E11.42 POLYNEUROPATHY DUE TO TYPE 2 DIABETES MELLITUS: ICD-10-CM

## 2020-01-14 DIAGNOSIS — I48.92 ATRIAL FLUTTER, UNSPECIFIED TYPE: ICD-10-CM

## 2020-01-14 DIAGNOSIS — E08.42 DIABETES MELLITUS DUE TO UNDERLYING CONDITION WITH DIABETIC POLYNEUROPATHY, WITH LONG-TERM CURRENT USE OF INSULIN: ICD-10-CM

## 2020-01-14 DIAGNOSIS — Z79.4 TYPE 2 DIABETES MELLITUS WITH OTHER CIRCULATORY COMPLICATION, WITH LONG-TERM CURRENT USE OF INSULIN: ICD-10-CM

## 2020-01-14 DIAGNOSIS — E11.59 TYPE 2 DIABETES MELLITUS WITH OTHER CIRCULATORY COMPLICATION, WITH LONG-TERM CURRENT USE OF INSULIN: ICD-10-CM

## 2020-01-14 DIAGNOSIS — I70.0 ATHEROSCLEROSIS OF AORTIC ARCH: ICD-10-CM

## 2020-01-14 DIAGNOSIS — M17.0 PRIMARY OSTEOARTHRITIS OF BOTH KNEES: ICD-10-CM

## 2020-01-14 DIAGNOSIS — Z99.89 DEPENDENCE ON OTHER ENABLING MACHINES AND DEVICES: ICD-10-CM

## 2020-01-14 DIAGNOSIS — E66.9 OBESITY, UNSPECIFIED CLASSIFICATION, UNSPECIFIED OBESITY TYPE, UNSPECIFIED WHETHER SERIOUS COMORBIDITY PRESENT: ICD-10-CM

## 2020-01-14 DIAGNOSIS — I50.42 CHRONIC COMBINED SYSTOLIC AND DIASTOLIC HEART FAILURE: Primary | ICD-10-CM

## 2020-01-14 DIAGNOSIS — E78.00 PURE HYPERCHOLESTEROLEMIA: ICD-10-CM

## 2020-01-14 DIAGNOSIS — E11.51 TYPE 2 DIABETES MELLITUS WITH DIABETIC PERIPHERAL ANGIOPATHY WITHOUT GANGRENE, WITH LONG-TERM CURRENT USE OF INSULIN: ICD-10-CM

## 2020-01-14 DIAGNOSIS — F41.9 ANXIETY: ICD-10-CM

## 2020-01-14 DIAGNOSIS — E78.5 HYPERLIPIDEMIA, UNSPECIFIED HYPERLIPIDEMIA TYPE: ICD-10-CM

## 2020-01-14 DIAGNOSIS — Z95.810 ICD (IMPLANTABLE CARDIOVERTER-DEFIBRILLATOR), BIVENTRICULAR, IN SITU: ICD-10-CM

## 2020-01-14 DIAGNOSIS — I21.4 NSTEMI (NON-ST ELEVATED MYOCARDIAL INFARCTION): ICD-10-CM

## 2020-01-14 DIAGNOSIS — I27.9 CHRONIC PULMONARY HEART DISEASE: ICD-10-CM

## 2020-01-14 DIAGNOSIS — I25.10 CORONARY ARTERY DISEASE INVOLVING NATIVE CORONARY ARTERY OF NATIVE HEART WITHOUT ANGINA PECTORIS: ICD-10-CM

## 2020-01-14 DIAGNOSIS — E55.9 VITAMIN D DEFICIENCY DISEASE: ICD-10-CM

## 2020-01-14 DIAGNOSIS — Z79.899 LONG TERM CURRENT USE OF AMIODARONE: ICD-10-CM

## 2020-01-14 DIAGNOSIS — I48.91 ATRIAL FIBRILLATION, UNSPECIFIED TYPE: ICD-10-CM

## 2020-01-14 DIAGNOSIS — I73.9 PERIPHERAL VASCULAR DISEASE: ICD-10-CM

## 2020-01-14 DIAGNOSIS — I25.5 ISCHEMIC CARDIOMYOPATHY: ICD-10-CM

## 2020-01-14 DIAGNOSIS — Z79.4 DIABETES MELLITUS DUE TO UNDERLYING CONDITION WITH DIABETIC POLYNEUROPATHY, WITH LONG-TERM CURRENT USE OF INSULIN: ICD-10-CM

## 2020-01-14 DIAGNOSIS — I10 ESSENTIAL HYPERTENSION: ICD-10-CM

## 2020-01-14 DIAGNOSIS — Z91.199 NONCOMPLIANCE: ICD-10-CM

## 2020-01-14 DIAGNOSIS — D72.829 LEUKOCYTOSIS, UNSPECIFIED TYPE: ICD-10-CM

## 2020-01-14 DIAGNOSIS — I48.0 PAROXYSMAL ATRIAL FIBRILLATION: ICD-10-CM

## 2020-01-14 DIAGNOSIS — Z79.01 CURRENT USE OF LONG TERM ANTICOAGULATION: ICD-10-CM

## 2020-01-14 DIAGNOSIS — Z00.00 ENCOUNTER FOR PREVENTIVE HEALTH EXAMINATION: Primary | ICD-10-CM

## 2020-01-14 DIAGNOSIS — Z79.4 TYPE 2 DIABETES MELLITUS WITH DIABETIC NEPHROPATHY, WITH LONG-TERM CURRENT USE OF INSULIN: ICD-10-CM

## 2020-01-14 DIAGNOSIS — Z79.4 TYPE 2 DIABETES MELLITUS WITH DIABETIC PERIPHERAL ANGIOPATHY WITHOUT GANGRENE, WITH LONG-TERM CURRENT USE OF INSULIN: ICD-10-CM

## 2020-01-14 DIAGNOSIS — I50.42 CHRONIC COMBINED SYSTOLIC AND DIASTOLIC HEART FAILURE: ICD-10-CM

## 2020-01-14 PROBLEM — R06.02 SHORTNESS OF BREATH: Status: RESOLVED | Noted: 2019-05-22 | Resolved: 2020-01-14

## 2020-01-14 PROBLEM — E11.29 TYPE 2 DIABETES MELLITUS WITH RENAL MANIFESTATIONS: Status: ACTIVE | Noted: 2020-01-14

## 2020-01-14 PROBLEM — R09.3 ABNORMAL SPUTUM AMOUNT: Status: RESOLVED | Noted: 2018-11-07 | Resolved: 2020-01-14

## 2020-01-14 PROBLEM — R60.0 BILATERAL LEG EDEMA: Status: RESOLVED | Noted: 2019-03-27 | Resolved: 2020-01-14

## 2020-01-14 PROCEDURE — 99499 RISK ADDL DX/OHS AUDIT: ICD-10-PCS | Mod: S$GLB,,, | Performed by: INTERNAL MEDICINE

## 2020-01-14 PROCEDURE — 3074F SYST BP LT 130 MM HG: CPT | Mod: HCNC,CPTII,S$GLB, | Performed by: INTERNAL MEDICINE

## 2020-01-14 PROCEDURE — 99499 UNLISTED E&M SERVICE: CPT | Mod: S$GLB,,, | Performed by: INTERNAL MEDICINE

## 2020-01-14 PROCEDURE — 99999 PR PBB SHADOW E&M-EST. PATIENT-LVL III: CPT | Mod: PBBFAC,HCNC,, | Performed by: INTERNAL MEDICINE

## 2020-01-14 PROCEDURE — 1159F MED LIST DOCD IN RCRD: CPT | Mod: HCNC,S$GLB,, | Performed by: INTERNAL MEDICINE

## 2020-01-14 PROCEDURE — 99999 PR PBB SHADOW E&M-EST. PATIENT-LVL V: CPT | Mod: PBBFAC,HCNC,, | Performed by: NURSE PRACTITIONER

## 2020-01-14 PROCEDURE — 99499 UNLISTED E&M SERVICE: CPT | Mod: HCNC,S$GLB,, | Performed by: NURSE PRACTITIONER

## 2020-01-14 PROCEDURE — 99214 PR OFFICE/OUTPT VISIT, EST, LEVL IV, 30-39 MIN: ICD-10-PCS | Mod: HCNC,S$GLB,, | Performed by: INTERNAL MEDICINE

## 2020-01-14 PROCEDURE — 3078F PR MOST RECENT DIASTOLIC BLOOD PRESSURE < 80 MM HG: ICD-10-PCS | Mod: HCNC,CPTII,S$GLB, | Performed by: NURSE PRACTITIONER

## 2020-01-14 PROCEDURE — 1100F PR PT FALLS ASSESS DOC 2+ FALLS/FALL W/INJURY/YR: ICD-10-PCS | Mod: HCNC,CPTII,S$GLB, | Performed by: INTERNAL MEDICINE

## 2020-01-14 PROCEDURE — 99499 RISK ADDL DX/OHS AUDIT: ICD-10-PCS | Mod: HCNC,S$GLB,, | Performed by: NURSE PRACTITIONER

## 2020-01-14 PROCEDURE — 1100F PTFALLS ASSESS-DOCD GE2>/YR: CPT | Mod: HCNC,CPTII,S$GLB, | Performed by: INTERNAL MEDICINE

## 2020-01-14 PROCEDURE — 99999 PR PBB SHADOW E&M-EST. PATIENT-LVL III: ICD-10-PCS | Mod: PBBFAC,HCNC,, | Performed by: INTERNAL MEDICINE

## 2020-01-14 PROCEDURE — 3078F PR MOST RECENT DIASTOLIC BLOOD PRESSURE < 80 MM HG: ICD-10-PCS | Mod: HCNC,CPTII,S$GLB, | Performed by: INTERNAL MEDICINE

## 2020-01-14 PROCEDURE — 3078F DIAST BP <80 MM HG: CPT | Mod: HCNC,CPTII,S$GLB, | Performed by: INTERNAL MEDICINE

## 2020-01-14 PROCEDURE — 1126F PR PAIN SEVERITY QUANTIFIED, NO PAIN PRESENT: ICD-10-PCS | Mod: HCNC,S$GLB,, | Performed by: INTERNAL MEDICINE

## 2020-01-14 PROCEDURE — 1126F AMNT PAIN NOTED NONE PRSNT: CPT | Mod: HCNC,S$GLB,, | Performed by: INTERNAL MEDICINE

## 2020-01-14 PROCEDURE — 3074F SYST BP LT 130 MM HG: CPT | Mod: HCNC,CPTII,S$GLB, | Performed by: NURSE PRACTITIONER

## 2020-01-14 PROCEDURE — G0439 PPPS, SUBSEQ VISIT: HCPCS | Mod: HCNC,S$GLB,, | Performed by: NURSE PRACTITIONER

## 2020-01-14 PROCEDURE — 1159F PR MEDICATION LIST DOCUMENTED IN MEDICAL RECORD: ICD-10-PCS | Mod: HCNC,S$GLB,, | Performed by: INTERNAL MEDICINE

## 2020-01-14 PROCEDURE — 3078F DIAST BP <80 MM HG: CPT | Mod: HCNC,CPTII,S$GLB, | Performed by: NURSE PRACTITIONER

## 2020-01-14 PROCEDURE — 3074F PR MOST RECENT SYSTOLIC BLOOD PRESSURE < 130 MM HG: ICD-10-PCS | Mod: HCNC,CPTII,S$GLB, | Performed by: INTERNAL MEDICINE

## 2020-01-14 PROCEDURE — 3074F PR MOST RECENT SYSTOLIC BLOOD PRESSURE < 130 MM HG: ICD-10-PCS | Mod: HCNC,CPTII,S$GLB, | Performed by: NURSE PRACTITIONER

## 2020-01-14 PROCEDURE — G0439 PR MEDICARE ANNUAL WELLNESS SUBSEQUENT VISIT: ICD-10-PCS | Mod: HCNC,S$GLB,, | Performed by: NURSE PRACTITIONER

## 2020-01-14 PROCEDURE — 3288F PR FALLS RISK ASSESSMENT DOCUMENTED: ICD-10-PCS | Mod: HCNC,CPTII,S$GLB, | Performed by: INTERNAL MEDICINE

## 2020-01-14 PROCEDURE — 3288F FALL RISK ASSESSMENT DOCD: CPT | Mod: HCNC,CPTII,S$GLB, | Performed by: INTERNAL MEDICINE

## 2020-01-14 PROCEDURE — 99999 PR PBB SHADOW E&M-EST. PATIENT-LVL V: ICD-10-PCS | Mod: PBBFAC,HCNC,, | Performed by: NURSE PRACTITIONER

## 2020-01-14 PROCEDURE — 99214 OFFICE O/P EST MOD 30 MIN: CPT | Mod: HCNC,S$GLB,, | Performed by: INTERNAL MEDICINE
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
## 2020-01-14 NOTE — Clinical Note
Mini-cog score 5/7 - 1/4 word recall, 4/4 clock. PHQ-2 score 2/4 - did not express significant depression during visit. Thank you so much for your call prior to the visit

## 2020-01-14 NOTE — PROGRESS NOTES
"Madhav Cortez presented for a  Medicare AWV and comprehensive Health Risk Assessment today. The following components were reviewed and updated:    · Medical history  · Family History  · Social history  · Allergies and Current Medications  · Health Risk Assessment  · Health Maintenance  · Care Team     ** See Completed Assessments for Annual Wellness Visit within the encounter summary.**       The following assessments were completed:  · Living Situation  · CAGE  · Depression Screening  · Timed Get Up and Go  · Whisper Test  · Cognitive Function Screening      ·   · Nutrition Screening  · ADL Screening  · PAQ Screening    Vitals:    01/14/20 1430   BP: 100/60   BP Location: Right arm   Pulse: 60   Weight: 89 kg (196 lb 3.4 oz)   Height: 5' 10" (1.778 m)     Body mass index is 28.15 kg/m².  Physical Exam   Constitutional: He is oriented to person, place, and time. He appears well-developed and well-nourished.   Musculoskeletal:   In wheelchair today   Neurological: He is alert and oriented to person, place, and time.   Skin: Skin is warm and dry.   Psychiatric: He has a normal mood and affect.   Nursing note and vitals reviewed.        Diagnoses and health risks identified today and associated recommendations/orders:    1. Encounter for preventive health examination  Here for Health Risk Assessment/Annual Wellness Visit.  Health maintenance reviewed and updated. Follow up in one year.  Declined immunizations - pneumonia, TDAP, shingrix.  Initially agreed to Optometry appointment - but declined to schedule.    2. Essential hypertension  Chronic, stable on current medications. Followed by PCP.    3. Atherosclerosis of aortic arch  Chronic, stable on current medications. Nooted CXR 6/12/19. Followed by PCP.    4. Atrial flutter, unspecified type  Chronic, stable on current medications. Followed by PCP, Cardiology.    5. Atrial fibrillation, unspecified type  Chronic, stable on current medications. Followed by PCP, " Cardiology.    6. Current use of long term anticoagulation  Chronic dabigatran. Followed by PCP, Cardiology.    7. Long term current use of amiodarone  Chronic, stable. Followed by PCP, Cardiology.    8. Coronary artery disease involving native coronary artery of native heart without angina pectoris  Chronic, stable on current medications. Followed by PCP, Cardiology.    9. NSTEMI (non-ST elevated myocardial infarction)  Stable on current medications. Followed by PCP, Cardiology.    10. Chronic combined systolic and diastolic heart failure  Chronic, stable on current medications. Followed by PCP, Cardiology.    11. Ischemic cardiomyopathy  Chronic, stable on current medications. Followed by PCP, Cardiology.    12. ICD (implantable cardioverter-defibrillator), biventricular, in situ  Chronic, stable. Followed by Cardiology.    13. Chronic pulmonary heart disease  Chronic, stable on current medications. Estimated PA Systolic pressure 44 noted on ECHO 5/23/19. Followed by PCP, Cardiology.    14. Peripheral vascular disease  Chronic, stable on current medications. Followed by PCP, Cardiology.    15. Venous insufficiency of both lower extremities  Chronic, stable on current medications. Followed by PCP, Cardiology.    16. Hyperlipidemia, unspecified hyperlipidemia type  Chronic, stable on current medications. Followed by PCP, Cardiology.    17. Type 2 diabetes mellitus with diabetic nephropathy, with long-term current use of insulin  Chronic, stable on current medications. Last A1c 8.0. Followed by PCP.    18. Stage 3 chronic kidney disease  Chronic, stable. Followed by PCP.    19. Diabetes mellitus due to underlying condition with diabetic polyneuropathy, with long-term current use of insulin  Chronic, stable on current medications. Last A1c 8.0. Followed by PCP.  - Ambulatory Referral to Optometry    20. Polyneuropathy due to type 2 diabetes mellitus  Chronic, stable on current medications. Last A1c 8.0. Followed by  PCP.    21. Type 2 diabetes mellitus with other circulatory complication, with long-term current use of insulin  Chronic, stable on current medications. Last A1c 8.0. Followed by PCP.    22. Dependence on other enabling machines and devices  Chronic, uses walker for ambulation, reports multiple falls with no significant injury. Followed by PCP.    23. Abnormality of gait and mobility  Chronic, uses walker for ambulation, reports multiple falls with no significant injury. Followed by PCP.    24. Anxiety  Chronic, stable on current medications. PHQ-2 score 2.Followed by PCP.    25. Primary osteoarthritis of both knees  Chronic, stable on current medications. Followed by PCP.    26. Leukocytosis, unspecified type  Chronic, stable. Followed by PCP.      Provided Madhav with a 5-10 year written screening schedule and personal prevention plan. Recommendations were developed using the USPSTF age appropriate recommendations. Education, counseling, and referrals were provided as needed. After Visit Summary printed and given to patient which includes a list of additional screenings\tests needed.    Follow up in about 4 weeks (around 2/11/2020).with PCP    Una Grijalva NP

## 2020-01-14 NOTE — PATIENT INSTRUCTIONS
Counseling and Referral of Other Preventative  (Italic type indicates deductible and co-insurance are waived)    Patient Name: Madhav Cortez  Today's Date: 1/14/2020    Health Maintenance       Date Due Completion Date    Lipid Panel 04/12/2019 4/12/2018    Eye Exam 04/17/2019 4/17/2018    Hemoglobin A1c 09/13/2019 6/13/2019    TETANUS VACCINE 01/14/2021 (Originally 6/4/1958) Consider obtaining    Pneumococcal Vaccine (65+ High/Highest Risk) (2 of 2 - PPSV23) 01/14/2021 (Originally 2/5/2018) 12/11/2017    Shingles Vaccine (1 of 2) 01/14/2021 (Originally 6/4/1988) Consider obtaining    Foot Exam 07/15/2020 7/15/2019        Orders Placed This Encounter   Procedures    Ambulatory Referral to Optometry     The following information is provided to all patients.  This information is to help you find resources for any of the problems found today that may be affecting your health:                Living healthy guide: www.[facility].[place].[facility]      Understanding Diabetes: www.diabetes.org      Eating healthy: www.cdc.gov/healthyweight      CDC home safety checklist: www.cdc.gov/steadi/patient.html      Agency on Aging: www.goea.[place].[facility]      Alcoholics anonymous (AA): www.aa.org      Physical Activity: www.mojgan.nih.gov/uu1mhzs      Tobacco use: www.quitwithusla.org

## 2020-01-15 ENCOUNTER — OUTPATIENT CASE MANAGEMENT (OUTPATIENT)
Dept: ADMINISTRATIVE | Facility: OTHER | Age: 80
End: 2020-01-15

## 2020-01-15 ENCOUNTER — TELEPHONE (OUTPATIENT)
Dept: INTERNAL MEDICINE | Facility: CLINIC | Age: 80
End: 2020-01-15

## 2020-01-15 NOTE — TELEPHONE ENCOUNTER
pts daughter called wanting to know PCP opinion on where pt should live. Should he go to a Assisted living or nursing home. What level of care is needed for pt. She would lie to speak with Dr. ESTES is possible

## 2020-01-15 NOTE — TELEPHONE ENCOUNTER
----- Message from Trinity Swenson RN sent at 1/15/2020  2:24 PM CST -----  Contact: [phone]/ Ms. Lilly/ daughter      ----- Message -----  From: Nikki Campuzano  Sent: 1/15/2020  12:48 PM CST  To: Eric PETERSON Staff    Patient's daughter is requesting a call back from the nurse regarding questions she has.    Please advise, thank you.

## 2020-01-16 ENCOUNTER — TELEPHONE (OUTPATIENT)
Dept: PODIATRY | Facility: CLINIC | Age: 80
End: 2020-01-16

## 2020-01-16 ENCOUNTER — TELEPHONE (OUTPATIENT)
Dept: INTERNAL MEDICINE | Facility: CLINIC | Age: 80
End: 2020-01-16

## 2020-01-16 DIAGNOSIS — E13.9 DIABETES MELLITUS DUE TO ABNORMAL INSULIN: Primary | ICD-10-CM

## 2020-01-16 NOTE — TELEPHONE ENCOUNTER
Pt would also like a podiatry referral for the calluses on his feet. Once podiatry appt is made, I'll reschedule his labs

## 2020-01-16 NOTE — PROGRESS NOTES
1/16/2020-Contacted patient in attempt to complete initial assessment. Discussed referral for OPCM, patient reported that he needs help with getting an appt with podiatry.  Offered to assist, patient in agreement. Patient also stated that he would like to get rescheduled for his lab test that he was unable to complete yesterday. Will message PCP with patient request. Patient reported that he was just waking up and would like to complete the assessment at a later time. Will call patient at later time as requested.

## 2020-01-16 NOTE — TELEPHONE ENCOUNTER
----- Message from Astrid Douglas sent at 1/16/2020 11:02 AM CST -----  Contact: Waldemar/[facility] case mgmt [phone]  Please call pt at [phone]    Patient is requesting an immediate diabetic appt     Thank you

## 2020-01-16 NOTE — TELEPHONE ENCOUNTER
----- Message from Waldemar Romo RN sent at 1/16/2020 11:27 AM CST -----  Contact: Waldemar Romo RN Outpatient Case Management  Good Morning Dr. Reyes,      This morning I spoke with Mr. Cortez. He wanted me to get a message to you, which includes an apology for his behavior yesterday in the clinic. He would like you to know that he has agreed to go to an assisted living facility and is currently researching his options. He would like to know if you can contact him to reschedule his appt for his labs that are needed.   If you can contact him for that, he would be very appreciative.       I will be calling him back later today to complete his assessment for Outpatient Case Management and have already notified our LMSW that the patient would like some assistance with placement. If you need any additional assistance, please do not hesitate to contact me.    Thank you!  Waldemar Romo RN   [id number]

## 2020-01-16 NOTE — TELEPHONE ENCOUNTER
----- Message from Lucien Carbajal sent at 1/16/2020 11:50 AM CST -----  Contact: Self [phone]  Patient would like to get a referral.  Referral to what specialty:  Podiatry  Does the patient want the referral with a specific physician:  yes  Is the specialist an [facility]:  [facility]  Reason (be specific):    Does the patient already have the specialty clinic appointment scheduled:  no  If yes, what date is the appointment scheduled:     Is the insurance listed in Epic correct? (this is important for a referral):  yes  Comments:      Patient would like to get a referral.  Referral to what specialty:  Ophthalmology  Does the patient want the referral with a specific physician:  yes  Is the [facility]:  [facility]  Reason (be specific):  Annual check up  Does the patient already have the specialty clinic appointment scheduled:  no  If yes, what date is the appointment scheduled:     Is the insurance listed in Epic correct? (this is important for a referral):  yes  Comments:  Patient will like to speak to nurse in regards to addition information he forgot to mention about Assisted living, please advise.

## 2020-01-16 NOTE — TELEPHONE ENCOUNTER
Please let daughter know    Family is to look at both assisted living and nursing homes.  It is going to depend on how much assistance the assisted livnig facillity will give. He will need to have his medications administered to him as he is likely not taking his medications correctly.  He is going to need assistance with bathing.

## 2020-01-20 ENCOUNTER — PATIENT MESSAGE (OUTPATIENT)
Dept: ADMINISTRATIVE | Facility: OTHER | Age: 80
End: 2020-01-20

## 2020-01-20 ENCOUNTER — TELEPHONE (OUTPATIENT)
Dept: INTERNAL MEDICINE | Facility: CLINIC | Age: 80
End: 2020-01-20

## 2020-01-20 NOTE — TELEPHONE ENCOUNTER
Pt's wife came into the office and was advised that once paperwork was ready, that she would be given a call

## 2020-01-20 NOTE — TELEPHONE ENCOUNTER
----- Message from Waldemar Romo RN sent at 1/20/2020  2:08 PM CST -----  Contact: Waldemar Romo RN Outpatient Case Management  Good afternoon Dr. Reyes,    I just wanted to follow up with you on the referral for Outpatient Case Management for Mr. Cortez. I did try to reach this patient multiple times and have been unsuccessful.  I also tried to call his spouse and could not reach her. I will be mailing the patient my contact information along with a letter requesting a call back so I can assist with his needs as I know they are looking for placement for him.  Please reach out to me if you need additional assistance.       Thank you!  Waldemar Romo RN  [phone]

## 2020-01-20 NOTE — PROGRESS NOTES
1/20/2020--3rd Attempt to complete initial assessment for Outpatient Care Management; left message requesting a return call. Will mailed a letter requesting a return call from patient. Message also sent via patient portal requesting a return call.   Waldemar Romo RN  Outpatient Care Management

## 2020-01-20 NOTE — TELEPHONE ENCOUNTER
----- Message from Anna Marie Xiong sent at 1/20/2020 12:37 PM CST -----  Contact: Joanne (wife) [phone]  Patients wife is calling to check the status of paper work for patient to get into a nursing home.   Please advise ,thanks

## 2020-01-21 ENCOUNTER — CLINICAL SUPPORT (OUTPATIENT)
Dept: URGENT CARE | Facility: CLINIC | Age: 80
End: 2020-01-21
Payer: MEDICARE

## 2020-01-21 ENCOUNTER — TELEPHONE (OUTPATIENT)
Dept: HOME HEALTH SERVICES | Facility: HOSPITAL | Age: 80
End: 2020-01-21

## 2020-01-21 DIAGNOSIS — Z23 IMMUNIZATION DUE: Primary | ICD-10-CM

## 2020-01-21 PROCEDURE — 86580 TB INTRADERMAL TEST: CPT | Mod: S$GLB,,, | Performed by: PHYSICIAN ASSISTANT

## 2020-01-21 PROCEDURE — 86580 POCT TB SKIN TEST: ICD-10-PCS | Mod: S$GLB,,, | Performed by: PHYSICIAN ASSISTANT

## 2020-01-23 LAB
TB INDURATION - 48 HR READ: 0 MM
TB INDURATION - 72 HR READ: 0 MM
TB SKIN TEST - 48 HR READ: NEGATIVE
TB SKIN TEST - 72 HR READ: NEGATIVE

## 2020-01-28 DIAGNOSIS — I25.5 CARDIOMYOPATHY, ISCHEMIC: ICD-10-CM

## 2020-01-28 DIAGNOSIS — I10 ESSENTIAL HYPERTENSION: ICD-10-CM

## 2020-01-28 DIAGNOSIS — I25.10 CORONARY ARTERY DISEASE INVOLVING NATIVE CORONARY ARTERY OF NATIVE HEART WITHOUT ANGINA PECTORIS: ICD-10-CM

## 2020-01-28 DIAGNOSIS — R74.01 TRANSAMINITIS: ICD-10-CM

## 2020-01-28 RX ORDER — SACUBITRIL AND VALSARTAN 97; 103 MG/1; MG/1
TABLET, FILM COATED ORAL
Qty: 180 TABLET | Refills: 1 | Status: SHIPPED | OUTPATIENT
Start: 2020-01-28

## 2020-01-28 NOTE — PROGRESS NOTES
Refill Routing Note     Medication(s) are not appropriate for processing by [facility]:    Medication Outside of Protocol    Appointments  past 12m or future 3m with PCP    Date Provider   Last Visit   1/14/2020 Mirna Reyes MD   Next Visit   Visit date not found Mirna Reyes MD           Automatic Epic Protocol Generated Data:    Requested Prescriptions   Pending Prescriptions Disp Refills    amiodarone (PACERONE) 200 MG Tab [Pharmacy Med Name: AMIODARONE  MG Tablet] 180 tablet 3     Sig: TAKE 1 TABLET TWICE DAILY       There is no refill protocol information for this order       temazepam (RESTORIL) 15 mg Cap [Pharmacy Med Name: TEMAZEPAM 15 MG Capsule] 30 capsule      Sig: TAKE 1 CAPSULE EVERY EVENING       [facility] Refill Protocol Passed - 1/28/2020  3:52 PM        Passed - Patient seen within 3 months     Last visit with Mirna Reyes MD: 1/14/2020  Last visit in [facility]: Visit date not found    Patient's next visit in [facility]: Visit date not found           Passed - Med not refilled within 4 weeks

## 2020-01-28 NOTE — TELEPHONE ENCOUNTER
----- Message from Esther Olmos sent at 1/28/2020 11:29 AM CST -----  Contact: Patient [phone]  Stated that they moved and his spouse accidentally threw his anxiety Rx in the trash.    Please call and advise.    Thank You

## 2020-01-29 RX ORDER — AMIODARONE HYDROCHLORIDE 200 MG/1
TABLET ORAL
Qty: 180 TABLET | Refills: 3 | Status: SHIPPED | OUTPATIENT
Start: 2020-01-29 | End: 2020-02-05

## 2020-01-29 RX ORDER — TEMAZEPAM 15 MG/1
CAPSULE ORAL
Qty: 30 CAPSULE | Refills: 3 | OUTPATIENT
Start: 2020-01-29

## 2020-01-30 ENCOUNTER — TELEPHONE (OUTPATIENT)
Dept: INTERNAL MEDICINE | Facility: CLINIC | Age: 80
End: 2020-01-30

## 2020-01-30 NOTE — TELEPHONE ENCOUNTER
----- Message from Anna Marie Xiong sent at 1/30/2020  9:59 AM CST -----  Contact: [facility] [phone]  [facility] is calling to request orders for PT and nursing for patient.  Fax# [phone]  Please advise, thanks

## 2020-01-31 ENCOUNTER — TELEPHONE (OUTPATIENT)
Dept: HOME HEALTH SERVICES | Facility: HOSPITAL | Age: 80
End: 2020-01-31

## 2020-02-02 PROCEDURE — G0180 PR HOME HEALTH MD CERTIFICATION: ICD-10-PCS | Mod: ,,, | Performed by: INTERNAL MEDICINE

## 2020-02-02 PROCEDURE — G0180 MD CERTIFICATION HHA PATIENT: HCPCS | Mod: ,,, | Performed by: INTERNAL MEDICINE

## 2020-02-03 ENCOUNTER — TELEPHONE (OUTPATIENT)
Dept: INTERNAL MEDICINE | Facility: CLINIC | Age: 80
End: 2020-02-03

## 2020-02-03 NOTE — TELEPHONE ENCOUNTER
----- Message from Sarah Monique sent at 2/3/2020 10:54 AM CST -----  Contact: Lurdes with [facility]   Lurdes call in regards to the patient not wanting [facility] care no need for PT as well please advise

## 2020-02-05 RX ORDER — AMIODARONE HYDROCHLORIDE 200 MG/1
TABLET ORAL
Qty: 180 TABLET | Refills: 0 | Status: SHIPPED | OUTPATIENT
Start: 2020-02-05 | End: 2020-05-06 | Stop reason: SDUPTHER

## 2020-02-05 NOTE — PROGRESS NOTES
Refill Routing Note     Medication(s) are appropriate for refill:    Medication Outside of Protocol    Appointments  past 15m or future 3m with PCP    Date Provider   Last Visit   1/14/2020 Mirna Reyes MD   Next Visit   Visit date not found Mirna Reyes MD       Automatic Epic Protocol Generated Data:    Requested Prescriptions   Pending Prescriptions Disp Refills    amiodarone (PACERONE) 200 MG Tab [Pharmacy Med Name: AMIODARONE  MG Tablet] 180 tablet 3     Sig: TAKE 1 TABLET TWICE DAILY       There is no refill protocol information for this order           Note created:11:04 AM 02/05/2020

## 2020-02-06 ENCOUNTER — TELEPHONE (OUTPATIENT)
Dept: HOME HEALTH SERVICES | Facility: HOSPITAL | Age: 80
End: 2020-02-06

## 2020-02-12 ENCOUNTER — TELEPHONE (OUTPATIENT)
Dept: INTERNAL MEDICINE | Facility: CLINIC | Age: 80
End: 2020-02-12

## 2020-02-12 RX ORDER — AMIODARONE HYDROCHLORIDE 200 MG/1
200 TABLET ORAL 2 TIMES DAILY
Qty: 180 TABLET | Refills: 0 | Status: CANCELLED | OUTPATIENT
Start: 2020-02-12

## 2020-02-12 RX ORDER — TEMAZEPAM 15 MG/1
15 CAPSULE ORAL
Qty: 30 CAPSULE | Refills: 3 | Status: CANCELLED | OUTPATIENT
Start: 2020-02-12

## 2020-02-12 NOTE — PROGRESS NOTES
Refill Routing Note     Medication(s) are appropriate for refill:    Medication Outside of Protocol    Appointments  past 15m or future 3m with PCP    Date Provider   Last Visit   1/14/2020 Mirna Reyes MD   Next Visit   Visit date not found Mirna Reyes MD       Automatic Epic Protocol Generated Data:    Requested Prescriptions   Pending Prescriptions Disp Refills    amiodarone (PACERONE) 200 MG Tab 180 tablet 0     Sig: Take 1 tablet (200 mg total) by mouth 2 (two) times daily.       There is no refill protocol information for this order       temazepam (RESTORIL) 15 mg Cap 30 capsule 3     Sig: Take 1 capsule (15 mg total) by mouth as needed.       [facility] Refill Protocol Passed - 2/12/2020  1:02 PM        Passed - Patient seen within 3 months     Last visit with Mirna Reyes MD: 1/14/2020  Last visit in [facility]: 1/14/2020    Patient's next visit in [facility]: Visit date not found           Passed - Med not refilled within 4 weeks           Note created:3:50 PM 02/12/2020

## 2020-02-12 NOTE — TELEPHONE ENCOUNTER
----- Message from Anna Marie Xiong sent at 2/12/2020 12:42 PM CST -----  Contact: [phone]  Type: Rx    Name of medication(s): temazepam (RESTORIL) 15 mg Cap    Is this a refill? New rx? Refill     Pharmacy Name, Phone, & Location:[facility] - [facility] [address]   [phone] (Phone)  [phone] (Fax)    Comments:please advise ,thanks     ------------------------    Type: Rx    Name of medication(s): amiodarone (PACERONE) 200 MG Tab    Is this a refill? New rx? Refill     Pharmacy Name, Phone, & Location:[facility] - [place], [place] - [address]   [phone] (Phone)  [phone] (Fax)      Comments:

## 2020-02-13 NOTE — TELEPHONE ENCOUNTER
PLease notify pt  Already refilled the amodarone on 2/5/20    Not refilling the temazepam - this medication can make him hallucinate and not think clearly and cause meomry problems.

## 2020-02-13 NOTE — TELEPHONE ENCOUNTER
Pt wants to know what he will take in place of medication that pcp will not prescribe. Pt states that he listened to pcp and went into a facility and now he can't get his medication. Pt would like advise.

## 2020-02-13 NOTE — TELEPHONE ENCOUNTER
----- Message from Ana Vanessajosiah sent at 2/13/2020  9:34 AM CST -----  Contact: Patient [phone]  Please call the patient letting him why he can't get this refilled. He is calling again about this    Type: Rx     Name of medication(s): temazepam (RESTORIL) 15 mg Cap     Is this a refill? New rx? Refill      Pharmacy Name, Phone, & Location:[facility] - [facility] [address]           [phone] (Phone)  [phone] (Fax)     Comments:please advise ,thanks      ------------------------     Type: Rx     Name of medication(s): amiodarone (PACERONE) 200 MG Tab     Is this a refill? New rx? Refill      Pharmacy Name, Phone, & Location:[facility] - [place], [place] - [address]           [phone] (Phone)  [phone] (Fax)

## 2020-02-17 ENCOUNTER — TELEPHONE (OUTPATIENT)
Dept: HOME HEALTH SERVICES | Facility: HOSPITAL | Age: 80
End: 2020-02-17

## 2020-02-26 ENCOUNTER — EXTERNAL HOME HEALTH (OUTPATIENT)
Dept: HOME HEALTH SERVICES | Facility: HOSPITAL | Age: 80
End: 2020-02-26
Payer: MEDICARE

## 2020-02-27 ENCOUNTER — TELEPHONE (OUTPATIENT)
Dept: ELECTROPHYSIOLOGY | Facility: CLINIC | Age: 80
End: 2020-02-27

## 2020-03-03 RX ORDER — TRAZODONE HYDROCHLORIDE 50 MG/1
50 TABLET ORAL NIGHTLY
Qty: 90 TABLET | Refills: 1 | Status: SHIPPED | OUTPATIENT
Start: 2020-03-03 | End: 2020-07-21 | Stop reason: SDUPTHER

## 2020-03-03 NOTE — PROGRESS NOTES
Refill Authorization Note     is requesting a refill authorization.    Brief assessment and rationale for refill: APPROVE: prr                                         Comments:     Requested Prescriptions   Signed Prescriptions Disp Refills    traZODone (DESYREL) 50 MG tablet 90 tablet 1     Sig: TAKE 1 TABLET (50 MG TOTAL) BY MOUTH EVERY EVENING.       Serotonin Modulators Passed - 3/2/2020  8:48 AM        Passed - No positive pregnancy test in past 12 months         Passed - Patient has appt with me in past 12 months or next 90 days         Appointments  past 12m or future 3m with PCP    Date Provider   Last Visit   1/14/2020 Mirna Reyes MD   Next Visit   3/3/2020 Mirna Reyes MD   .  ED visits in past 90 days: 0       Note composed:3:03 PM 03/03/2020

## 2020-03-04 ENCOUNTER — TELEPHONE (OUTPATIENT)
Dept: ELECTROPHYSIOLOGY | Facility: CLINIC | Age: 80
End: 2020-03-04

## 2020-03-04 DIAGNOSIS — I49.8 OTHER SPECIFIED CARDIAC ARRHYTHMIAS: Primary | ICD-10-CM

## 2020-03-04 NOTE — TELEPHONE ENCOUNTER
I have rescheduled pt appt with Zahra SHOOK because she will not be in , he is now scheduled with Yana Adan on the same date & is aware of appt time .

## 2020-03-10 ENCOUNTER — TELEPHONE (OUTPATIENT)
Dept: HOME HEALTH SERVICES | Facility: HOSPITAL | Age: 80
End: 2020-03-10

## 2020-03-11 ENCOUNTER — TELEPHONE (OUTPATIENT)
Dept: ELECTROPHYSIOLOGY | Facility: CLINIC | Age: 80
End: 2020-03-11

## 2020-03-11 NOTE — TELEPHONE ENCOUNTER
Pt appointment now rescheduled due to [employer] caution ----- Message from Flora Rivera sent at 3/11/2020 12:12 PM CDT -----  Contact: Pt  Pt requesting to speak someone in the office regarding his appt on 3/17/2020    Please call and advise    Phone [phone]

## 2020-03-17 ENCOUNTER — TELEPHONE (OUTPATIENT)
Dept: INTERNAL MEDICINE | Facility: CLINIC | Age: 80
End: 2020-03-17

## 2020-03-17 RX ORDER — BUSPIRONE HYDROCHLORIDE 7.5 MG/1
7.5 TABLET ORAL 3 TIMES DAILY
Qty: 90 TABLET | Refills: 11 | Status: SHIPPED | OUTPATIENT
Start: 2020-03-17 | End: 2021-03-17

## 2020-03-17 NOTE — TELEPHONE ENCOUNTER
Pt is having a lot of anxiety since the virus has started. Pt would like PCP to order a Rx to go to [facility] to help him with this issue. please advise.

## 2020-03-17 NOTE — TELEPHONE ENCOUNTER
----- Message from Herminio Elizabeth sent at 3/17/2020 11:23 AM CDT -----  Contact: Patient [phone]  RX request - refill or new RX.  Is this a refill or new RX:  New   RX name and strength: Anxiety Rx  Directions:   Is this a 30 day or 90 day RX:    Pharmacy name and phone #[facility] - [facility] [address] [facility] [phone] (Phone) [phone] (Fax)    Comments: patient stating is having anxiety and would like Rx sent to pharmacy above, call to inform.    Please call an advise  Thank you

## 2020-03-20 ENCOUNTER — TELEPHONE (OUTPATIENT)
Dept: INTERNAL MEDICINE | Facility: CLINIC | Age: 80
End: 2020-03-20

## 2020-03-20 NOTE — TELEPHONE ENCOUNTER
----- Message from Yoselin Elkins sent at 3/20/2020  2:58 PM CDT -----  Contact: Kristina with [employer]    Kristina states pt has a scab on his right knee about the size of a dime. Kristina states the pt slipped off of his rolator walker onto the floor. Kristina stated the pt  advised her his life alert pandant was not working. Kristina states the patient's vitals were fine.

## 2020-03-29 ENCOUNTER — CLINICAL SUPPORT (OUTPATIENT)
Dept: CARDIOLOGY | Facility: HOSPITAL | Age: 80
End: 2020-03-29
Attending: INTERNAL MEDICINE
Payer: MEDICARE

## 2020-03-29 DIAGNOSIS — Z95.810 ICD (IMPLANTABLE CARDIOVERTER-DEFIBRILLATOR) IN PLACE: ICD-10-CM

## 2020-03-29 DIAGNOSIS — I25.5 ISCHEMIC CARDIOMYOPATHY: ICD-10-CM

## 2020-03-29 PROCEDURE — 93295 CARDIAC DEVICE CHECK - REMOTE: ICD-10-PCS | Mod: ,,, | Performed by: INTERNAL MEDICINE

## 2020-03-29 PROCEDURE — 93296 REM INTERROG EVL PM/IDS: CPT | Mod: HCNC | Performed by: INTERNAL MEDICINE

## 2020-03-29 PROCEDURE — 93295 DEV INTERROG REMOTE 1/2/MLT: CPT | Mod: ,,, | Performed by: INTERNAL MEDICINE

## 2020-04-02 PROCEDURE — G0179 PR HOME HEALTH MD RECERTIFICATION: ICD-10-PCS | Mod: ,,, | Performed by: INTERNAL MEDICINE

## 2020-04-02 PROCEDURE — G0179 MD RECERTIFICATION HHA PT: HCPCS | Mod: ,,, | Performed by: INTERNAL MEDICINE

## 2020-04-03 ENCOUNTER — TELEPHONE (OUTPATIENT)
Dept: INTERNAL MEDICINE | Facility: CLINIC | Age: 80
End: 2020-04-03

## 2020-04-03 NOTE — PROGRESS NOTES
Refill Authorization Note     is requesting a refill authorization.    Brief assessment and rationale for refill: APPROVE: prr          Medication Therapy Plan: TSH elevated, T4 WNL; approve 3 more    Medication reconciliation completed: No                         Comments:   Refill Center Care Gap Closure protocols temporarily suspended.   Requested Prescriptions   Pending Prescriptions Disp Refills    levothyroxine (SYNTHROID) 75 MCG tablet [Pharmacy Med Name: LEVOTHYROXINE SODIUM 75 MCG Tablet] 90 tablet 0     Sig: Take 1 tablet (75 mcg total) by mouth before breakfast. Administer on an empty stomach at least 30 minutes before food. If receiving tube feeds, HOLD tube feeds for 1 hour before and after levothyroxine administration.       Endocrinology:  Hypothyroid Agents Failed - 3/31/2020  4:44 PM        Failed - Manual Review: FT4 is not required if last TSH is WNL.        Failed - TSH in normal range and within 360 days     TSH   Date Value Ref Range Status   06/14/2019 6.518 (H) 0.400 - 4.000 uIU/mL Final   01/28/2019 10.914 (H) 0.400 - 4.000 uIU/mL Final   09/18/2018 8.928 (H) 0.400 - 4.000 uIU/mL Final              Passed - Patient is at least 18 years old        Passed - Office visit in past 12 months or future 90 days.     Recent Outpatient Visits            2 months ago Encounter for preventive health examination    [facility] - Internal Medicine Una Grijalva NP    2 months ago Chronic combined systolic and diastolic heart failure    [facility] Medicine Mirna Reyes MD    4 months ago Pure hypercholesterolemia    [facility] - Internal Medicine Mirna Reyes MD    4 months ago Wound of left lower extremity, initial encounter    [facility] - Internal Medicine Jaqueline Conrad PA-C    6 months ago Paroxysmal atrial fibrillation    [facility] Mirna Reyes MD                    Passed - T4 free within 1080 days     Free T4   Date Value Ref Range  Status   06/14/2019 1.01 0.71 - 1.51 ng/dL Final   01/28/2019 0.83 0.71 - 1.51 ng/dL Final   09/18/2018 0.72 0.71 - 1.51 ng/dL Final               Appointments  past 12m or future 3m with PCP    Date Provider   Last Visit   1/14/2020 Mirna Reyes MD   Next Visit   No visit found Mirna Reyes MD   .  ED visits in past 90 days: 0       Note composed:12:13 PM 04/03/2020

## 2020-04-03 NOTE — TELEPHONE ENCOUNTER
Refill Authorization Note     is requesting a refill authorization.                                              Comments: ***

## 2020-04-03 NOTE — TELEPHONE ENCOUNTER
----- Message from Halle Heller sent at 4/3/2020 11:23 AM CDT -----  Contact: Kristina Benavides [facility] @ [phone]  Good Morning,  Patient is doing fine. Patients wounds have heal . Ms Acevedo with [facility] is calling to let us know unless a patient need to be seen they are doing calls to patient instead of coming to there home.    Thank you

## 2020-04-04 RX ORDER — LEVOTHYROXINE SODIUM 75 UG/1
75 TABLET ORAL
Qty: 90 TABLET | Refills: 0 | Status: SHIPPED | OUTPATIENT
Start: 2020-04-04

## 2020-04-18 ENCOUNTER — TELEPHONE (OUTPATIENT)
Dept: INTERNAL MEDICINE | Facility: CLINIC | Age: 80
End: 2020-04-18

## 2020-04-18 NOTE — TELEPHONE ENCOUNTER
Pt has no new sores, he was tested for the COVID, he has no results as of yet. Pt is doing fine, BP is fine as well.

## 2020-04-18 NOTE — TELEPHONE ENCOUNTER
----- Message from Lucien Carbajal sent at 4/17/2020  2:36 PM CDT -----  Contact: [facility] [phone]  Kristina states she will like call pt once a week, pt is doing fine no new wound/ B/P is doing fine, please advise

## 2020-04-20 ENCOUNTER — DOCUMENT SCAN (OUTPATIENT)
Dept: HOME HEALTH SERVICES | Facility: HOSPITAL | Age: 80
End: 2020-04-20
Payer: MEDICARE

## 2020-05-04 ENCOUNTER — DOCUMENT SCAN (OUTPATIENT)
Dept: HOME HEALTH SERVICES | Facility: HOSPITAL | Age: 80
End: 2020-05-04
Payer: MEDICARE

## 2020-05-06 RX ORDER — AMIODARONE HYDROCHLORIDE 200 MG/1
200 TABLET ORAL 2 TIMES DAILY
Qty: 180 TABLET | Refills: 4 | Status: SHIPPED | OUTPATIENT
Start: 2020-05-06

## 2020-05-06 RX ORDER — AMIODARONE HYDROCHLORIDE 200 MG/1
200 TABLET ORAL 2 TIMES DAILY
Qty: 180 TABLET | Refills: 4 | Status: SHIPPED | OUTPATIENT
Start: 2020-05-06 | End: 2020-05-06 | Stop reason: SDUPTHER

## 2020-05-07 ENCOUNTER — EXTERNAL HOME HEALTH (OUTPATIENT)
Dept: HOME HEALTH SERVICES | Facility: HOSPITAL | Age: 80
End: 2020-05-07
Payer: MEDICARE

## 2020-05-22 RX ORDER — NITROGLYCERIN 0.4 MG/1
0.4 TABLET SUBLINGUAL EVERY 5 MIN PRN
Qty: 30 TABLET | Refills: 3 | Status: SHIPPED | OUTPATIENT
Start: 2020-05-22 | End: 2021-05-22

## 2020-05-22 NOTE — TELEPHONE ENCOUNTER
----- Message from Thiago Rosenthal sent at 5/22/2020  3:50 PM CDT -----  Contact: Pt [phone]  Is this a refill or new RX:  Refill    RX name and strength: nitroGLYCERIN (NITROSTAT) 0.4 MG SL tablet      Is this a 30 day or 90 day RX:  90    Pharmacy name and phone # [facility] - [place], [place] - [address] [phone] (Phone) [phone] (Fax)

## 2020-06-27 ENCOUNTER — CLINICAL SUPPORT (OUTPATIENT)
Dept: CARDIOLOGY | Facility: HOSPITAL | Age: 80
End: 2020-06-27
Attending: INTERNAL MEDICINE
Payer: MEDICARE

## 2020-06-27 DIAGNOSIS — I25.5 ISCHEMIC CARDIOMYOPATHY: ICD-10-CM

## 2020-06-27 DIAGNOSIS — Z95.810 ICD (IMPLANTABLE CARDIOVERTER-DEFIBRILLATOR) IN PLACE: ICD-10-CM

## 2020-06-27 PROCEDURE — 93295 CARDIAC DEVICE CHECK - REMOTE: ICD-10-PCS | Mod: HCNC,,, | Performed by: INTERNAL MEDICINE

## 2020-06-27 PROCEDURE — 93296 REM INTERROG EVL PM/IDS: CPT | Mod: HCNC | Performed by: INTERNAL MEDICINE

## 2020-06-27 PROCEDURE — 93295 DEV INTERROG REMOTE 1/2/MLT: CPT | Mod: HCNC,,, | Performed by: INTERNAL MEDICINE

## 2020-06-28 ENCOUNTER — HOSPITAL ENCOUNTER (EMERGENCY)
Facility: HOSPITAL | Age: 80
Discharge: HOME | End: 2020-06-28
Attending: EMERGENCY MEDICINE
Payer: MEDICARE

## 2020-06-28 VITALS
RESPIRATION RATE: 18 BRPM | HEIGHT: 70 IN | HEART RATE: 62 BPM | SYSTOLIC BLOOD PRESSURE: 122 MMHG | DIASTOLIC BLOOD PRESSURE: 68 MMHG | BODY MASS INDEX: 28.63 KG/M2 | TEMPERATURE: 98 F | WEIGHT: 200 LBS | OXYGEN SATURATION: 98 %

## 2020-06-28 DIAGNOSIS — S20.229A CONTUSION OF BACK, UNSPECIFIED LATERALITY, INITIAL ENCOUNTER: ICD-10-CM
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
## 2020-06-28 PROCEDURE — 99283 EMERGENCY DEPT VISIT LOW MDM: CPT | Mod: 25,HCNC

## 2020-06-28 NOTE — ED NOTES
Pt very agitated and anxious to go home. Wife is on her way to take him back to the nursing home.

## 2020-06-28 NOTE — ED NOTES
Pt reports that he isn't hurting that bad and the paramedics asked him to come in to be checked out. Pt states that his back hurts when he moves. He also reports that he has white bumps on his face. He also complains that he sat on his glasses and would like new glasses. Pt reports that he lives at a Nursing Home. Pt reports that the fall happened this morning but EMS reports that fall happened yesterday.

## 2020-06-28 NOTE — ED NOTES
APPEARANCE: Alert, confused at times and in no acute distress.  CARDIAC: Normal rate and rhythm, no murmur heard.   PERIPHERAL VASCULAR: peripheral pulses present. Normal cap refill. No edema. Warm to touch.    RESPIRATORY:Normal rate and effort, breath sounds clear bilaterally throughout chest. Respirations are equal and unlabored no obvious signs of distress.  GASTRO: soft, bowel sounds normal, no tenderness, no abdominal distention.  MUSC: Limited ROM due to back pain and aging process. No bony tenderness or soft tissue tenderness. Pt complains of back pain when moving.  SKIN: Skin is warm and dry, normal skin turgor, mucous membranes moist.  NEURO: 5/5 strength major flexors/extensors bilaterally. Sensory intact to light touch bilaterally. Merle coma scale: eyes open spontaneously-4, oriented & converses-4, obeys commands-6. Pt reports that he fell this morning but EMS reports that he fell yesterday.   MENTAL STATUS: awake, alert and aware of environment.  EYE: PERRL, both eyes: pupils brisk and reactive to light. Normal size.  ENT: EARS: no obvious drainage. NOSE: no active bleeding.   Reports mid to lower back pain after a fall.

## 2020-06-28 NOTE — ED PROVIDER NOTES
Encounter Date: 6/28/2020    SCRIBE #1 NOTE: I, Gypsy Regan, am scribing for, and in the presence of,  Dr. Winters. I have scribed the entire note.       History     Chief Complaint   Patient presents with    Fall     80y M to ED via  EMS from home. c/o mid thoracic back pain since fall yesterday     Time seen by provider: 7:05 AM on 06/28/2020    The patient is a 80 y.o. male who presents to the ED with complaint of fall. Patient reports getting out of the shower where he slipped and fell landing on his back. Patient reports pain to the lower/middle area of his back. Patient notes pain is worse with movement. Patient denies hitting his head of LOC. Patient denies abdominal pain, leg pain, or pain to any other area at this time.     Patient  has a past medical history of *Atrial fibrillation, *Atrial flutter, Acute exacerbation of CHF (congestive heart failure) (8/2/2013), Anticoagulant long-term use, Anxiety, Arthritis, Atrial flutter, Back pain, Cardiomyopathy, Cataract, Coronary artery disease, Depression, Diabetes mellitus, Diabetes with neurologic complications, Heart bloc, Hepatitis B, Hyperlipidemia (4/1/2014), Hypertension, Myocardial infarction, Non-STEMI (non-ST elevated myocardial infarction) (5/26/2013), Nuclear sclerosis - Both Eyes (2/18/2013), Post PTCA (8/7/2012), Presence of biventricular AICD (2/23/2016), Renal manifestation of secondary diabetes mellitus, Stage 3 chronic kidney disease (12/11/2017), Tobacco dependence, Transaminitis (4/1/2014), Type 2 diabetes with peripheral circulatory disorder, controlled, and Ventricular tachycardia. Patient  has a past surgical history that includes Coronary angioplasty with stent; Appendectomy; Radiofrequency ablation; Fracture surgery; Cardiac defibrillator placement; Cardiac defibrillator placement; Tonsillectomy; Vascular surgery; Insert / replace / remove pacemaker (12/15); Colonoscopy; and Steroid injection knee (Bilateral).    The history is  "provided by the patient.     Review of patient's allergies indicates:  No Known Allergies  Past Medical History:   Diagnosis Date    *Atrial fibrillation     *Atrial flutter     Acute exacerbation of CHF (congestive heart failure) 8/2/2013    Anticoagulant long-term use     Anxiety     Arthritis     Atrial flutter     Back pain     Cardiomyopathy     Cataract     Coronary artery disease     Depression     Diabetes mellitus     Diabetes with neurologic complications     Heart bloc     Hepatitis B     Hyperlipidemia 4/1/2014    Hypertension     Myocardial infarction     Non-STEMI (non-ST elevated myocardial infarction) 5/26/2013    Nuclear sclerosis - Both Eyes 2/18/2013    Post PTCA 8/7/2012    Presence of biventricular AICD 2/23/2016    Renal manifestation of secondary diabetes mellitus     Stage 3 chronic kidney disease 12/11/2017    Tobacco dependence     quit 1987    Transaminitis 4/1/2014    Type 2 diabetes with peripheral circulatory disorder, controlled     Ventricular tachycardia      Past Surgical History:   Procedure Laterality Date    APPENDECTOMY      CARDIAC DEFIBRILLATOR PLACEMENT      CARDIAC DEFIBRILLATOR PLACEMENT      COLONOSCOPY      CORONARY ANGIOPLASTY WITH STENT PLACEMENT      FRACTURE SURGERY      L arm    INSERT / REPLACE / REMOVE PACEMAKER  12/15    bi-V upgrade    RADIOFREQUENCY ABLATION      STEROID INJECTION KNEE Bilateral     received about every 4 months    TONSILLECTOMY      VASCULAR SURGERY       Family History   Problem Relation Age of Onset    Cancer Father     Diabetes Father     Bladder Cancer Father     Diabetes Mother     Heart attack Mother         "weak heart"    Bipolar disorder Son     Cancer Paternal Grandmother     Heart disease Maternal Grandmother     No Known Problems Maternal Grandfather     Cancer Paternal Grandfather     No Known Problems Daughter     No Known Problems Daughter     No Known Problems Son     No " Known Problems Son     Cancer Maternal Uncle     No Known Problems Maternal Aunt     No Known Problems Paternal Aunt     No Known Problems Paternal Uncle     No Known Problems Sister     No Known Problems Brother     Amblyopia Neg Hx     Blindness Neg Hx     Cataracts Neg Hx     Glaucoma Neg Hx     Hypertension Neg Hx     Macular degeneration Neg Hx     Retinal detachment Neg Hx     Strabismus Neg Hx     Stroke Neg Hx     Thyroid disease Neg Hx     Liver disease Neg Hx     Melanoma Neg Hx     Psoriasis Neg Hx     Lupus Neg Hx     Acne Neg Hx     Eczema Neg Hx      Social History     Tobacco Use    Smoking status: Former Smoker     Quit date: 1987     Years since quittin.0    Smokeless tobacco: Never Used    Tobacco comment: 25 years ago   Substance Use Topics    Alcohol use: No    Drug use: No     Review of Systems   Musculoskeletal: Positive for back pain.   All other systems reviewed and are negative.      Physical Exam     Initial Vitals [20 0712]   BP Pulse Resp Temp SpO2   122/68 62 18 98.2 °F (36.8 °C) 98 %      MAP       --         Physical Exam    Nursing note and vitals reviewed.  Constitutional: He appears well-developed and well-nourished. He is not diaphoretic. No distress.   HENT:   Head: Normocephalic and atraumatic.   Mouth/Throat: Oropharynx is clear and moist.   No cervical vertebral tenderness    Eyes: Conjunctivae and EOM are normal.   Neck: Normal range of motion. Neck supple.   Cardiovascular: Normal rate.   S1 S2 with ectopy    Pulmonary/Chest: Breath sounds normal. No respiratory distress.   Abdominal: Soft. There is no abdominal tenderness.   Musculoskeletal: Normal range of motion. No tenderness or edema.      Comments: Full ROM of all extremities    Neurological: He is alert and oriented to person, place, and time. He has normal strength.   Skin: Skin is warm and dry.         ED Course   Procedures  Labs Reviewed - No data to display       Imaging  Results          X-Ray Thoracic Spine AP Lateral (Final result)  Result time 06/28/20 07:45:41    Final result by Alber Narvaez MD (06/28/20 07:45:41)                 Impression:      Chronic changes are noted there is no evidence for acute fracture deformity.  Clinical and historical correlation is otherwise needed to determine need for additional follow-up.    Suspected atelectasis or infiltrate at the left lung base incompletely imaged, if indicated chest radiography may be helpful.      Electronically signed by: Alber Narvaez  Date:    06/28/2020  Time:    07:45             Narrative:    EXAMINATION:  XR THORACIC SPINE AP LATERAL    CLINICAL HISTORY:  fall;    TECHNIQUE:  AP and lateral views of the thoracic spine were performed.    COMPARISON:  September 25, 2015    FINDINGS:  Radiographic examination of the thoracic spine was performed, for radiographs are submitted.  Cardiac pacemaker is noted.  The heart size appears prominent and there is aortic atherosclerotic change noted.  There is appearance of may relate to atelectatic change or infiltrate at the left base incompletely imaged, clinical correlation is needed if indicated chest radiography may be helpful.  Chronic appearing endplate changes are noted, there is multilevel osteophyte formation, there is appearance of may relate to DISH.  There is no evidence for high-grade spondylolisthesis, there is no evidence for high-grade or acute compression fracture deformity.  There is no additional radiographic evidence for osseous destructive process or acute fracture.                                 Medical Decision Making:   Clinical Tests:   Radiological Study: Ordered and Reviewed  ED Management:  80-year-old male who fell injuring his back.  X-rays of the area in question show chronic changes but no acute abnormalities.  Patient will be discharged in stable condition to follow-up with his primary physician as needed.                                  Clinical Impression:       ICD-10-CM ICD-9-CM   1. Fall, initial encounter  W19.XXXA E888.9   2. Contusion of back, unspecified laterality, initial encounter  S20.229A 922.31         Disposition:   Disposition: Discharged  Condition: Stable     ED Disposition Condition    Discharge Stable        ED Prescriptions     None        Follow-up Information     Follow up With Specialties Details Why Contact Info    Mirna Reyes MD Internal Medicine  As needed [address]  [place] [address]  [phone]                        I, Dr. Helder Winters, personally performed the services described in this documentation. All medical record entries made by the scribe were at my direction and in my presence. I have reviewed the chart and agree that the record reflects my personal performance and is accurate and complete. Helder Winters MD.  2:55 PM 06/28/2020               Helder Winters MD  06/28/20 [id number]

## 2020-07-20 ENCOUNTER — TELEPHONE (OUTPATIENT)
Dept: INTERNAL MEDICINE | Facility: CLINIC | Age: 80
End: 2020-07-20

## 2020-07-20 DIAGNOSIS — R26.9 GAIT ABNORMALITY: Primary | ICD-10-CM

## 2020-07-20 NOTE — TELEPHONE ENCOUNTER
Pt states that his living facility will not let him leave out for treatment. Pt will have to be quarantined for 14 days.

## 2020-07-20 NOTE — TELEPHONE ENCOUNTER
He is going ot have ot be seen before ordering home health.  BOok him with Jaqueline Conrad this week.

## 2020-07-20 NOTE — TELEPHONE ENCOUNTER
----- Message from Halle Heller sent at 7/20/2020  9:48 AM CDT -----  Contact: Patient @ [phone]  Good Morning,  Patient called need a motorized wheelchair.Please call and advise

## 2020-07-20 NOTE — TELEPHONE ENCOUNTER
----- Message from Zakia Mohan sent at 7/20/2020 12:33 PM CDT -----  Regarding: Pt self Mobile/Home [phone]  Patient would like a call back in regards to him wanting for you to give him his medicare insurance subscriber ID number. Patient said that he need this information so that he can order a wheel chair please.

## 2020-07-20 NOTE — TELEPHONE ENCOUNTER
----- Message from Jazmin Duffy sent at 7/20/2020  2:30 PM CDT -----  Contact: self [phone]  Pt states he would like to talk back to the nurse in ref to some blisters he has on legs. Pt states he would like the dr to send a letter to [facility] with instructions on what to do to treat the blisters. Please call and advise

## 2020-07-20 NOTE — TELEPHONE ENCOUNTER
3rd message. Pt calling back states that he has blisters on his legs, he would like an order for [facility] to come out to see him for this issue.

## 2020-07-21 ENCOUNTER — OFFICE VISIT (OUTPATIENT)
Dept: INTERNAL MEDICINE | Facility: CLINIC | Age: 80
End: 2020-07-21
Payer: MEDICARE

## 2020-07-21 ENCOUNTER — LAB VISIT (OUTPATIENT)
Dept: LAB | Facility: HOSPITAL | Age: 80
End: 2020-07-21
Attending: INTERNAL MEDICINE
Payer: MEDICARE

## 2020-07-21 VITALS
DIASTOLIC BLOOD PRESSURE: 60 MMHG | HEIGHT: 70 IN | HEART RATE: 60 BPM | BODY MASS INDEX: 28.7 KG/M2 | SYSTOLIC BLOOD PRESSURE: 120 MMHG | OXYGEN SATURATION: 98 %

## 2020-07-21 DIAGNOSIS — I50.42 CHRONIC COMBINED SYSTOLIC AND DIASTOLIC HEART FAILURE: ICD-10-CM

## 2020-07-21 DIAGNOSIS — E11.51 TYPE 2 DIABETES MELLITUS WITH DIABETIC PERIPHERAL ANGIOPATHY WITHOUT GANGRENE, WITH LONG-TERM CURRENT USE OF INSULIN: ICD-10-CM

## 2020-07-21 DIAGNOSIS — L03.115 CELLULITIS OF RIGHT LOWER EXTREMITY: Primary | ICD-10-CM

## 2020-07-21 DIAGNOSIS — Z79.4 TYPE 2 DIABETES MELLITUS WITH DIABETIC PERIPHERAL ANGIOPATHY WITHOUT GANGRENE, WITH LONG-TERM CURRENT USE OF INSULIN: ICD-10-CM

## 2020-07-21 DIAGNOSIS — I50.22 CHRONIC SYSTOLIC HEART FAILURE: ICD-10-CM

## 2020-07-21 LAB
ALBUMIN SERPL BCP-MCNC: 3.7 G/DL (ref 3.5–5.2)
ALP SERPL-CCNC: 72 U/L (ref 55–135)
ALT SERPL W/O P-5'-P-CCNC: 45 U/L (ref 10–44)
ANION GAP SERPL CALC-SCNC: 6 MMOL/L (ref 8–16)
AST SERPL-CCNC: 56 U/L (ref 10–40)
BASOPHILS # BLD AUTO: 0.02 K/UL (ref 0–0.2)
BASOPHILS NFR BLD: 0.3 % (ref 0–1.9)
BILIRUB SERPL-MCNC: 0.6 MG/DL (ref 0.1–1)
BUN SERPL-MCNC: 20 MG/DL (ref 8–23)
CALCIUM SERPL-MCNC: 9.1 MG/DL (ref 8.7–10.5)
CHLORIDE SERPL-SCNC: 105 MMOL/L (ref 95–110)
CO2 SERPL-SCNC: 27 MMOL/L (ref 23–29)
CREAT SERPL-MCNC: 1.3 MG/DL (ref 0.5–1.4)
DIFFERENTIAL METHOD: ABNORMAL
EOSINOPHIL # BLD AUTO: 0 K/UL (ref 0–0.5)
EOSINOPHIL NFR BLD: 0.6 % (ref 0–8)
ERYTHROCYTE [DISTWIDTH] IN BLOOD BY AUTOMATED COUNT: 15.4 % (ref 11.5–14.5)
EST. GFR  (AFRICAN AMERICAN): 58.7 ML/MIN/1.73 M^2
EST. GFR  (NON AFRICAN AMERICAN): 50.8 ML/MIN/1.73 M^2
GLUCOSE SERPL-MCNC: 161 MG/DL (ref 70–110)
HCT VFR BLD AUTO: 41.4 % (ref 40–54)
HGB BLD-MCNC: 13.1 G/DL (ref 14–18)
IMM GRANULOCYTES # BLD AUTO: 0.02 K/UL (ref 0–0.04)
IMM GRANULOCYTES NFR BLD AUTO: 0.3 % (ref 0–0.5)
LYMPHOCYTES # BLD AUTO: 1.1 K/UL (ref 1–4.8)
LYMPHOCYTES NFR BLD: 16 % (ref 18–48)
MCH RBC QN AUTO: 31.5 PG (ref 27–31)
MCHC RBC AUTO-ENTMCNC: 31.6 G/DL (ref 32–36)
MCV RBC AUTO: 100 FL (ref 82–98)
MONOCYTES # BLD AUTO: 0.8 K/UL (ref 0.3–1)
MONOCYTES NFR BLD: 11.1 % (ref 4–15)
NEUTROPHILS # BLD AUTO: 5 K/UL (ref 1.8–7.7)
NEUTROPHILS NFR BLD: 71.7 % (ref 38–73)
NRBC BLD-RTO: 0 /100 WBC
PLATELET # BLD AUTO: 254 K/UL (ref 150–350)
PMV BLD AUTO: 10.8 FL (ref 9.2–12.9)
POTASSIUM SERPL-SCNC: 5.1 MMOL/L (ref 3.5–5.1)
PROT SERPL-MCNC: 6.8 G/DL (ref 6–8.4)
RBC # BLD AUTO: 4.16 M/UL (ref 4.6–6.2)
SODIUM SERPL-SCNC: 138 MMOL/L (ref 136–145)
TSH SERPL DL<=0.005 MIU/L-ACNC: 3.54 UIU/ML (ref 0.4–4)
WBC # BLD AUTO: 7.01 K/UL (ref 3.9–12.7)

## 2020-07-21 PROCEDURE — 83036 HEMOGLOBIN GLYCOSYLATED A1C: CPT | Mod: HCNC

## 2020-07-21 PROCEDURE — 99999 PR PBB SHADOW E&M-EST. PATIENT-LVL V: ICD-10-PCS | Mod: PBBFAC,HCNC,GC, | Performed by: STUDENT IN AN ORGANIZED HEALTH CARE EDUCATION/TRAINING PROGRAM

## 2020-07-21 PROCEDURE — 1159F PR MEDICATION LIST DOCUMENTED IN MEDICAL RECORD: ICD-10-PCS | Mod: HCNC,GC,S$GLB, | Performed by: STUDENT IN AN ORGANIZED HEALTH CARE EDUCATION/TRAINING PROGRAM

## 2020-07-21 PROCEDURE — 99214 PR OFFICE/OUTPT VISIT, EST, LEVL IV, 30-39 MIN: ICD-10-PCS | Mod: HCNC,GC,S$GLB, | Performed by: STUDENT IN AN ORGANIZED HEALTH CARE EDUCATION/TRAINING PROGRAM

## 2020-07-21 PROCEDURE — 36415 COLL VENOUS BLD VENIPUNCTURE: CPT | Mod: HCNC

## 2020-07-21 PROCEDURE — 1125F AMNT PAIN NOTED PAIN PRSNT: CPT | Mod: HCNC,GC,S$GLB, | Performed by: STUDENT IN AN ORGANIZED HEALTH CARE EDUCATION/TRAINING PROGRAM

## 2020-07-21 PROCEDURE — 3078F PR MOST RECENT DIASTOLIC BLOOD PRESSURE < 80 MM HG: ICD-10-PCS | Mod: HCNC,CPTII,GC,S$GLB | Performed by: STUDENT IN AN ORGANIZED HEALTH CARE EDUCATION/TRAINING PROGRAM

## 2020-07-21 PROCEDURE — 1100F PR PT FALLS ASSESS DOC 2+ FALLS/FALL W/INJURY/YR: ICD-10-PCS | Mod: HCNC,CPTII,GC,S$GLB | Performed by: STUDENT IN AN ORGANIZED HEALTH CARE EDUCATION/TRAINING PROGRAM

## 2020-07-21 PROCEDURE — 80053 COMPREHEN METABOLIC PANEL: CPT | Mod: HCNC

## 2020-07-21 PROCEDURE — 1100F PTFALLS ASSESS-DOCD GE2>/YR: CPT | Mod: HCNC,CPTII,GC,S$GLB | Performed by: STUDENT IN AN ORGANIZED HEALTH CARE EDUCATION/TRAINING PROGRAM

## 2020-07-21 PROCEDURE — 99214 OFFICE O/P EST MOD 30 MIN: CPT | Mod: HCNC,GC,S$GLB, | Performed by: STUDENT IN AN ORGANIZED HEALTH CARE EDUCATION/TRAINING PROGRAM

## 2020-07-21 PROCEDURE — 3288F FALL RISK ASSESSMENT DOCD: CPT | Mod: HCNC,CPTII,GC,S$GLB | Performed by: STUDENT IN AN ORGANIZED HEALTH CARE EDUCATION/TRAINING PROGRAM

## 2020-07-21 PROCEDURE — 1159F MED LIST DOCD IN RCRD: CPT | Mod: HCNC,GC,S$GLB, | Performed by: STUDENT IN AN ORGANIZED HEALTH CARE EDUCATION/TRAINING PROGRAM

## 2020-07-21 PROCEDURE — 85025 COMPLETE CBC W/AUTO DIFF WBC: CPT | Mod: HCNC

## 2020-07-21 PROCEDURE — 84443 ASSAY THYROID STIM HORMONE: CPT | Mod: HCNC

## 2020-07-21 PROCEDURE — 99999 PR PBB SHADOW E&M-EST. PATIENT-LVL V: CPT | Mod: PBBFAC,HCNC,GC, | Performed by: STUDENT IN AN ORGANIZED HEALTH CARE EDUCATION/TRAINING PROGRAM

## 2020-07-21 PROCEDURE — 1125F PR PAIN SEVERITY QUANTIFIED, PAIN PRESENT: ICD-10-PCS | Mod: HCNC,GC,S$GLB, | Performed by: STUDENT IN AN ORGANIZED HEALTH CARE EDUCATION/TRAINING PROGRAM

## 2020-07-21 PROCEDURE — 3288F PR FALLS RISK ASSESSMENT DOCUMENTED: ICD-10-PCS | Mod: HCNC,CPTII,GC,S$GLB | Performed by: STUDENT IN AN ORGANIZED HEALTH CARE EDUCATION/TRAINING PROGRAM

## 2020-07-21 PROCEDURE — 3074F SYST BP LT 130 MM HG: CPT | Mod: HCNC,CPTII,GC,S$GLB | Performed by: STUDENT IN AN ORGANIZED HEALTH CARE EDUCATION/TRAINING PROGRAM

## 2020-07-21 PROCEDURE — 3074F PR MOST RECENT SYSTOLIC BLOOD PRESSURE < 130 MM HG: ICD-10-PCS | Mod: HCNC,CPTII,GC,S$GLB | Performed by: STUDENT IN AN ORGANIZED HEALTH CARE EDUCATION/TRAINING PROGRAM

## 2020-07-21 PROCEDURE — 3078F DIAST BP <80 MM HG: CPT | Mod: HCNC,CPTII,GC,S$GLB | Performed by: STUDENT IN AN ORGANIZED HEALTH CARE EDUCATION/TRAINING PROGRAM

## 2020-07-21 PROCEDURE — 83880 ASSAY OF NATRIURETIC PEPTIDE: CPT | Mod: HCNC

## 2020-07-21 RX ORDER — DOXYCYCLINE 100 MG/1
100 CAPSULE ORAL EVERY 12 HOURS
Qty: 28 CAPSULE | Refills: 0 | Status: SHIPPED | OUTPATIENT
Start: 2020-07-21 | End: 2020-08-04

## 2020-07-21 RX ORDER — TRAZODONE HYDROCHLORIDE 50 MG/1
50 TABLET ORAL NIGHTLY
Qty: 90 TABLET | Refills: 1 | Status: SHIPPED | OUTPATIENT
Start: 2020-07-21 | End: 2020-07-21 | Stop reason: SDUPTHER

## 2020-07-21 RX ORDER — SPIRONOLACTONE 25 MG/1
25 TABLET ORAL DAILY
Qty: 90 TABLET | Refills: 3 | Status: SHIPPED | OUTPATIENT
Start: 2020-07-21

## 2020-07-21 RX ORDER — TRAZODONE HYDROCHLORIDE 50 MG/1
50 TABLET ORAL NIGHTLY
Qty: 90 TABLET | Refills: 1 | Status: SHIPPED | OUTPATIENT
Start: 2020-07-21

## 2020-07-21 NOTE — TELEPHONE ENCOUNTER
----- Message from Ela Laurent sent at 7/21/2020  8:51 AM CDT -----  Contact: self   Pt is requesting a rx for sleep. Pt states that he has not slept in 4 days. [facility] [phone] (Phone) [phone] (Fax). Pt would like to be notify as soon as it is sent. Please advise

## 2020-07-21 NOTE — TELEPHONE ENCOUNTER
Care Due:                  Date            Visit Type   Department     Provider  --------------------------------------------------------------------------------                                ESTABLISHED   [facility] INTERNAL  Last Visit: 01-      PATIENT      MEDICINE       FREDDIE REYES  Next Visit: None Scheduled  None         None Found                                                            Last  Test          Frequency    Reason                     Performed    Due Date  --------------------------------------------------------------------------------    ALT.........  12 months..  mirtazapine..............  09- 08-    Cr..........  12 months..  LANTUS...................  08- 08-    HBA1C.......  6 months...  LANTUS...................  06-   12-    Total         12 months..  mirtazapine..............  Not Found    Overdue  Cholesterol.    Triglyceride  12 months..  mirtazapine..............  Not Found    Overdue  s...........    TSH.........  12 months..  levothyroxine............  06- 06-    WBC.........  12 months..  mirtazapine..............  06-   06-    Powered by [employer]. Reference number: [id number]. 7/21/2020 12:18:04 PM   CDT

## 2020-07-21 NOTE — TELEPHONE ENCOUNTER
----- Message from Ela Laurent sent at 7/21/2020  8:51 AM CDT -----  Contact: self   Pt is requesting a rx for sleep. Pt states that he has not slept in 4 days. [facility] [phone] (Phone) [phone] (Fax). Pt would like to be notify as soon as it is sent. Please advise

## 2020-07-21 NOTE — TELEPHONE ENCOUNTER
----- Message from Thiago Rosenthal sent at 7/21/2020 10:37 AM CDT -----  Is this a refill or new RX:  Refill    RX name and strength: traZODone (DESYREL) 50 MG tablet    Pharmacy name and phone # [facility] [phone] (Phone)  [phone] (Fax)    He asked for a callback once it's sent to the pharmacy

## 2020-07-21 NOTE — TELEPHONE ENCOUNTER
----- Message from Mary Kay Tran sent at 7/21/2020 10:21 AM CDT -----  Type:  Needs Medical Advice    Who Called Diego       Pharmacy name and phone #:  [facility] [phone] (Phone)  [phone] (Fax)      Would the patient rather a call back or a response via [facility]? Call back     Best Call Back Number: [phone]    Additional Information: diego would like to speak with the nurse about getting sleeping medication

## 2020-07-21 NOTE — TELEPHONE ENCOUNTER
Pt states that he has been unable tro sleep for 2 nights. Pt would like pcp to order a sleeping pill to help him sleep. Please send to [facility]

## 2020-07-21 NOTE — PROGRESS NOTES
Subjective:       Patient ID: Madhav Cortez is a 80 y.o. male.    Chief Complaint: Blisters (both legs)    Madhav Cortez is a 80 y.o. male with DM, HTN, CAD s/p PCI, HFrEF ( EF 10-15%) s/p ICD and A fib on Pradaxa who is comes to the clinic for Leg blisters and redness.     He has noticed last week that both his legs were swollen, turned red with fluid blisters. Some of the blisters have burst with peeled skin and some spots with pus drainage and pain.      He has had this previously in February, where it was managed by wound care through home health that had healed.     He has HFrEF, seems he has been non compliant with his Diuretics - Bumex and Spironolactone per Refill, which could be cause him to be volume overload and  leg swelling, resulting in fluid blisters. He hasn't been consistently following up with his cardiologist - Dr Kaminski.   Currently no SOB. Sats normal in RA.     He also has chronic knee pain, bilateral secondary to osteoarthritis, which is worse in the last 2 weeks limiting his ability to walk. Has been seen by Dr Steve and Dr Coley and received Hyalurinic acid injections.         Review of Systems   Constitutional: Negative for appetite change, fatigue and fever.   HENT: Negative for rhinorrhea and sore throat.    Respiratory: Negative for cough and shortness of breath.    Cardiovascular: Positive for leg swelling. Negative for chest pain and palpitations.   Gastrointestinal: Negative for abdominal distention, abdominal pain, constipation, diarrhea, nausea and vomiting.   Genitourinary: Negative for dysuria and hematuria.   Musculoskeletal: Positive for arthralgias (Both knees).        Bilateral leg erythema and fluid blisters   Integumentary:  Negative for rash and wound.   Neurological: Negative for dizziness and headaches.   Hematological: Bruises/bleeds easily.   Psychiatric/Behavioral: Negative for agitation and confusion.         Objective:      Physical Exam  Constitutional:        Appearance: He is well-developed.   HENT:      Head: Normocephalic and atraumatic.   Eyes:      Pupils: Pupils are equal, round, and reactive to light.   Neck:      Musculoskeletal: Normal range of motion.      Thyroid: No thyromegaly.      Vascular: No JVD.   Cardiovascular:      Rate and Rhythm: Normal rate and regular rhythm.      Heart sounds: Normal heart sounds.   Pulmonary:      Effort: Pulmonary effort is normal.      Breath sounds: Normal breath sounds. No wheezing or rales.   Abdominal:      General: Bowel sounds are normal. There is no distension.      Palpations: Abdomen is soft.      Tenderness: There is no abdominal tenderness.   Musculoskeletal:      Right lower leg: Edema present.      Left lower leg: Edema present.   Skin:     Comments: Both LE legs - Erythema and fluid blisters, peeled skin following burst blisters  Right leg with pus  Ulcers on toes   Neurological:      Mental Status: He is alert and oriented to person, place, and time.      Cranial Nerves: No cranial nerve deficit.   Psychiatric:         Judgment: Judgment normal.         Assessment:       1. Cellulitis of right lower extremity    2. Chronic systolic heart failure    3. Chronic combined systolic and diastolic heart failure        Plan:         Cellulitis of right lower extremity  Patient with venous stasis sking changes from chronic LE edema, complicated by fluid blisters and Cellulitis with pus. He is also has DM  -     doxycycline (VIBRAMYCIN) 100 MG Cap; Take 1 capsule (100 mg total) by mouth every 12 (twelve) hours. for 14 days   - Legs were wrapped with dressing temporarily till wound care officially sees him  -     Ambulatory referral/consult to Wound Clinic; Future; Expected date: 07/28/2020  -     Ambulatory referral/consult to Home Health; Future; Expected date: 07/28/2020 for continued wound care. Lives in a assisted facility.    Chronic systolic heart failure  Patient has not been filling his Diuretics leading to  fluid retention given his HFrEF 10-15%  - Refilled spironolactone (ALDACTONE) 25 MG tablet; Take 1 tablet (25 mg total) by mouth once daily.    - Continue taking Bumex 1mg BID ( home dose )  - F/u with is cardiologist Dr Kaminski     To do blood work ordered by PCP, including CBC, CMP, BNP, TSH and HbA1c      Osteoarthritis of bilateral knees   - Follow up with Dr Coley ( has been getting hylarunic acid injections )  - Referral to Dr Cárdenas regarding wheelchair     Follow up with his PCP in 2 weeks       Case discussed with Dr Daron Redd  PGY - 3  Internal Medicine

## 2020-07-22 ENCOUNTER — PATIENT OUTREACH (OUTPATIENT)
Dept: ADMINISTRATIVE | Facility: OTHER | Age: 80
End: 2020-07-22

## 2020-07-22 ENCOUNTER — TELEPHONE (OUTPATIENT)
Dept: INTERNAL MEDICINE | Facility: CLINIC | Age: 80
End: 2020-07-22

## 2020-07-22 LAB
BNP SERPL-MCNC: 2717 PG/ML (ref 0–99)
ESTIMATED AVG GLUCOSE: 186 MG/DL (ref 68–131)
HBA1C MFR BLD HPLC: 8.1 % (ref 4–5.6)

## 2020-07-22 PROCEDURE — G0180 MD CERTIFICATION HHA PATIENT: HCPCS | Mod: ,,, | Performed by: INTERNAL MEDICINE

## 2020-07-22 PROCEDURE — G0180 PR HOME HEALTH MD CERTIFICATION: ICD-10-PCS | Mod: ,,, | Performed by: INTERNAL MEDICINE

## 2020-07-22 NOTE — TELEPHONE ENCOUNTER
----- Message from Laquita Levine sent at 7/22/2020  8:47 AM CDT -----  Contact: [facility]/mack/[phone]  Home care called in regards to getting clarification on the pt wound care orders. The nurse would like a call back ASAP.  She as the message be sent to [place].  The pt was seen by Kayce Redd MD    Please advise

## 2020-07-22 NOTE — PROGRESS NOTES
Care Everywhere: updated  Immunization: updated  Health Maintenance: updated  Media Review: reviewed for outside eye exam report  Legacy Review:   Order placed:   Upcoming appts:

## 2020-07-23 NOTE — TELEPHONE ENCOUNTER
----- Message from Mirna Reyes MD sent at 7/22/2020  7:20 PM CDT -----  PLease ntoify pt that his blood work reveals that he has too much fluid on him  The heart failure blood test is very high.  Have you been taking your bumex 1 mg twice daily?  If you have been taking the bumex, then increase the bumex to 2 mg twice daily. Let me know    SF

## 2020-07-24 ENCOUNTER — TELEPHONE (OUTPATIENT)
Dept: CARDIOLOGY | Facility: HOSPITAL | Age: 80
End: 2020-07-24

## 2020-07-24 ENCOUNTER — TELEPHONE (OUTPATIENT)
Dept: INTERNAL MEDICINE | Facility: CLINIC | Age: 80
End: 2020-07-24

## 2020-07-24 NOTE — TELEPHONE ENCOUNTER
----- Message from Jazmin Duffy sent at 7/24/2020  8:47 AM CDT -----  Contact: wife   Pt wife called to notify  that pt passed away yesterday morning. Please call and advise

## 2020-07-24 NOTE — TELEPHONE ENCOUNTER
----- Message from Jessenia Montano sent at 7/24/2020  9:58 AM CDT -----  Needs Advice    Reason for call:      Pt has passed away on this morning at home.    Communication Preference: [place] -- [facility] -- [phone]    Additional Information:    Waldemar is requesting a call back

## 2020-07-24 NOTE — TELEPHONE ENCOUNTER
Daughter, Vjiaya, called and stated that patient passed away yesterday at [facility] assisted living [facility]. Patient is now at [facility] ([phone]) awaiting cremation. Tianna, from [facility] has been contacted and I am awaiting her return call. Daughter and wife would like to know what happened as far as events go. Once I hear from [facility], I will ask them to interrogate device to see if pt had any events that were treated or was it a natural death. I will call Vijaya back at [phone].

## 2020-07-26 NOTE — PROGRESS NOTES
I have reviewed and concur with the resident's history, physical, assessment, and plan.  I have personally interviewed and examined the patient  Madhav Cortez at bedside. See below addendum for my evaluation and additional findings.  Patient of Dr Reyes with complex medical history including venous stasis skin changes with leg swelling with weeping bullous lesions of both lower legs; R lower leg with two areas that appear to have secondary cellulitis as well. Lung fields clear on exam. On discussion of meds, appears he may have stopped taking his bumex and spironolactone, but this is not clear. Recommended restart diuretic. Ordering home health for nursing as well as wound care services - [facility] and [facility].   Referring to wound care clinic for detailed eval and to guide ongoing wound care; patient may have had unna boots in the past for similar problem.  Recommend close follow up with cardiology and PCP  Referral placed to PMR as patient is requesting rx for powered wheelchair and knee injections; can also see Dr Coley again re: knee pain.   Labs today as previously ordered by PCP.  We do not have appropriate setup for full wound care of his skin lesions in clinic today; cleaned areas as much as possible and applied dressing, counseled re: wound care with clean warm water / gentle soap or with saline wound wash, pat dry, avoid any friction.   RTC/ER precautions given.    Anders Neri MD

## 2020-07-28 ENCOUNTER — DOCUMENT SCAN (OUTPATIENT)
Dept: HOME HEALTH SERVICES | Facility: HOSPITAL | Age: 80
End: 2020-07-28
Payer: MEDICARE

## 2020-08-13 ENCOUNTER — EXTERNAL HOME HEALTH (OUTPATIENT)
Dept: HOME HEALTH SERVICES | Facility: HOSPITAL | Age: 80
End: 2020-08-13
Payer: MEDICARE

## 2022-01-01 NOTE — ASSESSMENT & PLAN NOTE
CXR, elevated BNP, physical exam suggestive of volume overload, and history all consistent with CHF exacerbation  Last echo was in 2017 with EF of 10-15%  Repeat echo results below. Unchanged  Diuresis with lasix 80 mg IV BID; will decrease dose due to contraction alkalosis    On carvedilol but given poor EF on previous echo and CHF exacerbation, will hold for now until euvolemia achieved   9.6L urine output since admit.  Weight down to 99kg from 103 kg  Hypoxia improved. Now on RA  Maintain euvolemia by monitoring I/O's and daily weights.  Fluid restriction (2 liters/24 hours)  Low Na diet  Monitor for signs of fluid overload: RR>30, O2 sat<92%, weight gain of 3 lbs, or urinary output <160ml/8hr  Maintain oxygen sats >92% via NC if supplemental oxygen needed.     Patient Vitals for the past 72 hrs (Last 3 readings):   Weight   05/24/19 0400 99 kg (218 lb 4.1 oz)   05/23/19 1516 103 kg (227 lb)   05/22/19 0843 103 kg (227 lb)      204335:2 weeks|| ||00\01||False;

## 2023-07-25 NOTE — ASSESSMENT & PLAN NOTE
POCT Glucose   Date Value Ref Range Status   05/26/2019 227 (H) 70 - 110 mg/dL Final   05/25/2019 226 (H) 70 - 110 mg/dL Final   05/25/2019 208 (H) 70 - 110 mg/dL Final   05/25/2019 225 (H) 70 - 110 mg/dL Final   05/25/2019 113 (H) 70 - 110 mg/dL Final   05/24/2019 186 (H) 70 - 110 mg/dL Final   05/24/2019 117 (H) 70 - 110 mg/dL Final   05/24/2019 140 (H) 70 - 110 mg/dL Final   05/24/2019 216 (H) 70 - 110 mg/dL Final   05/24/2019 89 70 - 110 mg/dL Final   05/23/2019 251 (H) 70 - 110 mg/dL Final   05/23/2019 132 (H) 70 - 110 mg/dL Final   05/23/2019 146 (H) 70 - 110 mg/dL Final     Patient states only taking basal insulin at home of 40 units QHS but sometimes forgets  Wife took him off prandial insulin due to low sugars  Increase basal dose to 20 units  Start low dose scheduled aspart at 3 units   Low dose correction scale   Cont blood glucose monitoring   Avoid hypoglycemia  ADA diet   2.5